# Patient Record
Sex: FEMALE | Race: WHITE | NOT HISPANIC OR LATINO | Employment: OTHER | URBAN - METROPOLITAN AREA
[De-identification: names, ages, dates, MRNs, and addresses within clinical notes are randomized per-mention and may not be internally consistent; named-entity substitution may affect disease eponyms.]

---

## 2018-12-19 ENCOUNTER — AMBULATORY - HEALTHEAST (OUTPATIENT)
Dept: LAB | Facility: CLINIC | Age: 63
End: 2018-12-19

## 2018-12-19 DIAGNOSIS — Z79.899 HIGH RISK MEDICATION USE: ICD-10-CM

## 2018-12-19 DIAGNOSIS — Z13.9 ENCOUNTER FOR SCREENING: ICD-10-CM

## 2018-12-19 LAB — ETHANOL SERPL-MCNC: <10 MG/DL

## 2018-12-24 LAB
MISCELLANEOUS TEST DEPT. - HE HISTORICAL: NORMAL
PERFORMING LAB: NORMAL
SPECIMEN STATUS: NORMAL
TEST NAME: NORMAL

## 2021-05-25 ENCOUNTER — RECORDS - HEALTHEAST (OUTPATIENT)
Dept: ADMINISTRATIVE | Facility: CLINIC | Age: 66
End: 2021-05-25

## 2021-05-26 ENCOUNTER — RECORDS - HEALTHEAST (OUTPATIENT)
Dept: ADMINISTRATIVE | Facility: CLINIC | Age: 66
End: 2021-05-26

## 2021-05-27 ENCOUNTER — RECORDS - HEALTHEAST (OUTPATIENT)
Dept: ADMINISTRATIVE | Facility: CLINIC | Age: 66
End: 2021-05-27

## 2021-05-29 ENCOUNTER — RECORDS - HEALTHEAST (OUTPATIENT)
Dept: ADMINISTRATIVE | Facility: CLINIC | Age: 66
End: 2021-05-29

## 2021-06-01 ENCOUNTER — RECORDS - HEALTHEAST (OUTPATIENT)
Dept: ADMINISTRATIVE | Facility: CLINIC | Age: 66
End: 2021-06-01

## 2021-06-16 PROBLEM — B37.9 CANDIDA INFECTION: Status: ACTIVE | Noted: 2018-10-19

## 2021-06-16 PROBLEM — F23 ACUTE PSYCHOSIS (H): Status: ACTIVE | Noted: 2018-10-14

## 2021-06-16 PROBLEM — F10.20 ALCOHOL USE DISORDER, SEVERE, DEPENDENCE (H): Status: ACTIVE | Noted: 2018-10-31

## 2021-07-21 ENCOUNTER — RECORDS - HEALTHEAST (OUTPATIENT)
Dept: ADMINISTRATIVE | Facility: CLINIC | Age: 66
End: 2021-07-21

## 2021-07-30 ENCOUNTER — OFFICE VISIT (OUTPATIENT)
Dept: FAMILY MEDICINE | Facility: CLINIC | Age: 66
End: 2021-07-30
Payer: COMMERCIAL

## 2021-07-30 ENCOUNTER — TRANSCRIBE ORDERS (OUTPATIENT)
Dept: OTHER | Age: 66
End: 2021-07-30

## 2021-07-30 VITALS
HEART RATE: 72 BPM | WEIGHT: 132.3 LBS | OXYGEN SATURATION: 95 % | HEIGHT: 67 IN | SYSTOLIC BLOOD PRESSURE: 104 MMHG | DIASTOLIC BLOOD PRESSURE: 68 MMHG | BODY MASS INDEX: 20.76 KG/M2

## 2021-07-30 DIAGNOSIS — J44.9 CHRONIC OBSTRUCTIVE PULMONARY DISEASE, UNSPECIFIED COPD TYPE (H): Primary | ICD-10-CM

## 2021-07-30 DIAGNOSIS — M54.50 CHRONIC LOW BACK PAIN: ICD-10-CM

## 2021-07-30 DIAGNOSIS — G89.29 OTHER CHRONIC PAIN: ICD-10-CM

## 2021-07-30 DIAGNOSIS — G89.29 CHRONIC LOW BACK PAIN: ICD-10-CM

## 2021-07-30 DIAGNOSIS — M25.511 PAIN IN RIGHT SHOULDER: Primary | ICD-10-CM

## 2021-07-30 PROCEDURE — 99203 OFFICE O/P NEW LOW 30 MIN: CPT | Performed by: FAMILY MEDICINE

## 2021-07-30 RX ORDER — POTASSIUM CHLORIDE 1500 MG/1
20 TABLET, EXTENDED RELEASE ORAL DAILY
COMMUNITY
Start: 2020-10-20 | End: 2023-03-01

## 2021-07-30 RX ORDER — BUDESONIDE AND FORMOTEROL FUMARATE DIHYDRATE 160; 4.5 UG/1; UG/1
2 AEROSOL RESPIRATORY (INHALATION) 2 TIMES DAILY
Qty: 10.2 G | Refills: 0 | Status: SHIPPED | OUTPATIENT
Start: 2021-07-30 | End: 2023-03-01

## 2021-07-30 RX ORDER — METHADONE HYDROCHLORIDE 5 MG/1
5 TABLET ORAL DAILY
COMMUNITY
Start: 2021-07-07 | End: 2023-03-01

## 2021-07-30 RX ORDER — METHYLPHENIDATE HYDROCHLORIDE 20 MG/1
40 TABLET ORAL 2 TIMES DAILY
COMMUNITY
End: 2023-03-01

## 2021-07-30 RX ORDER — CLONAZEPAM 0.5 MG/1
0.5 TABLET ORAL DAILY
COMMUNITY
End: 2023-03-01

## 2021-07-30 RX ORDER — CLONIDINE HYDROCHLORIDE 0.2 MG/1
0.2 TABLET ORAL 3 TIMES DAILY
COMMUNITY
Start: 2021-07-13 | End: 2023-03-05 | Stop reason: ALTCHOICE

## 2021-07-30 RX ORDER — NYSTATIN 100000 [USP'U]/G
POWDER TOPICAL 3 TIMES DAILY PRN
COMMUNITY
Start: 2021-03-27

## 2021-07-30 RX ORDER — ONDANSETRON 4 MG/1
4 TABLET, ORALLY DISINTEGRATING ORAL PRN
COMMUNITY
Start: 2020-08-14 | End: 2023-03-01

## 2021-07-30 ASSESSMENT — MIFFLIN-ST. JEOR: SCORE: 1159.8
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
## 2023-03-01 ENCOUNTER — APPOINTMENT (OUTPATIENT)
Dept: ULTRASOUND IMAGING | Facility: CLINIC | Age: 68
End: 2023-03-01
Attending: NURSE PRACTITIONER
Payer: COMMERCIAL

## 2023-03-01 ENCOUNTER — HOSPITAL ENCOUNTER (OUTPATIENT)
Facility: CLINIC | Age: 68
Setting detail: OBSERVATION
Discharge: HOME | End: 2023-03-02
Attending: HOSPITALIST | Admitting: HOSPITALIST
Payer: COMMERCIAL

## 2023-03-01 ENCOUNTER — APPOINTMENT (OUTPATIENT)
Dept: RADIOLOGY | Facility: CLINIC | Age: 68
End: 2023-03-01
Attending: NURSE PRACTITIONER
Payer: COMMERCIAL

## 2023-03-01 ENCOUNTER — APPOINTMENT (OUTPATIENT)
Dept: CT IMAGING | Facility: CLINIC | Age: 68
End: 2023-03-01
Attending: NURSE PRACTITIONER
Payer: COMMERCIAL

## 2023-03-01 DIAGNOSIS — R10.9 ABDOMINAL PAIN: ICD-10-CM

## 2023-03-01 DIAGNOSIS — M79.651 PAIN OF RIGHT THIGH: ICD-10-CM

## 2023-03-01 DIAGNOSIS — E83.42 HYPOMAGNESEMIA: Primary | ICD-10-CM

## 2023-03-01 DIAGNOSIS — E87.6 HYPOKALEMIA: ICD-10-CM

## 2023-03-01 DIAGNOSIS — K59.00 OBSTIPATION: ICD-10-CM

## 2023-03-01 LAB
ALBUMIN SERPL-MCNC: 3.7 G/DL (ref 3.5–5)
ALP SERPL-CCNC: 80 U/L (ref 45–120)
ALT SERPL W P-5'-P-CCNC: <9 U/L (ref 0–45)
ANION GAP SERPL CALCULATED.3IONS-SCNC: 14 MMOL/L (ref 5–18)
AST SERPL W P-5'-P-CCNC: 14 U/L (ref 0–40)
BASOPHILS # BLD AUTO: 0 10E3/UL (ref 0–0.2)
BASOPHILS NFR BLD AUTO: 1 %
BILIRUB SERPL-MCNC: 0.4 MG/DL (ref 0–1)
BUN SERPL-MCNC: 13 MG/DL (ref 8–22)
CALCIUM SERPL-MCNC: 8.7 MG/DL (ref 8.5–10.5)
CHLORIDE BLD-SCNC: 102 MMOL/L (ref 98–107)
CO2 SERPL-SCNC: 25 MMOL/L (ref 22–31)
CREAT SERPL-MCNC: 1.13 MG/DL (ref 0.6–1.1)
EOSINOPHIL # BLD AUTO: 0.1 10E3/UL (ref 0–0.7)
EOSINOPHIL NFR BLD AUTO: 3 %
ERYTHROCYTE [DISTWIDTH] IN BLOOD BY AUTOMATED COUNT: 12.6 % (ref 10–15)
GFR SERPL CREATININE-BSD FRML MDRD: 53 ML/MIN/1.73M2
GLUCOSE BLD-MCNC: 93 MG/DL (ref 70–125)
HCT VFR BLD AUTO: 32.3 % (ref 35–47)
HGB BLD-MCNC: 11 G/DL (ref 11.7–15.7)
HOLD SPECIMEN: NORMAL
IMM GRANULOCYTES # BLD: 0 10E3/UL
IMM GRANULOCYTES NFR BLD: 1 %
LACTATE SERPL-SCNC: 1.4 MMOL/L (ref 0.7–2)
LYMPHOCYTES # BLD AUTO: 0.9 10E3/UL (ref 0.8–5.3)
LYMPHOCYTES NFR BLD AUTO: 22 %
MAGNESIUM SERPL-MCNC: 1.7 MG/DL (ref 1.8–2.6)
MAGNESIUM SERPL-MCNC: 1.9 MG/DL (ref 1.8–2.6)
MCH RBC QN AUTO: 31.8 PG (ref 26.5–33)
MCHC RBC AUTO-ENTMCNC: 34.1 G/DL (ref 31.5–36.5)
MCV RBC AUTO: 93 FL (ref 78–100)
MONOCYTES # BLD AUTO: 0.3 10E3/UL (ref 0–1.3)
MONOCYTES NFR BLD AUTO: 6 %
NEUTROPHILS # BLD AUTO: 2.9 10E3/UL (ref 1.6–8.3)
NEUTROPHILS NFR BLD AUTO: 67 %
NRBC # BLD AUTO: 0 10E3/UL
NRBC BLD AUTO-RTO: 0 /100
PLATELET # BLD AUTO: 199 10E3/UL (ref 150–450)
POTASSIUM BLD-SCNC: 2.7 MMOL/L (ref 3.5–5)
PROT SERPL-MCNC: 7 G/DL (ref 6–8)
RBC # BLD AUTO: 3.46 10E6/UL (ref 3.8–5.2)
SODIUM SERPL-SCNC: 141 MMOL/L (ref 136–145)
WBC # BLD AUTO: 4.3 10E3/UL (ref 4–11)

## 2023-03-01 PROCEDURE — 36415 COLL VENOUS BLD VENIPUNCTURE: CPT | Performed by: EMERGENCY MEDICINE

## 2023-03-01 PROCEDURE — 250N000011 HC RX IP 250 OP 636: Performed by: HOSPITALIST

## 2023-03-01 PROCEDURE — 71046 X-RAY EXAM CHEST 2 VIEWS: CPT

## 2023-03-01 PROCEDURE — 83735 ASSAY OF MAGNESIUM: CPT | Performed by: NURSE PRACTITIONER

## 2023-03-01 PROCEDURE — 36415 COLL VENOUS BLD VENIPUNCTURE: CPT | Performed by: HOSPITALIST

## 2023-03-01 PROCEDURE — 83880 ASSAY OF NATRIURETIC PEPTIDE: CPT | Performed by: NURSE PRACTITIONER

## 2023-03-01 PROCEDURE — 83605 ASSAY OF LACTIC ACID: CPT | Performed by: NURSE PRACTITIONER

## 2023-03-01 PROCEDURE — C9113 INJ PANTOPRAZOLE SODIUM, VIA: HCPCS | Performed by: HOSPITALIST

## 2023-03-01 PROCEDURE — 80053 COMPREHEN METABOLIC PANEL: CPT | Performed by: NURSE PRACTITIONER

## 2023-03-01 PROCEDURE — 96372 THER/PROPH/DIAG INJ SC/IM: CPT | Mod: XS | Performed by: NURSE PRACTITIONER

## 2023-03-01 PROCEDURE — 83735 ASSAY OF MAGNESIUM: CPT | Performed by: HOSPITALIST

## 2023-03-01 PROCEDURE — 99285 EMERGENCY DEPT VISIT HI MDM: CPT | Mod: 25

## 2023-03-01 PROCEDURE — 99222 1ST HOSP IP/OBS MODERATE 55: CPT | Performed by: HOSPITALIST

## 2023-03-01 PROCEDURE — 258N000003 HC RX IP 258 OP 636: Performed by: HOSPITALIST

## 2023-03-01 PROCEDURE — 96365 THER/PROPH/DIAG IV INF INIT: CPT

## 2023-03-01 PROCEDURE — 250N000011 HC RX IP 250 OP 636: Performed by: NURSE PRACTITIONER

## 2023-03-01 PROCEDURE — 74177 CT ABD & PELVIS W/CONTRAST: CPT

## 2023-03-01 PROCEDURE — 96375 TX/PRO/DX INJ NEW DRUG ADDON: CPT

## 2023-03-01 PROCEDURE — 93971 EXTREMITY STUDY: CPT | Mod: RT

## 2023-03-01 PROCEDURE — 85025 COMPLETE CBC W/AUTO DIFF WBC: CPT | Performed by: NURSE PRACTITIONER

## 2023-03-01 PROCEDURE — G0378 HOSPITAL OBSERVATION PER HR: HCPCS

## 2023-03-01 PROCEDURE — 250N000013 HC RX MED GY IP 250 OP 250 PS 637: Performed by: NURSE PRACTITIONER

## 2023-03-01 PROCEDURE — 96366 THER/PROPH/DIAG IV INF ADDON: CPT

## 2023-03-01 RX ORDER — QUETIAPINE FUMARATE 300 MG/1
300 TABLET, FILM COATED ORAL AT BEDTIME
Status: DISCONTINUED | OUTPATIENT
Start: 2023-03-01 | End: 2023-03-02 | Stop reason: HOSPADM

## 2023-03-01 RX ORDER — QUETIAPINE FUMARATE 100 MG/1
100 TABLET, FILM COATED ORAL EVERY MORNING
Status: ON HOLD | COMMUNITY
End: 2023-04-06

## 2023-03-01 RX ORDER — PROCHLORPERAZINE MALEATE 5 MG
5 TABLET ORAL EVERY 6 HOURS PRN
Status: DISCONTINUED | OUTPATIENT
Start: 2023-03-01 | End: 2023-03-02 | Stop reason: HOSPADM

## 2023-03-01 RX ORDER — PROCHLORPERAZINE 25 MG
12.5 SUPPOSITORY, RECTAL RECTAL EVERY 12 HOURS PRN
Status: DISCONTINUED | OUTPATIENT
Start: 2023-03-01 | End: 2023-03-02 | Stop reason: HOSPADM

## 2023-03-01 RX ORDER — POTASSIUM CHLORIDE 1500 MG/1
40 TABLET, EXTENDED RELEASE ORAL ONCE
Status: COMPLETED | OUTPATIENT
Start: 2023-03-01 | End: 2023-03-01

## 2023-03-01 RX ORDER — AMLODIPINE BESYLATE 5 MG/1
5 TABLET ORAL DAILY
Status: ON HOLD | COMMUNITY
End: 2023-04-01

## 2023-03-01 RX ORDER — FLUOXETINE 40 MG/1
40 CAPSULE ORAL DAILY
Status: ON HOLD | COMMUNITY
End: 2023-04-06

## 2023-03-01 RX ORDER — POTASSIUM CHLORIDE 1500 MG/1
20 TABLET, EXTENDED RELEASE ORAL 2 TIMES DAILY
Qty: 20 TABLET | Refills: 0 | Status: SHIPPED | OUTPATIENT
Start: 2023-03-01 | End: 2023-03-31

## 2023-03-01 RX ORDER — QUETIAPINE FUMARATE 50 MG/1
100 TABLET, FILM COATED ORAL EVERY MORNING
Status: DISCONTINUED | OUTPATIENT
Start: 2023-03-02 | End: 2023-03-02 | Stop reason: HOSPADM

## 2023-03-01 RX ORDER — ACETAMINOPHEN 325 MG/1
650 TABLET ORAL EVERY 6 HOURS PRN
Status: DISCONTINUED | OUTPATIENT
Start: 2023-03-01 | End: 2023-03-02 | Stop reason: HOSPADM

## 2023-03-01 RX ORDER — BUPRENORPHINE AND NALOXONE 4; 1 MG/1; MG/1
1 FILM, SOLUBLE BUCCAL; SUBLINGUAL 2 TIMES DAILY
COMMUNITY

## 2023-03-01 RX ORDER — GABAPENTIN 300 MG/1
300 CAPSULE ORAL 3 TIMES DAILY
Status: ON HOLD | COMMUNITY
End: 2023-04-01

## 2023-03-01 RX ORDER — FLUTICASONE FUROATE AND VILANTEROL 200; 25 UG/1; UG/1
1 POWDER RESPIRATORY (INHALATION) DAILY
Status: DISCONTINUED | OUTPATIENT
Start: 2023-03-02 | End: 2023-03-01

## 2023-03-01 RX ORDER — BUPRENORPHINE AND NALOXONE 4; 1 MG/1; MG/1
1 FILM, SOLUBLE BUCCAL; SUBLINGUAL 2 TIMES DAILY
Status: DISCONTINUED | OUTPATIENT
Start: 2023-03-01 | End: 2023-03-02 | Stop reason: HOSPADM

## 2023-03-01 RX ORDER — ACETAMINOPHEN 650 MG/1
650 SUPPOSITORY RECTAL EVERY 6 HOURS PRN
Status: DISCONTINUED | OUTPATIENT
Start: 2023-03-01 | End: 2023-03-02 | Stop reason: HOSPADM

## 2023-03-01 RX ORDER — GABAPENTIN 300 MG/1
300 CAPSULE ORAL 3 TIMES DAILY
Status: DISCONTINUED | OUTPATIENT
Start: 2023-03-01 | End: 2023-03-02 | Stop reason: HOSPADM

## 2023-03-01 RX ORDER — AMLODIPINE BESYLATE 5 MG/1
5 TABLET ORAL DAILY
Status: DISCONTINUED | OUTPATIENT
Start: 2023-03-02 | End: 2023-03-02 | Stop reason: HOSPADM

## 2023-03-01 RX ORDER — CLONIDINE HYDROCHLORIDE 0.1 MG/1
0.2 TABLET ORAL 3 TIMES DAILY
Status: DISCONTINUED | OUTPATIENT
Start: 2023-03-01 | End: 2023-03-02 | Stop reason: HOSPADM

## 2023-03-01 RX ORDER — DOCUSATE SODIUM 100 MG/1
100 CAPSULE, LIQUID FILLED ORAL 2 TIMES DAILY
Status: DISCONTINUED | OUTPATIENT
Start: 2023-03-01 | End: 2023-03-02 | Stop reason: HOSPADM

## 2023-03-01 RX ORDER — DOCUSATE SODIUM 100 MG/1
100 CAPSULE, LIQUID FILLED ORAL 2 TIMES DAILY PRN
Status: DISCONTINUED | OUTPATIENT
Start: 2023-03-01 | End: 2023-03-02 | Stop reason: HOSPADM

## 2023-03-01 RX ORDER — CLONIDINE HYDROCHLORIDE 0.2 MG/1
0.2 TABLET ORAL PRN
COMMUNITY
End: 2023-03-05 | Stop reason: ALTCHOICE

## 2023-03-01 RX ORDER — FLUTICASONE PROPIONATE AND SALMETEROL 250; 50 UG/1; UG/1
1 POWDER RESPIRATORY (INHALATION) EVERY 12 HOURS
COMMUNITY
End: 2023-03-30

## 2023-03-01 RX ORDER — ALBUTEROL SULFATE 90 UG/1
2 AEROSOL, METERED RESPIRATORY (INHALATION) EVERY 6 HOURS PRN
Status: DISCONTINUED | OUTPATIENT
Start: 2023-03-01 | End: 2023-03-02 | Stop reason: HOSPADM

## 2023-03-01 RX ORDER — ONDANSETRON 4 MG/1
4 TABLET, ORALLY DISINTEGRATING ORAL EVERY 6 HOURS PRN
Status: DISCONTINUED | OUTPATIENT
Start: 2023-03-01 | End: 2023-03-02 | Stop reason: HOSPADM

## 2023-03-01 RX ORDER — ASPIRIN 81 MG
100 TABLET, DELAYED RELEASE (ENTERIC COATED) ORAL 2 TIMES DAILY PRN
Status: ON HOLD | COMMUNITY
End: 2023-04-15

## 2023-03-01 RX ORDER — MAGNESIUM OXIDE 400 MG/1
400 TABLET ORAL DAILY
Status: DISCONTINUED | OUTPATIENT
Start: 2023-03-02 | End: 2023-03-02 | Stop reason: HOSPADM

## 2023-03-01 RX ORDER — IOPAMIDOL 755 MG/ML
100 INJECTION, SOLUTION INTRAVASCULAR ONCE
Status: COMPLETED | OUTPATIENT
Start: 2023-03-01 | End: 2023-03-01

## 2023-03-01 RX ORDER — MAGNESIUM OXIDE 400 MG/1
400 TABLET ORAL DAILY
COMMUNITY
End: 2023-03-02

## 2023-03-01 RX ORDER — BISACODYL 10 MG
10 SUPPOSITORY, RECTAL RECTAL DAILY PRN
Status: DISCONTINUED | OUTPATIENT
Start: 2023-03-01 | End: 2023-03-02 | Stop reason: HOSPADM

## 2023-03-01 RX ORDER — POTASSIUM CHLORIDE 7.45 MG/ML
10 INJECTION INTRAVENOUS
Status: COMPLETED | OUTPATIENT
Start: 2023-03-01 | End: 2023-03-02

## 2023-03-01 RX ORDER — FUROSEMIDE 20 MG
20 TABLET ORAL DAILY
COMMUNITY
End: 2023-03-05 | Stop reason: ALTCHOICE

## 2023-03-01 RX ORDER — POTASSIUM CHLORIDE 1500 MG/1
20 TABLET, EXTENDED RELEASE ORAL DAILY
Status: DISCONTINUED | OUTPATIENT
Start: 2023-03-02 | End: 2023-03-02 | Stop reason: HOSPADM

## 2023-03-01 RX ORDER — FUROSEMIDE 20 MG
20 TABLET ORAL DAILY
Status: DISCONTINUED | OUTPATIENT
Start: 2023-03-02 | End: 2023-03-02 | Stop reason: HOSPADM

## 2023-03-01 RX ORDER — ONDANSETRON 2 MG/ML
4 INJECTION INTRAMUSCULAR; INTRAVENOUS EVERY 6 HOURS PRN
Status: DISCONTINUED | OUTPATIENT
Start: 2023-03-01 | End: 2023-03-02 | Stop reason: HOSPADM

## 2023-03-01 RX ORDER — SODIUM CHLORIDE 9 MG/ML
INJECTION, SOLUTION INTRAVENOUS CONTINUOUS
Status: DISCONTINUED | OUTPATIENT
Start: 2023-03-01 | End: 2023-03-02 | Stop reason: HOSPADM

## 2023-03-01 RX ORDER — BUDESONIDE AND FORMOTEROL FUMARATE DIHYDRATE 160; 4.5 UG/1; UG/1
2 AEROSOL RESPIRATORY (INHALATION)
Status: DISCONTINUED | OUTPATIENT
Start: 2023-03-02 | End: 2023-03-02 | Stop reason: HOSPADM

## 2023-03-01 RX ADMIN — PANTOPRAZOLE SODIUM 40 MG: 40 INJECTION, POWDER, FOR SOLUTION INTRAVENOUS at 22:22

## 2023-03-01 RX ADMIN — POLYETHYLENE GLYCOL 3350, SODIUM SULFATE ANHYDROUS, SODIUM BICARBONATE, SODIUM CHLORIDE, POTASSIUM CHLORIDE 4000 ML: 236; 22.74; 6.74; 5.86; 2.97 POWDER, FOR SOLUTION ORAL at 22:05

## 2023-03-01 RX ADMIN — METHYLNALTREXONE BROMIDE 12 MG: 12 INJECTION, SOLUTION SUBCUTANEOUS at 22:05

## 2023-03-01 RX ADMIN — IOPAMIDOL 75 ML: 755 INJECTION, SOLUTION INTRAVENOUS at 21:09

## 2023-03-01 RX ADMIN — POTASSIUM CHLORIDE 10 MEQ: 7.46 INJECTION, SOLUTION INTRAVENOUS at 22:05

## 2023-03-01 RX ADMIN — SODIUM CHLORIDE: 9 INJECTION, SOLUTION INTRAVENOUS at 22:05

## 2023-03-01 RX ADMIN — POTASSIUM CHLORIDE 10 MEQ: 7.46 INJECTION, SOLUTION INTRAVENOUS at 23:23

## 2023-03-01 RX ADMIN — POTASSIUM CHLORIDE 40 MEQ: 1500 TABLET, EXTENDED RELEASE ORAL at 21:25

## 2023-03-01 ASSESSMENT — ACTIVITIES OF DAILY LIVING (ADL)
ADLS_ACUITY_SCORE: 35
ADLS_ACUITY_SCORE: 35

## 2023-03-01 ASSESSMENT — ENCOUNTER SYMPTOMS
SHORTNESS OF BREATH: 1
CONSTIPATION: 1
FLANK PAIN: 0
ABDOMINAL PAIN: 1
COUGH: 1
EYE PAIN: 1

## 2023-03-02 VITALS
DIASTOLIC BLOOD PRESSURE: 71 MMHG | RESPIRATION RATE: 15 BRPM | OXYGEN SATURATION: 97 % | HEART RATE: 61 BPM | TEMPERATURE: 97.9 F | BODY MASS INDEX: 21.35 KG/M2 | SYSTOLIC BLOOD PRESSURE: 106 MMHG | HEIGHT: 66 IN

## 2023-03-02 LAB — BNP SERPL-MCNC: 31 PG/ML (ref 0–114)

## 2023-03-02 PROCEDURE — 250N000011 HC RX IP 250 OP 636: Performed by: NURSE PRACTITIONER

## 2023-03-02 PROCEDURE — 96366 THER/PROPH/DIAG IV INF ADDON: CPT

## 2023-03-02 PROCEDURE — G0378 HOSPITAL OBSERVATION PER HR: HCPCS

## 2023-03-02 RX ORDER — MAGNESIUM OXIDE 400 MG/1
400 TABLET ORAL DAILY
Qty: 30 TABLET | Refills: 0 | Status: ON HOLD | OUTPATIENT
Start: 2023-03-02 | End: 2023-04-06

## 2023-03-02 RX ADMIN — POTASSIUM CHLORIDE 10 MEQ: 7.46 INJECTION, SOLUTION INTRAVENOUS at 01:38

## 2023-03-02 RX ADMIN — POTASSIUM CHLORIDE 10 MEQ: 7.46 INJECTION, SOLUTION INTRAVENOUS at 00:30

## 2023-03-02 ASSESSMENT — ACTIVITIES OF DAILY LIVING (ADL)
ADLS_ACUITY_SCORE: 35
ADLS_ACUITY_SCORE: 35

## 2023-03-02 NOTE — PHARMACY-ADMISSION MEDICATION HISTORY
Pharmacy Note - Admission Medication History    Pertinent Provider Information: pt has both symbicort AND wixela and has been taking both;    Pt took additional 40 mg of lasix today.       ______________________________________________________________________    Prior To Admission (PTA) med list completed and updated in EMR.       PTA Med List   Medication Sig Note Last Dose     albuterol (PROAIR HFA;PROVENTIL HFA;VENTOLIN HFA) 90 mcg/actuation inhaler [ALBUTEROL (PROAIR HFA;PROVENTIL HFA;VENTOLIN HFA) 90 MCG/ACTUATION INHALER] Inhale 2 puffs every 6 (six) hours as needed for wheezing.  PRN at has with     amLODIPine (NORVASC) 5 MG tablet Take 5 mg by mouth daily  3/1/2023 at am     budesonide-formoterol (SYMBICORT) 160-4.5 MCG/ACT Inhaler Inhale 2 puffs into the lungs 2 times daily  3/1/2023 at x2.  has with     buprenorphine HCl-naloxone HCl (SUBOXONE) 4-1 MG per film Place 1 Film under the tongue 2 times daily  3/1/2023 at x2     cholecalciferol, vitamin D3, 1,000 unit tablet [CHOLECALCIFEROL, VITAMIN D3, 1,000 UNIT TABLET] Take 1 tablet (1,000 Units total) by mouth daily.  3/1/2023     cloNIDine (CATAPRES) 0.2 MG tablet Take 0.2 mg by mouth as needed  Unknown     cloNIDine (CATAPRES) 0.2 MG tablet Take 0.2 mg by mouth 3 times daily  3/1/2023 at 1200     docusate sodium (COLACE) 100 MG capsule [DOCUSATE SODIUM (COLACE) 100 MG CAPSULE] Take 1 capsule (100 mg total) by mouth 2 (two) times a day.  3/1/2023 at 2     docusate sodium (COLACE) 100 MG tablet Take 100 mg by mouth 2 times daily as needed for constipation  Past Week     FLUoxetine (PROZAC) 40 MG capsule Take 40 mg by mouth daily  3/1/2023     fluticasone-salmeterol (ADVAIR) 250-50 MCG/ACT inhaler Inhale 1 puff into the lungs every 12 hours 3/1/2023: Pt has BOTH symbicort and fluticasone/salmeterol with her and states she is using both 3/1/2023 at x2.  has with     furosemide (LASIX) 20 MG tablet Take 20 mg by mouth daily  3/1/2023 at took additional 40 mg  today     gabapentin (NEURONTIN) 300 MG capsule Take 300 mg by mouth 3 times daily 3/1/2023: Pt states she takes 900 mg TID.  Seems unlikely given recent fills for 300 mg #90 = 30 days supply  3/1/2023 at 1200     magnesium oxide (MAG-OX) 400 MG tablet Take 400 mg by mouth daily  3/1/2023     NYSTOP 726310 UNIT/GM powder 3 times daily as needed  PRN     omeprazole (PRILOSEC) 20 MG DR capsule Take 20 mg by mouth At Bedtime  2/28/2023     potassium chloride ER (KLOR-CON M) 20 MEQ CR tablet Take 20 mEq by mouth daily  2/28/2023     QUEtiapine (SEROQUEL) 100 MG tablet Take 100 mg by mouth every morning  3/1/2023     QUEtiapine (SEROQUEL) 100 MG tablet [QUETIAPINE (SEROQUEL) 100 MG TABLET] Take 3 tablets (300 mg total) by mouth at bedtime.  2/28/2023       Information source(s): Patient, Hospital records and [facility]  Method of interview communication: in-person    Summary of Changes to PTA Med List  New: lasix, magnesium, amlodipine  Discontinued: trazodone, methadone, spiriva, imitrex, zofran, oxybutynin, ritalin, clonazepam  Changed: updated gabapentin dosing; clonidine dosing, suboxone dosing    Patient was asked about OTC/herbal products specifically.  PTA med list reflects this.    In the past week, patient estimated taking medication this percent of the time:  greater than 90%.    Medication Affordability:  Not including over the counter (OTC) medications, was there a time in the past 12 months when you did not take your medications as prescribed because of cost?: Yes  Has this happened in the past 3 months?: Yes (seroquel and others;)    Allergies were reviewed, assessed, and updated with the patient.      Medications available for use during hospital stay: inhalers.     The information provided in this note is only as accurate as the sources available at the time of the update(s).    Thank you for the opportunity to participate in the care of this patient.    Marcella Cisneros [facility]  3/1/2023 10:45  PM

## 2023-03-02 NOTE — ED NOTES
"Requiring frequent reminders to continue drinking golytely. Has not consumed half of it yet. Pt states, \"I'm trying but I can't keep it down. I'm full.\" No emesis, no BM.     Finishing potassium. Remains on monitor in the meantime.  "

## 2023-03-02 NOTE — ED PROVIDER NOTES
"Emergency Department Midlevel Supervisory Note     I personally saw the patient and performed a substantive portion of the visit including all aspects of the medical decision making.    ED Course:  José Miguel Alas staffed patient with me. I agree with their assessment and plan of management, and I will see the patient.   I met with the patient to introduce myself, gather additional history, perform my initial exam, and discuss the plan.     Brief HPI:     Renetta Farooq is a 67 year old female who presents for evaluation of constipation.  She has had no bowel movement for 2 weeks.  Her abdomen is distended she appears uncomfortable    Brief Physical Exam: /71   Pulse 82   Temp 97.9  F (36.6  C) (Temporal)   Resp 18   Ht 1.676 m (5' 6\")   SpO2 97%   BMI 21.35 kg/m    Constitutional:  Alert, in no acute distress  EYES: Conjunctivae clear  HENT:  Atraumatic, normocephalic  Respiratory:  Respirations even, unlabored, in no acute respiratory distress  Cardiovascular:  Regular rate and rhythm, good peripheral perfusion  GI:  Abdomen distended and uncomfortable  Musculoskeletal:  No edema. No cyanosis. Range of motion major extremities intact.    Integument: Warm, Dry, No erythema, No rash.   Neurologic:  Alert & oriented, no focal deficits noted  Psych: Normal mood and affect     MDM:  Patient evaluated for constipation.  She has complicated past medical history.  Her vital signs are normal.  She appears uncomfortable.  She has some abdominal distention.  Laboratory investigation is unremarkable.  CT shows some evidence of obstipation inflammation.  Patient will be given GoLytely and admitted to the hospitalist       1. Abdominal pain    2. Hypokalemia    3. Pain of right thigh    4. Obstipation        Labs and Imaging:  Results for orders placed or performed during the hospital encounter of 03/01/23   CT Abdomen Pelvis w Contrast    Impression    IMPRESSION:   1.  Moderate stool burden throughout the proximal " colon, suggestive of constipation.  2.  Volvulus morphology of the central mesentery, without evidence of bowel obstruction.  3.  Distal esophageal wall thickening, suggestive of a distal esophagitis.  4.  Indeterminate 2 mm left lower lobe pulmonary nodule. Follow-up is recommended according to Fleischner criteria, as detailed below.    Fleischner Society Recommendations for Pulmonary Nodules    Nodule size less than 6 mm:     Single or multiple nodules:     Nodules < 6 mm do not require routine follow-up, but certain patients at high risk with suspicious nodule morphology, upper lobe location, or both may warrant 12-month follow-up.   US Lower Extremity Venous Duplex Right    Impression    IMPRESSION:  1.  No deep venous thrombosis in the right lower extremity.   Chest XR,  PA & LAT    Impression    IMPRESSION: Heart is normal in size. Lungs are clear.   Extra Green Top (Lithium Heparin) Tube   Result Value Ref Range    Hold Specimen JIC    Extra Purple Top Tube   Result Value Ref Range    Hold Specimen JIC    Extra Green Top (Lithium Heparin) ON ICE   Result Value Ref Range    Hold Specimen     Comprehensive metabolic panel   Result Value Ref Range    Sodium 141 136 - 145 mmol/L    Potassium 2.7 (LL) 3.5 - 5.0 mmol/L    Chloride 102 98 - 107 mmol/L    Carbon Dioxide (CO2) 25 22 - 31 mmol/L    Anion Gap 14 5 - 18 mmol/L    Urea Nitrogen 13 8 - 22 mg/dL    Creatinine 1.13 (H) 0.60 - 1.10 mg/dL    Calcium 8.7 8.5 - 10.5 mg/dL    Glucose 93 70 - 125 mg/dL    Alkaline Phosphatase 80 45 - 120 U/L    AST 14 0 - 40 U/L    ALT <9 0 - 45 U/L    Protein Total 7.0 6.0 - 8.0 g/dL    Albumin 3.7 3.5 - 5.0 g/dL    Bilirubin Total 0.4 0.0 - 1.0 mg/dL    GFR Estimate 53 (L) >60 mL/min/1.73m2   Lactic acid whole blood   Result Value Ref Range    Lactic Acid 1.4 0.7 - 2.0 mmol/L   CBC with platelets and differential   Result Value Ref Range    WBC Count 4.3 4.0 - 11.0 10e3/uL    RBC Count 3.46 (L) 3.80 - 5.20 10e6/uL    Hemoglobin  11.0 (L) 11.7 - 15.7 g/dL    Hematocrit 32.3 (L) 35.0 - 47.0 %    MCV 93 78 - 100 fL    MCH 31.8 26.5 - 33.0 pg    MCHC 34.1 31.5 - 36.5 g/dL    RDW 12.6 10.0 - 15.0 %    Platelet Count 199 150 - 450 10e3/uL    % Neutrophils 67 %    % Lymphocytes 22 %    % Monocytes 6 %    % Eosinophils 3 %    % Basophils 1 %    % Immature Granulocytes 1 %    NRBCs per 100 WBC 0 <1 /100    Absolute Neutrophils 2.9 1.6 - 8.3 10e3/uL    Absolute Lymphocytes 0.9 0.8 - 5.3 10e3/uL    Absolute Monocytes 0.3 0.0 - 1.3 10e3/uL    Absolute Eosinophils 0.1 0.0 - 0.7 10e3/uL    Absolute Basophils 0.0 0.0 - 0.2 10e3/uL    Absolute Immature Granulocytes 0.0 <=0.4 10e3/uL    Absolute NRBCs 0.0 10e3/uL   Result Value Ref Range    Magnesium 1.7 (L) 1.8 - 2.6 mg/dL     I have reviewed the relevant laboratory and radiology studies    Romero Low MD  [facility] EMERGENCY ROOM  [address] [address] [phone]     Romero Low MD  03/01/23 [id number]

## 2023-03-02 NOTE — ED PROVIDER NOTES
EMERGENCY DEPARTMENT ENCOUNTER      NAME: Renetta Farooq  AGE: 67 year old female  YOB: 1955  MRN: [id number]  EVALUATION DATE & TIME: No admission date for patient encounter.    PCP: Jamila Connelly    ED PROVIDER: ZEUS Pozo, CNP      Chief Complaint   Patient presents with     Constipation     Rectal Bleeding     Bloated         FINAL IMPRESSION:  1. Abdominal pain    2. Hypokalemia    3. Pain of right thigh    4. Obstipation          ED COURSE & MEDICAL DECISION MAKIN:20 PM I met with the patient, obtained history, performed an initial exam, and discussed options and plan for treatment here in the ED.  9:40 PM Updated the patient about labs and imaging results. We discussed plans for admission. Staffed with Dr Low.  9:45 PM Spoke with hospitalist Dr. Fischer who accepts the patient for admission.     Pertinent Labs & Imaging studies reviewed. (See chart for details)  67 year old female presents to the Emergency Department for evaluation of abdominal pain and constipation.  On Suboxone and Lasix.  Constipation likely precipitated by opioid use and dehydration.  She had been feeling bloated recently and increased her Lasix.  Did run out of her potassium replacement and potassium today 2.7.  Ordered p.o. and IV replacement.  Significant stool burden noted in the right colon.  Unlikely that enema would be helpful.  Ordered Relistor and GoLytely for treatment of severe constipation.  Will be kept observation for constipation management and potassium replacement.      Medical Decision Making    History:    Supplemental history from: Documented in chart, if applicable    External Record(s) reviewed: Documented in chart, if applicable. and Other: Inpatient and Outpatient computer records reviewed    Work Up:    Chart documentation includes differential considered and any EKGs or imaging independently interpreted by provider, where specified.    In additional to work up documented, I  considered the following work up: Documented in chart, if applicable.    External consultation:    Discussion of management with another provider: Documented in chart, if applicable and Hospitalist    Complicating factors:    Care impacted by chronic illness: N/A    Care affected by social determinants of health: Housing Insecurity    Disposition considerations: Admit.        At the conclusion of the encounter I discussed the results of all of the tests and the disposition. The questions were answered. The patient or family acknowledged understanding and was agreeable with the care plan.       0 minutes of critical care time     MEDICATIONS GIVEN IN THE EMERGENCY:  Medications   methylnaltrexone (RELISTOR) injection 12 mg (has no administration in time range)   polyethylene glycol (GoLYTELY) suspension 4,000 mL (has no administration in time range)   potassium chloride 10 mEq in 100 mL sterile water infusion (has no administration in time range)   potassium chloride ER (KLOR-CON M) CR tablet 40 mEq (40 mEq Oral $Given 3/1/23 2125)   iopamidol (ISOVUE-370) solution 100 mL (75 mLs Intravenous $Given 3/1/23 2109)       NEW PRESCRIPTIONS STARTED AT TODAY'S ER VISIT  New Prescriptions    No medications on file            =================================================================    Patient information was obtained from: Patient     Use of Intrepreter: N/A     Limitations to History: None     HPI    Renetta Farooq is a 67 year old female with a history of acute diastolic heart failure, bipolar disorder I, degenerative disc disease (cervical), cocaine use disorder, methamphetamine dependence, alcohol dependence, who presents via EMS for evaluation of constipation and rectal bleeding.     The patient was evicted from her previous apartment on 2/14 due to being behind on payments. She found an extended stay place in [place] where she currently reside since 2/21. The patient has been constipated since 2/14 where she  "endorses diffuse abdominal pain, greater on right side. The patient is bloated and notes that she has been \"doubling and even tripling\" intake of lasix due to feeling bloated. She is unable to pass gas. She denies history of constipation. The patient endorses rectal bleeding. She describe the bleeding as bright red with clots. She did a digital rectum exam on herself and reports she did not see any feces. Prior to constipation, she states having a bowel movement every morning. The patient reports difficulty urinating. She has to cough aggressively to get some urine out. About 3 days ago she notice right leg swelling. She explain having a vein pain on her right inner thigh that radiates up to her groin area which is a similar pain when she was pregnant. She reports history of blood clots. The patients appendix and gallbladder is still intact. She endorses bilateral ear ringing. She shortness of breath with movement. Denies any other complaints or concerns at the moment.     External Records Reviewed: I reviewed recent and relevant  Inpatient notes, Outpatient notes, Outpatient labs / studies, Care Everywhere, PDMP, External ED, Other    Review of Systems   Eyes: Positive for pain (with ringing).   Respiratory: Positive for cough and shortness of breath (with movement).    Cardiovascular: Positive for leg swelling (right).   Gastrointestinal: Positive for abdominal pain (diffuse, greater on right side) and constipation.        Positive for unable to pass gas   Genitourinary: Negative for flank pain.        Positive for difficulty urinating  Positive for rectal bleeding (bright red with clots)   Musculoskeletal:        Positive for right inner thing pain that radiates up to her groin area   All other systems reviewed and are negative.      PAST MEDICAL HISTORY:  Past Medical History:   Diagnosis Date     Acute psychosis (H) 10/14/2018     Alcohol dependence (H) 4/10/2015     Bipolar affective disorder, manic, severe, " with psychotic behavior (H) 10/15/2018     Candida infection 10/19/2018     Chronic hepatitis C (H) 4/10/2015     Chronic neck pain      Closed traumatic brain injury, without loss of consciousness, sequela (H)      Dental abscess      Episodic tension-type headache, not intractable      Methamphetamine dependence (H) 10/15/2018     Pancytopenia (H) 4/10/2015     Weakness of right arm 4/10/2015       PAST SURGICAL HISTORY:  Past Surgical History:   Procedure Laterality Date     ARTHROSCOPY SHOULDER ROTATOR CUFF REPAIR Bilateral      CERVICAL FUSION      twice     COSMETIC SURGERY       RHINOPLASTY       TONSILLECTOMY             CURRENT MEDICATIONS:    Current Facility-Administered Medications   Medication     methylnaltrexone (RELISTOR) injection 12 mg     polyethylene glycol (GoLYTELY) suspension 4,000 mL     potassium chloride 10 mEq in 100 mL sterile water infusion     Current Outpatient Medications   Medication     albuterol (PROAIR HFA;PROVENTIL HFA;VENTOLIN HFA) 90 mcg/actuation inhaler     budesonide-formoterol (SYMBICORT) 160-4.5 MCG/ACT Inhaler     buprenorphine-naloxone (SUBOXONE) 4-1 mg Film     buprenorphine-naloxone (SUBOXONE) 8-2 mg Film per sublingual film     cholecalciferol, vitamin D3, 1,000 unit tablet     clonazePAM (KLONOPIN) 0.5 MG tablet     cloNIDine (CATAPRES) 0.1 MG tablet     docusate sodium (COLACE) 100 MG capsule     FLUoxetine (PROZAC) 20 MG capsule     gabapentin (NEURONTIN) 100 MG capsule     methadone (DOLOPHINE) 5 MG tablet     methylphenidate (RITALIN) 20 MG tablet     NYSTOP 108157 UNIT/GM powder     omeprazole (PRILOSEC) 20 MG DR capsule     ondansetron (ZOFRAN-ODT) 4 MG ODT tab     oxybutynin (DITROPAN) 5 MG tablet     potassium chloride ER (KLOR-CON M) 20 MEQ CR tablet     QUEtiapine (SEROQUEL) 100 MG tablet     SUMAtriptan (IMITREX) 50 MG tablet     tiotropium (SPIRIVA) 18 mcg inhalation capsule     traZODone (DESYREL) 50 MG tablet         ALLERGIES:  No Known  "Allergies      VITALS:  Patient Vitals for the past 24 hrs:   BP Temp Temp src Pulse Resp SpO2 Height   03/01/23 1926 106/71 97.9  F (36.6  C) Temporal 82 18 97 % 1.676 m (5' 6\")       PHYSICAL EXAM    Constitutional:  alert, no distress  EYES: Conjunctivae clear  HENT:  Atraumatic, normocephalic  Respiratory:  No respiratory distress, normal breath sounds  Cardiovascular:  Normal rate, normal rhythm, no murmurs  GI:  Firm and distended. Right sided tenderness. no rebound, no guarding   Integument: Warm, Dry, No erythema, No rash.   Neurologic:  Alert & oriented x 3              LAB:  All pertinent labs reviewed and interpreted.  Labs Ordered and Resulted from Time of ED Arrival to Time of ED Departure   COMPREHENSIVE METABOLIC PANEL - Abnormal       Result Value    Sodium 141      Potassium 2.7 (*)     Chloride 102      Carbon Dioxide (CO2) 25      Anion Gap 14      Urea Nitrogen 13      Creatinine 1.13 (*)     Calcium 8.7      Glucose 93      Alkaline Phosphatase 80      AST 14      ALT <9      Protein Total 7.0      Albumin 3.7      Bilirubin Total 0.4      GFR Estimate 53 (*)    CBC WITH PLATELETS AND DIFFERENTIAL - Abnormal    WBC Count 4.3      RBC Count 3.46 (*)     Hemoglobin 11.0 (*)     Hematocrit 32.3 (*)     MCV 93      MCH 31.8      MCHC 34.1      RDW 12.6      Platelet Count 199      % Neutrophils 67      % Lymphocytes 22      % Monocytes 6      % Eosinophils 3      % Basophils 1      % Immature Granulocytes 1      NRBCs per 100 WBC 0      Absolute Neutrophils 2.9      Absolute Lymphocytes 0.9      Absolute Monocytes 0.3      Absolute Eosinophils 0.1      Absolute Basophils 0.0      Absolute Immature Granulocytes 0.0      Absolute NRBCs 0.0     MAGNESIUM - Abnormal    Magnesium 1.7 (*)    LACTIC ACID WHOLE BLOOD - Normal    Lactic Acid 1.4     B-TYPE NATRIURETIC PEPTIDE ( EAST ONLY)         RADIOLOGY:  Reviewed all pertinent imaging. Please see official radiology report.  CT Abdomen Pelvis w Contrast "   Final Result   IMPRESSION:    1.  Moderate stool burden throughout the proximal colon, suggestive of constipation.   2.  Volvulus morphology of the central mesentery, without evidence of bowel obstruction.   3.  Distal esophageal wall thickening, suggestive of a distal esophagitis.   4.  Indeterminate 2 mm left lower lobe pulmonary nodule. Follow-up is recommended according to Fleischner criteria, as detailed below.      Fleischner Society Recommendations for Pulmonary Nodules      Nodule size less than 6 mm:       Single or multiple nodules:       Nodules < 6 mm do not require routine follow-up, but certain patients at high risk with suspicious nodule morphology, upper lobe location, or both may warrant 12-month follow-up.      Chest XR,  PA & LAT   Final Result   IMPRESSION: Heart is normal in size. Lungs are clear.      US Lower Extremity Venous Duplex Right   Final Result   IMPRESSION:   1.  No deep venous thrombosis in the right lower extremity.                PROCEDURES:   None      I, Bernie Steiner, am serving as a scribe to document services personally performed by Vu Alas CNP. based on my observation and the provider's statements to me. IVu CNP attest that Bernie  is acting in a scribe capacity, has observed my performance of the services and has documented them in accordance with my direction.    ZEUS Pozo, CNP  Emergency Services  [facility] EMERGENCY ROOM  [address] [place] [id number] [phone]  Dept: [phone]           Vu Alas APRN CNP  03/01/23 8690

## 2023-03-02 NOTE — ED TRIAGE NOTES
"Here by [place] [employer] for constipation since 2/14. Patient reports being unable to pass gas, distention, pain on right side of abdomen that describes as sharp, and passing bright red clots from rectum.   Is taking lasix for CHF, per EMS, patient has been \"doubling and even tripling\" intake of lasix due to feeling bloated.   Patient is not on blood thinners.   Has been taking suboxone at home for pain with no relief.      Triage Assessment     Row Name 03/01/23 1927       Triage Assessment (Adult)    Airway WDL WDL       Respiratory WDL    Respiratory WDL WDL       Skin Circulation/Temperature WDL    Skin Circulation/Temperature WDL WDL       Cardiac WDL    Cardiac WDL WDL       Peripheral/Neurovascular WDL    Peripheral Neurovascular WDL WDL       Cognitive/Neuro/Behavioral WDL    Cognitive/Neuro/Behavioral WDL WDL              "

## 2023-03-02 NOTE — PROGRESS NOTES
Hospitalist follow up note:    After completing admission orders pt requested to be discharged to home as there are no beds here and she would be staying in the hallway. I think this is reasonable as she is tolerating oral intake and is not toxic appearing. She is to complete the order for golytely. She will also complete IV potassium replacement. She should resume her usual medications and follow up with primary in 2 days. She should resume lasix at prescribed dose and her supplemental potassium as well which was rx'd so that she has supply. Did not prescribe relistor as it is not clear yet that it will be effective. She is advised to return should her abdominal pain worsen or she is unable to tolerate oral intake.     Harrison Fischer, DO   Pager #: [phone]

## 2023-03-02 NOTE — H&P
[facility] MEDICINE ADMISSION HISTORY AND PHYSICAL     Brief Synopsis:     Renetta Farooq is a 67 year old female who presented with complaints of abdominal distention, unable to pass gas, right-sided abdominal pain, bright red blood per rectum.    Medical history is notable for opiate dependence with Suboxone chronically, chronic hepatitis C, methamphetamine abuse history, history of alcohol dependence, bipolar disorder.    Initial evaluation revealed normal vital signs, low potassium of 2.7, creatinine 1.13, normal white count, hemoglobin 11.0.  CT abdomen pelvis with moderate stool burden, volvulus morphology of the central mesentery without evidence of obstruction, distal esophageal wall thickening suggestive of esophagitis, tiny left lower lobe pulmonary nodule.  Lower extremity ultrasound negative for DVT.    Initial treatment included methylnaltrexone, GoLytely, potassium supplement.    Assessment and Plan:  Obstipation  Bright red blood per rectum  Distal esophagitis on CT  Likely opioid induced constipation  Imaging with volvulus morphology without evidence of obstruction, normal lactic acid  Getting methylnaltrexone in the emergency department  Also getting GoLytely in the ED  Monitor for response  Serial abdominal exams  Repeat lactic acid in the morning  Trend hemoglobin, white count  Start Protonix for esophagitis  Could rx methylnatrexone 12 every other day prn at discharge if effective  Will ask for GI consult given volvulus morphology, ?sigmoidoscopy    Chronic CHF  Essential hypertension  On lasix  Does have increased leg swelling, says wt is up 15lbs  Check BNP  Monitor fluid status  Recent normal ejection fraction, normal stress test    Chronic pain, on Suboxone  Also on gabapentin    Bipolar disorder  Takes Seroquel      Clinically Significant Risk Factors Present on Admission        # Hypokalemia: Lowest K = 2.7 mmol/L in last 2 days, will replace as needed          "  # Hypertension: home medication list includes antihypertensive(s)                  Disposition Plan      Expected Discharge Date: 03/02/2023               Chief Complaint  abdominal distention and pain     HISTORY   Renetta Farooq is a 67 year old female who presented with complaints of constipation.    Per ED provider:  Renetta Farooq is a 67 year old female with a history of acute diastolic heart failure, bipolar disorder I, degenerative disc disease (cervical), cocaine use disorder, methamphetamine dependence, alcohol dependence, who presents via EMS for evaluation of constipation and rectal bleeding.      The patient was evicted from her previous apartment on 2/14 due to being behind on payments. She found an extended stay place in [place] where she currently reside since 2/21. The patient has been constipated since 2/14 where she endorses diffuse abdominal pain, greater on right side. The patient is bloated and notes that she has been \"doubling and even tripling\" intake of lasix due to feeling bloated. She is unable to pass gas. She denies history of constipation. The patient endorses rectal bleeding. She describe the bleeding as bright red with clots. She did a digital rectum exam on herself and reports she did not see any feces. Prior to constipation, she states having a bowel movement every morning. The patient reports difficulty urinating. She has to cough aggressively to get some urine out. About 3 days ago she notice right leg swelling. She explain having a vein pain on her right inner thigh that radiates up to her groin area which is a similar pain when she was pregnant. She reports history of blood clots. The patients appendix and gallbladder is still intact. She endorses bilateral ear ringing. She shortness of breath with movement. Denies any other complaints or concerns at the moment.     She complains of abdominal pain, distention in the right side of her abdomen, bright red blood per rectum, " "nausea and vomiting.  No fever or chills.  She does feel short of breath.  Also describes increased lower extremity swelling.  She said that she was recently admitted at [facility] for heart failure.  She has been taking increased doses of Lasix for her leg swelling and shortness of breath.  Past Medical History     Past Medical History:  10/14/2018: Acute psychosis (H)  4/10/2015: Alcohol dependence (H)  10/15/2018: Bipolar affective disorder, manic, severe, with psychotic   behavior (H)  10/19/2018: Candida infection  4/10/2015: Chronic hepatitis C (H)  No date: Chronic neck pain  No date: Closed traumatic brain injury, without loss of consciousness,   sequela (H)  No date: Dental abscess  No date: Episodic tension-type headache, not intractable  10/15/2018: Methamphetamine dependence (H)  4/10/2015: Pancytopenia (H)  4/10/2015: Weakness of right arm     Surgical History     Past Surgical History:   Procedure Laterality Date     ARTHROSCOPY SHOULDER ROTATOR CUFF REPAIR Bilateral      CERVICAL FUSION      twice     COSMETIC SURGERY       RHINOPLASTY       TONSILLECTOMY       Family History      Family History   Problem Relation Age of Onset     Narcolepsy Mother      Acute myelogenous leukemia Mother      Cancer Father       Social History      Social History     Tobacco Use     Smoking status: Every Day     Packs/day: 1.00     Years: 42.00     Pack years: 42.00     Types: Cigarettes     Smokeless tobacco: Never   Substance Use Topics     Alcohol use: Yes     Comment: Alcoholic Drinks/day: \"A lot.\"  (Couldn't quantify except to repeat \"A lot\" of wine, liquor and beer)     Drug use: No      Allergies   No Known Allergies  Prior to Admission Medications      Prior to Admission Medications   Prescriptions Last Dose Informant Patient Reported? Taking?   FLUoxetine (PROZAC) 20 MG capsule   No No   Sig: [FLUOXETINE (PROZAC) 20 MG CAPSULE] Take 2 capsules (40 mg total) by mouth daily.   NYSTOP 328333 UNIT/GM powder  "  Yes No   Sig: APPLY UNDER BREAST 2-3 TIMES DAILY AS DIRECTED   QUEtiapine (SEROQUEL) 100 MG tablet   No No   Sig: [QUETIAPINE (SEROQUEL) 100 MG TABLET] Take 3 tablets (300 mg total) by mouth at bedtime.   SUMAtriptan (IMITREX) 50 MG tablet   No No   Sig: [SUMATRIPTAN (IMITREX) 50 MG TABLET] Take 1 tablet (50 mg total) by mouth every 2 (two) hours as needed for migraine No more than 2 tablets in 24 hrs..   albuterol (PROAIR HFA;PROVENTIL HFA;VENTOLIN HFA) 90 mcg/actuation inhaler   No No   Sig: [ALBUTEROL (PROAIR HFA;PROVENTIL HFA;VENTOLIN HFA) 90 MCG/ACTUATION INHALER] Inhale 2 puffs every 6 (six) hours as needed for wheezing.   budesonide-formoterol (SYMBICORT) 160-4.5 MCG/ACT Inhaler   No No   Sig: Inhale 2 puffs into the lungs 2 times daily   buprenorphine-naloxone (SUBOXONE) 4-1 mg Film   No No   Sig: [BUPRENORPHINE-NALOXONE (SUBOXONE) 4-1 MG FILM] Place 1 Film under the tongue daily Take 1 film mid-day.   Patient not taking: Reported on 2021   buprenorphine-naloxone (SUBOXONE) 8-2 mg Film per sublingual film   No No   Sig: [BUPRENORPHINE-NALOXONE (SUBOXONE) 8-2 MG FILM PER SUBLINGUAL FILM] Place 1 Film under the tongue 2 (two) times a day 1 film morning and evening..   cholecalciferol, vitamin D3, 1,000 unit tablet   No No   Sig: [CHOLECALCIFEROL, VITAMIN D3, 1,000 UNIT TABLET] Take 1 tablet (1,000 Units total) by mouth daily.   cloNIDine (CATAPRES) 0.1 MG tablet   Yes No   Si.3 mg 3 times daily as needed   clonazePAM (KLONOPIN) 0.5 MG tablet   Yes No   Sig: Take 0.5 mg by mouth daily   docusate sodium (COLACE) 100 MG capsule   No No   Sig: [DOCUSATE SODIUM (COLACE) 100 MG CAPSULE] Take 1 capsule (100 mg total) by mouth 2 (two) times a day.   gabapentin (NEURONTIN) 100 MG capsule   No No   Sig: [GABAPENTIN (NEURONTIN) 100 MG CAPSULE] Take 100 mg by mouth 3 (three) times a day.   Patient taking differently: Take 300 mg by mouth 3 times daily    methadone (DOLOPHINE) 5 MG tablet   Yes No   Sig: Take 5  mg by mouth daily   methylphenidate (RITALIN) 20 MG tablet   Yes No   Sig: Take 40 mg by mouth 2 times daily   omeprazole (PRILOSEC) 20 MG DR capsule   Yes No   Sig: Take 20 mg by mouth daily   ondansetron (ZOFRAN-ODT) 4 MG ODT tab   Yes No   Sig: Take 4 mg by mouth as needed   oxybutynin (DITROPAN) 5 MG tablet   No No   Sig: [OXYBUTYNIN (DITROPAN) 5 MG TABLET] Take 1 tablet (5 mg total) by mouth 3 (three) times a day.   potassium chloride ER (KLOR-CON M) 20 MEQ CR tablet   Yes No   Sig: daily   tiotropium (SPIRIVA) 18 mcg inhalation capsule   No No   Sig: [TIOTROPIUM (SPIRIVA) 18 MCG INHALATION CAPSULE] Place 1 capsule (2 puffs total) into inhaler and inhale daily.   traZODone (DESYREL) 50 MG tablet   No No   Sig: [TRAZODONE (DESYREL) 50 MG TABLET] Take 1 tablet (50 mg total) by mouth at bedtime as needed, may repeat once for sleep (melatonin augmentation or failure).   Patient not taking: Reported on 7/30/2021      Facility-Administered Medications: None      Review of Systems     A 12 point comprehensive review of systems was negative except as noted above in HPI.    PHYSICAL EXAMINATION     Vitals      Temp:  [97.9  F (36.6  C)] 97.9  F (36.6  C)  Pulse:  [82] 82  Resp:  [18] 18  BP: (106)/(71) 106/71  SpO2:  [97 %] 97 %    Examination   Physical Exam:    Gen: no acute distress, comfortable, alert, pleasant  ENT: no scleral icterus  Pulm: lungs are clear without wheezing, crackles, breathing comfortably at rest  CV: regular rate and rhythm, mild bilateral lower extremity pitting edema  GI: abdomen is soft, slightly distended, no guarding to palpation  MSK: no obvious deformities of the extremities  Derm: Not pale, no jaundice  Psych: appropriate affect      Pertinent Radiology     Radiology Results:   Recent Results (from the past 24 hour(s))   US Lower Extremity Venous Duplex Right    Narrative    EXAM: US LOWER EXTREMITY VENOUS DUPLEX RIGHT  LOCATION: [facility]  DATE/TIME: 3/1/2023  9:05 PM    INDICATION: Right leg pain.  COMPARISON: None.  TECHNIQUE: Venous Duplex ultrasound of the right lower extremity with and without compression, augmentation and duplex. Color flow and spectral Doppler with waveform analysis performed.    FINDINGS: Exam includes the common femoral, femoral, popliteal, and contralateral common femoral veins as well as segmentally visualized deep calf veins and greater saphenous vein.     RIGHT: No deep vein thrombosis. No superficial thrombophlebitis. No popliteal cyst.      Impression    IMPRESSION:  1.  No deep venous thrombosis in the right lower extremity.   Chest XR,  PA & LAT    Narrative    EXAM: XR CHEST 2 VIEWS  LOCATION: [facility]  DATE/TIME: 3/1/2023 9:07 PM    INDICATION: dyspnea  COMPARISON: 02/09/2023.      Impression    IMPRESSION: Heart is normal in size. Lungs are clear.   CT Abdomen Pelvis w Contrast    Narrative    EXAM: CT ABDOMEN PELVIS W CONTRAST  LOCATION: [facility]  DATE/TIME: 3/1/2023 9:16 PM    INDICATION: Abdominal pain and constipation.  COMPARISON: CT chest abdomen pelvis 01/22/2023; CT abdomen/pelvis 12/16/2022.  TECHNIQUE: CT scan of the abdomen and pelvis was performed following injection of IV contrast. Multiplanar reformats were obtained. Dose reduction techniques were used.  CONTRAST: 75 mL Isovue 370.    FINDINGS:    LOWER CHEST: Indeterminate 2 mm nodule left lower lobe, axial image 31. Mild centrilobular emphysema. Upper limits of normal heart size. Wall thickening of the distal esophagus, suggestive of a distal esophagitis. Atherosclerosis of the visualized   thoracic aorta.    HEPATOBILIARY: Liver enhances normally and is normal in size.    Gallbladder is normal.    No intrahepatic or intrahepatic biliary ductal dilatation.    PANCREAS: Diffuse parenchymal atrophy, similar to prior.    SPLEEN: Enhances normally. Normal size.    ADRENAL GLANDS: Normal.    KIDNEYS: Both kidneys  enhance symmetrically, without hydronephrosis.    No nephroureterolithiasis. Low-attenuation subcentimeter renal lesion(s). These are compatible with small benign cysts and no specific imaging evaluation or follow-up is recommended.    Urinary bladder is unremarkable.    PELVIC ORGANS: No suspicious abnormality.    BOWEL: Moderate stool burden throughout the proximal colon, suggestive of constipation. Volvulus morphology of the central mesentery, without proximally dilated bowel to suggest that this represents a mechanical obstruction. This is most likely a   transient phenomenon.    No intraperitoneal free fluid or free air.    LYMPH NODES: No suspicious abdominal or pelvic lymphadenopathy.    VASCULATURE: No abdominal aortic aneurysm. Moderate atheromatous disease.    MUSCULOSKELETAL: No suspicious abnormality. Sacral Tarlov cysts.    OTHER: No additionally significant abnormalities.      Impression    IMPRESSION:   1.  Moderate stool burden throughout the proximal colon, suggestive of constipation.  2.  Volvulus morphology of the central mesentery, without evidence of bowel obstruction.  3.  Distal esophageal wall thickening, suggestive of a distal esophagitis.  4.  Indeterminate 2 mm left lower lobe pulmonary nodule. Follow-up is recommended according to Fleischner criteria, as detailed below.    Fleischner Society Recommendations for Pulmonary Nodules    Nodule size less than 6 mm:     Single or multiple nodules:     Nodules < 6 mm do not require routine follow-up, but certain patients at high risk with suspicious nodule morphology, upper lobe location, or both may warrant 12-month follow-up.     Harrison Fischer,   [facility]   Phone: #[phone]

## 2023-03-02 NOTE — ED NOTES
Wants to go home. Dr. Fischer updated. Orders for discharge. Will discharge after IV potassium is complete.

## 2023-03-05 ENCOUNTER — APPOINTMENT (OUTPATIENT)
Dept: RADIOLOGY | Facility: CLINIC | Age: 68
End: 2023-03-05
Attending: EMERGENCY MEDICINE
Payer: COMMERCIAL

## 2023-03-05 ENCOUNTER — HOSPITAL ENCOUNTER (EMERGENCY)
Facility: CLINIC | Age: 68
Discharge: HOME | End: 2023-03-05
Attending: EMERGENCY MEDICINE | Admitting: EMERGENCY MEDICINE
Payer: COMMERCIAL

## 2023-03-05 VITALS
HEIGHT: 65 IN | BODY MASS INDEX: 22.49 KG/M2 | HEART RATE: 55 BPM | WEIGHT: 135 LBS | DIASTOLIC BLOOD PRESSURE: 58 MMHG | TEMPERATURE: 97.5 F | SYSTOLIC BLOOD PRESSURE: 93 MMHG | OXYGEN SATURATION: 94 % | RESPIRATION RATE: 36 BRPM

## 2023-03-05 DIAGNOSIS — F19.90 MISUSE OF MEDICATION: ICD-10-CM

## 2023-03-05 DIAGNOSIS — I50.32 CHRONIC HEART FAILURE WITH PRESERVED EJECTION FRACTION (HFPEF) (H): ICD-10-CM

## 2023-03-05 LAB
ANION GAP SERPL CALCULATED.3IONS-SCNC: 10 MMOL/L (ref 5–18)
BASOPHILS # BLD AUTO: 0 10E3/UL (ref 0–0.2)
BASOPHILS NFR BLD AUTO: 1 %
BNP SERPL-MCNC: 213 PG/ML (ref 0–114)
BUN SERPL-MCNC: 8 MG/DL (ref 8–22)
CALCIUM SERPL-MCNC: 9.3 MG/DL (ref 8.5–10.5)
CHLORIDE BLD-SCNC: 106 MMOL/L (ref 98–107)
CO2 SERPL-SCNC: 25 MMOL/L (ref 22–31)
CREAT SERPL-MCNC: 0.88 MG/DL (ref 0.6–1.1)
EOSINOPHIL # BLD AUTO: 0.1 10E3/UL (ref 0–0.7)
EOSINOPHIL NFR BLD AUTO: 3 %
ERYTHROCYTE [DISTWIDTH] IN BLOOD BY AUTOMATED COUNT: 12.8 % (ref 10–15)
GFR SERPL CREATININE-BSD FRML MDRD: 71 ML/MIN/1.73M2
GLUCOSE BLD-MCNC: 134 MG/DL (ref 70–125)
HCT VFR BLD AUTO: 35.6 % (ref 35–47)
HGB BLD-MCNC: 12.3 G/DL (ref 11.7–15.7)
HOLD SPECIMEN: NORMAL
IMM GRANULOCYTES # BLD: 0 10E3/UL
IMM GRANULOCYTES NFR BLD: 1 %
LYMPHOCYTES # BLD AUTO: 1 10E3/UL (ref 0.8–5.3)
LYMPHOCYTES NFR BLD AUTO: 25 %
MAGNESIUM SERPL-MCNC: 2 MG/DL (ref 1.8–2.6)
MCH RBC QN AUTO: 32 PG (ref 26.5–33)
MCHC RBC AUTO-ENTMCNC: 34.6 G/DL (ref 31.5–36.5)
MCV RBC AUTO: 93 FL (ref 78–100)
MONOCYTES # BLD AUTO: 0.2 10E3/UL (ref 0–1.3)
MONOCYTES NFR BLD AUTO: 6 %
NEUTROPHILS # BLD AUTO: 2.6 10E3/UL (ref 1.6–8.3)
NEUTROPHILS NFR BLD AUTO: 64 %
NRBC # BLD AUTO: 0 10E3/UL
NRBC BLD AUTO-RTO: 0 /100
PLATELET # BLD AUTO: 195 10E3/UL (ref 150–450)
POTASSIUM BLD-SCNC: 3.6 MMOL/L (ref 3.5–5)
RBC # BLD AUTO: 3.84 10E6/UL (ref 3.8–5.2)
SODIUM SERPL-SCNC: 141 MMOL/L (ref 136–145)
TROPONIN I SERPL-MCNC: 0.01 NG/ML (ref 0–0.29)
WBC # BLD AUTO: 3.9 10E3/UL (ref 4–11)

## 2023-03-05 PROCEDURE — 36415 COLL VENOUS BLD VENIPUNCTURE: CPT | Performed by: EMERGENCY MEDICINE

## 2023-03-05 PROCEDURE — 93005 ELECTROCARDIOGRAM TRACING: CPT | Performed by: EMERGENCY MEDICINE

## 2023-03-05 PROCEDURE — 99285 EMERGENCY DEPT VISIT HI MDM: CPT | Mod: 25

## 2023-03-05 PROCEDURE — 83880 ASSAY OF NATRIURETIC PEPTIDE: CPT | Performed by: EMERGENCY MEDICINE

## 2023-03-05 PROCEDURE — 83735 ASSAY OF MAGNESIUM: CPT | Performed by: EMERGENCY MEDICINE

## 2023-03-05 PROCEDURE — 82374 ASSAY BLOOD CARBON DIOXIDE: CPT | Performed by: EMERGENCY MEDICINE

## 2023-03-05 PROCEDURE — 84484 ASSAY OF TROPONIN QUANT: CPT | Performed by: EMERGENCY MEDICINE

## 2023-03-05 PROCEDURE — 71046 X-RAY EXAM CHEST 2 VIEWS: CPT

## 2023-03-05 PROCEDURE — 85025 COMPLETE CBC W/AUTO DIFF WBC: CPT | Performed by: EMERGENCY MEDICINE

## 2023-03-05 RX ORDER — CLONIDINE HYDROCHLORIDE 0.2 MG/1
0.2 TABLET ORAL 3 TIMES DAILY
Qty: 30 TABLET | Refills: 0 | Status: SHIPPED | OUTPATIENT
Start: 2023-03-05 | End: 2023-03-30 | Stop reason: DRUGHIGH

## 2023-03-05 RX ORDER — FUROSEMIDE 20 MG
40 TABLET ORAL 2 TIMES DAILY
Qty: 56 TABLET | Refills: 0 | Status: SHIPPED | OUTPATIENT
Start: 2023-03-05 | End: 2023-03-31

## 2023-03-05 ASSESSMENT — ENCOUNTER SYMPTOMS
SHORTNESS OF BREATH: 1
PALPITATIONS: 1
APPETITE CHANGE: 0
DIARRHEA: 1
COUGH: 0
FEVER: 1

## 2023-03-05 ASSESSMENT — ACTIVITIES OF DAILY LIVING (ADL): ADLS_ACUITY_SCORE: 35

## 2023-03-05 NOTE — ED NOTES
Patient now responding to question, states that in the past in two hours she was not able to breath

## 2023-03-05 NOTE — ED TRIAGE NOTES
Patient presents to ED for evaluation for shortness of breath and altered mental status. Patient was in the wheel chair and slumped over, not responding to verbal communication. Son reports that she was responding on the drive over. Patient was recently seen for shortness of breath and was found to have access fluid

## 2023-03-06 LAB
ATRIAL RATE - MUSE: 78 BPM
DIASTOLIC BLOOD PRESSURE - MUSE: NORMAL MMHG
INTERPRETATION ECG - MUSE: NORMAL
P AXIS - MUSE: 52 DEGREES
PR INTERVAL - MUSE: 160 MS
QRS DURATION - MUSE: 88 MS
QT - MUSE: 436 MS
QTC - MUSE: 497 MS
R AXIS - MUSE: 65 DEGREES
SYSTOLIC BLOOD PRESSURE - MUSE: NORMAL MMHG
T AXIS - MUSE: 76 DEGREES
VENTRICULAR RATE- MUSE: 78 BPM

## 2023-03-06 NOTE — DISCHARGE INSTRUCTIONS
You cannot over take your clonidine.  If you have an episode of panic you need to do your breathing exercises that you were instructed on by the psychiatrist the last time you are in the hospital.    I did set up a new primary care referral for you.  You should receive a phone call within the next 48 hours to get scheduled for a new primary care clinic appointment.  If you do not hear from our  you can call yourself to which [facility] you would like to be seen at and simply set up an appointment with them.    You were given a short course of the clonidine.  We otherwise do not do refills through the emergency department.  Therefore, make sure that you make the new appointments and if you are not able to get one soon enough then go see your old primary care physician in the meantime.

## 2023-03-06 NOTE — ED PROVIDER NOTES
Emergency Department Encounter     Evaluation Date & Time:   3/5/2023  5:37 PM    CHIEF COMPLAINT:  Shortness of Breath and Altered Mental Status      Triage Note:Patient presents to ED for evaluation for shortness of breath and altered mental status. Patient was in the wheel chair and slumped over, not responding to verbal communication. Son reports that she was responding on the drive over. Patient was recently seen for shortness of breath and was found to have access fluid           FINAL IMPRESSION:    ICD-10-CM    1. Chronic heart failure with preserved ejection fraction (HFpEF) (H)  I50.32 Primary Care Referral      2. Misuse of medication  F19.90           Impression and Plan     ED COURSE & MEDICAL DECISION MAKIN:00 PM I met with the patient, obtained history, performed an initial exam, and discussed options and plan for diagnostics and treatment here in the ED.     ED Course as of 23 1228   Sun Mar 05, 2023   0076 Renetta is here for feelings of tachycardia and shortness of breath when she is out of her clonidine.  This is a chronic condition for her and not new.  She describes overusing the clonidine and that she previously had pain physician who prescribed both the clonidine and the Suboxone as well as her Lasix but they have told her that she needs to see somebody else which she has not yet set up.  So, she used her last dose of the clonidine and had been stretching out the Lasix as well.  She noted last night her legs were quite swollen but they are back down this morning after her son who is her PCA elevated them overnight.  She is staying at the extended care facility here in [place] for additional assistance.  She would like to get set up with a new primary care physician out here as well.  She feels this is her normal state when she runs out of those medications.    Her chart was otherwise reviewed, though, because she does state that she gets frequent UTIs, she was here recently and  for some reason needed to receive GoLytely according to the patient has been having loose stools because of that.  She clinically appears a little bit intravascularly dehydrated.  With slight crackles on her lower lungs we will do a chest x-ray to look at her fluid status but otherwise she does not have a fever.  There was some question of a fever yesterday but her son states that their thermometer is actually not working.  Now she is not sob.  She has gavi on oxygen previously at home and when she is sleeping her oxygen saturations are in the very upper 80s and then go back to normal when she wakes up.s   1818 Her chart with us as well as with [facility] was reviewed including her most recent hospital admission and cardiology notes.  She has a normal ejection fraction was admitted in early February for possible pulmonary edema but it sounds as if the findings on chest x-ray were mild.  They did note that she has significant problems with polypharmacy and often notes that she has run out of her medications in particular her clonidine when she recently had fills and should have had quite an adequate amount left.  She has admitted to overusing the clonidine.  Today's history would fit with that pattern.  She does admit to overusing it.  I will have pharmacy check on when her last fill was, and this currently her heart rate is adequate not elevated and her blood pressure is normal as well.  Certainly will do chest x-ray to look for signs of fluid overload along with BNP but her extremities are not significantly edematous.  She has no fever or complaint of new cough or cold to suggest that as a cause for her shortness of breath and at rest now she is not short of breath.  No current signs of clonidine withdrawal but she does states she took her last dose before coming in.  She does want a new primary care physician here in [place] now that she lives here.  We will make primary care referral.   1925 She has no  "infectious complaints to suggest that the finding on x-ray is infectious and has no cough with this.  More than likely this is a localized area of atelectasis or edema.  Otherwise there is no other pulmonary edema on her examination room to need emergent dose of IV Lasix today.  As mentioned she is oxygenating normally.  I am having pharmacy check on her last fill of clonidine to see whether or not we can safely represcribe a short course.       At the conclusion of the encounter I discussed the results of all the tests and the disposition. The questions were answered. The patient or family acknowledged understanding and was agreeable with the care plan.          0 minutes of critical care time        MEDICATIONS GIVEN IN THE EMERGENCY DEPARTMENT:  Medications - No data to display    NEW PRESCRIPTIONS STARTED AT TODAY'S ED VISIT:  Discharge Medication List as of 3/5/2023  7:47 PM          HPI     The history is provided by the patient and a caregiver. No  was used.        Renetta Farooq is a 67 year old female with a pertinent history of acute diastolic heart failure, multiple drug use dependencies, and TBI, who presents to this ED via walk-in with her son/PCA for evaluation of shortness of breath and altered mental status.    Patient reports she can't breathe and her mouth is super dry. She endorses she has been eating and drinking. Patient states she is out of her lasix and clonidine because she's been \"taking them too fast\" and won't get it refilled for another week. She reports she is prescribed 0.2 mg of clonidine 3x per day, and is taking them as needed, as her heart rate goes up to 180 bpm, which leads to difficulty breathing. She notes she is also prescribed 40 mg of lasix per day. Patient reports her legs are swollen and elevating them last night helped. She states she had a fever of 103.2 yesterday with multiple episodes of diarrhea. No cough. She notes she has not seen her primary " "doctor in a year. Son denies the patient has been using oxygen at home since July of 2022. She endorses a history of bladder infections. No other current complaints.    REVIEW OF SYSTEMS:  Review of Systems   Constitutional: Positive for fever. Negative for appetite change.   Respiratory: Positive for shortness of breath. Negative for cough.    Cardiovascular: Positive for palpitations and leg swelling.   Gastrointestinal: Positive for diarrhea.     remainder of systems are all otherwise negative.        Medical History     Past Medical History:   Diagnosis Date     Acute psychosis (H) 10/14/2018     Alcohol dependence (H) 4/10/2015     Bipolar affective disorder, manic, severe, with psychotic behavior (H) 10/15/2018     Candida infection 10/19/2018     Chronic hepatitis C (H) 4/10/2015     Chronic neck pain      Closed traumatic brain injury, without loss of consciousness, sequela (H)      Dental abscess      Episodic tension-type headache, not intractable      Methamphetamine dependence (H) 10/15/2018     Pancytopenia (H) 4/10/2015     Weakness of right arm 4/10/2015       Past Surgical History:   Procedure Laterality Date     ARTHROSCOPY SHOULDER ROTATOR CUFF REPAIR Bilateral      CERVICAL FUSION      twice     COSMETIC SURGERY       RHINOPLASTY       TONSILLECTOMY         Family History   Problem Relation Age of Onset     Narcolepsy Mother      Acute myelogenous leukemia Mother      Cancer Father        Social History     Tobacco Use     Smoking status: Every Day     Packs/day: 1.00     Years: 42.00     Pack years: 42.00     Types: Cigarettes     Smokeless tobacco: Never   Substance Use Topics     Alcohol use: Yes     Comment: Alcoholic Drinks/day: \"A lot.\"  (Couldn't quantify except to repeat \"A lot\" of wine, liquor and beer)     Drug use: No       cloNIDine (CATAPRES) 0.2 MG tablet  furosemide (LASIX) 20 MG tablet  albuterol (PROAIR HFA;PROVENTIL HFA;VENTOLIN HFA) 90 mcg/actuation inhaler  amLODIPine (NORVASC) " "5 MG tablet  buprenorphine HCl-naloxone HCl (SUBOXONE) 4-1 MG per film  cholecalciferol, vitamin D3, 1,000 unit tablet  docusate sodium (COLACE) 100 MG capsule  docusate sodium (COLACE) 100 MG tablet  FLUoxetine (PROZAC) 40 MG capsule  fluticasone-salmeterol (ADVAIR) 250-50 MCG/ACT inhaler  gabapentin (NEURONTIN) 300 MG capsule  magnesium oxide (MAG-OX) 400 MG tablet  NYSTOP 389821 UNIT/GM powder  omeprazole (PRILOSEC) 20 MG DR capsule  potassium chloride ER (KLOR-CON M) 20 MEQ CR tablet  QUEtiapine (SEROQUEL) 100 MG tablet  QUEtiapine (SEROQUEL) 100 MG tablet        Physical Exam     First Vitals:  Patient Vitals for the past 24 hrs:   BP Temp Temp src Pulse Resp SpO2 Height Weight   03/05/23 1945 93/58 -- -- 55 -- 94 % -- --   03/05/23 1916 96/59 -- -- 60 -- 94 % -- --   03/05/23 1830 -- -- -- 75 (!) 36 98 % -- --   03/05/23 1815 105/64 -- -- 74 16 93 % -- --   03/05/23 1800 105/63 -- -- 67 12 92 % -- --   03/05/23 1745 109/64 -- -- 77 12 99 % -- --   03/05/23 1744 109/64 97.5  F (36.4  C) Axillary 79 20 100 % 1.651 m (5' 5\") 61.2 kg (135 lb)       PHYSICAL EXAM:   Constitutional:   Laying in hospital bed.   HENT:  Normocephalic, posterior pharynx wnl, mucous membranes dry and pink.   Eyes:  PERRL, EOMI, Conjunctiva normal, No discharge, no scleral icterus.  Respiratory:  Crackles at bases, worse on left than the right.  Cardiovascular:  RRR nl s1s2 0 murmurs, rubs, or gallops.  Peripheral pulses dp, pt, and radial are wnl.  Trace peripheral edema.  GI:  Bowel sounds normal, Soft, No tenderness, No flank tenderness, nondistended.  :No CVA tenderness.   Musculoskeletal:  Moves all extremities.  No erythematous or swollen major joints,   Integument:  Normal.  Lymphatic:  No cervical lymphadenopathy  Neurologic:  Alert & oriented x 3, Normal motor function, Normal sensory function, No focal deficits noted. Normal speech.  Psychiatric:  Affect normal, Judgment normal, Mood normal.     Results     LAB AND " RADIOLOGY:  All pertinent labs reviewed and interpreted  Results for orders placed or performed during the hospital encounter of 03/05/23   Chest XR,  PA & LAT     Status: None    Narrative    EXAM: XR CHEST 2 VIEWS  LOCATION: [facility]  DATE/TIME: 3/5/2023 6:51 PM    INDICATION: SOB.  COMPARISON: 3/1/2023.      Impression    IMPRESSION: Lungs are hyperexpanded, compatible with COPD. No acute airspace opacities. There is a minimal amount of patchy opacity involving the left lateral costophrenic sulcus which could reflect a small area of infectious process. Right lung is   clear. No pleural effusion or pneumothorax. Spinal fusion hardware of the cervical spine is partially seen.   Lyman Draw     Status: None    Narrative    The following orders were created for panel order Lyman Draw.  Procedure                               Abnormality         Status                     ---------                               -----------         ------                     Extra Blood Culture Bottle[[id number]]                       Final result               Extra Blue Top Tube[[id number]]                              Final result               Extra Red Top Tube[[id number]]                               Final result               Extra Green Top (Lithium...[[id number]]                      Final result               Extra Purple Top Tube[[id number]]                            Final result               Extra Heparinized Syringe[[id number]]                        Final result               Extra Green Top (Lithium...[[id number]]                      Final result                 Please view results for these tests on the individual orders.   Extra Blood Culture Bottle     Status: None   Result Value Ref Range    Hold Specimen JIC    Extra Blue Top Tube     Status: None   Result Value Ref Range    Hold Specimen JIC    Extra Red Top Tube     Status: None   Result Value Ref Range    Hold Specimen JIC    Extra  Green Top (Lithium Heparin) Tube     Status: None   Result Value Ref Range    Hold Specimen JIC    Extra Purple Top Tube     Status: None   Result Value Ref Range    Hold Specimen JIC    Extra Heparinized Syringe     Status: None   Result Value Ref Range    Hold Specimen JIC    Extra Green Top (Lithium Heparin) ON ICE     Status: None   Result Value Ref Range    Hold Specimen JIC    Basic metabolic panel     Status: Abnormal   Result Value Ref Range    Sodium 141 136 - 145 mmol/L    Potassium 3.6 3.5 - 5.0 mmol/L    Chloride 106 98 - 107 mmol/L    Carbon Dioxide (CO2) 25 22 - 31 mmol/L    Anion Gap 10 5 - 18 mmol/L    Urea Nitrogen 8 8 - 22 mg/dL    Creatinine 0.88 0.60 - 1.10 mg/dL    Calcium 9.3 8.5 - 10.5 mg/dL    Glucose 134 (H) 70 - 125 mg/dL    GFR Estimate 71 >60 mL/min/1.73m2   Magnesium     Status: Normal   Result Value Ref Range    Magnesium 2.0 1.8 - 2.6 mg/dL   Troponin I     Status: Normal   Result Value Ref Range    Troponin I 0.01 0.00 - 0.29 ng/mL   B-Type Natriuretic Peptide ([facility] Only)     Status: Abnormal   Result Value Ref Range     (H) 0 - 114 pg/mL   CBC with platelets and differential     Status: Abnormal   Result Value Ref Range    WBC Count 3.9 (L) 4.0 - 11.0 10e3/uL    RBC Count 3.84 3.80 - 5.20 10e6/uL    Hemoglobin 12.3 11.7 - 15.7 g/dL    Hematocrit 35.6 35.0 - 47.0 %    MCV 93 78 - 100 fL    MCH 32.0 26.5 - 33.0 pg    MCHC 34.6 31.5 - 36.5 g/dL    RDW 12.8 10.0 - 15.0 %    Platelet Count 195 150 - 450 10e3/uL    % Neutrophils 64 %    % Lymphocytes 25 %    % Monocytes 6 %    % Eosinophils 3 %    % Basophils 1 %    % Immature Granulocytes 1 %    NRBCs per 100 WBC 0 <1 /100    Absolute Neutrophils 2.6 1.6 - 8.3 10e3/uL    Absolute Lymphocytes 1.0 0.8 - 5.3 10e3/uL    Absolute Monocytes 0.2 0.0 - 1.3 10e3/uL    Absolute Eosinophils 0.1 0.0 - 0.7 10e3/uL    Absolute Basophils 0.0 0.0 - 0.2 10e3/uL    Absolute Immature Granulocytes 0.0 <=0.4 10e3/uL    Absolute NRBCs 0.0 10e3/uL    CBC with platelets differential     Status: Abnormal    Narrative    The following orders were created for panel order CBC with platelets differential.  Procedure                               Abnormality         Status                     ---------                               -----------         ------                     CBC with platelets and d...[[id number]]  Abnormal            Final result                 Please view results for these tests on the individual orders.         ECG:    Performed at: nsr rate of 78, poor r wave progression in anterior leads, nonspecific st finding in v5.  No significant changes copared to 10/14/18.  Pr 160ms, qrs 88ms, qtc 497ms, prt axes 52 65 76      I have independently reviewed and interpreted the EKS(s) documented above    PROCEDURES:  Procedures:       [employer] System Documentation     Medical Decision Making    History:    Supplemental history from: Documented in chart, if applicable and Family Member/Significant Other    External Record(s) reviewed: Documented in chart, if applicable.    Work Up:    Chart documentation includes differential considered and any EKGs or imaging independently interpreted by provider, where specified.    In additional to work up documented, I considered the following work up: Documented in chart, if applicable.    External consultation:    Discussion of management with another provider: n/a    Complicating factors:    Care impacted by chronic illness: Chronic Pain, Mental Health and Other: chemical dependence and medication misuse, hx/o drug abuse and alcoholism    Care affected by social determinants of health: Alcohol Abuse and/or Recreational Drug Use    Disposition considerations: Discharge. I recommended the patient continue their current prescription strength medication(s): and she should not over use, and does need f/u with pmd, made referral to new pmd per pt request.. See documentation for any additional details.           The  creation of this record is based on the scribe s observations of the work being performed by Jihan Perry and the provider s statements to them. This document has been checked and approved by MD Morena Blanco MD  Emergency Medicine  [facility] EMERGENCY ROOM       Morena Guerrero MD  03/05/23 0649

## 2023-03-30 ENCOUNTER — HOSPITAL ENCOUNTER (INPATIENT)
Facility: CLINIC | Age: 68
LOS: 2 days | Discharge: HOME | DRG: 190 | End: 2023-04-01
Attending: EMERGENCY MEDICINE | Admitting: FAMILY MEDICINE
Payer: COMMERCIAL

## 2023-03-30 ENCOUNTER — APPOINTMENT (OUTPATIENT)
Dept: CT IMAGING | Facility: CLINIC | Age: 68
DRG: 190 | End: 2023-03-30
Attending: EMERGENCY MEDICINE
Payer: COMMERCIAL

## 2023-03-30 ENCOUNTER — APPOINTMENT (OUTPATIENT)
Dept: RADIOLOGY | Facility: CLINIC | Age: 68
DRG: 190 | End: 2023-03-30
Attending: EMERGENCY MEDICINE
Payer: COMMERCIAL

## 2023-03-30 DIAGNOSIS — E83.42 HYPOMAGNESEMIA: ICD-10-CM

## 2023-03-30 DIAGNOSIS — J20.9 ACUTE BRONCHITIS, UNSPECIFIED ORGANISM: Primary | ICD-10-CM

## 2023-03-30 DIAGNOSIS — J44.1 COPD EXACERBATION (H): ICD-10-CM

## 2023-03-30 DIAGNOSIS — F41.1 GENERALIZED ANXIETY DISORDER: ICD-10-CM

## 2023-03-30 DIAGNOSIS — F31.2 BIPOLAR AFFECTIVE DISORDER, MANIC, SEVERE, WITH PSYCHOTIC BEHAVIOR (H): ICD-10-CM

## 2023-03-30 LAB
ALBUMIN SERPL-MCNC: 4.3 G/DL (ref 3.5–5)
ALBUMIN UR-MCNC: NEGATIVE MG/DL
ALP SERPL-CCNC: 81 U/L (ref 45–120)
ALT SERPL W P-5'-P-CCNC: 10 U/L (ref 0–45)
AMMONIA PLAS-SCNC: 18 UMOL/L (ref 11–35)
AMPHETAMINES UR QL SCN: NORMAL
ANION GAP SERPL CALCULATED.3IONS-SCNC: 15 MMOL/L (ref 5–18)
APPEARANCE UR: CLEAR
AST SERPL W P-5'-P-CCNC: 17 U/L (ref 0–40)
ATRIAL RATE - MUSE: 77 BPM
BARBITURATES UR QL: NORMAL
BASE EXCESS BLDV CALC-SCNC: -2 MMOL/L
BASOPHILS # BLD AUTO: 0 10E3/UL (ref 0–0.2)
BASOPHILS NFR BLD AUTO: 0 %
BENZODIAZ UR QL: NORMAL
BILIRUB SERPL-MCNC: 0.6 MG/DL (ref 0–1)
BILIRUB UR QL STRIP: NEGATIVE
BNP SERPL-MCNC: 14 PG/ML (ref 0–114)
BUN SERPL-MCNC: 13 MG/DL (ref 8–22)
C REACTIVE PROTEIN LHE: <0.1 MG/DL (ref 0–?)
CALCIUM SERPL-MCNC: 9.5 MG/DL (ref 8.5–10.5)
CANNABINOIDS UR QL SCN: NORMAL
CHLORIDE BLD-SCNC: 91 MMOL/L (ref 98–107)
CK SERPL-CCNC: 124 U/L (ref 30–190)
CO2 SERPL-SCNC: 21 MMOL/L (ref 22–31)
COCAINE UR QL: NORMAL
COLOR UR AUTO: COLORLESS
CREAT SERPL-MCNC: 1.01 MG/DL (ref 0.6–1.1)
CREAT UR-MCNC: 15 MG/DL
DIASTOLIC BLOOD PRESSURE - MUSE: NORMAL MMHG
EOSINOPHIL # BLD AUTO: 0.1 10E3/UL (ref 0–0.7)
EOSINOPHIL NFR BLD AUTO: 1 %
ERYTHROCYTE [DISTWIDTH] IN BLOOD BY AUTOMATED COUNT: 11.9 % (ref 10–15)
ETHANOL SERPL-MCNC: <10 MG/DL
FLUAV RNA SPEC QL NAA+PROBE: NEGATIVE
FLUBV RNA RESP QL NAA+PROBE: NEGATIVE
GFR SERPL CREATININE-BSD FRML MDRD: 60 ML/MIN/1.73M2
GLUCOSE BLD-MCNC: 117 MG/DL (ref 70–125)
GLUCOSE UR STRIP-MCNC: NEGATIVE MG/DL
HCO3 BLDV-SCNC: 22 MMOL/L (ref 24–30)
HCT VFR BLD AUTO: 37.4 % (ref 35–47)
HGB BLD-MCNC: 13.1 G/DL (ref 11.7–15.7)
HGB UR QL STRIP: NEGATIVE
IMM GRANULOCYTES # BLD: 0.1 10E3/UL
IMM GRANULOCYTES NFR BLD: 1 %
INTERPRETATION ECG - MUSE: NORMAL
KETONES UR STRIP-MCNC: NEGATIVE MG/DL
LACTATE SERPL-SCNC: 2.7 MMOL/L (ref 0.7–2)
LACTATE SERPL-SCNC: 4.3 MMOL/L (ref 0.7–2)
LEUKOCYTE ESTERASE UR QL STRIP: NEGATIVE
LYMPHOCYTES # BLD AUTO: 1.1 10E3/UL (ref 0.8–5.3)
LYMPHOCYTES NFR BLD AUTO: 10 %
MAGNESIUM SERPL-MCNC: 1.5 MG/DL (ref 1.8–2.6)
MCH RBC QN AUTO: 31.6 PG (ref 26.5–33)
MCHC RBC AUTO-ENTMCNC: 35 G/DL (ref 31.5–36.5)
MCV RBC AUTO: 90 FL (ref 78–100)
MONOCYTES # BLD AUTO: 0.5 10E3/UL (ref 0–1.3)
MONOCYTES NFR BLD AUTO: 5 %
NEUTROPHILS # BLD AUTO: 8.7 10E3/UL (ref 1.6–8.3)
NEUTROPHILS NFR BLD AUTO: 83 %
NITRATE UR QL: NEGATIVE
NRBC # BLD AUTO: 0 10E3/UL
NRBC BLD AUTO-RTO: 0 /100
OPIATES UR QL SCN: NORMAL
OXYCODONE UR QL: NORMAL
OXYHGB MFR BLDV: 50.8 % (ref 70–75)
P AXIS - MUSE: 76 DEGREES
PCO2 BLDV: 35 MM HG (ref 35–50)
PCP UR QL SCN: NORMAL
PH BLDV: 7.42 [PH] (ref 7.35–7.45)
PH UR STRIP: 6.5 [PH] (ref 5–7)
PLATELET # BLD AUTO: 175 10E3/UL (ref 150–450)
PO2 BLDV: 27 MM HG (ref 25–47)
POTASSIUM BLD-SCNC: 3.3 MMOL/L (ref 3.5–5)
PR INTERVAL - MUSE: 150 MS
PROCALCITONIN SERPL-MCNC: <0.02 NG/ML (ref 0–0.49)
PROT SERPL-MCNC: 7.6 G/DL (ref 6–8)
QRS DURATION - MUSE: 92 MS
QT - MUSE: 420 MS
QTC - MUSE: 475 MS
R AXIS - MUSE: 46 DEGREES
RBC # BLD AUTO: 4.14 10E6/UL (ref 3.8–5.2)
RBC URINE: <1 /HPF
RSV RNA SPEC NAA+PROBE: NEGATIVE
SAO2 % BLDV: 52.5 % (ref 70–75)
SARS-COV-2 RNA RESP QL NAA+PROBE: NEGATIVE
SODIUM SERPL-SCNC: 127 MMOL/L (ref 136–145)
SP GR UR STRIP: 1.02 (ref 1–1.03)
SQUAMOUS EPITHELIAL: <1 /HPF
SYSTOLIC BLOOD PRESSURE - MUSE: NORMAL MMHG
T AXIS - MUSE: 72 DEGREES
TROPONIN I SERPL-MCNC: <0.01 NG/ML (ref 0–0.29)
UROBILINOGEN UR STRIP-MCNC: <2 MG/DL
VENTRICULAR RATE- MUSE: 77 BPM
WBC # BLD AUTO: 10.5 10E3/UL (ref 4–11)
WBC URINE: <1 /HPF

## 2023-03-30 PROCEDURE — 250N000013 HC RX MED GY IP 250 OP 250 PS 637: Performed by: INTERNAL MEDICINE

## 2023-03-30 PROCEDURE — 74177 CT ABD & PELVIS W/CONTRAST: CPT

## 2023-03-30 PROCEDURE — 99285 EMERGENCY DEPT VISIT HI MDM: CPT | Mod: 25,CS

## 2023-03-30 PROCEDURE — 82550 ASSAY OF CK (CPK): CPT | Performed by: EMERGENCY MEDICINE

## 2023-03-30 PROCEDURE — 258N000003 HC RX IP 258 OP 636: Performed by: FAMILY MEDICINE

## 2023-03-30 PROCEDURE — C9803 HOPD COVID-19 SPEC COLLECT: HCPCS

## 2023-03-30 PROCEDURE — 82805 BLOOD GASES W/O2 SATURATION: CPT | Performed by: EMERGENCY MEDICINE

## 2023-03-30 PROCEDURE — 80307 DRUG TEST PRSMV CHEM ANLYZR: CPT | Performed by: EMERGENCY MEDICINE

## 2023-03-30 PROCEDURE — 250N000009 HC RX 250: Performed by: FAMILY MEDICINE

## 2023-03-30 PROCEDURE — 250N000013 HC RX MED GY IP 250 OP 250 PS 637: Performed by: EMERGENCY MEDICINE

## 2023-03-30 PROCEDURE — 87637 SARSCOV2&INF A&B&RSV AMP PRB: CPT | Performed by: EMERGENCY MEDICINE

## 2023-03-30 PROCEDURE — 999N000157 HC STATISTIC RCP TIME EA 10 MIN

## 2023-03-30 PROCEDURE — 82077 ASSAY SPEC XCP UR&BREATH IA: CPT | Performed by: EMERGENCY MEDICINE

## 2023-03-30 PROCEDURE — 82140 ASSAY OF AMMONIA: CPT | Performed by: EMERGENCY MEDICINE

## 2023-03-30 PROCEDURE — 250N000013 HC RX MED GY IP 250 OP 250 PS 637: Performed by: FAMILY MEDICINE

## 2023-03-30 PROCEDURE — 258N000003 HC RX IP 258 OP 636: Performed by: EMERGENCY MEDICINE

## 2023-03-30 PROCEDURE — 94640 AIRWAY INHALATION TREATMENT: CPT | Mod: 76

## 2023-03-30 PROCEDURE — 84520 ASSAY OF UREA NITROGEN: CPT | Performed by: EMERGENCY MEDICINE

## 2023-03-30 PROCEDURE — 96365 THER/PROPH/DIAG IV INF INIT: CPT

## 2023-03-30 PROCEDURE — 71275 CT ANGIOGRAPHY CHEST: CPT

## 2023-03-30 PROCEDURE — 87040 BLOOD CULTURE FOR BACTERIA: CPT | Performed by: EMERGENCY MEDICINE

## 2023-03-30 PROCEDURE — 99223 1ST HOSP IP/OBS HIGH 75: CPT | Performed by: FAMILY MEDICINE

## 2023-03-30 PROCEDURE — 96361 HYDRATE IV INFUSION ADD-ON: CPT

## 2023-03-30 PROCEDURE — 250N000011 HC RX IP 250 OP 636: Performed by: FAMILY MEDICINE

## 2023-03-30 PROCEDURE — 83880 ASSAY OF NATRIURETIC PEPTIDE: CPT | Performed by: EMERGENCY MEDICINE

## 2023-03-30 PROCEDURE — 120N000001 HC R&B MED SURG/OB

## 2023-03-30 PROCEDURE — 250N000009 HC RX 250: Performed by: EMERGENCY MEDICINE

## 2023-03-30 PROCEDURE — 83735 ASSAY OF MAGNESIUM: CPT | Performed by: EMERGENCY MEDICINE

## 2023-03-30 PROCEDURE — 84145 PROCALCITONIN (PCT): CPT | Performed by: EMERGENCY MEDICINE

## 2023-03-30 PROCEDURE — 85025 COMPLETE CBC W/AUTO DIFF WBC: CPT | Performed by: EMERGENCY MEDICINE

## 2023-03-30 PROCEDURE — 250N000011 HC RX IP 250 OP 636: Performed by: EMERGENCY MEDICINE

## 2023-03-30 PROCEDURE — 83605 ASSAY OF LACTIC ACID: CPT | Performed by: EMERGENCY MEDICINE

## 2023-03-30 PROCEDURE — 36415 COLL VENOUS BLD VENIPUNCTURE: CPT | Performed by: EMERGENCY MEDICINE

## 2023-03-30 PROCEDURE — 96375 TX/PRO/DX INJ NEW DRUG ADDON: CPT

## 2023-03-30 PROCEDURE — 94640 AIRWAY INHALATION TREATMENT: CPT

## 2023-03-30 PROCEDURE — 71046 X-RAY EXAM CHEST 2 VIEWS: CPT

## 2023-03-30 PROCEDURE — 84484 ASSAY OF TROPONIN QUANT: CPT | Performed by: EMERGENCY MEDICINE

## 2023-03-30 PROCEDURE — 86140 C-REACTIVE PROTEIN: CPT | Performed by: EMERGENCY MEDICINE

## 2023-03-30 PROCEDURE — 93005 ELECTROCARDIOGRAM TRACING: CPT | Performed by: EMERGENCY MEDICINE

## 2023-03-30 PROCEDURE — 81001 URINALYSIS AUTO W/SCOPE: CPT | Performed by: EMERGENCY MEDICINE

## 2023-03-30 RX ORDER — LIDOCAINE 40 MG/G
CREAM TOPICAL
Status: DISCONTINUED | OUTPATIENT
Start: 2023-03-30 | End: 2023-04-01 | Stop reason: HOSPADM

## 2023-03-30 RX ORDER — CLONIDINE HYDROCHLORIDE 0.1 MG/1
0.1 TABLET ORAL 3 TIMES DAILY
Status: DISCONTINUED | OUTPATIENT
Start: 2023-03-31 | End: 2023-04-01 | Stop reason: HOSPADM

## 2023-03-30 RX ORDER — ONDANSETRON 2 MG/ML
4 INJECTION INTRAMUSCULAR; INTRAVENOUS EVERY 6 HOURS PRN
Status: DISCONTINUED | OUTPATIENT
Start: 2023-03-30 | End: 2023-04-01 | Stop reason: HOSPADM

## 2023-03-30 RX ORDER — DIPHENHYDRAMINE HCL 50 MG
50 CAPSULE ORAL EVERY 6 HOURS PRN
Status: ON HOLD | COMMUNITY
End: 2023-04-01

## 2023-03-30 RX ORDER — DOCUSATE SODIUM 100 MG/1
100 CAPSULE, LIQUID FILLED ORAL 2 TIMES DAILY PRN
Status: DISCONTINUED | OUTPATIENT
Start: 2023-03-30 | End: 2023-04-01 | Stop reason: HOSPADM

## 2023-03-30 RX ORDER — MAGNESIUM OXIDE 400 MG/1
400 TABLET ORAL DAILY
Status: DISCONTINUED | OUTPATIENT
Start: 2023-03-31 | End: 2023-04-01 | Stop reason: HOSPADM

## 2023-03-30 RX ORDER — SODIUM CHLORIDE 9 MG/ML
INJECTION, SOLUTION INTRAVENOUS CONTINUOUS
Status: DISCONTINUED | OUTPATIENT
Start: 2023-03-30 | End: 2023-03-31

## 2023-03-30 RX ORDER — IPRATROPIUM BROMIDE AND ALBUTEROL SULFATE 2.5; .5 MG/3ML; MG/3ML
3 SOLUTION RESPIRATORY (INHALATION) ONCE
Status: COMPLETED | OUTPATIENT
Start: 2023-03-30 | End: 2023-03-30

## 2023-03-30 RX ORDER — NICOTINE 21 MG/24HR
1 PATCH, TRANSDERMAL 24 HOURS TRANSDERMAL DAILY
Status: DISCONTINUED | OUTPATIENT
Start: 2023-03-30 | End: 2023-04-01 | Stop reason: HOSPADM

## 2023-03-30 RX ORDER — LEVOFLOXACIN 250 MG/1
750 TABLET, FILM COATED ORAL EVERY 24 HOURS
Status: DISCONTINUED | OUTPATIENT
Start: 2023-03-30 | End: 2023-04-01 | Stop reason: HOSPADM

## 2023-03-30 RX ORDER — DOXYCYCLINE 100 MG/10ML
100 INJECTION, POWDER, LYOPHILIZED, FOR SOLUTION INTRAVENOUS ONCE
Status: COMPLETED | OUTPATIENT
Start: 2023-03-30 | End: 2023-03-30

## 2023-03-30 RX ORDER — IPRATROPIUM BROMIDE AND ALBUTEROL SULFATE 2.5; .5 MG/3ML; MG/3ML
3 SOLUTION RESPIRATORY (INHALATION)
Status: DISCONTINUED | OUTPATIENT
Start: 2023-03-30 | End: 2023-03-31

## 2023-03-30 RX ORDER — BUPRENORPHINE AND NALOXONE 4; 1 MG/1; MG/1
1 FILM, SOLUBLE BUCCAL; SUBLINGUAL 2 TIMES DAILY
Status: DISCONTINUED | OUTPATIENT
Start: 2023-03-30 | End: 2023-04-01 | Stop reason: HOSPADM

## 2023-03-30 RX ORDER — MAGNESIUM SULFATE 4 G/50ML
4 INJECTION INTRAVENOUS ONCE
Status: COMPLETED | OUTPATIENT
Start: 2023-03-30 | End: 2023-03-30

## 2023-03-30 RX ORDER — BUPRENORPHINE AND NALOXONE 8; 2 MG/1; MG/1
1 FILM, SOLUBLE BUCCAL; SUBLINGUAL ONCE
Status: COMPLETED | OUTPATIENT
Start: 2023-03-30 | End: 2023-03-30

## 2023-03-30 RX ORDER — POTASSIUM CHLORIDE 1500 MG/1
40 TABLET, EXTENDED RELEASE ORAL ONCE
Status: COMPLETED | OUTPATIENT
Start: 2023-03-30 | End: 2023-03-30

## 2023-03-30 RX ORDER — LORAZEPAM 2 MG/ML
0.5 INJECTION INTRAMUSCULAR EVERY 6 HOURS PRN
Status: DISCONTINUED | OUTPATIENT
Start: 2023-03-30 | End: 2023-04-01 | Stop reason: HOSPADM

## 2023-03-30 RX ORDER — IPRATROPIUM BROMIDE AND ALBUTEROL SULFATE 2.5; .5 MG/3ML; MG/3ML
3 SOLUTION RESPIRATORY (INHALATION) ONCE
Status: DISCONTINUED | OUTPATIENT
Start: 2023-03-30 | End: 2023-03-30

## 2023-03-30 RX ORDER — POTASSIUM CHLORIDE 1500 MG/1
20 TABLET, EXTENDED RELEASE ORAL 2 TIMES DAILY
Status: DISCONTINUED | OUTPATIENT
Start: 2023-03-30 | End: 2023-04-01 | Stop reason: HOSPADM

## 2023-03-30 RX ORDER — METHYLPREDNISOLONE SODIUM SUCCINATE 125 MG/2ML
125 INJECTION, POWDER, LYOPHILIZED, FOR SOLUTION INTRAMUSCULAR; INTRAVENOUS ONCE
Status: COMPLETED | OUTPATIENT
Start: 2023-03-30 | End: 2023-03-30

## 2023-03-30 RX ORDER — PANTOPRAZOLE SODIUM 40 MG/1
40 TABLET, DELAYED RELEASE ORAL AT BEDTIME
Status: DISCONTINUED | OUTPATIENT
Start: 2023-03-30 | End: 2023-04-01 | Stop reason: HOSPADM

## 2023-03-30 RX ORDER — ACETAMINOPHEN 650 MG/1
650 SUPPOSITORY RECTAL EVERY 6 HOURS PRN
Status: DISCONTINUED | OUTPATIENT
Start: 2023-03-30 | End: 2023-04-01 | Stop reason: HOSPADM

## 2023-03-30 RX ORDER — ONDANSETRON 4 MG/1
4 TABLET, ORALLY DISINTEGRATING ORAL EVERY 6 HOURS PRN
Status: DISCONTINUED | OUTPATIENT
Start: 2023-03-30 | End: 2023-04-01 | Stop reason: HOSPADM

## 2023-03-30 RX ORDER — METHYLPREDNISOLONE SODIUM SUCCINATE 40 MG/ML
40 INJECTION, POWDER, LYOPHILIZED, FOR SOLUTION INTRAMUSCULAR; INTRAVENOUS EVERY 6 HOURS
Status: COMPLETED | OUTPATIENT
Start: 2023-03-30 | End: 2023-03-31

## 2023-03-30 RX ORDER — LORAZEPAM 2 MG/ML
1 INJECTION INTRAMUSCULAR ONCE
Status: COMPLETED | OUTPATIENT
Start: 2023-03-30 | End: 2023-03-30

## 2023-03-30 RX ORDER — HEPARIN SODIUM 5000 [USP'U]/.5ML
5000 INJECTION, SOLUTION INTRAVENOUS; SUBCUTANEOUS EVERY 12 HOURS
Status: DISCONTINUED | OUTPATIENT
Start: 2023-03-30 | End: 2023-04-01 | Stop reason: HOSPADM

## 2023-03-30 RX ORDER — MAGNESIUM SULFATE HEPTAHYDRATE 40 MG/ML
2 INJECTION, SOLUTION INTRAVENOUS ONCE
Status: COMPLETED | OUTPATIENT
Start: 2023-03-30 | End: 2023-03-30

## 2023-03-30 RX ORDER — BUDESONIDE AND FORMOTEROL FUMARATE DIHYDRATE 160; 4.5 UG/1; UG/1
2 AEROSOL RESPIRATORY (INHALATION) 2 TIMES DAILY
COMMUNITY
End: 2023-04-27

## 2023-03-30 RX ORDER — IOPAMIDOL 755 MG/ML
75 INJECTION, SOLUTION INTRAVASCULAR ONCE
Status: COMPLETED | OUTPATIENT
Start: 2023-03-30 | End: 2023-03-30

## 2023-03-30 RX ORDER — LORAZEPAM 2 MG/ML
0.5 INJECTION INTRAMUSCULAR ONCE
Status: DISCONTINUED | OUTPATIENT
Start: 2023-03-30 | End: 2023-03-30

## 2023-03-30 RX ORDER — CLONIDINE HYDROCHLORIDE 0.1 MG/1
0.1 TABLET ORAL 3 TIMES DAILY
Status: ON HOLD | COMMUNITY
End: 2023-04-01

## 2023-03-30 RX ORDER — ACETAMINOPHEN 325 MG/1
650 TABLET ORAL EVERY 6 HOURS PRN
Status: DISCONTINUED | OUTPATIENT
Start: 2023-03-30 | End: 2023-04-01 | Stop reason: HOSPADM

## 2023-03-30 RX ORDER — PREDNISONE 20 MG/1
40 TABLET ORAL DAILY
Status: DISCONTINUED | OUTPATIENT
Start: 2023-03-31 | End: 2023-04-01 | Stop reason: HOSPADM

## 2023-03-30 RX ORDER — DIPHENHYDRAMINE HCL 50 MG
50 CAPSULE ORAL EVERY 6 HOURS PRN
Status: DISCONTINUED | OUTPATIENT
Start: 2023-03-30 | End: 2023-04-01 | Stop reason: HOSPADM

## 2023-03-30 RX ADMIN — LEVOFLOXACIN 750 MG: 750 TABLET, FILM COATED ORAL at 20:31

## 2023-03-30 RX ADMIN — SODIUM CHLORIDE 1000 ML: 9 INJECTION, SOLUTION INTRAVENOUS at 11:32

## 2023-03-30 RX ADMIN — BUPRENORPHINE AND NALOXONE 1 FILM: 4; 1 FILM, SOLUBLE BUCCAL; SUBLINGUAL at 23:38

## 2023-03-30 RX ADMIN — METHYLPREDNISOLONE SODIUM SUCCINATE 40 MG: 40 INJECTION, POWDER, FOR SOLUTION INTRAMUSCULAR; INTRAVENOUS at 23:36

## 2023-03-30 RX ADMIN — IPRATROPIUM BROMIDE AND ALBUTEROL SULFATE 3 ML: 2.5; .5 SOLUTION RESPIRATORY (INHALATION) at 17:08

## 2023-03-30 RX ADMIN — POTASSIUM CHLORIDE 40 MEQ: 1500 TABLET, EXTENDED RELEASE ORAL at 17:10

## 2023-03-30 RX ADMIN — POTASSIUM CHLORIDE 20 MEQ: 1500 TABLET, EXTENDED RELEASE ORAL at 23:34

## 2023-03-30 RX ADMIN — IOPAMIDOL 75 ML: 755 INJECTION, SOLUTION INTRAVENOUS at 15:18

## 2023-03-30 RX ADMIN — SODIUM CHLORIDE 1000 ML: 9 INJECTION, SOLUTION INTRAVENOUS at 17:12

## 2023-03-30 RX ADMIN — ACETAMINOPHEN 650 MG: 325 TABLET ORAL at 23:34

## 2023-03-30 RX ADMIN — MAGNESIUM SULFATE HEPTAHYDRATE 2 G: 40 INJECTION, SOLUTION INTRAVENOUS at 16:51

## 2023-03-30 RX ADMIN — IPRATROPIUM BROMIDE AND ALBUTEROL SULFATE 3 ML: 2.5; .5 SOLUTION RESPIRATORY (INHALATION) at 13:21

## 2023-03-30 RX ADMIN — SODIUM CHLORIDE: 9 INJECTION, SOLUTION INTRAVENOUS at 16:51

## 2023-03-30 RX ADMIN — NICOTINE 1 PATCH: 14 PATCH, EXTENDED RELEASE TRANSDERMAL at 20:30

## 2023-03-30 RX ADMIN — HEPARIN SODIUM 5000 UNITS: 5000 INJECTION, SOLUTION INTRAVENOUS; SUBCUTANEOUS at 20:31

## 2023-03-30 RX ADMIN — IPRATROPIUM BROMIDE AND ALBUTEROL SULFATE 3 ML: 2.5; .5 SOLUTION RESPIRATORY (INHALATION) at 19:34

## 2023-03-30 RX ADMIN — MAGNESIUM SULFATE HEPTAHYDRATE 4 G: 4 INJECTION, SOLUTION INTRAVENOUS at 20:25

## 2023-03-30 RX ADMIN — DOXYCYCLINE 100 MG: 100 INJECTION, POWDER, LYOPHILIZED, FOR SOLUTION INTRAVENOUS at 17:50

## 2023-03-30 RX ADMIN — SODIUM CHLORIDE: 9 INJECTION, SOLUTION INTRAVENOUS at 20:25

## 2023-03-30 RX ADMIN — LORAZEPAM 1 MG: 2 INJECTION INTRAMUSCULAR; INTRAVENOUS at 17:10

## 2023-03-30 RX ADMIN — PANTOPRAZOLE SODIUM 40 MG: 40 TABLET, DELAYED RELEASE ORAL at 23:34

## 2023-03-30 RX ADMIN — METHYLPREDNISOLONE SODIUM SUCCINATE 125 MG: 125 INJECTION, POWDER, FOR SOLUTION INTRAMUSCULAR; INTRAVENOUS at 10:42

## 2023-03-30 RX ADMIN — BUPRENORPHINE AND NALOXONE 1 FILM: 8; 2 FILM, SOLUBLE BUCCAL; SUBLINGUAL at 10:43

## 2023-03-30 RX ADMIN — LORAZEPAM 0.5 MG: 2 INJECTION INTRAMUSCULAR; INTRAVENOUS at 22:04

## 2023-03-30 ASSESSMENT — ENCOUNTER SYMPTOMS
ABDOMINAL PAIN: 1
COUGH: 1
SHORTNESS OF BREATH: 1

## 2023-03-30 ASSESSMENT — ACTIVITIES OF DAILY LIVING (ADL)
ADLS_ACUITY_SCORE: 35
ADLS_ACUITY_SCORE: 35
ADLS_ACUITY_SCORE: 33
ADLS_ACUITY_SCORE: 35

## 2023-03-30 NOTE — H&P
[facility] MEDICINE ADMISSION HISTORY AND PHYSICAL     Assessment & Plan       Renetta Farooq is a 67 year old  female with history of hypertension, anxiety, depression, bipolar disorder, opiate dependence with Suboxone chronically, chronic hepatitis C, methamphetamine abuse, history of alcohol dependence, tobacco use, came with short of breath, chest tightness, wheezing secondary to COPD exacerbation.    COPD exacerbation  Ongoing tobacco use  Chest CT-worsening bronchial wall thickening with associated endobronchial retained secretion compatible with bronchitis, no PE  Levaquin 750 mg daily for 5-day  DuoNeb 4 times a day  Status post IV methylprednisone 125 mg once, resume prednisone 40 mg daily for 5-day  Symbicort 2 puff twice a day at home    Essential hypertension  Norvasc 5 mg daily  Clonidine 0.1 mg 3 times a day    Anxiety and depression  Seroquel 100 mg in a.m. and 300 mg at bedtime  Fluoxetine 40 mg daily    Hypokalemia: Lowest K = 3.3 mmol/L in last 2 days, will replace as needed  Hyponatremia: Lowest Na = 127 mmol/L in last 2 days, will monitor as appropriate    Hypomagnesemia: Lowest Mg = 1.5 mg/dL in last 2 days, will replace as needed hold     Social issues  Currently homeless living in an extended stay hotel   evaluate    Code full  DVT prophylaxis  Subcu heparin 5000 unit twice a day  Inpatient admission  Needs to stay in the hospital at least for 2 days for further evaluation and treatment       Chief Complaint  short of breath, chest tightness, wheezing     HISTORY     Renetta Farooq is a 67 year old  female with history of hypertension, anxiety, depression, bipolar disorder, opiate dependence with Suboxone chronically, chronic hepatitis C, methamphetamine abuse, history of alcohol dependence, tobacco use, came with short of breath, chest tightness, wheezing.  She is smoking 3 to 4 cigarettes/day.  She has productive cough with yellow-green phlegm.   "Afebrile.  She has dyspnea with minimal exertion.  Denies headache, heartburn, abdominal pain, diarrhea, constipation or urinary symptoms.  History is provided by the patient.  Spoke with ED physician    Past Medical History     Past Medical History:  10/14/2018: Acute psychosis (H)  4/10/2015: Alcohol dependence (H)  10/15/2018: Bipolar affective disorder, manic, severe, with psychotic   behavior (H)  10/19/2018: Candida infection  4/10/2015: Chronic hepatitis C (H)  No date: Chronic neck pain  No date: Closed traumatic brain injury, without loss of consciousness,   sequela (H)  No date: Dental abscess  No date: Episodic tension-type headache, not intractable  10/15/2018: Methamphetamine dependence (H)  4/10/2015: Pancytopenia (H)  4/10/2015: Weakness of right arm     Surgical History     Past Surgical History:   Procedure Laterality Date     ARTHROSCOPY SHOULDER ROTATOR CUFF REPAIR Bilateral      CERVICAL FUSION      twice     COSMETIC SURGERY       RHINOPLASTY       TONSILLECTOMY       Family History    Reviewed, and   Family History   Problem Relation Age of Onset     Narcolepsy Mother      Acute myelogenous leukemia Mother      Cancer Father      Social History      Social History     Tobacco Use     Smoking status: Every Day     Packs/day: 1.00     Years: 42.00     Pack years: 42.00     Types: Cigarettes     Smokeless tobacco: Never   Substance Use Topics     Alcohol use: Yes     Comment: Alcoholic Drinks/day: \"A lot.\"  (Couldn't quantify except to repeat \"A lot\" of wine, liquor and beer)     Drug use: No     Allergies   No Known Allergies  Prior to Admission Medications      Prior to Admission Medications   Prescriptions Last Dose Informant Patient Reported? Taking?   FLUoxetine (PROZAC) 40 MG capsule   Yes No   Sig: Take 40 mg by mouth daily   NYSTOP 964262 UNIT/GM powder   Yes No   Sig: 3 times daily as needed   QUEtiapine (SEROQUEL) 100 MG tablet   No No   Sig: [QUETIAPINE (SEROQUEL) 100 MG TABLET] Take 3 " tablets (300 mg total) by mouth at bedtime.   QUEtiapine (SEROQUEL) 100 MG tablet   Yes No   Sig: Take 100 mg by mouth every morning   albuterol (PROAIR HFA;PROVENTIL HFA;VENTOLIN HFA) 90 mcg/actuation inhaler   No No   Sig: [ALBUTEROL (PROAIR HFA;PROVENTIL HFA;VENTOLIN HFA) 90 MCG/ACTUATION INHALER] Inhale 2 puffs every 6 (six) hours as needed for wheezing.   amLODIPine (NORVASC) 5 MG tablet   Yes No   Sig: Take 5 mg by mouth daily   buprenorphine HCl-naloxone HCl (SUBOXONE) 4-1 MG per film   Yes No   Sig: Place 1 Film under the tongue 2 times daily   cholecalciferol, vitamin D3, 1,000 unit tablet   No No   Sig: [CHOLECALCIFEROL, VITAMIN D3, 1,000 UNIT TABLET] Take 1 tablet (1,000 Units total) by mouth daily.   cloNIDine (CATAPRES) 0.2 MG tablet   No No   Sig: Take 1 tablet (0.2 mg) by mouth 3 times daily for 10 days   docusate sodium (COLACE) 100 MG capsule   No No   Sig: [DOCUSATE SODIUM (COLACE) 100 MG CAPSULE] Take 1 capsule (100 mg total) by mouth 2 (two) times a day.   docusate sodium (COLACE) 100 MG tablet   Yes No   Sig: Take 100 mg by mouth 2 times daily as needed for constipation   fluticasone-salmeterol (ADVAIR) 250-50 MCG/ACT inhaler   Yes No   Sig: Inhale 1 puff into the lungs every 12 hours   furosemide (LASIX) 20 MG tablet   No No   Sig: Take 2 tablets (40 mg) by mouth 2 times daily for 14 days   gabapentin (NEURONTIN) 300 MG capsule   Yes No   Sig: Take 300 mg by mouth 3 times daily   magnesium oxide (MAG-OX) 400 MG tablet   No No   Sig: Take 1 tablet (400 mg) by mouth daily   omeprazole (PRILOSEC) 20 MG DR capsule   Yes No   Sig: Take 20 mg by mouth At Bedtime   potassium chloride ER (KLOR-CON M) 20 MEQ CR tablet   No No   Sig: Take 1 tablet (20 mEq) by mouth 2 times daily      Facility-Administered Medications: None      Review of Systems     A 12 point comprehensive review of systems was negative except as noted above in HPI.    PHYSICAL EXAMINATION       Vitals      Temp:  [98.1  F (36.7  C)]  98.1  F (36.7  C)  Pulse:  [68-89] 83  Resp:  [20] 20  BP: (102-117)/(55-74) 117/74  SpO2:  [97 %-99 %] 98 %    Examination     GENERAL:  Alert, anxious, appears stated age   HEAD:  Normocephalic, without obvious abnormality, atraumatic   EYES:  PERRL, conjunctiva  clear,  EOM's intact   NOSE: Nares normal,  mucosa normal, no drainage   THROAT: Lips, mucosa,   gums normal, mouth moist   NECK: Supple, symmetrical, trachea midline   BACK:   Symmetric, no curvature, ROM normal   LUNGS:   Scattered expiratory wheezing bilaterally, no rales, rhonchi, symmetric chest rise on inhalation, respirations unlabored   CHEST WALL:  No tenderness or deformity   HEART:  Regular rate and rhythm, S1 and S2 normal, no murmur    ABDOMEN:   Soft, non-tender, bowel sounds active , no masses, no organomegaly, no rebound or guarding   EXTREMITIES: No   edema    SKIN: No rashes   NEURO: Alert, oriented x 3, moves all four extremities freely, non-focal   PSYCH: Cooperative, anxious easily     Pertinent Lab     Most Recent 3 CBC's:Recent Labs   Lab Test 03/30/23  1037 03/05/23  1748 03/01/23  1933   WBC 10.5 3.9* 4.3   HGB 13.1 12.3 11.0*   MCV 90 93 93    195 199     Most Recent 3 BMP's:Recent Labs   Lab Test 03/30/23  1037 03/05/23  1748 03/01/23 1933   * 141 141   POTASSIUM 3.3* 3.6 2.7*   CHLORIDE 91* 106 102   CO2 21* 25 25   BUN 13 8 13   CR 1.01 0.88 1.13*   ANIONGAP 15 10 14   KARRIE 9.5 9.3 8.7    134* 93     Most Recent 2 LFT's:Recent Labs   Lab Test 03/30/23  1037 03/01/23  1933   AST 17 14   ALT 10 <9   ALKPHOS 81 80   BILITOTAL 0.6 0.4         Pertinent Radiology     Radiology Results:   Chest ct  1.  No evidence of pulmonary embolus.  2.  Worsening bronchial wall thickening, with associated endobronchial retained secretions compatible with bronchitis.  3.  Additional chronic findings as above.    Abdomen ct  1.  Cholelithiasis without cholecystitis.  2.  No evidence of bowel obstruction or pneumatosis.    EKG  Results:  Sinus rhythm     Total time spent 70 min by me on the date of service doing chart review, history, exam, documentation & further activities per the note.       Laila Grayson MD  [facility] Medicine  [facility]   Phone: #[phone]

## 2023-03-30 NOTE — ED NOTES
Report received and pt moved to 8 and care assumed by RN. Pt appears anxious on arrival to room. VSS, asking when she can have ativan. MD notified. Call light in reach.

## 2023-03-30 NOTE — ED TRIAGE NOTES
Pt BIB EMS for SOB. Pt has hx of CHF and has been having SOB for the last week. The pt is bringing up multiple complaints and asking for her suboxone in triage. Pt says that she has pneumonia but has not been in to see a doctor since she last was in the Hospital. Oxygen is at 98% on room air.     Triage Assessment     Row Name 03/30/23 1013       Triage Assessment (Adult)    Airway WDL WDL       Respiratory WDL    Respiratory WDL X;rhythm/pattern    Rhythm/Pattern, Respiratory shortness of breath;periodic breathing;dyspnea on exertion       Skin Circulation/Temperature WDL    Skin Circulation/Temperature WDL WDL       Cardiac WDL    Cardiac WDL WDL       Peripheral/Neurovascular WDL    Peripheral Neurovascular WDL WDL       Cognitive/Neuro/Behavioral WDL    Cognitive/Neuro/Behavioral WDL WDL

## 2023-03-30 NOTE — ED PROVIDER NOTES
EMERGENCY DEPARTMENT ENCOUNTER      NAME: Renetta Farooq  AGE: 67 year old female  YOB: 1955  MRN: [id number]  EVALUATION DATE & TIME: No admission date for patient encounter.    PCP: Jamila Connelly    ED PROVIDER: Rosita Samson MD      Chief Complaint   Patient presents with     Shortness of Breath     Withdrawal         FINAL IMPRESSION:  1. Acute bronchitis, unspecified organism    2. COPD exacerbation (H)    3. Bipolar affective disorder, manic, severe, with psychotic behavior (H)    4. Generalized anxiety disorder    5. Hypomagnesemia          ED COURSE & MEDICAL DECISION MAKING:    Pertinent Labs & Imaging studies reviewed. (See chart for details)    10:18 AM I introduced myself to the patient, obtained patient history, performed a physical exam, and discussed plan for ED workup including potential diagnostic laboratory/imaging studies and interventions.    67 year old female with a pertinent history of methamphetamine dependence, pancytopenia, acute psychosis, closed traumatic brain injury, COPD, opioid abuse on suboxone, clonidine abuse, homelessness who presents to this ED for evaluation of shortness of breath.  Patient appears significantly anxious with multiple complaints.  She is supposed to be on Suboxone but has not taken it for 2 days due to vomiting.  Also has prior history of clonidine abuse and is overdosed on this in the past.  States she is currently just taking it however per record review from recent hospitalization this was not continued.  It does appear that her primary care doctor may have reordered it.  On exam at this time she does not have any obvious sign of clonidine overdose.  Her vital signs are within normal limits and she is afebrile.  She has some mild expiratory wheezing bilaterally but no significant respiratory distress.  She is satting normally on room air.  Unfortunately due to significant ED boarding and capacity issues she was in the waiting room for an  extended period of time while treatment was initiated.  Did have concern for possible Suboxone/opioid withdrawal with her multiple complaints and elevated COWS score.  Thus did give her her prescribed Suboxone.  Also had concern for possible COPD exacerbation versus pneumonia versus pulmonary edema versus PE etc.  She was given 125 mg of IV Solu-Medrol and DuoNeb was ordered.  Unfortunately the DuoNeb was significantly delayed as she needed to be in her room to have this performed.  However I did check on her multiple times in the waiting room and she was in no acute distress.  She was able to ambulate to the bathroom.  When I would walk up to her to discuss how she was feeling she would then become more dramatic about her symptoms however watching her from afar she had no sign of respiratory distress.  EKG was obtained which does not reveal any evidence of acute ischemia.  Troponin was within normal limits.  Felt that acute coronary syndrome was less likely.  BNP is within normal to 14 making pulmonary edema unlikely.  Did have concern for potential substance abuse as well.  Did appear to improve with the Suboxone.  Initial lactic acid is 2.7 however do question potentially some dehydration versus albuterol use.  Did give IV fluids with plan for repeat.  White blood cell count is normal at 10.5.  Hemoglobin 13.1.  CMP revealed a potassium of 3.3.  40 mill equivalents of oral potassium was given.  Sodium slightly low at 127 as well.  Creatinine within normal limits.  Chest x-ray was obtained and largely unrevealing.  Ammonia within normal limits.  Influenza RSV and COVID-19 PCR is negative.  CK within normal limits.  Did obtain CRP and procalcitonin which are both normal.  Urine drug screen ordered as well as urinalysis.  Magnesium was also low and replacement with 2 g IV magnesium sulfate was ordered.  Family was able to get the patient to a room for monitoring was also given a milligram of IV Ativan as she was  significantly anxious.  She was able to be given the DuoNeb.    With the unrevealing chest x-ray did order a CT chest as well as abdomen and pelvis for further evaluation with the elevated lactic acid.  CT of the abdomen is unrevealing.  CT of the chest Did not reveal any evidence of PE.  Worsening bronchial wall thickening with associated endobronchial retained secretions compatible with bronchitis.  Did order second DuoNeb as well.  Initially had not given antibiotics due to reassuring procalcitonin CRP and white blood cell count.  However on repeat lactic acid is now elevated at 4 however do question this may have been albuterol related.  However will give IV doxycycline as it does appear that she at the very least has a COPD exacerbation.  Do feel she warrants admission for further management.  She was improved with interventions here.  No acute respiratory distress on my multiple reevaluations.  Spoke with the hospitalist who excepted the patient for admission.  She was admitted in stable condition.         At the conclusion of the encounter I discussed the results of all of the tests and the disposition. The questions were answered. The patient or family acknowledged understanding and was agreeable with the care plan.         Medical Decision Making    History:    Supplemental history from: Documented in chart, if applicable    External Record(s) reviewed: Documented in chart, if applicable.    Work Up:    Chart documentation includes differential considered and any EKGs or imaging independently interpreted by provider.    In additional to work up documented, I considered the following work up: Documented in chart, if applicable.    External consultation:    Discussion of management with another provider: Documented in chart, if applicable    Complicating factors:    Care impacted by chronic illness: Chronic Lung Disease, Chronic Pain and Mental Health    Care affected by social determinants of health: Alcohol  Abuse and/or Recreational Drug Use    Disposition considerations: Admit.      MEDICATIONS GIVEN IN THE EMERGENCY:  Medications   buprenorphine HCl-naloxone HCl (SUBOXONE) 8-2 MG per film 1 Film (1 Film Sublingual $Given 3/30/23 1043)   ipratropium - albuterol 0.5 mg/2.5 mg/3 mL (DUONEB) neb solution 3 mL (3 mLs Nebulization $Given 3/30/23 1321)   methylPREDNISolone sodium succinate (solu-MEDROL) injection 125 mg (125 mg Intravenous $Given 3/30/23 1042)   0.9% sodium chloride BOLUS (0 mLs Intravenous Stopped 3/30/23 1651)   magnesium sulfate 2 g in 50 mL sterile water intermittent infusion (0 g Intravenous Stopped 3/30/23 1756)   iopamidol (ISOVUE-370) solution 75 mL (75 mLs Intravenous $Given 3/30/23 1518)   ipratropium - albuterol 0.5 mg/2.5 mg/3 mL (DUONEB) neb solution 3 mL (3 mLs Nebulization $Given 3/30/23 1708)   potassium chloride ER (KLOR-CON M) CR tablet 40 mEq (40 mEq Oral $Given 3/30/23 1710)   LORazepam (ATIVAN) injection 1 mg (1 mg Intravenous $Given 3/30/23 1710)   doxycycline (VIBRAMYCIN) 100 mg vial to attach to  mL bag (0 mg Intravenous Stopped 3/30/23 1900)   0.9% sodium chloride BOLUS (0 mLs Intravenous Stopped 3/30/23 1757)   magnesium sulfate 4 g in 50 mL sterile water intermittent infusion (0 g Intravenous Stopped 3/30/23 2230)   methylPREDNISolone sodium succinate (solu-MEDROL) injection 40 mg (40 mg Intravenous $Given 3/31/23 0456)   magnesium sulfate 4 g in 50 mL sterile water intermittent infusion (0 g Intravenous Stopped 3/31/23 1334)       NEW PRESCRIPTIONS STARTED AT TODAY'S ER VISIT  Discharge Medication List as of 4/1/2023  2:27 PM      START taking these medications    Details   nicotine (NICODERM CQ) 14 MG/24HR 24 hr patch Place 1 patch onto the skin daily, Disp-14 patch, R-1, E-Prescribe      ipratropium - albuterol 0.5 mg/2.5 mg/3 mL (DUONEB) 0.5-2.5 (3) MG/3ML neb solution Take 1 vial (3 mLs) by nebulization 3 times daily For 1 week then every 6 hours as needed, Disp-120 mL,  R-1, E-PrescribePlease provide vials      predniSONE (DELTASONE) 20 MG tablet Take 2 tablets (40 mg) by mouth daily for 4 days, Disp-8 tablet, R-0, E-Prescribe                =================================================================    [facility]    Patient information was obtained from: Patient    Use of : N/A          Renetta Farooq is a 67 year old female with a pertinent history of methamphetamine dependence, pancytopenia, acute psychosis, closed traumatic brain injury, COPD, opioid abuse on suboxone, clonidine abuse, homelessness who presents to this ED for evaluation of shortness of breath.    Patient states she can't breathe. She reports she has had pneumonia for 7 days, 5 days without eating or drinking, and 4 days of a fever up to 102F. She states her electrolytes are low. Last on oxygen a couple of weeks ago when she was in the hospital. Patient reports congestion with coughing non-stop, and brown/green phlegm. She endorses tobacco use. Patient endorses she has been taking clonidine 0.2 mg 3x per day, endorsing the hospital prescribed 2 months of it. She reports she has not taken her suboxone for 2 days due to vomiting. She reports abdominal pain and leg pain. Patient has been taking her inhalers at home. No other current complaints.      REVIEW OF SYSTEMS   Review of Systems   HENT: Positive for congestion.    Respiratory: Positive for cough (productive) and shortness of breath.    Gastrointestinal: Positive for abdominal pain.   Musculoskeletal:        Positive for leg pain.      Pertinent positives and negatives are documented in the HPI. All other systems reviewed and are negative.      PAST MEDICAL HISTORY:  Past Medical History:   Diagnosis Date     Acute psychosis (H) 10/14/2018     Alcohol dependence (H) 4/10/2015     Bipolar affective disorder, manic, severe, with psychotic behavior (H) 10/15/2018     Candida infection 10/19/2018     Chronic hepatitis C (H) 4/10/2015     Chronic neck  pain      Closed traumatic brain injury, without loss of consciousness, sequela (H)      Dental abscess      Episodic tension-type headache, not intractable      Methamphetamine dependence (H) 10/15/2018     Pancytopenia (H) 4/10/2015     Weakness of right arm 4/10/2015       PAST SURGICAL HISTORY:  Past Surgical History:   Procedure Laterality Date     ARTHROSCOPY SHOULDER ROTATOR CUFF REPAIR Bilateral      CERVICAL FUSION      twice     COSMETIC SURGERY       RHINOPLASTY       TONSILLECTOMY             CURRENT MEDICATIONS:    albuterol (PROAIR HFA;PROVENTIL HFA;VENTOLIN HFA) 90 mcg/actuation inhaler  budesonide-formoterol (SYMBICORT) 160-4.5 MCG/ACT Inhaler  buprenorphine HCl-naloxone HCl (SUBOXONE) 4-1 MG per film  docusate sodium (COLACE) 100 MG capsule  nicotine (NICODERM CQ) 14 MG/24HR 24 hr patch  NYSTOP 357215 UNIT/GM powder  omeprazole (PRILOSEC) 20 MG DR capsule  calcium carbonate (TUMS) 500 MG chewable tablet  cloNIDine (CATAPRES) 0.1 MG tablet  cyclobenzaprine (FLEXERIL) 5 MG tablet  diclofenac (VOLTAREN) 1 % topical gel  FLUoxetine (PROZAC) 40 MG capsule  gabapentin (NEURONTIN) 300 MG capsule  hydrocortisone (CORTEF) 10 MG tablet  hydrOXYzine (ATARAX) 25 MG tablet  ibuprofen (ADVIL/MOTRIN) 200 MG tablet  ipratropium - albuterol 0.5 mg/2.5 mg/3 mL (DUONEB) 0.5-2.5 (3) MG/3ML neb solution  magnesium oxide (MAG-OX) 400 MG tablet  potassium chloride ER (KLOR-CON M) 20 MEQ CR tablet  QUEtiapine (SEROQUEL) 100 MG tablet  QUEtiapine (SEROQUEL) 300 MG tablet  sulfamethoxazole-trimethoprim (BACTRIM DS) 800-160 MG tablet  Vitamin D3 (CHOLECALCIFEROL) 25 mcg (1000 units) tablet        ALLERGIES:  Allergies   Allergen Reactions     Amoxicillin Diarrhea     Droperidol Angioedema       FAMILY HISTORY:  Family History   Problem Relation Age of Onset     Narcolepsy Mother      Acute myelogenous leukemia Mother      Cancer Father        SOCIAL HISTORY:   Social History     Socioeconomic History     Marital status:  "     Spouse name: None     Number of children: None     Years of education: None     Highest education level: None   Tobacco Use     Smoking status: Every Day     Packs/day: 1.00     Years: 42.00     Pack years: 42.00     Types: Cigarettes     Smokeless tobacco: Never   Substance and Sexual Activity     Alcohol use: Yes     Comment: Alcoholic Drinks/day: \"A lot.\"  (Couldn't quantify except to repeat \"A lot\" of wine, liquor and beer)     Drug use: No   Social History Narrative    Lives alone in independent housing with psychiatric case assistance       VITALS:  /62 (BP Location: Left arm)   Pulse 88   Temp 97.8  F (36.6  C) (Oral)   Resp 18   Ht 1.651 m (5' 5\")   Wt 59.3 kg (130 lb 12.8 oz)   SpO2 96%   BMI 21.77 kg/m      PHYSICAL EXAM    Physical Exam  Constitutional: anxious, appears uncomfortable  HENT: Normocephalic, Atraumatic, Bilateral external ears normal, Oropharynx normal, mucous membranes moist, Nose normal. Neck- Normal range of motion, No tenderness, Supple, No stridor.   Eyes: PERRL, EOMI, Conjunctiva normal, No discharge.   Respiratory: Normal breath sounds, No respiratory distress, Mild expiratory wheezing bilaterally, Speaks in full sentences easily.   Cardiovascular: Normal heart rate, Regular rhythm, No murmurs, No rubs, No gallops. 2+ radial pulses bilaterally  GI: Bowel sounds normal, Soft, No tenderness, No masses, No rebound or guarding.   Musculoskeletal: 2+ DP pulses. No notable lower extremity edema.. Good range of motion in all major joints. No tenderness to palpation or major deformities noted. No tenderness of the CTLS spine.   Integument: Warm, Dry, No erythema, No rash. No petechiae.   Neurologic: Alert & oriented x 3, 5/5 strength in all 4 extremities bilaterally. Sensation intact to light touch in all 4 extremities and the face bilaterally. No focal deficits noted. Normal gait.    Psychiatric: Anxious     LAB:  All pertinent labs reviewed and " interpreted.  Results for orders placed or performed during the hospital encounter of 03/30/23   Chest XR,  PA & LAT    Impression    IMPRESSION: Heart size is normal. No CHF, lobar consolidation, or pleural effusions. Minimal basilar scarring. Mediastinum and bony structures are stable in appearance.   CT Chest Pulmonary Embolism w Contrast    Impression    IMPRESSION:  1.  No evidence of pulmonary embolus.  2.  Worsening bronchial wall thickening, with associated endobronchial retained secretions compatible with bronchitis.  3.  Additional chronic findings as above.   CT Abdomen Pelvis w Contrast    Impression    IMPRESSION:   1.  Cholelithiasis without cholecystitis.  2.  No evidence of bowel obstruction or pneumatosis.   Comprehensive metabolic panel   Result Value Ref Range    Sodium 127 (L) 136 - 145 mmol/L    Potassium 3.3 (L) 3.5 - 5.0 mmol/L    Chloride 91 (L) 98 - 107 mmol/L    Carbon Dioxide (CO2) 21 (L) 22 - 31 mmol/L    Anion Gap 15 5 - 18 mmol/L    Urea Nitrogen 13 8 - 22 mg/dL    Creatinine 1.01 0.60 - 1.10 mg/dL    Calcium 9.5 8.5 - 10.5 mg/dL    Glucose 117 70 - 125 mg/dL    Alkaline Phosphatase 81 45 - 120 U/L    AST 17 0 - 40 U/L    ALT 10 0 - 45 U/L    Protein Total 7.6 6.0 - 8.0 g/dL    Albumin 4.3 3.5 - 5.0 g/dL    Bilirubin Total 0.6 0.0 - 1.0 mg/dL    GFR Estimate 60 (L) >60 mL/min/1.73m2   Result Value Ref Range    Troponin I <0.01 0.00 - 0.29 ng/mL   B-Type Natriuretic Peptide ([facility] Only)   Result Value Ref Range    BNP 14 0 - 114 pg/mL   UA with Microscopic reflex to Culture    Specimen: Urine, Clean Catch   Result Value Ref Range    Color Urine Colorless Colorless, Straw, Light Yellow, Yellow    Appearance Urine Clear Clear    Glucose Urine Negative Negative mg/dL    Bilirubin Urine Negative Negative    Ketones Urine Negative Negative mg/dL    Specific Gravity Urine 1.020 1.001 - 1.030    Blood Urine Negative Negative    pH Urine 6.5 5.0 - 7.0    Protein Albumin Urine Negative Negative  mg/dL    Urobilinogen Urine <2.0 <2.0 mg/dL    Nitrite Urine Negative Negative    Leukocyte Esterase Urine Negative Negative    RBC Urine <1 <=2 /HPF    WBC Urine <1 <=5 /HPF    Squamous Epithelials Urine <1 <=1 /HPF   Symptomatic Influenza A/B, RSV, & SARS-CoV2 PCR (COVID-19) Nasopharyngeal    Specimen: Nasopharyngeal; Swab   Result Value Ref Range    Influenza A PCR Negative Negative    Influenza B PCR Negative Negative    RSV PCR Negative Negative    SARS CoV2 PCR Negative Negative   Result Value Ref Range    Magnesium 1.5 (L) 1.8 - 2.6 mg/dL   Result Value Ref Range    Ammonia 18 11 - 35 umol/L   Ethyl Alcohol Level   Result Value Ref Range    Alcohol, Blood <10 None detected mg/dL   CRP inflammation   Result Value Ref Range    CRP <0.1 0.0 - <0.8 mg/dL   Result Value Ref Range    Procalcitonin <0.02 0.00 - 0.49 ng/mL   Lactic acid whole blood   Result Value Ref Range    Lactic Acid 2.7 (H) 0.7 - 2.0 mmol/L   CBC with platelets and differential   Result Value Ref Range    WBC Count 10.5 4.0 - 11.0 10e3/uL    RBC Count 4.14 3.80 - 5.20 10e6/uL    Hemoglobin 13.1 11.7 - 15.7 g/dL    Hematocrit 37.4 35.0 - 47.0 %    MCV 90 78 - 100 fL    MCH 31.6 26.5 - 33.0 pg    MCHC 35.0 31.5 - 36.5 g/dL    RDW 11.9 10.0 - 15.0 %    Platelet Count 175 150 - 450 10e3/uL    % Neutrophils 83 %    % Lymphocytes 10 %    % Monocytes 5 %    % Eosinophils 1 %    % Basophils 0 %    % Immature Granulocytes 1 %    NRBCs per 100 WBC 0 <1 /100    Absolute Neutrophils 8.7 (H) 1.6 - 8.3 10e3/uL    Absolute Lymphocytes 1.1 0.8 - 5.3 10e3/uL    Absolute Monocytes 0.5 0.0 - 1.3 10e3/uL    Absolute Eosinophils 0.1 0.0 - 0.7 10e3/uL    Absolute Basophils 0.0 0.0 - 0.2 10e3/uL    Absolute Immature Granulocytes 0.1 <=0.4 10e3/uL    Absolute NRBCs 0.0 10e3/uL   Lactic acid whole blood   Result Value Ref Range    Lactic Acid 4.3 (HH) 0.7 - 2.0 mmol/L   Drugs of Abuse 1 Panel, Urine ([facility] Only)   Result Value Ref Range    Amphetamines Urine Screen  Negative Screen Negative    Benzodiazepines Urine Screen Negative Screen Negative    Opiates Urine Screen Negative Screen Negative    PCP Urine Screen Negative Screen Negative    Cannabinoids Urine Screen Negative Screen Negative    Barbiturates Urine Screen Negative Screen Negative    Cocaine Urine Screen Negative Screen Negative    Oxycodone Urine Screen Negative Screen Negative    Creatinine Urine mg/dL 15 mg/dL   Result Value Ref Range     30 - 190 U/L   Blood gas venous   Result Value Ref Range    pH Venous 7.42 7.35 - 7.45    pCO2 Venous 35 35 - 50 mm Hg    pO2 Venous 27 25 - 47 mm Hg    Bicarbonate Venous 22 (L) 24 - 30 mmol/L    Base Excess/Deficit (+/-) -2.0   mmol/L    Oxyhemoglobin Venous 50.8 (L) 70.0 - 75.0 %    O2 Sat, Venous 52.5 (L) 70.0 - 75.0 %   CBC with platelets   Result Value Ref Range    WBC Count 7.2 4.0 - 11.0 10e3/uL    RBC Count 3.56 (L) 3.80 - 5.20 10e6/uL    Hemoglobin 11.4 (L) 11.7 - 15.7 g/dL    Hematocrit 32.9 (L) 35.0 - 47.0 %    MCV 92 78 - 100 fL    MCH 32.0 26.5 - 33.0 pg    MCHC 34.7 31.5 - 36.5 g/dL    RDW 11.9 10.0 - 15.0 %    Platelet Count 129 (L) 150 - 450 10e3/uL   Basic metabolic panel   Result Value Ref Range    Sodium 132 (L) 136 - 145 mmol/L    Potassium 4.0 3.5 - 5.0 mmol/L    Chloride 98 98 - 107 mmol/L    Carbon Dioxide (CO2) 23 22 - 31 mmol/L    Anion Gap 11 5 - 18 mmol/L    Urea Nitrogen 11 8 - 22 mg/dL    Creatinine 0.80 0.60 - 1.10 mg/dL    Calcium 9.2 8.5 - 10.5 mg/dL    Glucose 133 (H) 70 - 125 mg/dL    GFR Estimate 80 >60 mL/min/1.73m2   Result Value Ref Range    Magnesium 2.7 (H) 1.8 - 2.6 mg/dL   Result Value Ref Range    Magnesium 2.3 1.8 - 2.6 mg/dL   ECG 12-LEAD WITH MUSE ([facility])   Result Value Ref Range    Systolic Blood Pressure  mmHg    Diastolic Blood Pressure  mmHg    Ventricular Rate 77 BPM    Atrial Rate 77 BPM    NC Interval 150 ms    QRS Duration 92 ms     ms    QTc 475 ms    P Axis 76 degrees    R AXIS 46 degrees    T Axis 72 degrees     Interpretation ECG       Sinus rhythm  Cannot rule out Anterior infarct (cited on or before 05-MAR-2023)  Abnormal ECG  When compared with ECG of 05-MAR-2023 17:44,  No significant change was found  Confirmed by SEE ED PROVIDER NOTE FOR, ECG INTERPRETATION ([id number]),  BUCKY BALL ([id number]) on 3/30/2023 10:31:29 AM     Blood Culture Arm, Left    Specimen: Arm, Left; Blood   Result Value Ref Range    Culture No Growth    Blood Culture Arm, Right    Specimen: Arm, Right; Blood   Result Value Ref Range    Culture No Growth        RADIOLOGY:  Reviewed all pertinent imaging. Please see official radiology report.  CT Abdomen Pelvis w Contrast   Final Result   IMPRESSION:    1.  Cholelithiasis without cholecystitis.   2.  No evidence of bowel obstruction or pneumatosis.      CT Chest Pulmonary Embolism w Contrast   Final Result   IMPRESSION:   1.  No evidence of pulmonary embolus.   2.  Worsening bronchial wall thickening, with associated endobronchial retained secretions compatible with bronchitis.   3.  Additional chronic findings as above.      Chest XR,  PA & LAT   Final Result   IMPRESSION: Heart size is normal. No CHF, lobar consolidation, or pleural effusions. Minimal basilar scarring. Mediastinum and bony structures are stable in appearance.          EKG:         EKG Interpretation:      Interpreted by Rosita Samson MD  Time reviewed: 10:26 am   Symptoms at time of EKG: shortness of breath   Rhythm: normal sinus   Rate: normal  Axis: NORMAL  Ectopy: none  Conduction: normal  ST Segments/ T Waves: No ST-T wave changes  Q Waves: none  Comparison to prior: Unchanged from 3/5/23    Clinical Impression: no evidence of acute ischemia        I have independently reviewed and interpreted the EKG(s) documented above.    PROCEDURES:   None      [employer] System Documentation:   CMS Diagnoses:      Mental Health Risk Assessment      PSS-3    Date and Time Over the past 2 weeks have you felt down,  depressed, or hopeless? Over the past 2 weeks have you had thoughts of killing yourself? Have you ever attempted to kill yourself? When did this last happen? User   03/30/23 1017 yes yes no -- AB      C-SSRS ([place])    Date and Time Q1 Wished to be Dead (Past Month) Q2 Suicidal Thoughts (Past Month) Q3 Suicidal Thought Method Q4 Suicidal Intent without Specific Plan Q5 Suicide Intent with Specific Plan Q6 Suicide Behavior (Lifetime) Within the Past 3 Months? RETIRED: Level of Risk per Screen Screening Not Complete User   03/30/23 1017 no no no no no no -- -- -- AB                Item Assessment   Suicidal Ideation None   Plan none   Intent None   Suicidal or self-harm behaviors not suicidal currenlty   Risk Factors not suicidal currently   Protective Factors not suicidal currently           I, Jihan Perry, am serving as a scribe to document services personally performed by Rosita Samson MD based on my observation and the provider's statements to me. I, Rosita Samson MD, attest that Jihan Perry is acting in a scribe capacity, has observed my performance of the services and has documented them in accordance with my direction.    Rosita Samson MD  [facility] EMERGENCY ROOM  [address] [facility] [address] [facility] [address] [phone]     Rosita Samson MD  04/17/23 1854

## 2023-03-30 NOTE — LETTER
My Post-hospital Smoking Cessation Relapse Prevention Plan     Name: Renetta Farooq  Potential Quit date: 3/30/2023   Date: 3/31/2023   PCP/Family Doctor: Jamila Connelly   Tobacco Treatment Specialist: Michelle  Tobacco Treatment Specialist Phone Number: [phone]    Thank you for sitting with me and talking about your smoking history, thoughts and feelings about quitting. I hope you found our talk meaningful. As trained tobacco treatment specialists, we're here to give on-going support and recommendations.     Below are the things we recommend based on our talk:     > 14 mg NICOTINE PATCH: After leaving the hospital, use this every day for 4 to 6 weeks. Depending on your cravings and urges, lower the dose to 7 mg for 4 to 6 weeks until you fully quit. If your cravings get worse, you may want to go back up to the previous dose for another 1 to 2 weeks. Be sure to speak with your family doctor about patch refills. Refer to  Quitting for Good with Treatment and Support  for more details and titration recommendation.      > Nicotine Inhaler: Do not smoke cigarettes while using this inhaler. Take 3 to 4 puffs a minute for 20 minutes.  Or see what works for you. Try puffing as much as you want for 5 or 20 minutes at a time. You may use 4 to 16 cartridges per day. Begin with using every 1-2 hours then reduce interval about every 3 weeks. For example, start every 1-2 hours then in 3 weeks down to every 2-3 hours for 3 weeks then every 4-5 hours then done.Don't use it longer than 6 months. Be sure to speak with your family doctor about Nicotine Inhaler refills. If you need more counseling or support, feel free to contact your Tobacco Treatment Specialist. Refer to  Quitting for Good with Treatment and Support  for more details and titration recommendation.     Make sure you stay in contact with your primary provider for any changes you might need in your daily medications. Use the workbook we gave you to help  understand why you smoke, to come up with 5 reasons to quit, and to imagine your future without smoking. I strongly encourage you to find your own ways to distract yourself to get past cravings.     Please know that slipping up here and there doesn't mean you failed. Rather, it's a chance to reflect, change your habits, and try new ways to deal with strong triggers.  If you have any questions or concerns, please don't hesitate to call us at [phone]. We are here Monday through Friday, 08:00A to 04:30P. Please leave a message if we don't answer.

## 2023-03-31 LAB
ANION GAP SERPL CALCULATED.3IONS-SCNC: 11 MMOL/L (ref 5–18)
BUN SERPL-MCNC: 11 MG/DL (ref 8–22)
CALCIUM SERPL-MCNC: 9.2 MG/DL (ref 8.5–10.5)
CHLORIDE BLD-SCNC: 98 MMOL/L (ref 98–107)
CO2 SERPL-SCNC: 23 MMOL/L (ref 22–31)
CREAT SERPL-MCNC: 0.8 MG/DL (ref 0.6–1.1)
ERYTHROCYTE [DISTWIDTH] IN BLOOD BY AUTOMATED COUNT: 11.9 % (ref 10–15)
GFR SERPL CREATININE-BSD FRML MDRD: 80 ML/MIN/1.73M2
GLUCOSE BLD-MCNC: 133 MG/DL (ref 70–125)
HCT VFR BLD AUTO: 32.9 % (ref 35–47)
HGB BLD-MCNC: 11.4 G/DL (ref 11.7–15.7)
MAGNESIUM SERPL-MCNC: 2.3 MG/DL (ref 1.8–2.6)
MAGNESIUM SERPL-MCNC: 2.7 MG/DL (ref 1.8–2.6)
MCH RBC QN AUTO: 32 PG (ref 26.5–33)
MCHC RBC AUTO-ENTMCNC: 34.7 G/DL (ref 31.5–36.5)
MCV RBC AUTO: 92 FL (ref 78–100)
PLATELET # BLD AUTO: 129 10E3/UL (ref 150–450)
POTASSIUM BLD-SCNC: 4 MMOL/L (ref 3.5–5)
RBC # BLD AUTO: 3.56 10E6/UL (ref 3.8–5.2)
SODIUM SERPL-SCNC: 132 MMOL/L (ref 136–145)
WBC # BLD AUTO: 7.2 10E3/UL (ref 4–11)

## 2023-03-31 PROCEDURE — 999N000032 HC STATISTIC CHRONIC DISEASE SPECIALIST RT CONSULT

## 2023-03-31 PROCEDURE — 94640 AIRWAY INHALATION TREATMENT: CPT

## 2023-03-31 PROCEDURE — 250N000013 HC RX MED GY IP 250 OP 250 PS 637: Performed by: HOSPITALIST

## 2023-03-31 PROCEDURE — 94640 AIRWAY INHALATION TREATMENT: CPT | Mod: 76

## 2023-03-31 PROCEDURE — 258N000003 HC RX IP 258 OP 636: Performed by: FAMILY MEDICINE

## 2023-03-31 PROCEDURE — 999N000157 HC STATISTIC RCP TIME EA 10 MIN

## 2023-03-31 PROCEDURE — 120N000001 HC R&B MED SURG/OB

## 2023-03-31 PROCEDURE — 85027 COMPLETE CBC AUTOMATED: CPT | Performed by: FAMILY MEDICINE

## 2023-03-31 PROCEDURE — 80048 BASIC METABOLIC PNL TOTAL CA: CPT | Performed by: FAMILY MEDICINE

## 2023-03-31 PROCEDURE — 250N000012 HC RX MED GY IP 250 OP 636 PS 637: Performed by: FAMILY MEDICINE

## 2023-03-31 PROCEDURE — 94799 UNLISTED PULMONARY SVC/PX: CPT

## 2023-03-31 PROCEDURE — 250N000013 HC RX MED GY IP 250 OP 250 PS 637: Performed by: FAMILY MEDICINE

## 2023-03-31 PROCEDURE — 250N000013 HC RX MED GY IP 250 OP 250 PS 637: Performed by: INTERNAL MEDICINE

## 2023-03-31 PROCEDURE — 250N000009 HC RX 250: Performed by: FAMILY MEDICINE

## 2023-03-31 PROCEDURE — 250N000011 HC RX IP 250 OP 636: Performed by: FAMILY MEDICINE

## 2023-03-31 PROCEDURE — 83735 ASSAY OF MAGNESIUM: CPT | Performed by: FAMILY MEDICINE

## 2023-03-31 PROCEDURE — G0463 HOSPITAL OUTPT CLINIC VISIT: HCPCS

## 2023-03-31 PROCEDURE — 999N000033 HC STATISTIC CHRONIC PULMONARY DISEASE SPECIALIST

## 2023-03-31 PROCEDURE — 99233 SBSQ HOSP IP/OBS HIGH 50: CPT | Performed by: FAMILY MEDICINE

## 2023-03-31 PROCEDURE — 36415 COLL VENOUS BLD VENIPUNCTURE: CPT | Performed by: FAMILY MEDICINE

## 2023-03-31 PROCEDURE — 99407 BEHAV CHNG SMOKING > 10 MIN: CPT

## 2023-03-31 RX ORDER — QUETIAPINE FUMARATE 25 MG/1
100 TABLET, FILM COATED ORAL EVERY MORNING
Status: DISCONTINUED | OUTPATIENT
Start: 2023-04-01 | End: 2023-04-01 | Stop reason: HOSPADM

## 2023-03-31 RX ORDER — MAGNESIUM OXIDE 400 MG/1
400 TABLET ORAL DAILY
Qty: 20 TABLET | Refills: 0 | Status: SHIPPED | OUTPATIENT
Start: 2023-03-31 | End: 2023-04-01

## 2023-03-31 RX ORDER — IBUPROFEN 400 MG/1
400 TABLET, FILM COATED ORAL EVERY 6 HOURS PRN
Status: DISCONTINUED | OUTPATIENT
Start: 2023-03-31 | End: 2023-04-01 | Stop reason: HOSPADM

## 2023-03-31 RX ORDER — MAGNESIUM SULFATE 4 G/50ML
4 INJECTION INTRAVENOUS ONCE
Status: COMPLETED | OUTPATIENT
Start: 2023-03-31 | End: 2023-03-31

## 2023-03-31 RX ORDER — QUETIAPINE FUMARATE 300 MG/1
300 TABLET, FILM COATED ORAL AT BEDTIME
Status: DISCONTINUED | OUTPATIENT
Start: 2023-03-31 | End: 2023-04-01 | Stop reason: HOSPADM

## 2023-03-31 RX ORDER — GABAPENTIN 300 MG/1
300 CAPSULE ORAL 3 TIMES DAILY
Status: DISCONTINUED | OUTPATIENT
Start: 2023-03-31 | End: 2023-04-01 | Stop reason: HOSPADM

## 2023-03-31 RX ORDER — NICOTINE 21 MG/24HR
1 PATCH, TRANSDERMAL 24 HOURS TRANSDERMAL DAILY
Qty: 14 PATCH | Refills: 1 | Status: SHIPPED | OUTPATIENT
Start: 2023-04-01

## 2023-03-31 RX ORDER — PREDNISONE 20 MG/1
40 TABLET ORAL DAILY
Qty: 8 TABLET | Refills: 0 | Status: ON HOLD | OUTPATIENT
Start: 2023-03-31 | End: 2023-04-06

## 2023-03-31 RX ORDER — IPRATROPIUM BROMIDE AND ALBUTEROL SULFATE 2.5; .5 MG/3ML; MG/3ML
3 SOLUTION RESPIRATORY (INHALATION) 3 TIMES DAILY
Status: DISCONTINUED | OUTPATIENT
Start: 2023-03-31 | End: 2023-04-01

## 2023-03-31 RX ORDER — LEVOFLOXACIN 750 MG/1
750 TABLET, FILM COATED ORAL EVERY 24 HOURS
Qty: 4 TABLET | Refills: 0 | Status: SHIPPED | OUTPATIENT
Start: 2023-03-31 | End: 2023-04-01

## 2023-03-31 RX ADMIN — QUETIAPINE FUMARATE 300 MG: 300 TABLET ORAL at 01:16

## 2023-03-31 RX ADMIN — Medication 400 MG: at 09:54

## 2023-03-31 RX ADMIN — BUPRENORPHINE AND NALOXONE 1 FILM: 4; 1 FILM, SOLUBLE BUCCAL; SUBLINGUAL at 20:19

## 2023-03-31 RX ADMIN — BUPRENORPHINE AND NALOXONE 1 FILM: 4; 1 FILM, SOLUBLE BUCCAL; SUBLINGUAL at 11:02

## 2023-03-31 RX ADMIN — MAGNESIUM SULFATE HEPTAHYDRATE 4 G: 4 INJECTION, SOLUTION INTRAVENOUS at 11:15

## 2023-03-31 RX ADMIN — QUETIAPINE FUMARATE 300 MG: 300 TABLET ORAL at 22:38

## 2023-03-31 RX ADMIN — LEVOFLOXACIN 750 MG: 750 TABLET, FILM COATED ORAL at 20:17

## 2023-03-31 RX ADMIN — POTASSIUM CHLORIDE 20 MEQ: 1500 TABLET, EXTENDED RELEASE ORAL at 20:17

## 2023-03-31 RX ADMIN — IPRATROPIUM BROMIDE AND ALBUTEROL SULFATE 3 ML: 2.5; .5 SOLUTION RESPIRATORY (INHALATION) at 07:57

## 2023-03-31 RX ADMIN — NICOTINE 1 PATCH: 14 PATCH, EXTENDED RELEASE TRANSDERMAL at 09:54

## 2023-03-31 RX ADMIN — LORAZEPAM 0.5 MG: 2 INJECTION INTRAMUSCULAR; INTRAVENOUS at 17:33

## 2023-03-31 RX ADMIN — IBUPROFEN 400 MG: 400 TABLET, FILM COATED ORAL at 18:18

## 2023-03-31 RX ADMIN — IPRATROPIUM BROMIDE AND ALBUTEROL SULFATE 3 ML: 2.5; .5 SOLUTION RESPIRATORY (INHALATION) at 15:11

## 2023-03-31 RX ADMIN — PANTOPRAZOLE SODIUM 40 MG: 40 TABLET, DELAYED RELEASE ORAL at 22:38

## 2023-03-31 RX ADMIN — IPRATROPIUM BROMIDE AND ALBUTEROL SULFATE 3 ML: 2.5; .5 SOLUTION RESPIRATORY (INHALATION) at 01:04

## 2023-03-31 RX ADMIN — PREDNISONE 40 MG: 20 TABLET ORAL at 11:15

## 2023-03-31 RX ADMIN — LORAZEPAM 0.5 MG: 2 INJECTION INTRAMUSCULAR; INTRAVENOUS at 09:53

## 2023-03-31 RX ADMIN — ACETAMINOPHEN 650 MG: 325 TABLET ORAL at 17:33

## 2023-03-31 RX ADMIN — IPRATROPIUM BROMIDE AND ALBUTEROL SULFATE 3 ML: 2.5; .5 SOLUTION RESPIRATORY (INHALATION) at 21:32

## 2023-03-31 RX ADMIN — CLONIDINE HYDROCHLORIDE 0.1 MG: 0.1 TABLET ORAL at 09:53

## 2023-03-31 RX ADMIN — SODIUM CHLORIDE: 9 INJECTION, SOLUTION INTRAVENOUS at 10:04

## 2023-03-31 RX ADMIN — IBUPROFEN 400 MG: 400 TABLET, FILM COATED ORAL at 01:16

## 2023-03-31 RX ADMIN — LORAZEPAM 0.5 MG: 2 INJECTION INTRAMUSCULAR; INTRAVENOUS at 23:57

## 2023-03-31 RX ADMIN — POTASSIUM CHLORIDE 20 MEQ: 1500 TABLET, EXTENDED RELEASE ORAL at 09:53

## 2023-03-31 RX ADMIN — METHYLPREDNISOLONE SODIUM SUCCINATE 40 MG: 40 INJECTION, POWDER, FOR SOLUTION INTRAMUSCULAR; INTRAVENOUS at 04:56

## 2023-03-31 ASSESSMENT — ACTIVITIES OF DAILY LIVING (ADL)
ADLS_ACUITY_SCORE: 35

## 2023-03-31 NOTE — CONSULTS
Tobacco Treatment Consult  3/31/2023, 5:46 PM    Patient Admitted for: COPD exacerbation (H) [J44.1] on 3/30/2023    History of Tobacco Use:   Tobacco Product(s):cigarette  Amount used: 1 ppd now down to 1/4 ppd  Number of quit attempts in past: 1  Longest period of without use: 1 year  Pharmacotherapy tried in the past: gum and Bupropion (Wellbutrin)  Pharmacotherapy tried that has been beneficial: patch  Pharmacotherapy tried that has not been beneficial or was not tolerated: Bupropion (Wellbutrin)  Fagerstrom Score: 7 Level of dependence 4-7 moderate  Medical co-morbidities impacting tobacco use: anxiety, depression and bipolar disorder    Assessment:   Importance of quitting to patient: 8  Current confidence in ability to quit: 8  Readiness to quit/planning to quit: ready  Reasons for wanting to quit: mostly her breathing  Emotional Triggers:anger, sadness, boredom and nervous or stressed  Physical and behavioral triggers: talking on the phone, drinking coffee, eating a meal and finishing a task  Nicotine withdrawal symptoms experiencing as an inpatient: nervousness, difficulty concentrating and anxiety  Current major life stressors: housing insecurity  Barriers to quitting: small support system  -Renetta is very interested in quitting, she states she does not want to have her breathing feel that bad again. She has quit in the past and started again when she was an in patient for her mental health.    Education done during visit:  -Discussed triggers and response to them  -Discussed benefits to quitting tobacco use  -Educated on physical benefits to health of tobacco cessation  -Identified health alternatives  -Education on use of pharmacotherapy products and discussed options to determine what may work best for patient.  -Discussed barriers to quitting and how patient feels they may overcome them  -Discussed relapse prevention strategies once back in an environment where smoking is allowed.  -Educated on benefits  of having a support system and remembering their reasons for quitting  -Initiated self-identifying as a nonsmoker   -Issued tobacco cessation materials and resource information.  -Discussed talking to her mental health worker about resources  -Talked about the importance of keeping in close contact with her primary provider for any medication that may need adjusting     Recommendations:  Smoking Cessation  -In the hospital recommend the patient have the following pharmacotherapy: Patch 14 mg  -Upon discharge recommend the patient have the following pharmacotherapy: Patch 14 mg and Inhaler puff for 20 minutes every 1-2 hours  -Taper NRT recommended after 4 weeks of successful cessation.  Patch down to 7 mg for 4 weeks. For inhaler after 3 weeks down to 20 minutes every 2-3 hours then down to every 2-3 hours  for 3 weeks, down to every 3-4 hours for 3 weeks then every 4-5 hours for 3 weeks.   -Continued cessation therapy by this service via phone in conjunction with the COPD call back program  -Continued encouragement from health care providers to quit and stay tobacco free.    Renetta will participate in follow-up calls post-discharge in conjunction with the COPD call back program. Will continue to be available to patient throughout hospital stay.    Total 38 minutes spent in smoking cessation, and 30 minutes spent in chart review,care coordination, and documentation.    Michelle Espinoza RT, Chronic Pulmonary Disease Specialist, Certified Tobacco Treatment Specialist  Phone [phone]

## 2023-03-31 NOTE — DISCHARGE INSTRUCTIONS
COPD Education Reminders:  *Take Action Plan out and start checking it daily.   * Call your Doctor when in the Yellow Zone  *Take Action Plan along with you to your follow up appointments with your Primary Care Provider and Pulmonologist. Discuss your Action Plan with them.  *Avoid all irritants such as dust, smoke, harsh smells, cold air, or humidity  *Try to avoid contact with individuals who are sick with symptoms such as cough, fever, sneezing, ect.  *Feel free to call COPD Educators if you have any questions about managing your COPD at [phone].  ---------------------------    Smoking Cessation Reminders  -Talk with your mental health worker about resources for patches. Along with gum if you don't like the inhaler.

## 2023-03-31 NOTE — PROGRESS NOTES
Report given on room air.     Patient frequently seeks staff. Patient is being taught boundaries.      Pt has fluids discontinued by Dr. Grayson this afternoon. Holding cardiac meds related to vitals.     Pt would like flu shot prior to discharge.     Naps intermittent throughout day, pain is rated 8-9/10. Falls asleep shortly after.     Report given [id number]

## 2023-03-31 NOTE — CONSULTS
COPD Education Consult  3/31/2023, 3:31 PM  Reason for Consult: COPD education  Patient Admitted for: COPD exacerbation (H) [J44.1] on 3/30/2023     History of Present Illness: Renetta is a 67 year old with a history of hypertension, CHF, ETOH abuse, depression, anxiety, bipolar disorder, h/o methamphetimine abuse, h/o opioid abuse, chronic pain, smoking, emphysema on CT scan, LLL lung nodule and presumed COPD. She has never seen pulmonary in the past nor had lung testing, she is on room air at baseline. Renetta presented to the ED after being shortness of breath for 1 week with chest tightness and wheezing. Of note her oxygen saturation was in the high 90's. She is being treated with steroids, antibiotics, OPEP therapy, supplemental oxygen and bronchodilators.    Last PFT:  Date: none    Home Respiratory Medications:  Bronchodilators:   -albuterol inhaler PRN  Combination Therapy:   -Symbicort 2 puffs BID    Imaging:  XR CHEST 2 VIEWS  DATE/TIME: 3/30/2023 12:45 PM                                                             IMPRESSION: Heart size is normal. No CHF, lobar consolidation, or pleural effusions. Minimal basilar scarring. Mediastinum and bony structures are stable in appearance.    CT CHEST PULMONARY EMBOLISM W CONTRAST  DATE/TIME: 3/30/2023 3:18 PM  LUNGS AND PLEURA: Moderate changes of centrilobular emphysema. Worsening bilateral bronchial wall thickening, greatest in both lower lobes. Scattered bronchial filling defects compatible with retained secretions noted, primarily in the left lower lobe.   No pneumothorax. No pleural effusions. 2 mm left lower lobe pulmonary nodule, image 189 series 6, unchanged.                                                      IMPRESSION:  1.  No evidence of pulmonary embolus.  2.  Worsening bronchial wall thickening, with associated endobronchial retained secretions compatible with bronchitis.  3.  Additional chronic findings as above.    Assessment: Patient currently is  "sitting in bed on room air comfortably, able to speak in full sentences with very little difficulty, cough is good and productive. /65   Pulse 94   Temp 98.6  F (37  C) (Oral)   Resp (!) 55   Ht 1.651 m (5' 5\")   Wt 59 kg (130 lb)   SpO2 96%   BMI 21.63 kg/m      Education:  -COPD Action Plan purpose and use discussed, instructed to talk with their primary and pulmonary about plan  -Information on COPD and tools to help cope  -Breathing techniques including pursed-lip breathing  -Disease process and irritants; discussed, questions answered  -Issued patient a COPD information folder  -Discussed anxiety and it's relationship with the dyspnea cycle  -Need for lung function testing and pulmonary follow up along with benefits of both    Recommendations:  -Continue current inpatient therapy, per [facility] protocols  -Follow up appointment with pulmonology along with a PFT.  -Consult(s) to tobacco treatment for smoking cessation.  -Medications patient is currently taking at home appear to be adequate, recommend no changes until pulmonary appointment.  -Home oxygen evaluation prior to discharge  -OT/PT evaluation prior to discharge    Renetta will participate in our call back program. Will continue to follow patient throughout hospital stay along with educating patient and family.    Total time spend with patient 15 minutes and 60 minutes spent in chart review, care coordination, and documentation.    Michelle Espinoza RT, Chronic Pulmonary Disease Specialist  Phone [phone]    "

## 2023-03-31 NOTE — PLAN OF CARE
A&Ox4, seeks out staff frequently. Very anxious at times, ativan given.   Scheduled seroquel given.   RA, no wheezing.   K protocol, SR. Softer blood pressures, still remain WNL.   Up independently to bathroom   Tylenol, ibuprofen given for thigh pain.   Problem: Pain Acute  Goal: Optimal Pain Control and Function  Outcome: Progressing   Goal Outcome Evaluation:

## 2023-03-31 NOTE — PROGRESS NOTES
[facility] MEDICINE PROGRESS NOTE      Identification/Summary: Renetta Farooq is a 67 year old female with a past medical history of  hypertension, anxiety, depression, bipolar disorder, opiate dependence with Suboxone chronically, chronic hepatitis C, methamphetamine abuse, history of alcohol dependence, tobacco use, came with short of breath, chest tightness, wheezing secondary to COPD exacerbation and acute bronchitis.  Requiring 1 L of oxygen with activity.  Symptoms started improving with bronchodilator, systemic steroid and antibiotic.    Assessment and Plan:  COPD exacerbation  Ongoing tobacco use  Chest CT-worsening bronchial wall thickening with associated endobronchial retained secretion compatible with bronchitis, no PE  Levaquin 750 mg daily for 5-day  DuoNeb 4 times a day  Status post IV methylprednisone--> p.o. prednisone 40 mg daily for 5 days  Symbicort 2 puff twice a day at home  Absolute smoking cessation  Follow-up with pulmonology as outpatient  Outpatient pulmonary function test    Acute hypoxic respiratory failure, requiring 1 L of oxygen  Wean off as tolerated    Hypotension on hypertension  Clonidine 0.1 mg 3 times a day, discontinued  Norvasc 5 mg daily, on hold    Anxiety and depression  Seroquel 100 mg in a.m. and 300 mg at bedtime  Fluoxetine 40 mg daily    Hypokalemia: Lowest K = 3.3-->4, replaced. Lasix is discontinued  Hyponatremia: Lowest Na = 127-->132. Status post IV normal saline.  Lasix is discontinued  Hypomagnesemia: Lowest Mg = 1.5-->2.3, replaced      Social issues  Currently homeless living in an extended stay hotel   evaluate     Code full  DVT prophylaxis  Subcu heparin 5000 unit twice a day    Barrier to discharge  Awaiting for more clinical improvement and wean of oxygen.  Anticipated discharge in 1 day    Laila Grayson MD  Hospitalist  [facility]  [facility]  Phone: #[phone]  Securely  message with the Vocera Web Console (learn more here)  Text page via [email or web address] Paging/Directory     Interval History/Subjective:  Resting comfortably.  Breathing is much improved.  Requiring 1 L of oxygen with activity.  Still has cough with productive cough.  Afebrile.  Getting anxious easily.  No other concern.    Physical Exam/Objective:  Temp:  [98  F (36.7  C)-98.1  F (36.7  C)] 98  F (36.7  C)  Pulse:  [68-95] 95  Resp:  [11-43] 26  BP: ()/(46-74) 128/68  SpO2:  [84 %-99 %] 95 %  Body mass index is 21.63 kg/m .    GENERAL:  Alert, anxious easily, in no acute distress, appears stated age   HEAD:  Normocephalic, without obvious abnormality, atraumatic   EYES:  PERRL, conjunctiva  clear,   EOM's intact   NOSE: Nares normal,  mucosa normal, no drainage   THROAT: Lips, mucosa, and tongue normal; teeth and gums normal, mouth moist   NECK: Supple, symmetrical, trachea midline   BACK:   Symmetric, no curvature, ROM normal   LUNGS:   Clear to auscultation bilaterally, no rales, rhonchi, or wheezing, symmetric chest rise on inhalation, respirations unlabored   CHEST WALL:  No tenderness or deformity   HEART:  Regular rate and rhythm, S1 and S2 normal, no murmur     ABDOMEN:   Soft, non-tender, bowel sounds active all four quadrants, no masses, no organomegaly, no rebound or guarding   EXTREMITIES: No   edema    SKIN: No rashes   NEURO: Alert, oriented x3, moves all four extremities freely   PSYCH: Cooperative, behavior is appropriate, anxious easily      Data reviewed today: I personally reviewed all new medications, labs, imaging/diagnostics reports over the past 24 hours. Pertinent findings include:    Imaging:   Chest ct  1.  No evidence of pulmonary embolus.  2.  Worsening bronchial wall thickening, with associated endobronchial retained secretions compatible with bronchitis.  3.  Additional chronic findings as above.     Abdomen ct  1.  Cholelithiasis without cholecystitis.  2.  No evidence of bowel obstruction  or pneumatosis.    Labs:  Most Recent 3 CBC's:Recent Labs   Lab Test 03/31/23  0633 03/30/23  1037 03/05/23  1748   WBC 7.2 10.5 3.9*   HGB 11.4* 13.1 12.3   MCV 92 90 93   * 175 195     Most Recent 3 BMP's:Recent Labs   Lab Test 03/31/23  0633 03/30/23  1037 03/05/23  1748   * 127* 141   POTASSIUM 4.0 3.3* 3.6   CHLORIDE 98 91* 106   CO2 23 21* 25   BUN 11 13 8   CR 0.80 1.01 0.88   ANIONGAP 11 15 10   KARRIE 9.2 9.5 9.3   * 117 134*       Medications:   Personally Reviewed.  Medications       buprenorphine HCl-naloxone HCl  1 Film Sublingual BID     cloNIDine  0.1 mg Oral TID     heparin ANTICOAGULANT  5,000 Units Subcutaneous Q12H     ipratropium - albuterol 0.5 mg/2.5 mg/3 mL  3 mL Nebulization Q6H     levofloxacin  750 mg Oral Q24H     magnesium oxide  400 mg Oral Daily     magnesium sulfate  4 g Intravenous Once     nicotine  1 patch Transdermal Daily     nicotine   Transdermal Q8H     pantoprazole  40 mg Oral At Bedtime     potassium chloride ER  20 mEq Oral BID     predniSONE  40 mg Oral Daily     QUEtiapine  300 mg Oral At Bedtime     sodium chloride (PF)  3 mL Intracatheter Q8H     Total time spent 50 min by me on the date of service doing chart review, history, exam, documentation & further activities per the note.

## 2023-03-31 NOTE — PHARMACY-ADMISSION MEDICATION HISTORY
"Pharmacy Note - Admission Medication History    Pertinent Provider Information:  Patient states dose of clonidine is 0.2 mg TID and that provider is Ruthann.    Two recent Rxs from Ruthann Zabala PA-C were for clonidine 0.1 mg tabs on 3/21/23 #30 for 30 days and on 3/12/23 #270 for 90 day supply with directions of 1 tablet TID.  Per records, patient has history of overuse of clonidine.  I documented 0.1 mg TID as this is likely the correct dosing from provider.    Patient ran out of seroquel and gabapentin.  Patient's reported gabapentin dose is more than Rx appears to be for.  She took suboxone in the ED, but states she hadn't had it for 4-5 days due to vomiting and then later in the conversation states due to \"pharmacy problems\".       ______________________________________________________________________    Prior To Admission (PTA) med list completed and updated in EMR.       PTA Med List   Medication Sig Note Last Dose     albuterol (PROAIR HFA;PROVENTIL HFA;VENTOLIN HFA) 90 mcg/actuation inhaler [ALBUTEROL (PROAIR HFA;PROVENTIL HFA;VENTOLIN HFA) 90 MCG/ACTUATION INHALER] Inhale 2 puffs every 6 (six) hours as needed for wheezing.  Past Week at has with     amLODIPine (NORVASC) 5 MG tablet Take 5 mg by mouth daily  3/29/2023     budesonide-formoterol (SYMBICORT) 160-4.5 MCG/ACT Inhaler Inhale 2 puffs into the lungs 2 times daily  Past Week     buprenorphine HCl-naloxone HCl (SUBOXONE) 4-1 MG per film Place 1 Film under the tongue 2 times daily  3/30/2023 at 1043     cholecalciferol, vitamin D3, 1,000 unit tablet [CHOLECALCIFEROL, VITAMIN D3, 1,000 UNIT TABLET] Take 1 tablet (1,000 Units total) by mouth daily.  Past Week     cloNIDine (CATAPRES) 0.1 MG tablet Take 0.1 mg by mouth 3 times daily 3/30/2023: Filled #270 for 90 day supply and #30 for 30 day supply in March 2023.  Patient states dose is 0.2 mg TID.   3/30/2023 at am x1     diphenhydrAMINE (BENADRYL) 50 MG capsule Take 50 mg by mouth every 6 hours as needed " for sleep  Past Week     docusate sodium (COLACE) 100 MG capsule [DOCUSATE SODIUM (COLACE) 100 MG CAPSULE] Take 1 capsule (100 mg total) by mouth 2 (two) times a day.  3/30/2023 at am     docusate sodium (COLACE) 100 MG tablet Take 100 mg by mouth 2 times daily as needed for constipation  PRN     furosemide (LASIX) 20 MG tablet Take 2 tablets (40 mg) by mouth 2 times daily for 14 days  3/30/2023 at am     gabapentin (NEURONTIN) 300 MG capsule Take 300 mg by mouth 3 times daily  ~2 weeks ago     magnesium oxide (MAG-OX) 400 MG tablet Take 1 tablet (400 mg) by mouth daily  3/30/2023     NYSTOP 599616 UNIT/GM powder Apply topically 3 times daily as needed  PRN     omeprazole (PRILOSEC) 20 MG DR capsule Take 20 mg by mouth At Bedtime  3/29/2023     potassium chloride ER (KLOR-CON M) 20 MEQ CR tablet Take 1 tablet (20 mEq) by mouth 2 times daily  3/30/2023 at am     QUEtiapine (SEROQUEL) 100 MG tablet Take 100 mg by mouth every morning  5 days     QUEtiapine (SEROQUEL) 100 MG tablet [QUETIAPINE (SEROQUEL) 100 MG TABLET] Take 3 tablets (300 mg total) by mouth at bedtime.  5 days       Information source(s): Patient, Hospital records and [facility]  Method of interview communication: in-person    Summary of Changes to PTA Med List  New: benadryl  Discontinued: none  Changed: clonidine dosing    Patient was asked about OTC/herbal products specifically.  PTA med list reflects this.    In the past week, patient estimated taking medication this percent of the time:  greater than 90%.    Medication Affordability:       Allergies were reviewed, assessed, and updated with the patient.      Medications available for use during hospital stay: albuterol.     The information provided in this note is only as accurate as the sources available at the time of the update(s).    Thank you for the opportunity to participate in the care of this patient.    Marcella Cisneros [facility]  3/30/2023 7:03 PM

## 2023-04-01 VITALS
DIASTOLIC BLOOD PRESSURE: 62 MMHG | TEMPERATURE: 97.8 F | HEART RATE: 88 BPM | RESPIRATION RATE: 18 BRPM | WEIGHT: 130.8 LBS | HEIGHT: 65 IN | OXYGEN SATURATION: 96 % | BODY MASS INDEX: 21.79 KG/M2 | SYSTOLIC BLOOD PRESSURE: 113 MMHG

## 2023-04-01 PROCEDURE — 250N000013 HC RX MED GY IP 250 OP 250 PS 637: Performed by: FAMILY MEDICINE

## 2023-04-01 PROCEDURE — 250N000011 HC RX IP 250 OP 636: Performed by: FAMILY MEDICINE

## 2023-04-01 PROCEDURE — 94640 AIRWAY INHALATION TREATMENT: CPT

## 2023-04-01 PROCEDURE — 250N000009 HC RX 250: Performed by: FAMILY MEDICINE

## 2023-04-01 PROCEDURE — 999N000157 HC STATISTIC RCP TIME EA 10 MIN

## 2023-04-01 PROCEDURE — 99239 HOSP IP/OBS DSCHRG MGMT >30: CPT | Performed by: FAMILY MEDICINE

## 2023-04-01 PROCEDURE — 250N000013 HC RX MED GY IP 250 OP 250 PS 637: Performed by: HOSPITALIST

## 2023-04-01 PROCEDURE — 250N000013 HC RX MED GY IP 250 OP 250 PS 637: Performed by: INTERNAL MEDICINE

## 2023-04-01 PROCEDURE — 250N000012 HC RX MED GY IP 250 OP 636 PS 637: Performed by: FAMILY MEDICINE

## 2023-04-01 RX ORDER — IPRATROPIUM BROMIDE AND ALBUTEROL SULFATE 2.5; .5 MG/3ML; MG/3ML
3 SOLUTION RESPIRATORY (INHALATION) 3 TIMES DAILY
Qty: 120 ML | Refills: 1 | Status: ON HOLD | OUTPATIENT
Start: 2023-04-01 | End: 2023-04-06

## 2023-04-01 RX ORDER — LEVOFLOXACIN 750 MG/1
750 TABLET, FILM COATED ORAL EVERY 24 HOURS
Qty: 3 TABLET | Refills: 0 | Status: ON HOLD | OUTPATIENT
Start: 2023-04-01 | End: 2023-04-06

## 2023-04-01 RX ORDER — GABAPENTIN 300 MG/1
300 CAPSULE ORAL 3 TIMES DAILY
Status: ON HOLD | COMMUNITY
Start: 2023-04-01 | End: 2023-04-15

## 2023-04-01 RX ORDER — IPRATROPIUM BROMIDE AND ALBUTEROL SULFATE 2.5; .5 MG/3ML; MG/3ML
3 SOLUTION RESPIRATORY (INHALATION) 2 TIMES DAILY
Status: DISCONTINUED | OUTPATIENT
Start: 2023-04-01 | End: 2023-04-01 | Stop reason: HOSPADM

## 2023-04-01 RX ADMIN — QUETIAPINE FUMARATE 100 MG: 25 TABLET ORAL at 07:58

## 2023-04-01 RX ADMIN — IBUPROFEN 400 MG: 400 TABLET, FILM COATED ORAL at 08:14

## 2023-04-01 RX ADMIN — Medication 400 MG: at 07:59

## 2023-04-01 RX ADMIN — POTASSIUM CHLORIDE 20 MEQ: 1500 TABLET, EXTENDED RELEASE ORAL at 07:59

## 2023-04-01 RX ADMIN — IPRATROPIUM BROMIDE AND ALBUTEROL SULFATE 3 ML: 2.5; .5 SOLUTION RESPIRATORY (INHALATION) at 08:10

## 2023-04-01 RX ADMIN — PREDNISONE 40 MG: 20 TABLET ORAL at 08:00

## 2023-04-01 RX ADMIN — LORAZEPAM 0.5 MG: 2 INJECTION INTRAMUSCULAR; INTRAVENOUS at 07:56

## 2023-04-01 RX ADMIN — BUPRENORPHINE AND NALOXONE 1 FILM: 4; 1 FILM, SOLUBLE BUCCAL; SUBLINGUAL at 07:59

## 2023-04-01 RX ADMIN — LORAZEPAM 0.5 MG: 2 INJECTION INTRAMUSCULAR; INTRAVENOUS at 14:05

## 2023-04-01 RX ADMIN — NICOTINE 1 PATCH: 14 PATCH, EXTENDED RELEASE TRANSDERMAL at 08:25

## 2023-04-01 ASSESSMENT — ACTIVITIES OF DAILY LIVING (ADL)
DEPENDENT_IADLS:: TRANSPORTATION
ADLS_ACUITY_SCORE: 35

## 2023-04-01 NOTE — PROGRESS NOTES
Patient has been assessed for Home Oxygen needs.     Pulse oximetry (SpO2) and Oxygen flow readings:    SpO2 = 94% on room air at rest while awake.    SpO2 = 89% on room air during activity/with exercise.

## 2023-04-01 NOTE — PLAN OF CARE
Goal Outcome Evaluation:      Plan of Care Reviewed With: patient    Overall Patient Progress: improvingOverall Patient Progress: improving     Patient alert and oriented. VSS on RA. Place on 2L of NC at HS(baseline Home O2 per pt).NSR on tele. Denies SOB/chest pain/nausea. Up ad noel. Multimodal pain regimen utilized to manage chronic pain. Voiding without issues. Provided with much emotional support.

## 2023-04-01 NOTE — PLAN OF CARE
"PRIMARY DIAGNOSIS: \"GENERIC\" NURSING  OUTPATIENT/OBSERVATION GOALS TO BE MET BEFORE DISCHARGE:  ADLs back to baseline: Yes    Activity and level of assistance: Ambulating independently.    Pain status: Improved-controlled with oral pain medications.    Return to near baseline physical activity: Yes     Discharge Planner Nurse   Safe discharge environment identified: No.  Barriers to discharge: Yes       Entered by: Cheryl Renner RN 04/01/2023 10:41 AM     Please review provider order for any additional goals.   Nurse to notify provider when observation goals have been met and patient is ready for discharge.    Patient is A/Ox4, VSS on RA. Patient walks independently in the room and across the chaudhary to the bathroom. Voiding adequately. IV saline locked, patent. No new skin issues noted. Patient rating pain 1/10 during shift, cold therapy, pain medications, rest, and repositioning used to manage pain. Home O2 assessment completed, patient does not require O2 at home. MD notified pending discharge.    "

## 2023-04-01 NOTE — CONSULTS
Care Management Initial Consult    General Information  Assessment completed with: Patient,    Type of CM/SW Visit: Initial Assessment    Primary Care Provider verified and updated as needed: Yes   Readmission within the last 30 days:        Reason for Consult: discharge planning  Advance Care Planning:            Communication Assessment  Patient's communication style: spoken language (English or Bilingual)             Cognitive  Cognitive/Neuro/Behavioral: WDL                      Living Environment:   People in home: alone     Current living Arrangements: hotel/motel, homeless      Able to return to prior arrangements: yes       Family/Social Support:  Care provided by: self, other (see comments) (PCA)  Provides care for: no one     PCA, Children          Description of Support System: Supportive, Involved         Current Resources:   Patient receiving home care services: No     Community Resources: [facility], [facility] Work[facility]  Equipment currently used at home: cane, straight, walker, rolling  Supplies currently used at home: None    Lifestyle & Psychosocial Needs:  Social Determinants of Health     Tobacco Use: High Risk     Smoking Tobacco Use: Every Day     Smokeless Tobacco Use: Never     Passive Exposure: Not on file   Alcohol Use: Not on file   Financial Resource Strain: Not on file   Food Insecurity: Not on file   Transportation Needs: Not on file   Physical Activity: Not on file   Stress: Not on file   Social Connections: Not on file   Intimate Partner Violence: Not on file   Depression: Not at risk     PHQ-2 Score: 0   Housing Stability: Not on file       Functional Status:  Prior to admission patient needed assistance:   Dependent ADLs:: Ambulation-cane, Ambulation-walker, Grooming  Dependent IADLs:: Transportation       Additional Information:  2:48 PM  Assessed.  Staying in a hotel in [place].  Pt uses a walker/cane as needed.  Receives 3 hrs PCA services (laundry/hair) per week.  Has Sebastian Canales CM assisting  with housing search.  Son will transport at discharge.        BRYANT Vasquez

## 2023-04-01 NOTE — PLAN OF CARE
Problem: COPD (Chronic Obstructive Pulmonary Disease) Comorbidity  Goal: Maintenance of COPD Symptom Control  Intervention: Maintain COPD-Symptom Control  Recent Flowsheet Documentation  Taken 4/1/2023 0000 by Alessandra Mckee RN  Medication Review/Management: medications reviewed    Goal Outcome Evaluation:  Patient is alert and oriented X 4. Denied pain. On 2L via NC at night. Patient reported that she uses oxygen at home at night as needed. Was up to the bathroom independently. Voided. IV saline locked. Discharge plan pending medical clearance.

## 2023-04-01 NOTE — PLAN OF CARE
Patient ready for discharge. Discharge instructions discussed and questions answered. All belongings sent with patient.

## 2023-04-01 NOTE — DISCHARGE SUMMARY
[facility] MEDICINE  DISCHARGE SUMMARY     Primary Care Physician: Jamila Connelly  Admission Date: 3/30/2023   Discharge Provider: Laila Grayson MD Discharge Date: 4/1/2023   Diet:   Regular diet   Code Status: Full Code   Activity: DCACTIVITY: Activity as tolerated        Condition at Discharge: Stable     REASON FOR PRESENTATION(See Admission Note for Details)   Short of breath and productive cough    PRINCIPAL & ACTIVE DISCHARGE DIAGNOSES     COPD exacerbation  Acute bronchitis  Ongoing tobacco use  Essential hypertension  Anxiety and depression  Hypokalemia, replaced  Hyponatremia, replaced  Hypomagnesemia, replaced  Social issues, currently homeless living in an extended stay hotel    PENDING LABS     Unresulted Labs Ordered in the Past 30 Days of this Admission     Date and Time Order Name Status Description    3/30/2023  5:08 PM Blood Culture Arm, Right Preliminary     3/30/2023  5:08 PM Blood Culture Arm, Left Preliminary           PROCEDURES ( this hospitalization only)      None    RECOMMENDATIONS TO OUTPATIENT PROVIDER FOR F/U VISIT     Follow-up Appointments     Follow-up and recommended labs and tests       Follow-up with primary MD in 1 week  CBC and BMP in 1 week  Follow-up with psychiatry in 2 weeks  Follow-up with pulmonology in 2-4 -week  Absolute smoking cessation             DISPOSITION     Home    SUMMARY OF HOSPITAL COURSE:      Ms.Mary JEANNE Farooq is a 67 year old  female with history of hypertension, anxiety, depression, bipolar disorder, opiate dependence with Suboxone chronically, chronic hepatitis C, methamphetamine abuse, history of alcohol dependence, tobacco use, came with short of breath, chest tightness, wheezing secondary to COPD exacerbation.  CT scan revealed worsening bronchial wall thickening with associated endobronchial retained secretion compatible with bronchitis.  Treated with DuoNeb, Levaquin, IV systemic steroid.  Discharging home  with Levaquin, oral prednisone to complete 5-day course and will resume steroid inhaler and bronchodilator.  DuoNeb prescribed. Absolute smoking cessation is advised.  Will follow-up with pulmonology in 2 weeks. Found to have hyponatremia, hypokalemia, hypomagnesemia secondary to diuresis.  No acute pulmonary congestion.  Lasix is discontinued.  Electrolytes are replaced.     Has significant anxiety.  On Seroquel 100 mg in a.m. and 300 mg at bedtime, fluoxetine 40 mg daily.  Follow-up with psychiatry as outpatient.     Blood pressure is in lower side.  Norvasc, clonidine, Lasix are discontinued.  Gabapentin dose decreased.  Will follow-up with primary care physician closely.      Discharge Medications with Med changes:     Current Discharge Medication List      START taking these medications    Details   ipratropium - albuterol 0.5 mg/2.5 mg/3 mL (DUONEB) 0.5-2.5 (3) MG/3ML neb solution Take 1 vial (3 mLs) by nebulization 3 times daily For 1 week then every 6 hours as needed  Qty: 120 mL, Refills: 1    Comments: Please provide vials  Associated Diagnoses: COPD exacerbation (H)      levofloxacin (LEVAQUIN) 750 MG tablet Take 1 tablet (750 mg) by mouth every 24 hours  Qty: 3 tablet, Refills: 0    Associated Diagnoses: Acute bronchitis, unspecified organism      !! magnesium oxide (MAG-OX) 400 MG tablet Take 1 tablet (400 mg) by mouth daily  Qty: 20 tablet, Refills: 0    Associated Diagnoses: Hypomagnesemia      nicotine (NICODERM CQ) 14 MG/24HR 24 hr patch Place 1 patch onto the skin daily  Qty: 14 patch, Refills: 1    Associated Diagnoses: Acute bronchitis, unspecified organism      predniSONE (DELTASONE) 20 MG tablet Take 2 tablets (40 mg) by mouth daily for 4 days  Qty: 8 tablet, Refills: 0    Associated Diagnoses: COPD exacerbation (H)       !! - Potential duplicate medications found. Please discuss with provider.      CONTINUE these medications which have CHANGED    Details   gabapentin (NEURONTIN) 300 MG capsule  Take 1 capsule (300 mg) by mouth At Bedtime         CONTINUE these medications which have NOT CHANGED    Details   albuterol (PROAIR HFA;PROVENTIL HFA;VENTOLIN HFA) 90 mcg/actuation inhaler [ALBUTEROL (PROAIR HFA;PROVENTIL HFA;VENTOLIN HFA) 90 MCG/ACTUATION INHALER] Inhale 2 puffs every 6 (six) hours as needed for wheezing.  Qty: 1 each, Refills: 0    Associated Diagnoses: Mucopurulent chronic bronchitis (H)      budesonide-formoterol (SYMBICORT) 160-4.5 MCG/ACT Inhaler Inhale 2 puffs into the lungs 2 times daily      buprenorphine HCl-naloxone HCl (SUBOXONE) 4-1 MG per film Place 1 Film under the tongue 2 times daily      cholecalciferol, vitamin D3, 1,000 unit tablet [CHOLECALCIFEROL, VITAMIN D3, 1,000 UNIT TABLET] Take 1 tablet (1,000 Units total) by mouth daily.  Qty: 30 tablet, Refills: 0    Associated Diagnoses: Bipolar affective disorder, manic, severe, with psychotic behavior (H)      docusate sodium (COLACE) 100 MG capsule [DOCUSATE SODIUM (COLACE) 100 MG CAPSULE] Take 1 capsule (100 mg total) by mouth 2 (two) times a day.  Qty: 60 capsule, Refills: 0    Associated Diagnoses: Opioid use disorder, severe, in sustained remission, on maintenance therapy (H)      docusate sodium (COLACE) 100 MG tablet Take 100 mg by mouth 2 times daily as needed for constipation      !! magnesium oxide (MAG-OX) 400 MG tablet Take 1 tablet (400 mg) by mouth daily  Qty: 30 tablet, Refills: 0    Associated Diagnoses: Hypomagnesemia      NYSTOP 163581 UNIT/GM powder Apply topically 3 times daily as needed      omeprazole (PRILOSEC) 20 MG DR capsule Take 20 mg by mouth At Bedtime      !! QUEtiapine (SEROQUEL) 100 MG tablet Take 100 mg by mouth every morning      !! QUEtiapine (SEROQUEL) 100 MG tablet [QUETIAPINE (SEROQUEL) 100 MG TABLET] Take 3 tablets (300 mg total) by mouth at bedtime.  Qty: 90 tablet, Refills: 0    Associated Diagnoses: Acute psychosis (H); Bipolar affective disorder, manic, severe, with psychotic behavior (H)       FLUoxetine (PROZAC) 40 MG capsule Take 40 mg by mouth daily       !! - Potential duplicate medications found. Please discuss with provider.      STOP taking these medications       amLODIPine (NORVASC) 5 MG tablet Comments:   Reason for Stopping:         cloNIDine (CATAPRES) 0.1 MG tablet Comments:   Reason for Stopping:         diphenhydrAMINE (BENADRYL) 50 MG capsule Comments:   Reason for Stopping:         furosemide (LASIX) 20 MG tablet Comments:   Reason for Stopping:         potassium chloride ER (KLOR-CON M) 20 MEQ CR tablet Comments:   Reason for Stopping:                     Rationale for medication changes:      Norvasc, clonidine, Lasix, Benadryl discontinued  Gabapentin dose decreased        Consults   None    Immunizations given this encounter     Most Recent Immunizations   Administered Date(s) Administered     Flu, Unspecified 09/23/2016     Hepatitis A (ADULT 19+) 01/10/2020     Influenza (H1N1) 06/03/2010     Influenza (High Dose) 3 valent vaccine 01/10/2020     Influenza (IIV3) PF 12/20/2011     Influenza Vaccine >6 months (Alfuria,Fluzone) 09/23/2016     Pneumo Conj 13-V (2010&after) 12/20/2011           Anticoagulation Information      None      SIGNIFICANT IMAGING FINDINGS     Results for orders placed or performed during the hospital encounter of 03/30/23   Chest XR,  PA & LAT    Impression    IMPRESSION: Heart size is normal. No CHF, lobar consolidation, or pleural effusions. Minimal basilar scarring. Mediastinum and bony structures are stable in appearance.   CT Chest Pulmonary Embolism w Contrast    Impression    IMPRESSION:  1.  No evidence of pulmonary embolus.  2.  Worsening bronchial wall thickening, with associated endobronchial retained secretions compatible with bronchitis.  3.  Additional chronic findings as above.   CT Abdomen Pelvis w Contrast    Impression    IMPRESSION:   1.  Cholelithiasis without cholecystitis.  2.  No evidence of bowel obstruction or pneumatosis.        SIGNIFICANT LABORATORY FINDINGS     Creatinine 0.80  Sodium 127-->132   Magnesium 1.5-->2.7  Discharge Orders        Adult Mental Health  Referral      Adult Pulmonary Medicine Referral      Reason for your hospital stay    sob     Follow-up and recommended labs and tests     Follow-up with primary MD in 1 week  CBC and BMP in 1 week  Follow-up with psychiatry in 2 weeks  Follow-up with pulmonology in 2-4 -week  Absolute smoking cessation     Activity    Your activity upon discharge: activity as tolerated     Compressor with Reusable Nebulizer     Diet    Follow this diet upon discharge: regular diet       Examination   Physical Exam   Temp:  [97.5  F (36.4  C)-98.7  F (37.1  C)] 97.8  F (36.6  C)  Pulse:  [75-94] 88  Resp:  [18-20] 18  BP: ()/(53-65) 113/62  SpO2:  [89 %-97 %] 96 %  Wt Readings from Last 1 Encounters:   04/01/23 59.3 kg (130 lb 12.8 oz)     GENERAL:  Alert, anxious easily, in no acute distress, appears stated age   HEAD:  Normocephalic, without obvious abnormality, atraumatic   EYES:  PERRL, conjunctiva  clear,   EOM's intact   NOSE: Nares normal,  mucosa normal, no drainage   THROAT: Lips, mucosa, and tongue normal; teeth and gums normal, mouth moist   NECK: Supple, symmetrical, trachea midline   BACK:   Symmetric, no curvature, ROM normal   LUNGS:   Clear to auscultation bilaterally, no rales, rhonchi, or wheezing, symmetric chest rise on inhalation, respirations unlabored   CHEST WALL:  No tenderness or deformity   HEART:  Regular rate and rhythm, S1 and S2 normal, no murmur     ABDOMEN:   Soft, non-tender, bowel sounds active all four quadrants, no masses, no organomegaly, no rebound or guarding   EXTREMITIES: No   edema    SKIN: No rashes   NEURO: Alert, oriented x3, moves all four extremities freely   PSYCH: Cooperative, behavior is appropriate, anxious easily            Please see EMR for more detailed significant labs, imaging, consultant notes etc.    Laila HUNTER,  MD, personally saw the patient today and spent greater than 30 minutes discharging this patient.    Laila Grayson MD  [facility]    CC:Jamila Connelly

## 2023-04-01 NOTE — PROGRESS NOTES
Pt on RA, Neb treatment given, pt tolerated well.       03/31/23 6857   Tech Time   $Tech Time (10 minute increments) 2   Nebulizer Assessment & Treatment   $RT Use ONLY Delivery Method Nebulizer - Additional   Nebulizer Device Mouthpiece   Pretreatment Heart Rate (beats/min) 68   Pretreatment Resp Rate (breaths/min) 18   Pretreatment O2 sats - (TCU only) 94   Pretreat Breath Sounds - Bilat - All Lobes clear   Breath Sounds Post-Respiratory Treatment   Posttreatment Heart Rate (beats/min) 72   Posttreatment Resp Rate (breaths/min) 20   Post treatment O2 Sats - (TCU only) 100   Breath Sounds Posttreatment All Fields All Fields   Breath Sounds Posttreatment All Fields no change     Will continue to monitor and assess.    Julianne Llanes, RT

## 2023-04-03 ENCOUNTER — APPOINTMENT (OUTPATIENT)
Dept: RADIOLOGY | Facility: CLINIC | Age: 68
End: 2023-04-03
Attending: EMERGENCY MEDICINE
Payer: COMMERCIAL

## 2023-04-03 ENCOUNTER — HOSPITAL ENCOUNTER (OUTPATIENT)
Facility: CLINIC | Age: 68
Setting detail: OBSERVATION
Discharge: HOME | End: 2023-04-06
Attending: EMERGENCY MEDICINE | Admitting: EMERGENCY MEDICINE
Payer: COMMERCIAL

## 2023-04-03 DIAGNOSIS — E87.6 HYPOKALEMIA: ICD-10-CM

## 2023-04-03 DIAGNOSIS — E83.42 HYPOMAGNESEMIA: ICD-10-CM

## 2023-04-03 DIAGNOSIS — F31.2 BIPOLAR AFFECTIVE DISORDER, MANIC, SEVERE, WITH PSYCHOTIC BEHAVIOR (H): ICD-10-CM

## 2023-04-03 DIAGNOSIS — R26.89 IMPAIRMENT OF BALANCE: Primary | ICD-10-CM

## 2023-04-03 DIAGNOSIS — R06.00 DYSPNEA, UNSPECIFIED TYPE: ICD-10-CM

## 2023-04-03 DIAGNOSIS — J44.1 COPD EXACERBATION (H): ICD-10-CM

## 2023-04-03 DIAGNOSIS — E83.51 HYPOCALCEMIA: ICD-10-CM

## 2023-04-03 DIAGNOSIS — F23 ACUTE PSYCHOSIS (H): ICD-10-CM

## 2023-04-03 LAB
ALBUMIN UR-MCNC: NEGATIVE MG/DL
ANION GAP SERPL CALCULATED.3IONS-SCNC: 13 MMOL/L (ref 5–18)
APPEARANCE UR: CLEAR
BILIRUB UR QL STRIP: NEGATIVE
BUN SERPL-MCNC: 17 MG/DL (ref 8–22)
CALCIUM SERPL-MCNC: 9.6 MG/DL (ref 8.5–10.5)
CHLORIDE BLD-SCNC: 98 MMOL/L (ref 98–107)
CO2 SERPL-SCNC: 22 MMOL/L (ref 22–31)
COLOR UR AUTO: COLORLESS
CREAT SERPL-MCNC: 1.02 MG/DL (ref 0.6–1.1)
ERYTHROCYTE [DISTWIDTH] IN BLOOD BY AUTOMATED COUNT: 11.9 % (ref 10–15)
GFR SERPL CREATININE-BSD FRML MDRD: 60 ML/MIN/1.73M2
GLUCOSE BLD-MCNC: 88 MG/DL (ref 70–125)
GLUCOSE UR STRIP-MCNC: NEGATIVE MG/DL
HCT VFR BLD AUTO: 37.4 % (ref 35–47)
HGB BLD-MCNC: 13.4 G/DL (ref 11.7–15.7)
HGB UR QL STRIP: NEGATIVE
KETONES UR STRIP-MCNC: NEGATIVE MG/DL
LEUKOCYTE ESTERASE UR QL STRIP: NEGATIVE
MAGNESIUM SERPL-MCNC: 1.6 MG/DL (ref 1.8–2.6)
MAGNESIUM SERPL-MCNC: 1.9 MG/DL (ref 1.8–2.6)
MCH RBC QN AUTO: 32 PG (ref 26.5–33)
MCHC RBC AUTO-ENTMCNC: 35.8 G/DL (ref 31.5–36.5)
MCV RBC AUTO: 89 FL (ref 78–100)
NITRATE UR QL: NEGATIVE
PH UR STRIP: 6.5 [PH] (ref 5–7)
PLATELET # BLD AUTO: 208 10E3/UL (ref 150–450)
POTASSIUM BLD-SCNC: 3 MMOL/L (ref 3.5–5)
POTASSIUM BLD-SCNC: 3 MMOL/L (ref 3.5–5)
POTASSIUM BLD-SCNC: 3.6 MMOL/L (ref 3.5–5)
RBC # BLD AUTO: 4.19 10E6/UL (ref 3.8–5.2)
RBC URINE: 1 /HPF
SODIUM SERPL-SCNC: 133 MMOL/L (ref 136–145)
SP GR UR STRIP: 1 (ref 1–1.03)
UROBILINOGEN UR STRIP-MCNC: <2 MG/DL
WBC # BLD AUTO: 12.1 10E3/UL (ref 4–11)
WBC URINE: <1 /HPF

## 2023-04-03 PROCEDURE — 83735 ASSAY OF MAGNESIUM: CPT | Performed by: EMERGENCY MEDICINE

## 2023-04-03 PROCEDURE — 93005 ELECTROCARDIOGRAM TRACING: CPT | Performed by: EMERGENCY MEDICINE

## 2023-04-03 PROCEDURE — 94640 AIRWAY INHALATION TREATMENT: CPT

## 2023-04-03 PROCEDURE — 258N000003 HC RX IP 258 OP 636: Performed by: EMERGENCY MEDICINE

## 2023-04-03 PROCEDURE — 36415 COLL VENOUS BLD VENIPUNCTURE: CPT | Performed by: INTERNAL MEDICINE

## 2023-04-03 PROCEDURE — 250N000011 HC RX IP 250 OP 636: Performed by: INTERNAL MEDICINE

## 2023-04-03 PROCEDURE — 85027 COMPLETE CBC AUTOMATED: CPT | Performed by: EMERGENCY MEDICINE

## 2023-04-03 PROCEDURE — 999N000157 HC STATISTIC RCP TIME EA 10 MIN

## 2023-04-03 PROCEDURE — 96361 HYDRATE IV INFUSION ADD-ON: CPT

## 2023-04-03 PROCEDURE — G0378 HOSPITAL OBSERVATION PER HR: HCPCS

## 2023-04-03 PROCEDURE — 250N000009 HC RX 250

## 2023-04-03 PROCEDURE — 96375 TX/PRO/DX INJ NEW DRUG ADDON: CPT

## 2023-04-03 PROCEDURE — 84132 ASSAY OF SERUM POTASSIUM: CPT | Performed by: EMERGENCY MEDICINE

## 2023-04-03 PROCEDURE — 81001 URINALYSIS AUTO W/SCOPE: CPT | Performed by: INTERNAL MEDICINE

## 2023-04-03 PROCEDURE — 36415 COLL VENOUS BLD VENIPUNCTURE: CPT | Performed by: EMERGENCY MEDICINE

## 2023-04-03 PROCEDURE — 83735 ASSAY OF MAGNESIUM: CPT | Mod: 91 | Performed by: INTERNAL MEDICINE

## 2023-04-03 PROCEDURE — 99223 1ST HOSP IP/OBS HIGH 75: CPT | Performed by: INTERNAL MEDICINE

## 2023-04-03 PROCEDURE — 250N000009 HC RX 250: Performed by: INTERNAL MEDICINE

## 2023-04-03 PROCEDURE — 96374 THER/PROPH/DIAG INJ IV PUSH: CPT

## 2023-04-03 PROCEDURE — 250N000009 HC RX 250: Performed by: EMERGENCY MEDICINE

## 2023-04-03 PROCEDURE — 99285 EMERGENCY DEPT VISIT HI MDM: CPT | Mod: 25

## 2023-04-03 PROCEDURE — 250N000013 HC RX MED GY IP 250 OP 250 PS 637: Performed by: INTERNAL MEDICINE

## 2023-04-03 PROCEDURE — 71046 X-RAY EXAM CHEST 2 VIEWS: CPT

## 2023-04-03 PROCEDURE — 94640 AIRWAY INHALATION TREATMENT: CPT | Mod: 76

## 2023-04-03 PROCEDURE — 96376 TX/PRO/DX INJ SAME DRUG ADON: CPT

## 2023-04-03 PROCEDURE — 250N000011 HC RX IP 250 OP 636: Performed by: EMERGENCY MEDICINE

## 2023-04-03 PROCEDURE — 84132 ASSAY OF SERUM POTASSIUM: CPT | Performed by: INTERNAL MEDICINE

## 2023-04-03 PROCEDURE — 82374 ASSAY BLOOD CARBON DIOXIDE: CPT | Performed by: EMERGENCY MEDICINE

## 2023-04-03 PROCEDURE — 96365 THER/PROPH/DIAG IV INF INIT: CPT

## 2023-04-03 PROCEDURE — 96368 THER/DIAG CONCURRENT INF: CPT

## 2023-04-03 PROCEDURE — 96366 THER/PROPH/DIAG IV INF ADDON: CPT

## 2023-04-03 PROCEDURE — 250N000013 HC RX MED GY IP 250 OP 250 PS 637: Performed by: EMERGENCY MEDICINE

## 2023-04-03 RX ORDER — HYDROXYZINE HYDROCHLORIDE 25 MG/1
25 TABLET, FILM COATED ORAL ONCE
Status: COMPLETED | OUTPATIENT
Start: 2023-04-03 | End: 2023-04-03

## 2023-04-03 RX ORDER — LEVALBUTEROL INHALATION SOLUTION 1.25 MG/3ML
1.25 SOLUTION RESPIRATORY (INHALATION)
Status: DISCONTINUED | OUTPATIENT
Start: 2023-04-03 | End: 2023-04-03

## 2023-04-03 RX ORDER — POTASSIUM CHLORIDE 1500 MG/1
40 TABLET, EXTENDED RELEASE ORAL ONCE
Status: COMPLETED | OUTPATIENT
Start: 2023-04-03 | End: 2023-04-03

## 2023-04-03 RX ORDER — DROPERIDOL 2.5 MG/ML
1.25 INJECTION, SOLUTION INTRAMUSCULAR; INTRAVENOUS ONCE
Status: COMPLETED | OUTPATIENT
Start: 2023-04-03 | End: 2023-04-03

## 2023-04-03 RX ORDER — IPRATROPIUM BROMIDE AND ALBUTEROL SULFATE 2.5; .5 MG/3ML; MG/3ML
3 SOLUTION RESPIRATORY (INHALATION)
Status: DISCONTINUED | OUTPATIENT
Start: 2023-04-03 | End: 2023-04-03

## 2023-04-03 RX ORDER — ALBUTEROL SULFATE 5 MG/ML
2.5 SOLUTION RESPIRATORY (INHALATION) ONCE
Status: COMPLETED | OUTPATIENT
Start: 2023-04-03 | End: 2023-04-03

## 2023-04-03 RX ORDER — AMOXICILLIN 250 MG
1 CAPSULE ORAL 2 TIMES DAILY
Status: DISCONTINUED | OUTPATIENT
Start: 2023-04-03 | End: 2023-04-06 | Stop reason: HOSPADM

## 2023-04-03 RX ORDER — POTASSIUM CHLORIDE 1500 MG/1
20 TABLET, EXTENDED RELEASE ORAL ONCE
Status: COMPLETED | OUTPATIENT
Start: 2023-04-03 | End: 2023-04-03

## 2023-04-03 RX ORDER — HYDROXYZINE HYDROCHLORIDE 25 MG/1
50 TABLET, FILM COATED ORAL EVERY 6 HOURS PRN
Status: DISCONTINUED | OUTPATIENT
Start: 2023-04-03 | End: 2023-04-06 | Stop reason: HOSPADM

## 2023-04-03 RX ORDER — MAGNESIUM SULFATE HEPTAHYDRATE 40 MG/ML
2 INJECTION, SOLUTION INTRAVENOUS ONCE
Status: COMPLETED | OUTPATIENT
Start: 2023-04-03 | End: 2023-04-03

## 2023-04-03 RX ORDER — BUPRENORPHINE AND NALOXONE 4; 1 MG/1; MG/1
1 FILM, SOLUBLE BUCCAL; SUBLINGUAL 2 TIMES DAILY
Status: DISCONTINUED | OUTPATIENT
Start: 2023-04-03 | End: 2023-04-03

## 2023-04-03 RX ORDER — METHYLPREDNISOLONE SODIUM SUCCINATE 125 MG/2ML
125 INJECTION, POWDER, LYOPHILIZED, FOR SOLUTION INTRAMUSCULAR; INTRAVENOUS ONCE
Status: COMPLETED | OUTPATIENT
Start: 2023-04-03 | End: 2023-04-03

## 2023-04-03 RX ORDER — PANTOPRAZOLE SODIUM 40 MG/1
40 TABLET, DELAYED RELEASE ORAL AT BEDTIME
Status: DISCONTINUED | OUTPATIENT
Start: 2023-04-03 | End: 2023-04-06 | Stop reason: HOSPADM

## 2023-04-03 RX ORDER — GABAPENTIN 300 MG/1
300 CAPSULE ORAL AT BEDTIME
Status: DISCONTINUED | OUTPATIENT
Start: 2023-04-03 | End: 2023-04-06 | Stop reason: HOSPADM

## 2023-04-03 RX ORDER — DIPHENHYDRAMINE HYDROCHLORIDE 50 MG/ML
25 INJECTION INTRAMUSCULAR; INTRAVENOUS ONCE
Status: COMPLETED | OUTPATIENT
Start: 2023-04-03 | End: 2023-04-03

## 2023-04-03 RX ORDER — LEVOFLOXACIN 5 MG/ML
500 INJECTION, SOLUTION INTRAVENOUS EVERY 24 HOURS
Status: DISCONTINUED | OUTPATIENT
Start: 2023-04-03 | End: 2023-04-05

## 2023-04-03 RX ORDER — MAGNESIUM OXIDE 400 MG/1
400 TABLET ORAL DAILY
Status: DISCONTINUED | OUTPATIENT
Start: 2023-04-03 | End: 2023-04-06 | Stop reason: HOSPADM

## 2023-04-03 RX ORDER — ONDANSETRON 4 MG/1
4 TABLET, ORALLY DISINTEGRATING ORAL EVERY 6 HOURS PRN
Status: DISCONTINUED | OUTPATIENT
Start: 2023-04-03 | End: 2023-04-06 | Stop reason: HOSPADM

## 2023-04-03 RX ORDER — LORAZEPAM 2 MG/ML
0.5 INJECTION INTRAMUSCULAR EVERY 6 HOURS PRN
Status: DISCONTINUED | OUTPATIENT
Start: 2023-04-03 | End: 2023-04-06 | Stop reason: HOSPADM

## 2023-04-03 RX ORDER — QUETIAPINE FUMARATE 100 MG/1
100 TABLET, FILM COATED ORAL EVERY MORNING
Status: DISCONTINUED | OUTPATIENT
Start: 2023-04-04 | End: 2023-04-06 | Stop reason: HOSPADM

## 2023-04-03 RX ORDER — METHYLPREDNISOLONE SODIUM SUCCINATE 40 MG/ML
40 INJECTION, POWDER, LYOPHILIZED, FOR SOLUTION INTRAMUSCULAR; INTRAVENOUS EVERY 8 HOURS
Status: DISCONTINUED | OUTPATIENT
Start: 2023-04-03 | End: 2023-04-04

## 2023-04-03 RX ORDER — HYDROXYZINE HYDROCHLORIDE 25 MG/1
25 TABLET, FILM COATED ORAL EVERY 6 HOURS PRN
Status: DISCONTINUED | OUTPATIENT
Start: 2023-04-03 | End: 2023-04-06 | Stop reason: HOSPADM

## 2023-04-03 RX ORDER — ACETAMINOPHEN 325 MG/1
650 TABLET ORAL EVERY 6 HOURS PRN
Status: DISCONTINUED | OUTPATIENT
Start: 2023-04-03 | End: 2023-04-06 | Stop reason: HOSPADM

## 2023-04-03 RX ORDER — DIPHENHYDRAMINE HYDROCHLORIDE 50 MG/ML
50 INJECTION INTRAMUSCULAR; INTRAVENOUS
Status: DISCONTINUED | OUTPATIENT
Start: 2023-04-03 | End: 2023-04-06 | Stop reason: HOSPADM

## 2023-04-03 RX ORDER — ACETAMINOPHEN 650 MG/1
650 SUPPOSITORY RECTAL EVERY 6 HOURS PRN
Status: DISCONTINUED | OUTPATIENT
Start: 2023-04-03 | End: 2023-04-06 | Stop reason: HOSPADM

## 2023-04-03 RX ORDER — BUPRENORPHINE AND NALOXONE 4; 1 MG/1; MG/1
1 FILM, SOLUBLE BUCCAL; SUBLINGUAL 2 TIMES DAILY
Status: DISCONTINUED | OUTPATIENT
Start: 2023-04-03 | End: 2023-04-06 | Stop reason: HOSPADM

## 2023-04-03 RX ORDER — BUDESONIDE AND FORMOTEROL FUMARATE DIHYDRATE 160; 4.5 UG/1; UG/1
2 AEROSOL RESPIRATORY (INHALATION) 2 TIMES DAILY
Status: DISCONTINUED | OUTPATIENT
Start: 2023-04-03 | End: 2023-04-06 | Stop reason: HOSPADM

## 2023-04-03 RX ORDER — QUETIAPINE FUMARATE 100 MG/1
300 TABLET, FILM COATED ORAL AT BEDTIME
Status: DISCONTINUED | OUTPATIENT
Start: 2023-04-03 | End: 2023-04-06 | Stop reason: HOSPADM

## 2023-04-03 RX ORDER — AMOXICILLIN 250 MG
2 CAPSULE ORAL 2 TIMES DAILY
Status: DISCONTINUED | OUTPATIENT
Start: 2023-04-03 | End: 2023-04-06 | Stop reason: HOSPADM

## 2023-04-03 RX ORDER — MEPERIDINE HYDROCHLORIDE 50 MG/ML
25 INJECTION INTRAMUSCULAR; INTRAVENOUS; SUBCUTANEOUS EVERY 30 MIN PRN
Status: DISCONTINUED | OUTPATIENT
Start: 2023-04-03 | End: 2023-04-06 | Stop reason: HOSPADM

## 2023-04-03 RX ORDER — ONDANSETRON 2 MG/ML
4 INJECTION INTRAMUSCULAR; INTRAVENOUS EVERY 6 HOURS PRN
Status: DISCONTINUED | OUTPATIENT
Start: 2023-04-03 | End: 2023-04-06 | Stop reason: HOSPADM

## 2023-04-03 RX ORDER — LEVALBUTEROL INHALATION SOLUTION 1.25 MG/3ML
1.25 SOLUTION RESPIRATORY (INHALATION) 4 TIMES DAILY
Status: DISCONTINUED | OUTPATIENT
Start: 2023-04-04 | End: 2023-04-05

## 2023-04-03 RX ORDER — NICOTINE 21 MG/24HR
1 PATCH, TRANSDERMAL 24 HOURS TRANSDERMAL DAILY
Status: DISCONTINUED | OUTPATIENT
Start: 2023-04-03 | End: 2023-04-06 | Stop reason: HOSPADM

## 2023-04-03 RX ORDER — ALBUTEROL SULFATE 0.83 MG/ML
2.5 SOLUTION RESPIRATORY (INHALATION)
Status: DISCONTINUED | OUTPATIENT
Start: 2023-04-03 | End: 2023-04-06 | Stop reason: HOSPADM

## 2023-04-03 RX ADMIN — Medication 1 MG: at 20:28

## 2023-04-03 RX ADMIN — POTASSIUM CHLORIDE 40 MEQ: 1500 TABLET, EXTENDED RELEASE ORAL at 09:57

## 2023-04-03 RX ADMIN — MAGNESIUM SULFATE HEPTAHYDRATE 2 G: 40 INJECTION, SOLUTION INTRAVENOUS at 12:11

## 2023-04-03 RX ADMIN — ACETAMINOPHEN 650 MG: 325 TABLET ORAL at 16:02

## 2023-04-03 RX ADMIN — POTASSIUM CHLORIDE 40 MEQ: 1500 TABLET, EXTENDED RELEASE ORAL at 17:08

## 2023-04-03 RX ADMIN — ALBUTEROL SULFATE 2.5 MG: 2.5 SOLUTION RESPIRATORY (INHALATION) at 08:12

## 2023-04-03 RX ADMIN — PANTOPRAZOLE SODIUM 40 MG: 40 TABLET, DELAYED RELEASE ORAL at 20:28

## 2023-04-03 RX ADMIN — SODIUM CHLORIDE 1000 ML: 9 INJECTION, SOLUTION INTRAVENOUS at 08:10

## 2023-04-03 RX ADMIN — HYDROXYZINE HYDROCHLORIDE 25 MG: 25 TABLET ORAL at 10:31

## 2023-04-03 RX ADMIN — BUPRENORPHINE AND NALOXONE 1 FILM: 4; 1 FILM, SOLUBLE BUCCAL; SUBLINGUAL at 16:10

## 2023-04-03 RX ADMIN — DIPHENHYDRAMINE HYDROCHLORIDE 25 MG: 50 INJECTION, SOLUTION INTRAMUSCULAR; INTRAVENOUS at 13:42

## 2023-04-03 RX ADMIN — METHYLPREDNISOLONE SODIUM SUCCINATE 125 MG: 125 INJECTION, POWDER, FOR SOLUTION INTRAMUSCULAR; INTRAVENOUS at 13:45

## 2023-04-03 RX ADMIN — LEVALBUTEROL HYDROCHLORIDE 1.25 MG: 1.25 SOLUTION RESPIRATORY (INHALATION) at 20:40

## 2023-04-03 RX ADMIN — ALBUTEROL SULFATE 2.5 MG: 2.5 SOLUTION RESPIRATORY (INHALATION) at 15:15

## 2023-04-03 RX ADMIN — EPINEPHRINE 1 MG: 1 INJECTION INTRAMUSCULAR; INTRAVENOUS; SUBCUTANEOUS at 13:44

## 2023-04-03 RX ADMIN — FLUOXETINE 40 MG: 20 CAPSULE ORAL at 15:58

## 2023-04-03 RX ADMIN — HYDROXYZINE HYDROCHLORIDE 50 MG: 25 TABLET ORAL at 18:45

## 2023-04-03 RX ADMIN — LORAZEPAM 0.5 MG: 2 INJECTION INTRAMUSCULAR; INTRAVENOUS at 15:49

## 2023-04-03 RX ADMIN — Medication 400 MG: at 15:58

## 2023-04-03 RX ADMIN — MAGNESIUM SULFATE HEPTAHYDRATE 2 G: 40 INJECTION, SOLUTION INTRAVENOUS at 17:09

## 2023-04-03 RX ADMIN — POTASSIUM CHLORIDE 20 MEQ: 1500 TABLET, EXTENDED RELEASE ORAL at 18:45

## 2023-04-03 RX ADMIN — LEVOFLOXACIN 500 MG: 500 INJECTION, SOLUTION INTRAVENOUS at 15:53

## 2023-04-03 RX ADMIN — BUPRENORPHINE AND NALOXONE 1 FILM: 4; 1 FILM, SOLUBLE BUCCAL; SUBLINGUAL at 22:24

## 2023-04-03 RX ADMIN — QUETIAPINE FUMARATE 300 MG: 300 TABLET ORAL at 20:29

## 2023-04-03 RX ADMIN — DROPERIDOL 1.25 MG: 2.5 INJECTION, SOLUTION INTRAMUSCULAR; INTRAVENOUS at 13:19

## 2023-04-03 RX ADMIN — GABAPENTIN 300 MG: 300 CAPSULE ORAL at 21:46

## 2023-04-03 RX ADMIN — IPRATROPIUM BROMIDE 0.5 MG: 0.5 SOLUTION RESPIRATORY (INHALATION) at 20:40

## 2023-04-03 RX ADMIN — NICOTINE 1 PATCH: 14 PATCH, EXTENDED RELEASE TRANSDERMAL at 15:58

## 2023-04-03 RX ADMIN — SODIUM CHLORIDE 500 ML: 9 INJECTION, SOLUTION INTRAVENOUS at 12:12

## 2023-04-03 RX ADMIN — SENNOSIDES AND DOCUSATE SODIUM 1 TABLET: 50; 8.6 TABLET ORAL at 20:29

## 2023-04-03 RX ADMIN — METHYLPREDNISOLONE SODIUM SUCCINATE 40 MG: 40 INJECTION, POWDER, FOR SOLUTION INTRAMUSCULAR; INTRAVENOUS at 21:45

## 2023-04-03 ASSESSMENT — ACTIVITIES OF DAILY LIVING (ADL)
DEPENDENT_IADLS:: TRANSPORTATION
ADLS_ACUITY_SCORE: 35
ADLS_ACUITY_SCORE: 35
ADLS_ACUITY_SCORE: 37
ADLS_ACUITY_SCORE: 35
ADLS_ACUITY_SCORE: 37
ADLS_ACUITY_SCORE: 36

## 2023-04-03 NOTE — ED PROVIDER NOTES
EMERGENCY DEPARTMENT ENCOUnter      NAME: Renetta Farooq  AGE: 67 year old female  YOB: 1955  MRN: [id number]  EVALUATION DATE & TIME: 4/3/2023  7:43 AM    PCP: Jamila Connelly    ED PROVIDER: Vu Macias DO      Chief Complaint   Patient presents with     Shortness of Breath         FINAL IMPRESSION:  1. Hypokalemia    2. Hypomagnesemia    3. Dyspnea, unspecified type          ED COURSE & MEDICAL DECISION MAKIN:42 AM  I met with patient for initial interview and encounter in Room 02. PPE worn includes N95 mask and exam gloves.   12:18 PM Spoke to Hospitalist Dr. Corona who accepts the patient for admission.       The patient presented to the emergency department today complaining of shortness of breath.  When EMS arrived on scene the patient was wheezy.  A nebulizer treatment was provided.  At the time of arrival here she was very anxious but had clear breath sounds.  She was satting well on room air.  Laboratory tests were performed where she was found to be both hypokalemic and hypomagnesemic.  Despite having normal O2 saturations, the patient continued to state that she was dyspneic.  She was also still very anxious and repeatedly demanding Ativan.  She was given 1 dose of droperidol.  Fairly shortly after this, she began complaining of tongue swelling.  She did appear to have some mild tongue swelling but no signs of airway compromise.  Benadryl, steroids, and epi were given.  She was monitored further in the emergency department and plan is to watch her overnight for further monitoring.  She is comfortable with this plan.        Medical Decision Making    History:    Supplemental history from: Documented in chart, if applicable and EMS    External Record(s) reviewed: Documented in chart, if applicable. and Other: Inpatient and Outpatient computer records reviewed. Chart Review [facility] admission 3/30/23-23    Work Up:    Chart documentation includes differential considered  and any EKGs or imaging independently interpreted by provider.    In additional to work up documented, I considered the following work up: Documented in chart, if applicable.    External consultation:    Discussion of management with another provider: Documented in chart, if applicable and Hospitalist    Complicating factors:    Care impacted by chronic illness: Hypertension    Care affected by social determinants of health: N/A    Disposition considerations: Admit.          At the conclusion of the encounter I discussed the results of all of the tests and the disposition. The questions were answered. The patient or family acknowledged understanding and was agreeable with the care plan.       =================================================================    HPI        Renetta Farooq is a 67 year old female with a pertinent history of HTN, anxiety, depression, bipolar disorder, opiate dependence with Suboxone chronically, chronic hepatitis C, methamphetamine abuse, history of alcohol dependence, tobacco use, came with short of breath, chest tightness, wheezing secondary to COPD exacerbation, who presents to this ED via EMS for evaluation of shortness of breath.     Per Chart Review: The patient was admitted to [facility] on 3/30/23-4/1/23 for shortness of breath and productive cough. CT scan revealed worsening bronchial wall thickening with associated endobronchial retained secretion compatible with bronchitis.  Treated with DuoNeb, Levaquin, IV systemic steroid.  Discharging home with Levaquin, oral prednisone to complete 5-day course and will resume steroid inhaler and bronchodilator.    Per EMS, the patient's son went to get the prescribe nebulizer for the patient but she does not have the nebulizer machine therefore developed significant shortness of breath at home. She was given duoneb en route with oxygen saturation in the 97-98%. She complains of right leg cramping/pain. The patient associated  productive cough and wheezing to symptoms.     Denies any other complaints or concerns at the moment.       REVIEW OF SYSTEMS     Constitutional:  Denies fever or chills  HENT:  Denies sore throat   Respiratory:  Positive for cough and shortness of breath   Cardiovascular:  Denies chest pain or palpitations  GI:  Denies abdominal pain, nausea, or vomiting  Musculoskeletal:  Positive for right lower extremity pain/cramping.   Skin:  Denies rash   Neurologic:  Denies headache, focal weakness or sensory changes    All other systems reviewed and are negative      PAST MEDICAL HISTORY:  Past Medical History:   Diagnosis Date     Acute psychosis (H) 10/14/2018     Alcohol dependence (H) 4/10/2015     Bipolar affective disorder, manic, severe, with psychotic behavior (H) 10/15/2018     Candida infection 10/19/2018     Chronic hepatitis C (H) 4/10/2015     Chronic neck pain      Closed traumatic brain injury, without loss of consciousness, sequela (H)      Dental abscess      Episodic tension-type headache, not intractable      Methamphetamine dependence (H) 10/15/2018     Pancytopenia (H) 4/10/2015     Weakness of right arm 4/10/2015       PAST SURGICAL HISTORY:  Past Surgical History:   Procedure Laterality Date     ARTHROSCOPY SHOULDER ROTATOR CUFF REPAIR Bilateral      CERVICAL FUSION      twice     COSMETIC SURGERY       RHINOPLASTY       TONSILLECTOMY             CURRENT MEDICATIONS:    No current outpatient medications on file.      ALLERGIES:  Allergies   Allergen Reactions     Amoxicillin Diarrhea     Droperidol Angioedema       FAMILY HISTORY:  Family History   Problem Relation Age of Onset     Narcolepsy Mother      Acute myelogenous leukemia Mother      Cancer Father        SOCIAL HISTORY:   Social History     Socioeconomic History     Marital status:    Tobacco Use     Smoking status: Every Day     Packs/day: 1.00     Years: 54.00     Pack years: 54.00     Types: Cigarettes     Start date: 1968      "Smokeless tobacco: Never     Tobacco comments:     Seen IP by CTTS on 3/31/2023   Substance and Sexual Activity     Alcohol use: Yes     Comment: Alcoholic Drinks/day: \"A lot.\"  (Couldn't quantify except to repeat \"A lot\" of wine, liquor and beer)     Drug use: No   Social History Narrative    Lives alone in independent housing with psychiatric case assistance       VITALS:  Patient Vitals for the past 24 hrs:   BP Temp Temp src Pulse Resp SpO2 Height Weight   04/04/23 0750 93/56 97.5  F (36.4  C) Oral 66 16 92 % -- --   04/04/23 0731 -- -- -- -- -- 92 % -- --   04/04/23 0521 -- -- -- -- -- -- -- 61.3 kg (135 lb 3.2 oz)   04/04/23 0422 93/55 97.4  F (36.3  C) Axillary 63 14 92 % -- --   04/04/23 0101 -- -- -- -- -- -- -- 60.2 kg (132 lb 11.5 oz)   04/04/23 0027 92/55 97.3  F (36.3  C) Axillary 62 14 92 % -- --   04/03/23 2224 -- -- -- -- 18 92 % -- --   04/03/23 2117 97/56 97.7  F (36.5  C) Oral 91 (!) 32 93 % 1.651 m (5' 5\") 59 kg (130 lb 1.1 oz)   04/03/23 2040 -- -- -- 96 28 94 % -- --   04/03/23 2030 109/69 -- -- 108 22 93 % -- --   04/03/23 1921 -- -- -- 92 26 97 % -- --   04/03/23 1423 -- -- -- 97 18 95 % -- --   04/03/23 1418 -- -- -- 97 20 97 % -- --   04/03/23 1400 -- -- -- 95 19 96 % -- --   04/03/23 1333 131/73 -- -- 82 25 99 % -- --   04/03/23 1155 -- -- -- 89 18 96 % -- --   04/03/23 1030 -- -- -- 100 -- 96 % -- --   04/03/23 0930 102/57 -- -- 71 11 94 % -- --   04/03/23 0812 -- -- -- 85 -- 100 % -- --       PHYSICAL EXAM    Constitutional:  Well developed, Well nourished, very anxious  HENT:  Normocephalic, Atraumatic, Oropharynx moist, Nose normal.   Eyes:  EOMI, Conjunctiva normal, No discharge.   Respiratory:  Normal breath sounds, No respiratory distress, hyperventilating, No wheezing, No chest tenderness.   Cardiovascular:  Normal heart rate, Normal rhythm, No murmurs  GI:  Soft, No tenderness, No guarding, No CVA tenderness.   Musculoskeletal:  No tenderness to palpation or major deformities " noted.   Extremities: No lower extremity edema.  Neurologic:  Alert & oriented x 3, No focal deficits noted.        LAB:  All pertinent labs reviewed and interpreted.  Results for orders placed or performed during the hospital encounter of 04/03/23              CBC with platelets   Result Value Ref Range    WBC Count 12.1 (H) 4.0 - 11.0 10e3/uL    RBC Count 4.19 3.80 - 5.20 10e6/uL    Hemoglobin 13.4 11.7 - 15.7 g/dL    Hematocrit 37.4 35.0 - 47.0 %    MCV 89 78 - 100 fL    MCH 32.0 26.5 - 33.0 pg    MCHC 35.8 31.5 - 36.5 g/dL    RDW 11.9 10.0 - 15.0 %    Platelet Count 208 150 - 450 10e3/uL   Basic metabolic panel   Result Value Ref Range    Sodium 133 (L) 136 - 145 mmol/L    Potassium 3.0 (L) 3.5 - 5.0 mmol/L    Chloride 98 98 - 107 mmol/L    Carbon Dioxide (CO2) 22 22 - 31 mmol/L    Anion Gap 13 5 - 18 mmol/L    Urea Nitrogen 17 8 - 22 mg/dL    Creatinine 1.02 0.60 - 1.10 mg/dL    Calcium 9.6 8.5 - 10.5 mg/dL    Glucose 88 70 - 125 mg/dL    GFR Estimate 60 (L) >60 mL/min/1.73m2   Result Value Ref Range    Magnesium 1.6 (L) 1.8 - 2.6 mg/dL   Result Value Ref Range    Potassium 3.0 (L) 3.5 - 5.0 mmol/L   Result Value Ref Range    Magnesium 1.9 1.8 - 2.6 mg/dL   UA with Microscopic reflex to Culture    Specimen: Urine, Clean Catch   Result Value Ref Range    Color Urine Colorless Colorless, Straw, Light Yellow, Yellow    Appearance Urine Clear Clear    Glucose Urine Negative Negative mg/dL    Bilirubin Urine Negative Negative    Ketones Urine Negative Negative mg/dL    Specific Gravity Urine 1.005 1.001 - 1.030    Blood Urine Negative Negative    pH Urine 6.5 5.0 - 7.0    Protein Albumin Urine Negative Negative mg/dL    Urobilinogen Urine <2.0 <2.0 mg/dL    Nitrite Urine Negative Negative    Leukocyte Esterase Urine Negative Negative    RBC Urine 1 <=2 /HPF    WBC Urine <1 <=5 /HPF         RADIOLOGY:  I have independently reviewed and interpreted the above imaging, pending the final radiology read.  XR Chest 2 Views    Final Result   IMPRESSION:    1. Mild hyperaeration.   2. No other evidence of acute cardiopulmonary disease is identified on this study. No significant change since the prior study dated 03/30/2023.   3. Questionable loosening of the pedicle screws at T2 (portion of the posterior fusion hardware for the lower cervical and upper thoracic spine). Recommend clinical correlation.          EKG:    Normal sinus rhythm at 67 bpm.  Normal axis.  Nonspecific ST changes.  No signs of acute ischemia.  QRS 98 ms, QTc 481 ms.    I have independently reviewed and interpreted this EKG          IBernie, am serving as a scribe to document services personally performed by Dr. Macias based on my observation and the provider's statements to me. I, Vu Macias, DO attest that Bernie Steiner is acting in a scribe capacity, has observed my performance of the services and has documented them in accordance with my direction.    Vu Macias DO  Emergency Medicine  [facility] EMERGENCY ROOM  [address] [id number] [phone]  Dept: [phone]       Vu Macias MD  04/04/23 0814

## 2023-04-03 NOTE — ED NOTES
Bed: [facility]-02  Expected date: 4/3/23  Expected time: 7:41 AM  Means of arrival: Ambulance  Comments:

## 2023-04-03 NOTE — ED NOTES
AIDET performed, white board updated for rounding. Patient updated on plan of care. Patient's pain assessed. Call light within reach, bed in low position, side rails up. Visitor at bedside: none. Care assumed by writer at this time.

## 2023-04-03 NOTE — PROGRESS NOTES
[facility] ACUTE PAIN SERVICE CONSULTATION     Date of Admission:  4/3/2023  Date of Consult (When I saw the patient): 04/03/23  Physician requesting consult: Dr. Corona   Reason for consult: chronic opioid use disorder  Primary Care Physician: Jamila Connelly     Assessment/Plan:     Renetta Farooq is a 67 year old female who was admitted on 4/3/2023.  Pain team was asked to see the patient for chronic opioid use disorder. Admitted for SOB. History of hypertension, COPD, PTSD, bipolar disorder, opioid dependence/substance abuse on Suboxone, methamphetamine abuse, alcohol dependence, tobacco abuse, cognitive impairment, chronic right arm and leg weakness, recently discharged from outside hospital on 3/12/2023 with complaints of bradycardia, hypotension secondary to clonidine abuse..  Describes pain as 2/10 and shaky. The patient describes leg pain that to me seems like she is shelbie muscles from anxiety, acutely. I do think this will resolve as we treat her anxiety and get her her daily dose of Suboxone which she hasn't had this AM. The patient denies current nausea, chest pain. Is feeling short of breath from anxiety. Diet is regular. The patient does  smoke and does have  chemical dependency history.   Was admitted at  3/30/23 - 4/1/23 for SOB. She was discharged on current dosing of gabapentin (300mg at bedtime) different than home dosing of 300mg TID  .     Opioid Induced Respiratory Depression Risk Assessment:low ?age    Patient in extreme anxiety in ED. Says she has leg pains, but upon further questioning it is more like 'jumping legs' from anxiety. Started when anxiety started ramping up a few hours ago. I do think her whole body is tensing up due to anxiety and causing pain. I would not at this time medicate for these 'leg pains'. No other pain. Takes suboxone regularly, going well as outpatient. No complaints about it, doesn't notice side effects, she reports ' it does the job'.      Would  not introduce opioids (other than home Suboxone)  here given history, unless acute pain process happens that would normally require opioids     VERY anxious to get her suboxone now (has not taken since last al). Will reorder for now, and then at bedtime.   Will give ordered lorazepam now for extreme anxiety in ED. Will add vistaril/atarax as well for adjunctive for anxiety.   PLAN:   1) Pain is consistent with anxiety induced leg spasms. .  Labs and imaging indicated: I have personally reviewed pertinent notes, labs, tests, and radiologic imaging in patient's chart. Treatment plan includes multimodal pain approach, Hospital Medicine Service for medical management, . Patient educated regarding multimodal pain approach, medications as listed below, reviewed discharge medications, advised keeping track of doses, educated on tapering off as pain improves, watch for constipation and stool softeners, no driving or alcohol with opioids, store medications in a safe place, advised to take no more than the prescribed dose as increased doses may cause respiratory depression or death, . Patient is understanding of the plan. All questions and concerns addressed to patient's satisfaction.   2)Multimodal Medication Therapy  Topical: none  NSAID'S: CrCl 49ml/min. none  Steroids:  Solu medrol q8h   Muscle Relaxants: none  Adjuvants: gabapentin 300mg at bedtime (home med) (was TID prior to last admission). I have not been able to gather from her if she can tell a difference pain wise with TID vs at bedtime, or any changes in side effects. She is currently too focused on anxiety and getting her Suboxone, to discuss this, I told her we can rediscuss tomorrow.   Antidepressants/anxiolytics: prozac 40mg daily, lorazepam 0.5mg q6hprn  Opioids: Suboxone 2-1mg BID  Nothing else indicated  IV Pain medication: none   3)Non-medication interventions:   Acupuncture consult - patient currently not able to hold long enough discussion to offer  this  Integrative consult - same, will offer tomorrow.   4)Constipation Prophylaxis: Scheduled and prn: SennaS BID  5) Care Teams:    -Opioid prescriber has been Jemima Serrano  -[place]  pulled from system on 4/3. This indicates   3/3 Suboxone 4-1mg 28 for 14 d  2/13 gabapentin 300mg TID  2/3 same suboxone  1/30 same suboxone dosing    Increase in suboxone 1/10/23 (previously 2-0.5mg BID)  Discharge Recommendations - We recommend prescribing the following at the time of discharge: home Suboxone.  Do not recommend opioids for ongoing management, if warranted would limit to < 3-5 day supply at discharge, continue adjuvants/topicals if effective for acute pain.Follow up primary care.    It has been determined patient is at high risk of overdose of opioids as you have a history of substance abuse disorder. Opioids are not recommended.      History of Present Illness (HPI):       Renetta Farooq is a 67 year old female who presented for sob.  Past medical history as above. The pain is reported to be acute 'spasming'  Per [place]  review, the patient does  have an opioid tolerance (via suboxone)  Opioid induced side effects noted and include: sedation: no, nausea: no, and constipation: no,and disease of addiction: yes, significant addiction history .      Reviewed medical record, labs, imaging, ED note, and care everywhere.     Past pain treatments have included: multiple BZD, multiple illicit use of opioids      Home pain medications/psych medications/anticoagulation medications include: Fluoxetine 40 mg daily  Gabapentin 300 mg at bedtime  Quetiapine 3 mg at bedtime 100 mg in the morning  Ativan 1 mg every 6 hours as needed for anxiety    Last UDS: 3/30 unremarkable. Discussed with hosptialist, likely don't need one now     Medical History   PAST MEDICAL HISTORY:   Past Medical History:   Diagnosis Date     Acute psychosis (H) 10/14/2018     Alcohol dependence (H) 4/10/2015     Bipolar affective disorder, manic, severe,  "with psychotic behavior (H) 10/15/2018     Candida infection 10/19/2018     Chronic hepatitis C (H) 4/10/2015     Chronic neck pain      Closed traumatic brain injury, without loss of consciousness, sequela (H)      Dental abscess      Episodic tension-type headache, not intractable      Methamphetamine dependence (H) 10/15/2018     Pancytopenia (H) 4/10/2015     Weakness of right arm 4/10/2015       PAST SURGICAL HISTORY:   Past Surgical History:   Procedure Laterality Date     ARTHROSCOPY SHOULDER ROTATOR CUFF REPAIR Bilateral      CERVICAL FUSION      twice     COSMETIC SURGERY       RHINOPLASTY       TONSILLECTOMY         FAMILY HISTORY:   Family History   Problem Relation Age of Onset     Narcolepsy Mother      Acute myelogenous leukemia Mother      Cancer Father        SOCIAL HISTORY:   Social History     Tobacco Use     Smoking status: Every Day     Packs/day: 1.00     Years: 54.00     Pack years: 54.00     Types: Cigarettes     Start date: 1968     Smokeless tobacco: Never     Tobacco comments:     Seen IP by CTTS on 3/31/2023   Vaping Use     Vaping status: Not on file   Substance Use Topics     Alcohol use: Yes     Comment: Alcoholic Drinks/day: \"A lot.\"  (Couldn't quantify except to repeat \"A lot\" of wine, liquor and beer)        HEALTH & LIFESTYLE PRACTICES  Tobacco:  reports that she has been smoking cigarettes. She started smoking about 55 years ago. She has a 54.00 pack-year smoking history. She has never used smokeless tobacco.  Alcohol:  reports current alcohol use.  Illicit drugs:  reports no history of drug use.    Allergies  Allergies   Allergen Reactions     Amoxicillin Diarrhea       Problem List  Patient Active Problem List    Diagnosis Date Noted     Hypokalemia 04/03/2023     Priority: Medium     Hypomagnesemia 04/03/2023     Priority: Medium     Dyspnea, unspecified type 04/03/2023     Priority: Medium     COPD exacerbation (H) 03/30/2023     Priority: Medium     Closed traumatic brain " injury, without loss of consciousness, sequela (H)      Priority: Medium     Dental abscess      Priority: Medium     Episodic tension-type headache, not intractable      Priority: Medium     Alcohol use disorder, severe, dependence (H) 10/31/2018     Priority: Medium     Opioid use disorder, severe, in sustained remission, on maintenance therapy (H)      Priority: Medium     Chronic neck pain      Priority: Medium     Candida infection 10/19/2018     Priority: Medium     Bipolar affective disorder, manic, severe, with psychotic behavior (H) 10/15/2018     Priority: Medium     Methamphetamine dependence (H) 10/15/2018     Priority: Medium     Acute psychosis (H) 10/14/2018     Priority: Medium     Alcohol withdrawal (H) 04/11/2015     Priority: Medium     Pancytopenia (H) 04/10/2015     Priority: Medium     Weakness of right arm 04/10/2015     Priority: Medium     Chronic hepatitis C (H) 04/10/2015     Priority: Medium     Alcohol dependence (H) 04/10/2015     Priority: Medium       Raquel Albarado PharmD  Acute Care Pain Management Program  [facility] ([place], [place], [place])  Monday-Friday 8a-4p   Page via online paging system

## 2023-04-03 NOTE — ED TRIAGE NOTES
Patient presents to the ED via EMS with complaints of shortness of breath, leg pain, and  anxiety.     Triage Assessment     Row Name 04/03/23 0749       Triage Assessment (Adult)    Airway WDL X    Additional Documentation Breath Sounds (Group)       Respiratory WDL    Respiratory WDL WDL       Breath Sounds    Breath Sounds All Fields    All Lung Fields Breath Sounds Anterior:;Posterior:;coarse       Skin Circulation/Temperature WDL    Skin Circulation/Temperature WDL WDL       Cardiac WDL    Cardiac WDL WDL       Peripheral/Neurovascular WDL    Peripheral Neurovascular WDL WDL       Cognitive/Neuro/Behavioral WDL    Cognitive/Neuro/Behavioral WDL WDL

## 2023-04-03 NOTE — PHARMACY-ADMISSION MEDICATION HISTORY
Pharmacy Intern Admission Medication History    Admission medication history is complete. The information provided in this note is only as accurate as the sources available at the time of the update.    Medication reconciliation/reorder completed by provider prior to medication history? Yes    Information Source(s): Patient via in-person    Pertinent Information:      Allergies reviewed with patient and updates made in EHR: yes    Medication History Completed By: Tae Glasgow 4/3/2023 3:36 PM    Prior to Admission Medications   Prescriptions Last Dose Informant Patient Reported? Taking?   FLUoxetine (PROZAC) 40 MG capsule 4/2/2023 at am  Yes Yes   Sig: Take 40 mg by mouth daily   NYSTOP [id number] UNIT/GM powder 4/2/2023 at prn  Yes Yes   Sig: Apply topically 3 times daily as needed   QUEtiapine (SEROQUEL) 100 MG tablet 4/2/2023 at pm  No Yes   Sig: [QUETIAPINE (SEROQUEL) 100 MG TABLET] Take 3 tablets (300 mg total) by mouth at bedtime.   QUEtiapine (SEROQUEL) 100 MG tablet 4/2/2023 at am  Yes Yes   Sig: Take 100 mg by mouth every morning   albuterol (PROAIR HFA;PROVENTIL HFA;VENTOLIN HFA) 90 mcg/actuation inhaler Unknown at prn  No Yes   Sig: [ALBUTEROL (PROAIR HFA;PROVENTIL HFA;VENTOLIN HFA) 90 MCG/ACTUATION INHALER] Inhale 2 puffs every 6 (six) hours as needed for wheezing.   budesonide-formoterol (SYMBICORT) 160-4.5 MCG/ACT Inhaler 4/2/2023 at pm  Yes Yes   Sig: Inhale 2 puffs into the lungs 2 times daily   buprenorphine HCl-naloxone HCl (SUBOXONE) 4-1 MG per film 4/2/2023 at pm  Yes Yes   Sig: Place 1 Film under the tongue 2 times daily   cholecalciferol, vitamin D3, 1,000 unit tablet 4/2/2023 at am  No Yes   Sig: [CHOLECALCIFEROL, VITAMIN D3, 1,000 UNIT TABLET] Take 1 tablet (1,000 Units total) by mouth daily.   docusate sodium (COLACE) 100 MG capsule 4/2/2023 at pm  No Yes   Sig: [DOCUSATE SODIUM (COLACE) 100 MG CAPSULE] Take 1 capsule (100 mg total) by mouth 2 (two) times a day.   docusate sodium (COLACE) 100 MG  tablet 4/2/2023 at prn  Yes Yes   Sig: Take 100 mg by mouth 2 times daily as needed for constipation   gabapentin (NEURONTIN) 300 MG capsule 4/2/2023 at pm  Yes Yes   Sig: Take 1 capsule (300 mg) by mouth At Bedtime   ipratropium - albuterol 0.5 mg/2.5 mg/3 mL (DUONEB) 0.5-2.5 (3) MG/3ML neb solution 4/2/2023 at pm  No Yes   Sig: Take 1 vial (3 mLs) by nebulization 3 times daily For 1 week then every 6 hours as needed   levofloxacin (LEVAQUIN) 750 MG tablet 4/2/2023 at am  No Yes   Sig: Take 1 tablet (750 mg) by mouth every 24 hours   magnesium oxide (MAG-OX) 400 MG tablet 4/2/2023 at am  No Yes   Sig: Take 1 tablet (400 mg) by mouth daily   nicotine (NICODERM CQ) 14 MG/24HR 24 hr patch 4/2/2023 at am  No Yes   Sig: Place 1 patch onto the skin daily   omeprazole (PRILOSEC) 20 MG DR capsule 4/2/2023 at pm  Yes Yes   Sig: Take 20 mg by mouth At Bedtime   predniSONE (DELTASONE) 20 MG tablet 4/2/2023 at am  No Yes   Sig: Take 2 tablets (40 mg) by mouth daily for 4 days      Facility-Administered Medications: None

## 2023-04-03 NOTE — PROVIDER NOTIFICATION
Text page to Dr. Corona: Patient frequently trying to get to the toilet complaining that her bladder hurts. PVR earlier was 900 ml and she was straight cathed. do you want to check a UA?  Justin Fernandez RN  4/3/2023  4:17 PM

## 2023-04-03 NOTE — ED NOTES
Pt seen in the ED. Neb x2 was given. BS clear diminished with rhonchi second neb. Pt seem very anxious. Pt remains on room air in no respiratory distress at this time.    Jenny Steele, RT

## 2023-04-03 NOTE — H&P
[facility]    History and Physical - Hospitalist Service       Date of Admission:  4/3/2023    Assessment & Plan      Renetta Farooq is a 67 year old female with history of hypertension, COPD, PTSD, bipolar disorder, opioid dependence/substance abuse on Suboxone, methamphetamine abuse, alcohol dependence, tobacco abuse, cognitive impairment, chronic right arm and leg weakness, recently discharged from outside hospital on 3/12/2023 with complaints of bradycardia, hypotension secondary to clonidine abuse.  She currently resides at an extended living hotel.  Admitted on 4/3/2023 for complaint of shortness of breath.  Patient was provided droperidol in the emergency room and had an apparent drug reaction with tongue and oral mucosal angioedema.  Resolved after methylprednisolone administration.    Mild chronic obstructive pulmonary disease exacerbation  Bronchitis  DuoNeb every 4 hours  Order methylprednisolone 40 mg IV every 8 hours  Order levofloxacin 500 mg IV every 24 hours  Continuous pulse oximetry  Supplemental oxygen to maintain oxygen saturation greater than 90%.  R CAT assessment.  Pulmonology consultation.    Drug reaction  Angioedema  Ordered frequent vital signs per hypersensitivity reaction management protocol.  Diphenhydramine 50 mg IV once as needed for hypersensitivity reaction.  Order famotidine 20 mg IV once as needed.  Epinephrine 0.3 mg IM every 5 minutes as needed for hypersensitivity reaction.  Order normal saline 500 cc fluid bolus as needed.  Albuterol as needed as above.    Hypokalemia  Hypomagnesemia  Mild hyponatremia likely secondary to dehydration.  Patient received normal saline infusion in ED.  Repeat basic metabolic profile  Magnesium potassium replacement protocols.    Chronic low back pain  Status post fusion C3-4, L4-5 and 6 and cervical spine.  History of  Opioid use disorder  X-ray reveals possible loosening of pedicle screws at T2.  Appears in  2022  appears stable.   Currently on Suboxone we will resume.  Acute pain service consultation.    Social issues.   Patient currently resides at extended stay hotel.  Care management/social work consultation.    Cognitive impairment  PTSD  Bipolar 2 with chronic passive SI.  Anxiety not otherwise specified  PTA medication: Fluoxetine 40 mg daily  Gabapentin 300 mg at bedtime  Quetiapine 3 mg at bedtime 100 mg in the morning  Ativan 1 mg every 6 hours as needed for anxiety    Severe protein calorie malnutrition  Continue dietary supplements.    Hypertension  Patient is currently normotensive    Tobacco abuse  Nicotine 14 mg transdermal daily     Diet: NPO for Medical/Clinical Reasons Except for: Ice Chips  DVT Prophylaxis: Pneumatic Compression Devices  Burroughs Catheter: Not present  Lines: None     Cardiac Monitoring: None  Code Status: Full Code    Clinically Significant Risk Factors Present on Admission        # Hypokalemia: Lowest K = 3 mmol/L in last 2 days, will replace as needed     # Hypomagnesemia: Lowest Mg = 1.6 mg/dL in last 2 days, will replace as needed                    Disposition Plan      Expected Discharge Date: 04/04/2023                  Ray Corona DO  Hospitalist Service  [facility]  Securely message with [employer] (more info)  Text page via [employer] Paging/Directory     ______________________________________________________________________    Chief Complaint   Shortness of breath, productive cough.    History is obtained from the patient    History of Present Illness   Renetta Farooq is a 67 year old female with history of hypertension, COPD, PTSD, bipolar disorder, opioid dependence/substance abuse on Suboxone, methamphetamine abuse, alcohol dependence, tobacco abuse, cognitive impairment, chronic right arm and leg weakness, recently discharged from outside hospital on 3/12/2023 with complaints of bradycardia, hypotension secondary to clonidine abuse.  She currently  resides at an extended living hotel.  EMS was contacted by patient's son.  Patient was prescribed nebulized beta agonists after her last hospitalization.  Unfortunately patient did not have a nebulizer machine and developed significant shortness of breath.  Patient also with symptoms of productive cough and wheezing.  Patient was given DuoNeb on route.  Oxygen saturation was normal between 97 and 98%.  Patient reported she had worsening shortness of breath which prompted her evaluation in the emergency department.  She reports cough productive of clear sputum.  She has increased anxiety and ran out of Ativan.  She is asking for clonidine in the emergency room.    Upon evaluation in the emergency department patient's vital signs were normal.  She was satting between 94 and 100% on room air.  Laboratory findings remarkable for mild hyponatremia with sodium of 133.  Potassium was low at 3.0 magnesium was low at 1.6.  CBC was remarkable for white blood cell count 12.1 otherwise unremarkable.  Chest x-ray showed mild hyperaeration.  There was no evidence for acute cardiopulmonary disease.  Incidental finding of possible loosening of pedicle screws at T2.  Patient was complaining of anxiety and was provided droperidol in the emergency room.  Patient developed angioedema of the tongue and mucous membranes which required IV methylprednisolone.  Patient swelling has improved.  Patient was admitted for possible upper respiratory infection versus bronchitis with mild COPD exacerbation      Past Medical History    Past Medical History:   Diagnosis Date     Acute psychosis (H) 10/14/2018     Alcohol dependence (H) 4/10/2015     Bipolar affective disorder, manic, severe, with psychotic behavior (H) 10/15/2018     Candida infection 10/19/2018     Chronic hepatitis C (H) 4/10/2015     Chronic neck pain      Closed traumatic brain injury, without loss of consciousness, sequela (H)      Dental abscess      Episodic tension-type  headache, not intractable      Methamphetamine dependence (H) 10/15/2018     Pancytopenia (H) 4/10/2015     Weakness of right arm 4/10/2015       Past Surgical History   Past Surgical History:   Procedure Laterality Date     ARTHROSCOPY SHOULDER ROTATOR CUFF REPAIR Bilateral      CERVICAL FUSION      twice     COSMETIC SURGERY       RHINOPLASTY       TONSILLECTOMY         Prior to Admission Medications   Prior to Admission Medications   Prescriptions Last Dose Informant Patient Reported? Taking?   FLUoxetine (PROZAC) 40 MG capsule   Yes No   Sig: Take 40 mg by mouth daily   NYSTOP [id number] UNIT/GM powder   Yes No   Sig: Apply topically 3 times daily as needed   QUEtiapine (SEROQUEL) 100 MG tablet   No No   Sig: [QUETIAPINE (SEROQUEL) 100 MG TABLET] Take 3 tablets (300 mg total) by mouth at bedtime.   QUEtiapine (SEROQUEL) 100 MG tablet   Yes No   Sig: Take 100 mg by mouth every morning   albuterol (PROAIR HFA;PROVENTIL HFA;VENTOLIN HFA) 90 mcg/actuation inhaler   No No   Sig: [ALBUTEROL (PROAIR HFA;PROVENTIL HFA;VENTOLIN HFA) 90 MCG/ACTUATION INHALER] Inhale 2 puffs every 6 (six) hours as needed for wheezing.   budesonide-formoterol (SYMBICORT) 160-4.5 MCG/ACT Inhaler   Yes No   Sig: Inhale 2 puffs into the lungs 2 times daily   buprenorphine HCl-naloxone HCl (SUBOXONE) 4-1 MG per film   Yes No   Sig: Place 1 Film under the tongue 2 times daily   cholecalciferol, vitamin D3, 1,000 unit tablet   No No   Sig: [CHOLECALCIFEROL, VITAMIN D3, 1,000 UNIT TABLET] Take 1 tablet (1,000 Units total) by mouth daily.   docusate sodium (COLACE) 100 MG capsule   No No   Sig: [DOCUSATE SODIUM (COLACE) 100 MG CAPSULE] Take 1 capsule (100 mg total) by mouth 2 (two) times a day.   docusate sodium (COLACE) 100 MG tablet   Yes No   Sig: Take 100 mg by mouth 2 times daily as needed for constipation   gabapentin (NEURONTIN) 300 MG capsule   Yes No   Sig: Take 1 capsule (300 mg) by mouth At Bedtime   ipratropium - albuterol 0.5 mg/2.5 mg/3  mL (DUONEB) 0.5-2.5 (3) MG/3ML neb solution   No No   Sig: Take 1 vial (3 mLs) by nebulization 3 times daily For 1 week then every 6 hours as needed   levofloxacin (LEVAQUIN) 750 MG tablet   No No   Sig: Take 1 tablet (750 mg) by mouth every 24 hours   magnesium oxide (MAG-OX) 400 MG tablet   No No   Sig: Take 1 tablet (400 mg) by mouth daily   nicotine (NICODERM CQ) 14 MG/24HR 24 hr patch   No No   Sig: Place 1 patch onto the skin daily   omeprazole (PRILOSEC) 20 MG DR capsule   Yes No   Sig: Take 20 mg by mouth At Bedtime   predniSONE (DELTASONE) 20 MG tablet   No No   Sig: Take 2 tablets (40 mg) by mouth daily for 4 days      Facility-Administered Medications: None           Physical Exam   Vital Signs: Temp: 98  F (36.7  C) Temp src: Axillary BP: 131/73 Pulse: 97   Resp: 18 SpO2: 95 % O2 Device: None (Room air)    Weight: 130 lbs 0 oz        Medical Decision Making             Data

## 2023-04-04 ENCOUNTER — APPOINTMENT (OUTPATIENT)
Dept: OCCUPATIONAL THERAPY | Facility: CLINIC | Age: 68
End: 2023-04-04
Attending: INTERNAL MEDICINE
Payer: COMMERCIAL

## 2023-04-04 ENCOUNTER — APPOINTMENT (OUTPATIENT)
Dept: PHYSICAL THERAPY | Facility: CLINIC | Age: 68
End: 2023-04-04
Attending: INTERNAL MEDICINE
Payer: COMMERCIAL

## 2023-04-04 LAB
ANION GAP SERPL CALCULATED.3IONS-SCNC: 9 MMOL/L (ref 5–18)
ATRIAL RATE - MUSE: 67 BPM
BACTERIA BLD CULT: NO GROWTH
BACTERIA BLD CULT: NO GROWTH
BUN SERPL-MCNC: 17 MG/DL (ref 8–22)
CALCIUM SERPL-MCNC: 9 MG/DL (ref 8.5–10.5)
CHLORIDE BLD-SCNC: 98 MMOL/L (ref 98–107)
CO2 SERPL-SCNC: 26 MMOL/L (ref 22–31)
CREAT SERPL-MCNC: 0.8 MG/DL (ref 0.6–1.1)
DIASTOLIC BLOOD PRESSURE - MUSE: 57 MMHG
ERYTHROCYTE [DISTWIDTH] IN BLOOD BY AUTOMATED COUNT: 12 % (ref 10–15)
GFR SERPL CREATININE-BSD FRML MDRD: 80 ML/MIN/1.73M2
GLUCOSE BLD-MCNC: 135 MG/DL (ref 70–125)
HCT VFR BLD AUTO: 33.2 % (ref 35–47)
HGB BLD-MCNC: 11.6 G/DL (ref 11.7–15.7)
INTERPRETATION ECG - MUSE: NORMAL
MAGNESIUM SERPL-MCNC: 2.2 MG/DL (ref 1.8–2.6)
MCH RBC QN AUTO: 31.4 PG (ref 26.5–33)
MCHC RBC AUTO-ENTMCNC: 34.9 G/DL (ref 31.5–36.5)
MCV RBC AUTO: 90 FL (ref 78–100)
P AXIS - MUSE: 51 DEGREES
PLATELET # BLD AUTO: 153 10E3/UL (ref 150–450)
POTASSIUM BLD-SCNC: 4 MMOL/L (ref 3.5–5)
PR INTERVAL - MUSE: 164 MS
QRS DURATION - MUSE: 98 MS
QT - MUSE: 456 MS
QTC - MUSE: 481 MS
R AXIS - MUSE: 24 DEGREES
RBC # BLD AUTO: 3.7 10E6/UL (ref 3.8–5.2)
SODIUM SERPL-SCNC: 133 MMOL/L (ref 136–145)
SYSTOLIC BLOOD PRESSURE - MUSE: 98 MMHG
T AXIS - MUSE: 50 DEGREES
VENTRICULAR RATE- MUSE: 67 BPM
WBC # BLD AUTO: 4.5 10E3/UL (ref 4–11)

## 2023-04-04 PROCEDURE — 250N000012 HC RX MED GY IP 250 OP 636 PS 637: Performed by: INTERNAL MEDICINE

## 2023-04-04 PROCEDURE — 94640 AIRWAY INHALATION TREATMENT: CPT | Mod: 76

## 2023-04-04 PROCEDURE — 999N000157 HC STATISTIC RCP TIME EA 10 MIN: Mod: 76

## 2023-04-04 PROCEDURE — 99232 SBSQ HOSP IP/OBS MODERATE 35: CPT | Performed by: INTERNAL MEDICINE

## 2023-04-04 PROCEDURE — 250N000013 HC RX MED GY IP 250 OP 250 PS 637: Performed by: INTERNAL MEDICINE

## 2023-04-04 PROCEDURE — 250N000009 HC RX 250: Performed by: INTERNAL MEDICINE

## 2023-04-04 PROCEDURE — 83735 ASSAY OF MAGNESIUM: CPT | Performed by: INTERNAL MEDICINE

## 2023-04-04 PROCEDURE — 97161 PT EVAL LOW COMPLEX 20 MIN: CPT | Mod: GP

## 2023-04-04 PROCEDURE — 250N000011 HC RX IP 250 OP 636: Performed by: INTERNAL MEDICINE

## 2023-04-04 PROCEDURE — 272N000202 HC AEROBIKA WITH MANOMETER

## 2023-04-04 PROCEDURE — 94640 AIRWAY INHALATION TREATMENT: CPT

## 2023-04-04 PROCEDURE — 96376 TX/PRO/DX INJ SAME DRUG ADON: CPT

## 2023-04-04 PROCEDURE — G0378 HOSPITAL OBSERVATION PER HR: HCPCS

## 2023-04-04 PROCEDURE — 85027 COMPLETE CBC AUTOMATED: CPT | Performed by: INTERNAL MEDICINE

## 2023-04-04 PROCEDURE — 97165 OT EVAL LOW COMPLEX 30 MIN: CPT | Mod: GO

## 2023-04-04 PROCEDURE — 94799 UNLISTED PULMONARY SVC/PX: CPT

## 2023-04-04 PROCEDURE — 99205 OFFICE O/P NEW HI 60 MIN: CPT | Performed by: NURSE PRACTITIONER

## 2023-04-04 PROCEDURE — 80048 BASIC METABOLIC PNL TOTAL CA: CPT | Performed by: INTERNAL MEDICINE

## 2023-04-04 PROCEDURE — 97116 GAIT TRAINING THERAPY: CPT | Mod: GP

## 2023-04-04 PROCEDURE — 99207 PR CDG-CUT & PASTE-POTENTIAL IMPACT ON LEVEL: CPT | Performed by: INTERNAL MEDICINE

## 2023-04-04 PROCEDURE — 36415 COLL VENOUS BLD VENIPUNCTURE: CPT | Performed by: INTERNAL MEDICINE

## 2023-04-04 RX ORDER — POTASSIUM CHLORIDE 1500 MG/1
20 TABLET, EXTENDED RELEASE ORAL ONCE
Status: COMPLETED | OUTPATIENT
Start: 2023-04-04 | End: 2023-04-04

## 2023-04-04 RX ORDER — TAMSULOSIN HYDROCHLORIDE 0.4 MG/1
0.4 CAPSULE ORAL DAILY
Status: DISCONTINUED | OUTPATIENT
Start: 2023-04-04 | End: 2023-04-06 | Stop reason: HOSPADM

## 2023-04-04 RX ORDER — POTASSIUM CHLORIDE 7.45 MG/ML
10 INJECTION INTRAVENOUS
Status: DISCONTINUED | OUTPATIENT
Start: 2023-04-04 | End: 2023-04-04

## 2023-04-04 RX ORDER — PREDNISONE 20 MG/1
40 TABLET ORAL DAILY
Status: DISCONTINUED | OUTPATIENT
Start: 2023-04-04 | End: 2023-04-06 | Stop reason: HOSPADM

## 2023-04-04 RX ADMIN — SENNOSIDES AND DOCUSATE SODIUM 1 TABLET: 50; 8.6 TABLET ORAL at 09:04

## 2023-04-04 RX ADMIN — LORAZEPAM 0.5 MG: 2 INJECTION INTRAMUSCULAR; INTRAVENOUS at 11:47

## 2023-04-04 RX ADMIN — TAMSULOSIN HYDROCHLORIDE 0.4 MG: 0.4 CAPSULE ORAL at 10:08

## 2023-04-04 RX ADMIN — HYDROXYZINE HYDROCHLORIDE 25 MG: 25 TABLET ORAL at 05:27

## 2023-04-04 RX ADMIN — GABAPENTIN 300 MG: 300 CAPSULE ORAL at 20:27

## 2023-04-04 RX ADMIN — METHYLPREDNISOLONE SODIUM SUCCINATE 40 MG: 40 INJECTION, POWDER, FOR SOLUTION INTRAMUSCULAR; INTRAVENOUS at 05:22

## 2023-04-04 RX ADMIN — BUPRENORPHINE AND NALOXONE 1 FILM: 4; 1 FILM, SOLUBLE BUCCAL; SUBLINGUAL at 20:27

## 2023-04-04 RX ADMIN — BUPRENORPHINE AND NALOXONE 1 FILM: 4; 1 FILM, SOLUBLE BUCCAL; SUBLINGUAL at 09:05

## 2023-04-04 RX ADMIN — LEVOFLOXACIN 500 MG: 500 INJECTION, SOLUTION INTRAVENOUS at 14:36

## 2023-04-04 RX ADMIN — LEVALBUTEROL HYDROCHLORIDE 1.25 MG: 1.25 SOLUTION RESPIRATORY (INHALATION) at 07:31

## 2023-04-04 RX ADMIN — PREDNISONE 40 MG: 20 TABLET ORAL at 11:47

## 2023-04-04 RX ADMIN — LEVALBUTEROL HYDROCHLORIDE 1.25 MG: 1.25 SOLUTION RESPIRATORY (INHALATION) at 20:03

## 2023-04-04 RX ADMIN — HYDROXYZINE HYDROCHLORIDE 25 MG: 25 TABLET ORAL at 13:16

## 2023-04-04 RX ADMIN — IPRATROPIUM BROMIDE 0.5 MG: 0.5 SOLUTION RESPIRATORY (INHALATION) at 07:31

## 2023-04-04 RX ADMIN — LEVALBUTEROL HYDROCHLORIDE 1.25 MG: 1.25 SOLUTION RESPIRATORY (INHALATION) at 11:58

## 2023-04-04 RX ADMIN — LEVALBUTEROL HYDROCHLORIDE 1.25 MG: 1.25 SOLUTION RESPIRATORY (INHALATION) at 16:02

## 2023-04-04 RX ADMIN — FLUOXETINE 40 MG: 20 CAPSULE ORAL at 09:04

## 2023-04-04 RX ADMIN — SENNOSIDES AND DOCUSATE SODIUM 2 TABLET: 50; 8.6 TABLET ORAL at 20:27

## 2023-04-04 RX ADMIN — IPRATROPIUM BROMIDE 0.5 MG: 0.5 SOLUTION RESPIRATORY (INHALATION) at 20:03

## 2023-04-04 RX ADMIN — QUETIAPINE FUMARATE 300 MG: 300 TABLET ORAL at 20:27

## 2023-04-04 RX ADMIN — NICOTINE 1 PATCH: 14 PATCH, EXTENDED RELEASE TRANSDERMAL at 09:06

## 2023-04-04 RX ADMIN — LORAZEPAM 0.5 MG: 2 INJECTION INTRAMUSCULAR; INTRAVENOUS at 05:48

## 2023-04-04 RX ADMIN — Medication 400 MG: at 09:04

## 2023-04-04 RX ADMIN — LORAZEPAM 0.5 MG: 2 INJECTION INTRAMUSCULAR; INTRAVENOUS at 17:53

## 2023-04-04 RX ADMIN — POTASSIUM CHLORIDE 20 MEQ: 1500 TABLET, EXTENDED RELEASE ORAL at 02:04

## 2023-04-04 RX ADMIN — IPRATROPIUM BROMIDE 0.5 MG: 0.5 SOLUTION RESPIRATORY (INHALATION) at 11:59

## 2023-04-04 RX ADMIN — QUETIAPINE 100 MG: 100 TABLET ORAL at 09:04

## 2023-04-04 RX ADMIN — IPRATROPIUM BROMIDE 0.5 MG: 0.5 SOLUTION RESPIRATORY (INHALATION) at 16:02

## 2023-04-04 ASSESSMENT — ACTIVITIES OF DAILY LIVING (ADL)
ADLS_ACUITY_SCORE: 36
ADLS_ACUITY_SCORE: 35
ADLS_ACUITY_SCORE: 36
ADLS_ACUITY_SCORE: 35
ADLS_ACUITY_SCORE: 35
ADLS_ACUITY_SCORE: 36
ADLS_ACUITY_SCORE: 35

## 2023-04-04 NOTE — PROGRESS NOTES
"PRIMARY DIAGNOSIS: \"GENERIC\" NURSING  OUTPATIENT/OBSERVATION GOALS TO BE MET BEFORE DISCHARGE:  1. ADLs back to baseline: No    2. Activity and level of assistance: Up with standby assistance.    3. Pain status: Improved-controlled with oral pain medications.    4. Return to near baseline physical activity: No     Discharge Planner Nurse   Safe discharge environment identified: No  Barriers to discharge: Yes       Entered by: Maria Elena Hayes RN 04/04/2023 9:59 AM     Please review provider order for any additional goals.   Nurse to notify provider when observation goals have been met and patient is ready for discharge.    A&Ox4, slightly lethargic but more alert today. Bladder scan at 0915 for 295mL, patient attempted to void but unable. Regular diet, SBA, alarms on for safety. Discharge pending and placement pending.  "

## 2023-04-04 NOTE — PROGRESS NOTES
04/04/23 1100   Appointment Info   Signing Clinician's Name / Credentials (PT) Lorraine Vivas, PT, DPT   Quick Adds   Quick Adds Certification   Living Environment   People in Home child(jimmy), adult  (Son)   Current Living Arrangements other (see comments)  (extended stay hotel)   Home Accessibility no concerns   Self-Care   Equipment Currently Used at Home none   Fall history within last six months no   Activity/Exercise/Self-Care Comment Pt reports having son assist with all ambulation at this time due to broken AFO and falls in past. Pt reports needing to go to [facility]   General Information   Onset of Illness/Injury or Date of Surgery 04/03/23   Referring Physician Dr. Ray Corona   Patient/Family Therapy Goals Statement (PT) Move safely   Pertinent History of Current Problem (include personal factors and/or comorbidities that impact the POC) Dyspnea, Hx of C3-4 and L4-5 fusions, drop foot on right   Weight-Bearing Status - LLE full weight-bearing   Weight-Bearing Status - RLE full weight-bearing   Bed Mobility   Bed Mobility supine-sit   Supine-Sit [place] (Bed Mobility) supervision;verbal cues   Transfers   Transfers sit-stand transfer   Maintains Weight-bearing Status (Transfers) able to maintain   Sit-Stand Transfer   Sit-Stand [place] (Transfers) supervision;verbal cues   Assistive Device (Sit-Stand Transfers)   (None)   Gait/Stairs (Locomotion)   [place] Level (Gait) minimum assist (75% patient effort);verbal cues   Assistive Device (Gait) walker, front-wheeled   Distance in Feet 10   Distance in Feet (Gait) 150, 200   Pattern (Gait) step-through   Deviations/Abnormal Patterns (Gait) gait speed decreased;festinating/shuffling   Maintains Weight-bearing Status (Gait) able to maintain   Clinical Impression   Criteria for Skilled Therapeutic Intervention Yes, treatment indicated   PT Diagnosis (PT) Impaired functional mobility   Influenced by the following impairments weakness, impaired  balance   Functional limitations due to impairments transfers, gait   Clinical Presentation (PT Evaluation Complexity) Stable/Uncomplicated   Clinical Presentation Rationale Pt presents as medically diagnosed   Clinical Decision Making (Complexity) low complexity   Planned Therapy Interventions (PT) gait training;home exercise program;balance training;patient/family education;transfer training;strengthening   Anticipated Equipment Needs at Discharge (PT) walker, rolling;gait belt;other (see comments)  (AFO)   Risk & Benefits of therapy have been explained evaluation/treatment results reviewed;care plan/treatment goals reviewed;patient   PT Total Evaluation Time   PT Eval, Low Complexity Minutes (84513) 10   Therapy Certification   Start of care date 04/04/23   Certification date from 04/04/23   Certification date to 04/25/23   Medical Diagnosis Dyspnea   Physical Therapy Goals   PT Frequency Daily   PT Predicted Duration/Target Date for Goal Attainment 04/11/23   PT Goals Transfers;Gait   PT: Transfers Modified independent;Sit to/from stand;Assistive device   PT: Gait Modified independent;Rolling walker;150 feet   Interventions   Interventions Quick Adds Gait Training;Therapeutic Activity   Therapeutic Activity   Symptoms Noted During/After Treatment None   Treatment Detail/Skilled Intervention Supine to sit with HOB elevated, SBA, verbal cueing for safety. Sit<>Stand with no AD x 1 rep, SBA, verbal cueing for safety. Sit<>Stand with FWW, CGA, verbal cueing for hand placement and safety. Sit to supine with HOB elevated, mod I   Gait Training   Gait Training Minutes (21204) 10   Symptoms Noted During/After Treatment (Gait Training) fatigue   Treatment Detail/Skilled Intervention verbal cueing for safety and LE advancement. patient reports having an AFO for right foot, but broke about 1 year ago and has not been able to replace. Patient reports using son's arm to ambulate at home for safety, also does not have a FWW.   during ambulation and 02 92% on RA   [place]son Level (Gait Training) contact guard   Physical Assistance Level (Gait Training) verbal cues   Weight Bearing (Gait Training) full weight-bearing   Assistive Device (Gait Training) rolling walker   Pattern Analysis (Gait Training) swing-through gait   Gait Analysis Deviations decreased evita;decreased step length   Impairments (Gait Analysis/Training) balance impaired   PT Discharge Planning   PT Plan Gait, balance, LE exercises   PT Discharge Recommendation (DC Rec) (S)  Transitional Care Facility   PT Rationale for DC Rec Patient needs assist of 1 for all mobility and FWW, high fall risk. Has assist from son at this time at home. TCU for balance and mobility training   PT Brief overview of current status Amb 200ft with FWW, CGA   Total Session Time   Timed Code Treatment Minutes 10   Total Session Time (sum of timed and untimed services) 20   [facility]  OUTPATIENT PHYSICAL THERAPY EVALUATION  PLAN OF TREATMENT FOR OUTPATIENT REHABILITATION  (COMPLETE FOR INITIAL CLAIMS ONLY)  Patient's Last Name, First Name, M.I.  YOB: 1955  CapriRenetta  JEANNE                        Provider's Name  [facility] Medical Record No.  [id number]                             Onset Date:  04/03/23   Start of Care Date:  04/04/23   Type:     _X_PT   ___OT   ___SLP Medical Diagnosis:  Dyspnea              PT Diagnosis:  Impaired functional mobility Visits from SOC:  1     See note for plan of treatment, functional goals and certification details    I CERTIFY THE NEED FOR THESE SERVICES FURNISHED UNDER        THIS PLAN OF TREATMENT AND WHILE UNDER MY CARE     (Physician co-signature of this document indicates review and certification of the therapy plan).

## 2023-04-04 NOTE — PROVIDER NOTIFICATION
04/03/23 2040   RCAT Assessment   Reason for Assessment COPD   Pulmonary Status 3  (Chronic)   Surgical Status 0   Chest X-ray 1  (Mild hyperinflation)   Respiratory Pattern 2   Mental Status 0   Breath Sounds 4  (Coarse)   Cough Effectiveness 1   Level of Activity 0   O2 Required for SpO2>=92% 0   Acuity Level (points) 11   Acuity Level  3   Re-eval Interval Guideline Every 3 days   Re-evaluation Date 04/06/23     Patient currently ordered on Q6 Atrovent/Xopenex. Per RCAT protocol based on score, nebs will be changed to QID with flutter valve for bronchopulmonary hygiene.     Doris Miranda, RT on 4/3/2023 at 9:59 PM

## 2023-04-04 NOTE — PROGRESS NOTES
RESPIRATORY CARE NOTE     Patient Name: Renetta Farooq  Today's Date: 4/4/2023       Pt continues to receive Xopenex and Atrovent nebs QID with flutter. BS are clear and diminished. Pt is on RAof o, SpO2 is 92-94 %. Post treatment there is no change. Pt  perceives improvement at times.  RT encouraged deep breathing and coughing techniques .  RT will continue to monitor and assess.   Linda Reddy, RT

## 2023-04-04 NOTE — PROGRESS NOTES
Care Management Follow Up    Length of Stay (days): 0    Expected Discharge Date: 04/05/2023     Concerns to be Addressed: no discharge needs identified, denies needs/concerns at this time (Pt's only requests are for medications. Informed the bedside RN would be notified.)     Patient plan of care discussed at interdisciplinary rounds: Yes    Anticipated Discharge Disposition: TCU     Additional Information:  CM met with the Pt after PT saw and requested TCU. Pt says they are open to going to any TCU that would best met the pt's needs. Referrals sent as requested. Pt requests and states understanding about cost for transport out of pocket it needed.     [facility] spoke with [facility] and learned that they open beds and will review the Pt as needed for placement.       DORA Cuevas

## 2023-04-04 NOTE — PROGRESS NOTES
[facility]    Medicine Progress Note - Hospitalist Service    Date of Admission:  4/3/2023    Assessment & Plan      Renetta Farooq is a 67 year old female with history of hypertension, COPD, PTSD, bipolar disorder, opioid dependence/substance abuse on Suboxone, methamphetamine abuse, alcohol dependence, tobacco abuse, cognitive impairment, chronic right arm and leg weakness, recently discharged from outside hospital on 3/12/2023 with complaints of bradycardia, hypotension secondary to clonidine abuse.  She currently resides at an extended living hotel.  Admitted on 4/3/2023 for complaint of shortness of breath.  Patient was provided droperidol in the emergency room and had an apparent drug reaction with tongue and oral mucosal angioedema.  Resolved after methylprednisolone administration.  Overnight patient with significant urinary retention requiring straight catheterization.     Mild chronic obstructive pulmonary disease exacerbation  Bronchitis  DuoNeb every 4 hours  Discontinue methyprednisolone.   Start prednisone 40 mg daily.  Continue levofloxacin 500 mg IV every 24 hours  Continuous pulse oximetry  Not requiring oxygen at this time.   Home oxygen assessment.   Pulmonology consultation.    Drug reaction  Angioedema  Resolved.   Diphenhydramine 50 mg IV once as needed  Famotidine 20 mg IV once as needed.  Epinephrine 0.3 mg IM every 5 minutes as needed for hypersensitivity reaction.  Albuterol as needed as above.  Resume regular diet.     Urinary retention  Continue bladder protocol.   UA negative.   Start flomax 0.4 mg daily.  Encourage ambulation.  Consider Burroughs catheter placement and outpatient Urology voiding trial.     Hypokalemia  Hypomagnesemia  Mild hyponatremia likely secondary to dehydration.  BMP in process.  Magnesium potassium replacement protocols.    Chronic low back pain  Status post fusion C3-4, L4-5 and 6 and cervical spine.  Opioid use disorder  X-ray reveals  possible loosening of pedicle screws at T2.  Appears in  2022 appears stable.   Currently on Suboxone we will resume.  Acute pain service consultation.    Social issues.   Patient currently resides at extended stay hot.  Care management/social work consultation for assistance with placement.     Cognitive impairment  PTSD  Bipolar 2 with chronic passive SI.  Anxiety not otherwise specified  PTA medication: Fluoxetine 40 mg daily  Gabapentin 300 mg at bedtime  Quetiapine 3 mg at bedtime 100 mg in the morning  Ativan 0.5 mg every 6 hours as needed for anxiety    Hypertension  Patient is currently normotensive    Tobacco abuse  Nicotine 14 mg transdermal daily       Diet: NPO for Medical/Clinical Reasons Except for: Ice Chips    DVT Prophylaxis: Pneumatic Compression Devices  Burroughs Catheter: Not present  Lines: None     Cardiac Monitoring: None  Code Status: Full Code      Clinically Significant Risk Factors Present on Admission        # Hypokalemia: Lowest K = 3 mmol/L in last 2 days, will replace as needed     # Hypomagnesemia: Lowest Mg = 1.6 mg/dL in last 2 days, will replace as needed                    Disposition Plan      Expected Discharge Date: 04/04/2023      Destination: home with family (Extended-stay hotel in [place].)            Ray Corona DO  Hospitalist Service  [facility]  Securely message with [employer] (more info)  Text page via [employer] Paging/Directory   ______________________________________________________________________    Interval History   Patient with urine retention requiring straight catheterization for 800mL. She reports mild shortness of breath.  Denies chest pain, nausea, or vomiting.  No stridor, no dysphagia.     Physical Exam   Vital Signs: Temp: 97.4  F (36.3  C) Temp src: Axillary BP: 93/55 Pulse: 63   Resp: 14 SpO2: 92 % O2 Device: None (Room air)    Weight: 135 lbs 3.2 oz    General Appearance: No apparent distress  Respiratory: Regular rate and  rhythm no murmur rub or gallop.  Cardiovascular: Clear to auscultation bilaterally no wheezes rales or rhonchi  GI: Soft nontender to palpation normoactive bowel sounds all 4 quadrants  Skin: No rashes wounds or abrasions on exposed skin.  Other:  awake alert oriented x3.    Medical Decision Making   38 MINUTES SPENT BY ME on the date of service doing chart review, history, exam, documentation & further activities per the note.      Data     I have personally reviewed the following data over the past 24 hrs:    12.1 (H)  \   13.4   / 208     133 (L) 98 17 /  88   3.6 22 1.02 \       Imaging results reviewed over the past 24 hrs:   Recent Results (from the past 24 hour(s))   XR Chest 2 Views    Narrative    EXAM: XR CHEST 2 VIEWS  LOCATION: [facility]  DATE/TIME: 4/3/2023 9:13 AM    INDICATION: Dyspnea.  COMPARISON: Chest x-rays dated 03/30/2023 and CT chest dated 03/30/2023.    FINDINGS: Lungs are persistently hyperaerated but are otherwise grossly clear without infiltrate, nodule, mass, effusion, pneumothorax, or acute osseous fracture. Heart size is grossly within normal limits. Cervical spine fusion hardware is partially   imaged and there is some lucency in the region of the pedicle screws at approximately T2 indicating possible loosening.      Impression    IMPRESSION:   1. Mild hyperaeration.  2. No other evidence of acute cardiopulmonary disease is identified on this study. No significant change since the prior study dated 03/30/2023.  3. Questionable loosening of the pedicle screws at T2 (portion of the posterior fusion hardware for the lower cervical and upper thoracic spine). Recommend clinical correlation.

## 2023-04-04 NOTE — PROGRESS NOTES
Occupational Therapy Discharge Summary    Reason for therapy discharge:  Pt seen for OT eval only.  She has no skilled OT needs.

## 2023-04-04 NOTE — ED NOTES
Patient has called out many times for some meds to help her breathing and anxiety. Writer notified the RN. Patient has been up to the bathroom independently twice since 1900.

## 2023-04-04 NOTE — PROGRESS NOTES
04/04/23 1430   Appointment Info   Signing Clinician's Name / Credentials (OT) Kajal Lopez, OTR/L   Living Environment   People in Home child(jimmy), adult   Living Environment Comments Pt and her son are currently homeless, living in an extended stay motel.  Pt has plans to go to an assisted living apt.  Is working with her  on that per pts report.  Pt has a PCa for 3 hours a week to do laundry and hair   Self-Care   Usual Activity Tolerance good   Current Activity Tolerance good   Activity/Exercise/Self-Care Comment pt was ind with adls prior to hospitalization   Instrumental Activities of Daily Living (IADL)   IADL Comments son does all.  Pt said they don'thave much money so go to the food shelf.  they don't have a car.   General Information   Onset of Illness/Injury or Date of Surgery 04/03/23   Referring Physician Lucila Corona   Patient/Family Therapy Goal Statement (OT) I want to go to an assisted living.  I've almost got my section 8 housing back.   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 thinking and her right arm and leg, [place], chronic Hep c, opioid dependent, PTSD   Existing Precautions/Restrictions fall   Cognitive Status Examination   Cognitive Status Comments pt is at baseline with her cognition   Strength Comprehensive (MMT)   General Manual Muscle Testing (MMT) Assessment no strength deficits identified   Muscle Tone Assessment   Muscle Tone Quick Adds No deficits were identified   Coordination   Coordination Comments pt able to complete her adls, brush her long hair, etc   Bed Mobility   Bed Mobility No deficits identified   Transfers   Transfers No deficits identified   Balance   Balance Comments pt walked to bathroom and around her room without a device independently   Activities of Daily Living   BADL Assessment/Intervention lower body  dressing;grooming;toileting   Lower Body Dressing Assessment/Training   [place] Level (Lower Body Dressing) modified independence   Grooming Assessment/Training   [place] Level (Grooming) modified independence   Toileting   [place] Level (Toileting) modified independence   Clinical Impression   Criteria for Skilled Therapeutic Interventions Met (OT) Evaluation only;Current level of function same as previous level of function   OT Total Evaluation Time   OT Eval, Low Complexity Minutes (69356) 15   OT Discharge Planning   OT Discharge Recommendation (DC Rec) (S)  home with assist  (as before)   OT Rationale for DC Rec pt has no skilled OT needs   OT Brief overview of current status Pt is ind with adls and mobility in bed/bathrooms.  Cognition is at baseline   Total Session Time   Total Session Time (sum of timed and untimed services) 15

## 2023-04-04 NOTE — CONSULTS
Care Management Initial Consult    General Information  Assessment completed with: Patient, VM-chart review,    Type of CM/SW Visit: Initial Assessment  Primary Care Provider verified and updated as needed: Yes   Readmission within the last 30 days: previous discharge plan unsuccessful   Return Category: Exacerbation of disease  Reason for Consult: discharge planning, health care directive, transportation  Advance Care Planning: Advance Care Planning Reviewed: no concerns identified        Communication Assessment  Patient's communication style: spoken language (English or Bilingual)        Cognitive  Cognitive/Neuro/Behavioral: WDL                      Living Environment:   People in home: child(jimmy), adult (Staying with son.)     Current living Arrangements: hotel/motel      Able to return to prior arrangements: yes     Family/Social Support:  Care provided by: self, child(jimmy) (Son/PCA care as needed.)  Provides care for: no one, unable/limited ability to care for self  Marital Status:   Children, Other (specify) (Friends)          Description of Support System: Involved, Supportive (As needed/able)    Support Assessment: Adequate family and caregiver support    Current Resources:   Patient receiving home care services: No  Community Resources: PCA, County Worker (Per documentation from 4/1/23)  Equipment currently used at home:    Supplies currently used at home: None    Employment/Financial:  Employment Status: retired     Financial Concerns: No concerns identified   Referral to Financial Worker: No     Lifestyle & Psychosocial Needs:  Social Determinants of Health     Tobacco Use: High Risk (3/31/2023)    Patient History      Smoking Tobacco Use: Every Day      Smokeless Tobacco Use: Never      Passive Exposure: Not on file   Alcohol Use: Not on file   Financial Resource Strain: Not on file   Food Insecurity: Not on file   Transportation Needs: Not on file   Physical Activity: Not on file   Stress: Not  "on file   Social Connections: Not on file   Intimate Partner Violence: Not on file   Depression: Not at risk (7/30/2021)    PHQ-2      PHQ-2 Score: 0   Housing Stability: Not on file     Functional Status:  Prior to admission patient needed assistance:   Dependent ADLs:: Ambulation-cane, Ambulation-walker, Grooming (Per previous documentation)  Dependent IADLs:: Transportation (Not fully determined. Son assists as needed.)  Assesssment of Functional Status: At functional baseline    Mental Health Status:  Mental Health Status: No Current Concerns       Chemical Dependency Status:  Chemical Dependency Status: No Current Concerns           Values/Beliefs:  Spiritual, Cultural Beliefs, Taoism Practices, Values that affect care: no (None identified)             Additional Information:  Writer met with pt and reviewed the chart to provide SHANNON info, reintroduce Care Management(CM), and obtain an assessment. Pt is currently staying at an extended-stay hotel with her son. Pt was not willing to answer many questions and continuously requested clonidine. Per documentation, pt has 3hrs/day of PCA care through an undisclosed provider. Pt stated no concerns with eventual discharge other than about receiving medications. Pt was directed to discuss medications with her RNs and Hospitalists.     4/3 per , J-\"67 year old female with history of hypertension, COPD, PTSD, bipolar disorder, opioid dependence/substance abuse on Suboxone, methamphetamine abuse, alcohol dependence, tobacco abuse, cognitive impairment, chronic right arm and leg weakness, recently discharged from outside hospital on 3/12/2023 with complaints of bradycardia, hypotension secondary to clonidine abuse.  She currently resides at an extended living hotel.  EMS was contacted by patient's son.  Patient was prescribed nebulized beta agonists after her last hospitalization.  Unfortunately patient did not have a nebulizer machine and developed " "significant shortness of breath.  Patient also with symptoms of productive cough and wheezing.  Patient was given DuoNeb on route.  Oxygen saturation was normal between 97 and 98%.  Patient reported she had worsening shortness of breath which prompted her evaluation in the emergency department.  She reports cough productive of clear sputum.  She has increased anxiety and ran out of Ativan.  She is asking for clonidine in the emergency room.     Upon evaluation in the emergency department patient's vital signs were normal.  She was satting between 94 and 100% on room air.  Laboratory findings remarkable for mild hyponatremia with sodium of 133.  Potassium was low at 3.0 magnesium was low at 1.6.  CBC was remarkable for white blood cell count 12.1 otherwise unremarkable.  Chest x-ray showed mild hyperaeration.  There was no evidence for acute cardiopulmonary disease.  Incidental finding of possible loosening of pedicle screws at T2.  Patient was complaining of anxiety and was provided droperidol in the emergency room.  Patient developed angioedema of the tongue and mucous membranes which required IV methylprednisolone.  Patient swelling has improved. Patient was admitted for possible upper respiratory infection versus bronchitis with mild COPD exacerbation.\"    PAIN, RT, and PULM Consults pending. Anticipate improvement and return to hot via son at time of discharge. CM to follow for changes in current discharge disposition and assist with service coordination or other needs as indicated.    Osmany Aguilar RN        "

## 2023-04-04 NOTE — CONSULTS
[facility] ACUTE PAIN SERVICE CONSULTATION     Date of Admission:  4/3/2023  Date of Consult (When I saw the patient): 04/04/23  Physician requesting consult: Dr. Corona   Reason for consult: chronic opioid use disorder  Primary Care Physician: Jamila Connelly     Assessment/Plan:     Renetta Farooq is a 67 year old female who was admitted on 4/3/2023.  Pain team was asked to see the patient for chronic opioid use disorder. Admitted for SOB. History of hypertension, COPD, PTSD, bipolar disorder, opioid dependence/substance abuse on Suboxone, methamphetamine abuse, alcohol dependence, tobacco abuse, cognitive impairment, chronic right arm and leg weakness. She was recently discharged from outside hospital on 3/12/2023 with complaints of bradycardia, hypotension secondary to clonidine abuse.  Describes pain as 3/10 and aching, tight, cramps in BLEs. The patient does  smoke and does have  chemical dependency history.     At time of visit, patient requests suboxone be changed to methadone. She requests clonidine be resumed and she would like an increase in lorazepam.  Do not recommend clonidine given recent abuse. Do not recommend methadone, will keep on suboxone. She will follow up with prescriber. Would use caution with concurrent use of benzodiazepines and suboxone.     PLAN:   1) Pain is consistent with chronic pain. Labs and imaging indicated: I have personally reviewed pertinent notes, labs, tests, and radiologic imaging in patient's chart. Treatment plan includes multimodal pain approach, Hospital Medicine Service for medical management. Patient educated regarding medications as listed below, cuation with use of benzodiazepines and suboxone. Patient is understanding of the plan. All questions and concerns addressed to patient's satisfaction.   2)Multimodal Medication Therapy  Adjuvants: Gabapentin 300 mg at bedtime, magnesium oxide   Antidepressants/anxiolytics: Prozac, Seroquel, prn hydroxyzine , prn  lorazepam   Opioids:  suboxone bid   IV Pain medication: none   3)Non-medication interventions: rest, ice vs heat, pt, ot   4)Constipation Prophylaxis: Scheduled and prn    -Opioid prescriber has been Jemima Serrano  -[place]  pulled from system on 4/3. This indicates   3/28/23 suboxone 4-1 mg film #14 for 7 days   3/3 Suboxone 4-1mg 28 for 14 d  2/13 gabapentin 300 mg #90 for 30 d  Increase in suboxone 1/10/23 (previously 2-0.5mg BID)  Discharge Recommendations - We recommend prescribing the following at the time of discharge: home Suboxone.     History of Present Illness (HPI):      Renetta Farooq is a 67 year old female who presented for shortness of breath.  Past medical history as above. The pain is reported to be chronic, located in the BLEs, and it does not radiate.  Current pain is rated at 3/10 and goal is 0/10.      Per [place]  review, the patient does have an opioid tolerance. Opioid induced side effects noted and include: disease of addiction.      Reviewed medical record, labs, imaging, ED note, and care everywhere.     Medical History   PAST MEDICAL HISTORY:   Past Medical History:   Diagnosis Date     Acute psychosis (H) 10/14/2018     Alcohol dependence (H) 4/10/2015     Bipolar affective disorder, manic, severe, with psychotic behavior (H) 10/15/2018     Candida infection 10/19/2018     Chronic hepatitis C (H) 4/10/2015     Chronic neck pain      Closed traumatic brain injury, without loss of consciousness, sequela (H)      Dental abscess      Episodic tension-type headache, not intractable      Methamphetamine dependence (H) 10/15/2018     Pancytopenia (H) 4/10/2015     Weakness of right arm 4/10/2015       PAST SURGICAL HISTORY:   Past Surgical History:   Procedure Laterality Date     ARTHROSCOPY SHOULDER ROTATOR CUFF REPAIR Bilateral      CERVICAL FUSION      twice     COSMETIC SURGERY       RHINOPLASTY       TONSILLECTOMY         FAMILY HISTORY:   Family History   Problem Relation Age of Onset      "Narcolepsy Mother      Acute myelogenous leukemia Mother      Cancer Father        SOCIAL HISTORY:   Social History     Tobacco Use     Smoking status: Every Day     Packs/day: 1.00     Years: 54.00     Pack years: 54.00     Types: Cigarettes     Start date: 1969     Smokeless tobacco: Never     Tobacco comments:     Seen IP by CTTS on 3/31/2023   Vaping Use     Vaping status: Not on file   Substance Use Topics     Alcohol use: Yes     Comment: Alcoholic Drinks/day: \"A lot.\"  (Couldn't quantify except to repeat \"A lot\" of wine, liquor and beer)        HEALTH & LIFESTYLE PRACTICES  Tobacco:  reports that she has been smoking cigarettes. She started smoking about 55 years ago. She has a 54.00 pack-year smoking history. She has never used smokeless tobacco.  Alcohol:  reports current alcohol use.  Illicit drugs:  reports no history of drug use.    Allergies  Allergies   Allergen Reactions     Amoxicillin Diarrhea     Droperidol Angioedema       Problem List  Patient Active Problem List    Diagnosis Date Noted     Hypokalemia 04/03/2023     Priority: Medium     Hypomagnesemia 04/03/2023     Priority: Medium     Dyspnea, unspecified type 04/03/2023     Priority: Medium     COPD exacerbation (H) 03/30/2023     Priority: Medium     Closed traumatic brain injury, without loss of consciousness, sequela (H)      Priority: Medium     Dental abscess      Priority: Medium     Episodic tension-type headache, not intractable      Priority: Medium     Alcohol use disorder, severe, dependence (H) 10/31/2018     Priority: Medium     Opioid use disorder, severe, in sustained remission, on maintenance therapy (H)      Priority: Medium     Chronic neck pain      Priority: Medium     Candida infection 10/19/2018     Priority: Medium     Bipolar affective disorder, manic, severe, with psychotic behavior (H) 10/15/2018     Priority: Medium     Methamphetamine dependence (H) 10/15/2018     Priority: Medium     Acute psychosis (H) 10/14/2018 "     Priority: Medium     Alcohol withdrawal (H) 04/11/2015     Priority: Medium     Pancytopenia (H) 04/10/2015     Priority: Medium     Weakness of right arm 04/10/2015     Priority: Medium     Chronic hepatitis C (H) 04/10/2015     Priority: Medium     Alcohol dependence (H) 04/10/2015     Priority: Medium       Prior to Admission Medications   Medications Prior to Admission   Medication Sig Dispense Refill Last Dose     albuterol (PROAIR HFA;PROVENTIL HFA;VENTOLIN HFA) 90 mcg/actuation inhaler [ALBUTEROL (PROAIR HFA;PROVENTIL HFA;VENTOLIN HFA) 90 MCG/ACTUATION INHALER] Inhale 2 puffs every 6 (six) hours as needed for wheezing. 1 each 0 Unknown at prn     budesonide-formoterol (SYMBICORT) 160-4.5 MCG/ACT Inhaler Inhale 2 puffs into the lungs 2 times daily   4/2/2023 at pm     buprenorphine HCl-naloxone HCl (SUBOXONE) 4-1 MG per film Place 1 Film under the tongue 2 times daily   4/2/2023 at pm     cholecalciferol, vitamin D3, 1,000 unit tablet [CHOLECALCIFEROL, VITAMIN D3, 1,000 UNIT TABLET] Take 1 tablet (1,000 Units total) by mouth daily. 30 tablet 0 4/2/2023 at am     docusate sodium (COLACE) 100 MG capsule [DOCUSATE SODIUM (COLACE) 100 MG CAPSULE] Take 1 capsule (100 mg total) by mouth 2 (two) times a day. 60 capsule 0 4/2/2023 at pm     docusate sodium (COLACE) 100 MG tablet Take 100 mg by mouth 2 times daily as needed for constipation   4/2/2023 at prn     FLUoxetine (PROZAC) 40 MG capsule Take 40 mg by mouth daily   4/2/2023 at am     gabapentin (NEURONTIN) 300 MG capsule Take 1 capsule (300 mg) by mouth At Bedtime   4/2/2023 at pm     ipratropium - albuterol 0.5 mg/2.5 mg/3 mL (DUONEB) 0.5-2.5 (3) MG/3ML neb solution Take 1 vial (3 mLs) by nebulization 3 times daily For 1 week then every 6 hours as needed 120 mL 1 4/2/2023 at pm     levofloxacin (LEVAQUIN) 750 MG tablet Take 1 tablet (750 mg) by mouth every 24 hours 3 tablet 0 4/2/2023 at am     magnesium oxide (MAG-OX) 400 MG tablet Take 1 tablet (400 mg)  "by mouth daily 30 tablet 0 4/2/2023 at am     nicotine (NICODERM CQ) 14 MG/24HR 24 hr patch Place 1 patch onto the skin daily 14 patch 1 4/2/2023 at am     NYSTOP 628996 UNIT/GM powder Apply topically 3 times daily as needed   4/2/2023 at prn     omeprazole (PRILOSEC) 20 MG DR capsule Take 20 mg by mouth At Bedtime   4/2/2023 at pm     predniSONE (DELTASONE) 20 MG tablet Take 2 tablets (40 mg) by mouth daily for 4 days 8 tablet 0 4/2/2023 at am     QUEtiapine (SEROQUEL) 100 MG tablet Take 100 mg by mouth every morning   4/2/2023 at am     QUEtiapine (SEROQUEL) 100 MG tablet [QUETIAPINE (SEROQUEL) 100 MG TABLET] Take 3 tablets (300 mg total) by mouth at bedtime. 90 tablet 0 4/2/2023 at pm       Review of Systems  Complete ROS reviewed, unless noted in HPI, all other systems reviewed (with patient) and all others found to be negative.      Objective:     Physical Exam:  BP 93/56 (BP Location: Left arm)   Pulse 66   Temp 97.5  F (36.4  C) (Oral)   Resp 16   Ht 1.651 m (5' 5\")   Wt 61.3 kg (135 lb 3.2 oz)   SpO2 94%   BMI 22.50 kg/m    Weight:   Vitals:    04/03/23 2117 04/04/23 0101 04/04/23 0521   Weight: 59 kg (130 lb 1.1 oz) 60.2 kg (132 lb 11.5 oz) 61.3 kg (135 lb 3.2 oz)      Body mass index is 22.5 kg/m .    General Appearance:  Alert, cooperative, no distress   Head:  Normocephalic, without obvious abnormality, atraumatic   Eyes:  PERRL, conjunctiva/corneas clear, EOM's intact   ENT/Throat: Lips, mucosa, and tongue normal; teeth and gums normal   Lymph/Neck: Supple, symmetrical, trachea midline   Lungs:   Clear but diminished to auscultation bilaterally, respirations unlabored   Cardiovascular/Heart:  Regular rate and rhythm, S1, S2 normal,no murmur, rub or gallop.    Abdomen:   Soft, non-tender, bowel sounds active all four quadrants,  no masses, no organomegaly   Musculoskeletal: Extremities normal, atraumatic   Skin: Skin color, texture, turgor normal   Neurologic: Alert and oriented X 3, Moves all 4 " extremities     Psych: Affect is anxious     Imaging: Reviewed I have personally reviewed pertinent labs, tests, and radiologic imaging in patient's chart.  XR Chest 2 Views    Result Date: 4/3/2023  EXAM: XR CHEST 2 VIEWS LOCATION: [facility] DATE/TIME: 4/3/2023 9:13 AM INDICATION: Dyspnea. COMPARISON: Chest x-rays dated 03/30/2023 and CT chest dated 03/30/2023. FINDINGS: Lungs are persistently hyperaerated but are otherwise grossly clear without infiltrate, nodule, mass, effusion, pneumothorax, or acute osseous fracture. Heart size is grossly within normal limits. Cervical spine fusion hardware is partially imaged and there is some lucency in the region of the pedicle screws at approximately T2 indicating possible loosening.     IMPRESSION: 1. Mild hyperaeration. 2. No other evidence of acute cardiopulmonary disease is identified on this study. No significant change since the prior study dated 03/30/2023. 3. Questionable loosening of the pedicle screws at T2 (portion of the posterior fusion hardware for the lower cervical and upper thoracic spine). Recommend clinical correlation.    CT Abdomen Pelvis w Contrast    Result Date: 3/30/2023  EXAM: CT ABDOMEN PELVIS W CONTRAST LOCATION: [facility] DATE/TIME: 3/30/2023 3:18 PM INDICATION: abdominal pain, elevated lactic acid, eval for pathology COMPARISON: 03/01/2023 TECHNIQUE: CT scan of the abdomen and pelvis was performed following injection of IV contrast. Multiplanar reformats were obtained. Dose reduction techniques were used. CONTRAST: Isovue 370 75mL FINDINGS: LOWER CHEST: Bibasilar bronchial wall thickening and filling defects compatible with bronchitis. HEPATOBILIARY: Cholelithiasis without CT evidence of cholecystitis. Liver is mildly enlarged extending into the pelvis, unchanged. PANCREAS: Small calcification in the pancreatic head which may be related to prior pancreatitis. No evidence of acute  pancreatitis. No ductal dilatation. SPLEEN: Normal size ADRENAL GLANDS: Unremarkable KIDNEYS/BLADDER: Small probable renal cysts. No hydronephrosis. Bladder is normal in contour. BOWEL: No bowel obstruction. No pneumatosis. LYMPH NODES: No significant retroperitoneal adenopathy VASCULATURE: Moderate atherosclerotic calcification. Patent portal vein. PELVIC ORGANS: No free fluid MUSCULOSKELETAL: No acute bony abnormalities. Lumbar scoliosis, apex to the left. Multilevel spondylosis.     IMPRESSION: 1.  Cholelithiasis without cholecystitis. 2.  No evidence of bowel obstruction or pneumatosis.    CT Chest Pulmonary Embolism w Contrast    Result Date: 3/30/2023  EXAM: CT CHEST PULMONARY EMBOLISM W CONTRAST LOCATION: [facility] DATE/TIME: 3/30/2023 3:18 PM INDICATION: cough, shortness of breath, productive cough, xray unrevealing, eval for COMPARISON: Chest x-ray dated 03/30/2023, 01/22/2023 chest CT TECHNIQUE: CT chest pulmonary angiogram during arterial phase injection of IV contrast. Multiplanar reformats and MIP reconstructions were performed. Dose reduction techniques were used. CONTRAST: Isovue 370 75mL FINDINGS: ANGIOGRAM CHEST: Pulmonary arteries are normal caliber and negative for pulmonary emboli. Thoracic aorta is negative for dissection. No CT evidence of right heart strain. LUNGS AND PLEURA: Moderate changes of centrilobular emphysema. Worsening bilateral bronchial wall thickening, greatest in both lower lobes. Scattered bronchial filling defects compatible with retained secretions noted, primarily in the left lower lobe. No pneumothorax. No pleural effusions. 2 mm left lower lobe pulmonary nodule, image 189 series 6, unchanged. MEDIASTINUM/AXILLAE: No significant mediastinal or hilar adenopathy. Small hiatal hernia. CORONARY ARTERY CALCIFICATION: None. UPPER ABDOMEN: No acute abnormalities. MUSCULOSKELETAL: Old left rib fractures. Postoperative changes in the cervical spine.      IMPRESSION: 1.  No evidence of pulmonary embolus. 2.  Worsening bronchial wall thickening, with associated endobronchial retained secretions compatible with bronchitis. 3.  Additional chronic findings as above.    Chest XR,  PA & LAT    Result Date: 3/30/2023  EXAM: XR CHEST 2 VIEWS LOCATION: [facility] DATE/TIME: 3/30/2023 12:45 PM INDICATION: shortness of breath, cough, eval for pneumonia COMPARISON: 01/22/2023     IMPRESSION: Heart size is normal. No CHF, lobar consolidation, or pleural effusions. Minimal basilar scarring. Mediastinum and bony structures are stable in appearance.    Chest XR,  PA & LAT    Result Date: 3/5/2023  EXAM: XR CHEST 2 VIEWS LOCATION: [facility] DATE/TIME: 3/5/2023 6:51 PM INDICATION: SOB. COMPARISON: 3/1/2023.     IMPRESSION: Lungs are hyperexpanded, compatible with COPD. No acute airspace opacities. There is a minimal amount of patchy opacity involving the left lateral costophrenic sulcus which could reflect a small area of infectious process. Right lung is clear. No pleural effusion or pneumothorax. Spinal fusion hardware of the cervical spine is partially seen.      Labs: Reviewed I have personally reviewed pertinent labs, tests, and radiologic imaging in patient's chart.  Recent Results (from the past 24 hour(s))   Potassium    Collection Time: 04/03/23  3:43 PM   Result Value Ref Range    Potassium 3.0 (L) 3.5 - 5.0 mmol/L   Magnesium    Collection Time: 04/03/23  3:43 PM   Result Value Ref Range    Magnesium 1.9 1.8 - 2.6 mg/dL   UA with Microscopic reflex to Culture    Collection Time: 04/03/23  5:09 PM    Specimen: Urine, Clean Catch   Result Value Ref Range    Color Urine Colorless Colorless, Straw, Light Yellow, Yellow    Appearance Urine Clear Clear    Glucose Urine Negative Negative mg/dL    Bilirubin Urine Negative Negative    Ketones Urine Negative Negative mg/dL    Specific Gravity Urine 1.005 1.001 - 1.030    Blood  Urine Negative Negative    pH Urine 6.5 5.0 - 7.0    Protein Albumin Urine Negative Negative mg/dL    Urobilinogen Urine <2.0 <2.0 mg/dL    Nitrite Urine Negative Negative    Leukocyte Esterase Urine Negative Negative    RBC Urine 1 <=2 /HPF    WBC Urine <1 <=5 /HPF   Potassium    Collection Time: 04/03/23 10:59 PM   Result Value Ref Range    Potassium 3.6 3.5 - 5.0 mmol/L   Basic metabolic panel    Collection Time: 04/04/23  9:07 AM   Result Value Ref Range    Sodium 133 (L) 136 - 145 mmol/L    Potassium 4.0 3.5 - 5.0 mmol/L    Chloride 98 98 - 107 mmol/L    Carbon Dioxide (CO2) 26 22 - 31 mmol/L    Anion Gap 9 5 - 18 mmol/L    Urea Nitrogen 17 8 - 22 mg/dL    Creatinine 0.80 0.60 - 1.10 mg/dL    Calcium 9.0 8.5 - 10.5 mg/dL    Glucose 135 (H) 70 - 125 mg/dL    GFR Estimate 80 >60 mL/min/1.73m2   CBC with platelets    Collection Time: 04/04/23  9:07 AM   Result Value Ref Range    WBC Count 4.5 4.0 - 11.0 10e3/uL    RBC Count 3.70 (L) 3.80 - 5.20 10e6/uL    Hemoglobin 11.6 (L) 11.7 - 15.7 g/dL    Hematocrit 33.2 (L) 35.0 - 47.0 %    MCV 90 78 - 100 fL    MCH 31.4 26.5 - 33.0 pg    MCHC 34.9 31.5 - 36.5 g/dL    RDW 12.0 10.0 - 15.0 %    Platelet Count 153 150 - 450 10e3/uL   Magnesium    Collection Time: 04/04/23  9:07 AM   Result Value Ref Range    Magnesium 2.2 1.8 - 2.6 mg/dL       Total time spent 65 minutes with greater than 50% in consultation, education and coordination of care.     Also discussed with RN, pharmacist.     Please see A&P for additional details of medical decision making.    Elements of Medical Decision Making as described above. High risk therapy including opioids, high risk drug therapy including oral and/or parenteral controlled substances    Thank you for this consultation.    Argentina SCHULZ, EDWINAP-C  Acute Care Pain Management Program  [facility] ([facility], [place], [place])  Monday-Friday 8a-4p   Page via online paging system or call [phone]

## 2023-04-04 NOTE — PROGRESS NOTES
PRIMARY DIAGNOSIS: Dyspnea  OUTPATIENT/OBSERVATION GOALS TO BE MET BEFORE DISCHARGE:  1. Stable vital signs Yes Soft BP  2. Tolerating diet:NPO except meds & ic chips  3. Pain controlled with oral pain medications:  Denies pain  4. Positive bowel sounds:  Yes  5. Voiding without difficulty:  No Difficulty voiding . Pt tried going to the bathroom 3x. Bladder scanned, 705ml, Straight cath, drained 825 ml  6. Able to ambulate:  Yes, but wobbly  7. Provider specific discharge goals met:  No    Discharge Planner Nurse   Safe discharge environment identified: No  Barriers to discharge: Yes       Entered by: Elodia Ward RN 04/04/2023 6:36 AM

## 2023-04-04 NOTE — PROGRESS NOTES
"PRIMARY DIAGNOSIS: \"GENERIC\" NURSING  OUTPATIENT/OBSERVATION GOALS TO BE MET BEFORE DISCHARGE:  1. ADLs back to baseline: No    2. Activity and level of assistance: Up with standby assistance.    3. Pain status: Pain free.    4. Return to near baseline physical activity: No     Discharge Planner Nurse   Safe discharge environment identified: No  Barriers to discharge: Yes       Entered by: Maria Elena Hayes RN 04/04/2023 2:02 PM     Please review provider order for any additional goals.   Nurse to notify provider when observation goals have been met and patient is ready for discharge.    A&Ox4, much more alert and oriented. Patient voided 200mL and PVR was 67mL. Recheck scan at 1700. Patient had anxiety an was given PRN IV Ativan at 1148 and Atarax at 1315. K and Mg protocol, rechecks tomorrow. Assist of 1 with GB, regular diet, and alarms on for safety. Pending discharge and placement.  "

## 2023-04-05 ENCOUNTER — APPOINTMENT (OUTPATIENT)
Dept: PHYSICAL THERAPY | Facility: CLINIC | Age: 68
End: 2023-04-05
Payer: COMMERCIAL

## 2023-04-05 LAB
ALBUMIN UR-MCNC: NEGATIVE MG/DL
ANION GAP SERPL CALCULATED.3IONS-SCNC: 10 MMOL/L (ref 5–18)
APPEARANCE UR: CLEAR
BILIRUB UR QL STRIP: NEGATIVE
BUN SERPL-MCNC: 19 MG/DL (ref 8–22)
CALCIUM SERPL-MCNC: 8.3 MG/DL (ref 8.5–10.5)
CHLORIDE BLD-SCNC: 95 MMOL/L (ref 98–107)
CO2 SERPL-SCNC: 25 MMOL/L (ref 22–31)
COLOR UR AUTO: NORMAL
CREAT SERPL-MCNC: 0.8 MG/DL (ref 0.6–1.1)
ERYTHROCYTE [DISTWIDTH] IN BLOOD BY AUTOMATED COUNT: 12.2 % (ref 10–15)
GFR SERPL CREATININE-BSD FRML MDRD: 80 ML/MIN/1.73M2
GLUCOSE BLD-MCNC: 103 MG/DL (ref 70–125)
GLUCOSE UR STRIP-MCNC: NEGATIVE MG/DL
HCT VFR BLD AUTO: 34.4 % (ref 35–47)
HGB BLD-MCNC: 11.8 G/DL (ref 11.7–15.7)
HGB UR QL STRIP: NEGATIVE
HOLD SPECIMEN: NORMAL
KETONES UR STRIP-MCNC: NEGATIVE MG/DL
LEUKOCYTE ESTERASE UR QL STRIP: NEGATIVE
MAGNESIUM SERPL-MCNC: 1.8 MG/DL (ref 1.8–2.6)
MCH RBC QN AUTO: 31.6 PG (ref 26.5–33)
MCHC RBC AUTO-ENTMCNC: 34.3 G/DL (ref 31.5–36.5)
MCV RBC AUTO: 92 FL (ref 78–100)
NITRATE UR QL: NEGATIVE
OSMOLALITY SERPL: 281 MMOL/KG (ref 280–301)
OSMOLALITY UR: 224 MMOL/KG (ref 100–1200)
PH UR STRIP: 6.5 [PH] (ref 5–7)
PLATELET # BLD AUTO: 160 10E3/UL (ref 150–450)
POTASSIUM BLD-SCNC: 3.6 MMOL/L (ref 3.5–5)
RBC # BLD AUTO: 3.73 10E6/UL (ref 3.8–5.2)
SODIUM SERPL-SCNC: 130 MMOL/L (ref 136–145)
SODIUM UR-SCNC: <20 MMOL/L
SP GR UR STRIP: 1.01 (ref 1–1.03)
TSH SERPL DL<=0.005 MIU/L-ACNC: 0.33 UIU/ML (ref 0.3–5)
UROBILINOGEN UR STRIP-MCNC: <2 MG/DL
WBC # BLD AUTO: 5.9 10E3/UL (ref 4–11)

## 2023-04-05 PROCEDURE — 97116 GAIT TRAINING THERAPY: CPT | Mod: GP

## 2023-04-05 PROCEDURE — 83735 ASSAY OF MAGNESIUM: CPT | Performed by: INTERNAL MEDICINE

## 2023-04-05 PROCEDURE — 99214 OFFICE O/P EST MOD 30 MIN: CPT | Performed by: NURSE PRACTITIONER

## 2023-04-05 PROCEDURE — 99232 SBSQ HOSP IP/OBS MODERATE 35: CPT | Mod: GC | Performed by: INTERNAL MEDICINE

## 2023-04-05 PROCEDURE — 81003 URINALYSIS AUTO W/O SCOPE: CPT | Performed by: INTERNAL MEDICINE

## 2023-04-05 PROCEDURE — 94640 AIRWAY INHALATION TREATMENT: CPT

## 2023-04-05 PROCEDURE — 84443 ASSAY THYROID STIM HORMONE: CPT | Performed by: INTERNAL MEDICINE

## 2023-04-05 PROCEDURE — 97112 NEUROMUSCULAR REEDUCATION: CPT | Mod: GP

## 2023-04-05 PROCEDURE — 94799 UNLISTED PULMONARY SVC/PX: CPT

## 2023-04-05 PROCEDURE — 250N000012 HC RX MED GY IP 250 OP 636 PS 637: Performed by: INTERNAL MEDICINE

## 2023-04-05 PROCEDURE — 96376 TX/PRO/DX INJ SAME DRUG ADON: CPT

## 2023-04-05 PROCEDURE — G0378 HOSPITAL OBSERVATION PER HR: HCPCS

## 2023-04-05 PROCEDURE — 999N000157 HC STATISTIC RCP TIME EA 10 MIN: Mod: 76

## 2023-04-05 PROCEDURE — 94640 AIRWAY INHALATION TREATMENT: CPT | Mod: 76

## 2023-04-05 PROCEDURE — 83935 ASSAY OF URINE OSMOLALITY: CPT | Performed by: INTERNAL MEDICINE

## 2023-04-05 PROCEDURE — 83930 ASSAY OF BLOOD OSMOLALITY: CPT | Performed by: INTERNAL MEDICINE

## 2023-04-05 PROCEDURE — 84300 ASSAY OF URINE SODIUM: CPT | Performed by: INTERNAL MEDICINE

## 2023-04-05 PROCEDURE — 250N000011 HC RX IP 250 OP 636: Performed by: INTERNAL MEDICINE

## 2023-04-05 PROCEDURE — 36415 COLL VENOUS BLD VENIPUNCTURE: CPT | Performed by: INTERNAL MEDICINE

## 2023-04-05 PROCEDURE — 85014 HEMATOCRIT: CPT

## 2023-04-05 PROCEDURE — 250N000009 HC RX 250: Performed by: INTERNAL MEDICINE

## 2023-04-05 PROCEDURE — 250N000013 HC RX MED GY IP 250 OP 250 PS 637: Performed by: NURSE PRACTITIONER

## 2023-04-05 PROCEDURE — 36415 COLL VENOUS BLD VENIPUNCTURE: CPT

## 2023-04-05 PROCEDURE — 250N000013 HC RX MED GY IP 250 OP 250 PS 637

## 2023-04-05 PROCEDURE — 250N000009 HC RX 250: Performed by: NURSE PRACTITIONER

## 2023-04-05 PROCEDURE — 82310 ASSAY OF CALCIUM: CPT

## 2023-04-05 PROCEDURE — 250N000013 HC RX MED GY IP 250 OP 250 PS 637: Performed by: INTERNAL MEDICINE

## 2023-04-05 RX ORDER — POTASSIUM CHLORIDE 1500 MG/1
20 TABLET, EXTENDED RELEASE ORAL ONCE
Status: COMPLETED | OUTPATIENT
Start: 2023-04-05 | End: 2023-04-05

## 2023-04-05 RX ORDER — LEVOFLOXACIN 500 MG/1
500 TABLET, FILM COATED ORAL DAILY
Status: DISCONTINUED | OUTPATIENT
Start: 2023-04-05 | End: 2023-04-06 | Stop reason: HOSPADM

## 2023-04-05 RX ORDER — DOCUSATE SODIUM 100 MG/1
100 CAPSULE, LIQUID FILLED ORAL 2 TIMES DAILY
Status: DISCONTINUED | OUTPATIENT
Start: 2023-04-05 | End: 2023-04-06 | Stop reason: HOSPADM

## 2023-04-05 RX ORDER — MAGNESIUM OXIDE 400 MG/1
400 TABLET ORAL EVERY 4 HOURS
Status: COMPLETED | OUTPATIENT
Start: 2023-04-05 | End: 2023-04-05

## 2023-04-05 RX ORDER — LIDOCAINE 50 MG/G
OINTMENT TOPICAL 4 TIMES DAILY
Status: DISCONTINUED | OUTPATIENT
Start: 2023-04-05 | End: 2023-04-06 | Stop reason: HOSPADM

## 2023-04-05 RX ORDER — LEVALBUTEROL INHALATION SOLUTION 1.25 MG/3ML
1.25 SOLUTION RESPIRATORY (INHALATION)
Status: DISCONTINUED | OUTPATIENT
Start: 2023-04-05 | End: 2023-04-06

## 2023-04-05 RX ORDER — LEVALBUTEROL INHALATION SOLUTION 1.25 MG/3ML
1.25 SOLUTION RESPIRATORY (INHALATION) 4 TIMES DAILY
Status: DISCONTINUED | OUTPATIENT
Start: 2023-04-05 | End: 2023-04-05

## 2023-04-05 RX ORDER — DOCUSATE SODIUM 100 MG/1
100 CAPSULE, LIQUID FILLED ORAL 2 TIMES DAILY PRN
Status: DISCONTINUED | OUTPATIENT
Start: 2023-04-05 | End: 2023-04-06 | Stop reason: HOSPADM

## 2023-04-05 RX ADMIN — LORAZEPAM 0.5 MG: 2 INJECTION INTRAMUSCULAR; INTRAVENOUS at 00:46

## 2023-04-05 RX ADMIN — DICLOFENAC SODIUM 2 G: 10 GEL TOPICAL at 12:15

## 2023-04-05 RX ADMIN — LEVALBUTEROL HYDROCHLORIDE 1.25 MG: 1.25 SOLUTION RESPIRATORY (INHALATION) at 20:15

## 2023-04-05 RX ADMIN — POTASSIUM CHLORIDE 20 MEQ: 1500 TABLET, EXTENDED RELEASE ORAL at 11:43

## 2023-04-05 RX ADMIN — QUETIAPINE FUMARATE 300 MG: 300 TABLET ORAL at 20:29

## 2023-04-05 RX ADMIN — Medication 400 MG: at 11:43

## 2023-04-05 RX ADMIN — LEVALBUTEROL HYDROCHLORIDE 1.25 MG: 1.25 SOLUTION RESPIRATORY (INHALATION) at 11:27

## 2023-04-05 RX ADMIN — GABAPENTIN 300 MG: 300 CAPSULE ORAL at 20:30

## 2023-04-05 RX ADMIN — HYDROXYZINE HYDROCHLORIDE 25 MG: 25 TABLET ORAL at 05:36

## 2023-04-05 RX ADMIN — LEVOFLOXACIN 500 MG: 500 TABLET, FILM COATED ORAL at 14:46

## 2023-04-05 RX ADMIN — NICOTINE 1 PATCH: 14 PATCH, EXTENDED RELEASE TRANSDERMAL at 08:13

## 2023-04-05 RX ADMIN — LIDOCAINE: 50 OINTMENT TOPICAL at 16:27

## 2023-04-05 RX ADMIN — DOCUSATE SODIUM 100 MG: 100 CAPSULE, LIQUID FILLED ORAL at 20:30

## 2023-04-05 RX ADMIN — FLUOXETINE 40 MG: 20 CAPSULE ORAL at 08:13

## 2023-04-05 RX ADMIN — QUETIAPINE 100 MG: 100 TABLET ORAL at 08:13

## 2023-04-05 RX ADMIN — LIDOCAINE: 50 OINTMENT TOPICAL at 12:15

## 2023-04-05 RX ADMIN — Medication 400 MG: at 14:46

## 2023-04-05 RX ADMIN — BUPRENORPHINE AND NALOXONE 1 FILM: 4; 1 FILM, SOLUBLE BUCCAL; SUBLINGUAL at 08:15

## 2023-04-05 RX ADMIN — Medication 400 MG: at 08:13

## 2023-04-05 RX ADMIN — LIDOCAINE: 50 OINTMENT TOPICAL at 13:26

## 2023-04-05 RX ADMIN — IPRATROPIUM BROMIDE 0.5 MG: 0.5 SOLUTION RESPIRATORY (INHALATION) at 20:15

## 2023-04-05 RX ADMIN — PREDNISONE 40 MG: 20 TABLET ORAL at 08:13

## 2023-04-05 RX ADMIN — LEVALBUTEROL HYDROCHLORIDE 1.25 MG: 1.25 SOLUTION RESPIRATORY (INHALATION) at 15:50

## 2023-04-05 RX ADMIN — IPRATROPIUM BROMIDE 0.5 MG: 0.5 SOLUTION RESPIRATORY (INHALATION) at 08:19

## 2023-04-05 RX ADMIN — IPRATROPIUM BROMIDE 0.5 MG: 0.5 SOLUTION RESPIRATORY (INHALATION) at 11:28

## 2023-04-05 RX ADMIN — HYDROXYZINE HYDROCHLORIDE 25 MG: 25 TABLET ORAL at 11:50

## 2023-04-05 RX ADMIN — DICLOFENAC SODIUM 2 G: 10 GEL TOPICAL at 18:00

## 2023-04-05 RX ADMIN — SENNOSIDES AND DOCUSATE SODIUM 1 TABLET: 50; 8.6 TABLET ORAL at 08:13

## 2023-04-05 RX ADMIN — LEVALBUTEROL HYDROCHLORIDE 1.25 MG: 1.25 SOLUTION RESPIRATORY (INHALATION) at 08:19

## 2023-04-05 RX ADMIN — LORAZEPAM 0.5 MG: 2 INJECTION INTRAMUSCULAR; INTRAVENOUS at 08:11

## 2023-04-05 RX ADMIN — BUPRENORPHINE AND NALOXONE 1 FILM: 4; 1 FILM, SOLUBLE BUCCAL; SUBLINGUAL at 20:30

## 2023-04-05 RX ADMIN — DOCUSATE SODIUM 100 MG: 100 CAPSULE, LIQUID FILLED ORAL at 11:50

## 2023-04-05 RX ADMIN — SENNOSIDES AND DOCUSATE SODIUM 1 TABLET: 50; 8.6 TABLET ORAL at 20:30

## 2023-04-05 RX ADMIN — TAMSULOSIN HYDROCHLORIDE 0.4 MG: 0.4 CAPSULE ORAL at 08:13

## 2023-04-05 RX ADMIN — LORAZEPAM 0.5 MG: 2 INJECTION INTRAMUSCULAR; INTRAVENOUS at 16:27

## 2023-04-05 RX ADMIN — PANTOPRAZOLE SODIUM 40 MG: 40 TABLET, DELAYED RELEASE ORAL at 20:29

## 2023-04-05 RX ADMIN — IPRATROPIUM BROMIDE 0.5 MG: 0.5 SOLUTION RESPIRATORY (INHALATION) at 15:50

## 2023-04-05 RX ADMIN — DICLOFENAC SODIUM 2 G: 10 GEL TOPICAL at 14:47

## 2023-04-05 RX ADMIN — HYDROXYZINE HYDROCHLORIDE 25 MG: 25 TABLET ORAL at 21:45

## 2023-04-05 ASSESSMENT — ACTIVITIES OF DAILY LIVING (ADL)
ADLS_ACUITY_SCORE: 35

## 2023-04-05 NOTE — PROGRESS NOTES
[facility] ACUTE PAIN SERVICE    Daily PAIN Progress Note    Assessment/Plan:  Renetta Farooq is a 67 year old female who was admitted on 4/3/2023.  Pain team was asked to see the patient for chronic opioid use disorder. Admitted for SOB. History of hypertension, COPD, PTSD, bipolar disorder, opioid dependence/substance abuse on Suboxone, methamphetamine abuse, alcohol dependence, tobacco abuse, cognitive impairment, chronic right arm and leg weakness. She was recently discharged from outside hospital on 3/12/2023 with complaints of bradycardia, hypotension secondary to clonidine abuse. The patient does  smoke and does have  chemical dependency history.       PLAN:   1) Pain is consistent with chronic pain. Labs and imaging indicated: I have personally reviewed pertinent notes, labs, tests, and radiologic imaging in patient's chart. Treatment plan includes multimodal pain approach, Hospital Medicine Service for medical management. Patient educated regarding medications as listed below, cuation with use of benzodiazepines and suboxone. Patient is understanding of the plan. All questions and concerns addressed to patient's satisfaction.   2)Multimodal Medication Therapy  Topicals: add lidocaine ointment alternating with Voltaren   NSAIDs: none oral, on daily prednisone   Adjuvants: Gabapentin 300 mg at bedtime, magnesium oxide   Antidepressants/anxiolytics: Prozac, Seroquel, prn hydroxyzine , prn lorazepam   Opioids:  suboxone bid   IV Pain medication: none   3)Non-medication interventions: rest, ice vs heat, pt, ot   4)Constipation Prophylaxis: Scheduled and prn     -Opioid prescriber has been Jemima DUNN PMP pulled from system on 4/3. This indicates   3/28/23 suboxone 4-1 mg film #14 for 7 days   3/3 Suboxone 4-1mg 28 for 14 d  2/13 gabapentin 300 mg #90 for 30 d  Increase in suboxone 1/10/23 (previously 2-0.5mg BID)  Discharge Recommendations - We recommend prescribing the following at the time of  "discharge: home Suboxone, home Gabapentin. Topicals over the counter if effective. Follow up Primary Care Provider and Suboxone provider.    Pain team will sign off. Discussed with Hospital Medicine Service     Subjective:  She reports ongoing BLE cramping and tightness. She reports chronic back and neck pain. Denies nausea, vomiting, constipation, diarrhea, chest pain, shortness of breath. Currently resting in bed and watching TV.      Principal Problem:    Dyspnea, unspecified type  Active Problems:    Hypokalemia    Hypomagnesemia      Objective:  Vital signs in last 24 hours:  /83 (BP Location: Left arm, Cuff Size: Adult Regular)   Pulse 94   Temp 97.5  F (36.4  C) (Oral)   Resp 18   Ht 1.651 m (5' 5\")   Wt 62.4 kg (137 lb 8 oz)   SpO2 91%   BMI 22.88 kg/m    Weight:   Vitals:    04/03/23 0747 04/03/23 2117 04/04/23 0101 04/04/23 0521   Weight: 59 kg (130 lb) 59 kg (130 lb 1.1 oz) 60.2 kg (132 lb 11.5 oz) 61.3 kg (135 lb 3.2 oz)    04/05/23 0343   Weight: 62.4 kg (137 lb 8 oz)      Weight change: 3.402 kg (7 lb 8 oz)  Body mass index is 22.88 kg/m .    Intake/Output last 3 shifts:  I/O last 3 completed shifts:  In: 1200 [P.O.:1200]  Out: 1750 [Urine:1750]  Intake/Output this shift:  I/O this shift:  In: -   Out: 325 [Urine:325]    Review of Systems:   As per subjective, all others negative.    Physical Exam:  General Appearance:  Alert, cooperative, no distress   Head:  Normocephalic, without obvious abnormality, atraumatic   Eyes:  PERRL, conjunctiva/corneas clear, EOM's intact   Nose: Nares normal, septum midline   Throat: Lips, mucosa, and tongue normal; teeth and gums normal   Neck: Supple, symmetrical, trachea midline   Back:   Symmetric, no curvature, ROM normal   Lungs:   Clear to auscultation bilaterally, respirations unlabored   Heart:  Regular rate and rhythm, S1, S2 normal    Abdomen:   Soft, non-tender, bowel sounds active all four quadrants   Extremities: Extremities normal, atraumatic "   Skin: Skin warm, dry    Neurologic: Alert and oriented X 3, Moves all 4 extremities     Imaging: Reviewed I have personally reviewed pertinent labs, tests, and radiologic imaging in patient's chart.      Labs: Reviewed I have personally reviewed pertinent labs, tests, and radiologic imaging in patient's chart.      Total time spent 35 minutes with greater than 50% in consultation, education and coordination of care.     Also discussed with MD.     Please see A&P for additional details of medical decision making.    Elements of Medical Decision Making as described above. High risk therapy including opioids, high risk drug therapy including oral and/or parenteral controlled substances.       Argentina SCHULZ FNP-C  Acute Care Pain Management Program  [facility] ([facility], [place], [place])  Monday-Friday 8a-4p   Page via online paging system or [email or web address]

## 2023-04-05 NOTE — PROGRESS NOTES
"PRIMARY DIAGNOSIS: \"GENERIC\" NURSING  OUTPATIENT/OBSERVATION GOALS TO BE MET BEFORE DISCHARGE:  1. ADLs back to baseline: Yes    2. Activity and level of assistance: Up with standby assistance.    3. Pain status: Improved-controlled with oral pain medications.    4. Return to near baseline physical activity: Yes     Discharge Planner Nurse   Safe discharge environment identified: No  Barriers to discharge: Yes       Entered by: Allison Simeon RN 04/05/2023 11:27 AM     Please review provider order for any additional goals.   Nurse to notify provider when observation goals have been met and patient is ready for discharge.  "

## 2023-04-05 NOTE — PROGRESS NOTES
Care Management Follow Up    Length of Stay (days): 0    Expected Discharge Date: 04/06/2023     Concerns to be Addressed: no discharge needs identified, denies needs/concerns at this time (Pt's only requests are for medications. Informed the bedside RN would be notified.)     Patient plan of care discussed at interdisciplinary rounds: Yes    Anticipated Discharge Disposition: Home     Anticipated Discharge Services: None  Anticipated Discharge DME: None    Patient/family educated on Medicare website which has current facility and service quality ratings: no  Education Provided on the Discharge Plan:    Patient/Family in Agreement with the Plan: unable to assess    Referrals Placed by CM/SW:  (Not currently indicated.)  Private pay costs discussed: Not applicable    Additional Information:  11:57 AM  SW reviewed TCU referrals.  CCRC team is reaching out, see below:    Destination    Service Provider Request Status Selected Services Address Phone Fax Patient Preferred   [facility] ([facility])  Pending - Request Sent N/A [address] [id number] [phone] --   Internal Comment last updated by Eusebia Esquivel LICSW 4/5/2023 1156    Tentative beds tomorrow 4/6            [facility] ()  Pending - Request Sent N/A [address] [address] [phone] --   Internal Comment last updated by Hermila Ham 4/5/2023 1011    04/05/23 1009: I emailed Ruba in admissions. [facility] ([facility])  Declined   Bed Not Available, History of violence and/or drug or alcohol abuse N/A [address] [id number] [phone] --   [facility] ([facility])  Declined   Cannot meet patient's needs N/A [address] [facility] [id number] [phone] --   Internal Comment last updated by Hermila Ham 4/5/2023 1140    04/05/23 1130: I called and spoke to Nigel in  admissions. She said declined, due to her medication, they do not have a doctor that can prescribe her medication. [facility] ([facility])  Declined   Cannot meet patient's psychosocial needs, Smoker N/A [address] [facility] [phone] --   Internal Comment last updated by Hermila Ham 4/5/2023 0931    04/05/23 0926: I emailed Hue in admissions. She will respond via Epic. BRYANT Fleming

## 2023-04-05 NOTE — PROGRESS NOTES
"PRIMARY DIAGNOSIS: \"GENERIC\" NURSING  OUTPATIENT/OBSERVATION GOALS TO BE MET BEFORE DISCHARGE:  1. ADLs back to baseline: Yes    2. Activity and level of assistance: Up with standby assistance.    3. Pain status: Improved-controlled with oral pain medications.    4. Return to near baseline physical activity: No     Discharge Planner Nurse   Safe discharge environment identified: No  Barriers to discharge: Yes   Please review provider order for any additional goals.   Nurse to notify provider when observation goals have been met and patient is ready for discharge.  "

## 2023-04-05 NOTE — PROGRESS NOTES
[facility]    Medicine Progress Note - Hospitalist Service  Physician Attestation   I, Ray Corona DO, was present with Dr. Annette Holman, resident physician, who participated in the service and in the documentation of the note.  I have verified the history and personally performed the physical exam and medical decision making.  I agree with the assessment and plan of care as documented in the note.      Additional findings: Hyponatremia  We will order TSH, with reflex T4, urinalysis, urine sodium, urine osmolality, serum osmolality levels.    I have personally reviewed the following data over the past 24 hrs:    5.9  \   11.8   / 160     130 (L) 95 (L) 19 /  103   3.6 25 0.80 \         Ray Corona DO  Date of Service (when I saw the patient): 04/05/23    Date of Admission:  4/3/2023    Assessment & Plan   Renetta Farooq is a 67 year old female with history of hypertension, COPD, PTSD, bipolar disorder, opioid dependence/substance abuse on Suboxone, methamphetamine abuse, alcohol dependence, tobacco abuse, cognitive impairment, chronic right arm and leg weakness, recently discharged from outside hospital on 3/12/2023 with complaints of bradycardia, hypotension secondary to clonidine abuse. Admitted on 4/3/2023 for complaint of shortness of breath, being treated for mild COPD exacerbation with bronchitis. Episode of angioedema in ED with droperidol, now resolved. Currently hemodynamically stable on room air. Pending placement to TCU.     Mild chronic obstructive pulmonary disease exacerbation  Bronchitis  CXR on admission with mild hyperaeration. Recent CT chest with bronchitis. Not requiring oxygen at this time.   -DuoNeb every 4 hours  -Continue prednisone 40 mg daily to complete 5 day course (4/4-4/8)  -Switch from IV to PO levofloxacin 500 mg every 24 hours (4/3-)  -Continuous pulse oximetry    Urinary retention  UA negative. Improving from patient perspective  -Continue  bladder protocol, bladder scan this morning  -Continue flomax 0.4 mg daily.  -Encourage ambulation.    Hypokalemia  Hypomagnesemia  Mild hyponatremia likely secondary to dehydration.  -BMP in process.  -Magnesium potassium replacement protocols    Drug reaction, resolved  Angioedema, resolved   Occurred in ED this admission after droperidol administration.   - Diphenhydramine 50 mg IV once as needed  - Famotidine 20 mg IV once as needed.  - Epinephrine 0.3 mg IM every 5 minutes as needed for hypersensitivity reaction.  - Albuterol as needed as above.  - continue regular diet      Chronic low back pain  Status post fusion C3-4, L4-5 and 6 and cervical spine.  Opioid use disorder  X-ray reveals possible loosening of pedicle screws at T2.  Appears stable in  2022  -Pain service consulted, appreciate recs (signed off 4/5)   - continue PTA suboxone, Prozac, seroquel    - PRN hydroxyzine, PRN lorazepam   - add lidocaine ointment, voltaren gel   - recommend PTA meds at discharge       Social issues.   Patient [facility].  Care management/social work consultation for assistance with placement, planning for TCU placement at discharge as recommended by PT.     Cognitive impairment  PTSD  Bipolar 2 with chronic passive SI.  Anxiety not otherwise specified  PTA medication: Fluoxetine 40 mg daily  Gabapentin 300 mg at bedtime  Quetiapine 3 mg at bedtime 100 mg in the morning  Ativan 0.5 mg every 6 hours as needed for anxiety  Hydroxyzine 25-50 mg every 6 hours as needed for anxiety     Hypertension  Patient is currently normotensive     Tobacco abuse  -Nicotine 14 mg transdermal daily       Diet: Regular Diet Adult    DVT Prophylaxis: Pneumatic Compression Devices  Burroughs Catheter: Not present  Lines: None     Cardiac Monitoring: None  Code Status: Full Code      Clinically Significant Risk Factors Present on Admission        # Hypokalemia: Lowest K = 3 mmol/L in last 2 days, will replace as needed                         Disposition Plan      Expected Discharge Date: 04/06/2023    Discharge Delays: Placement - TCU  Destination: home with family (Extended-stay hotel in [place].)          The patient's care was discussed with the Attending Physician, Dr. Corona.    Annette Holman MD PGY2  Hospitalist Service  [facility]  Securely message with [employer] (more info)  Text page via [email or web address] Paging/Directory   ______________________________________________________________________    Interval History   No overnight events. Patient reports her shortness of breath and cough are improving. Continues to have light brown sputum but amount is decreasing. She reports ongoing leg cramps that she attributes to low magnesium. She would like her home colace restarted for constipation. Denies chest pain or leg swelling.  Reports her urinary retention is improved.     Physical Exam   Vital Signs: Temp: 97.5  F (36.4  C) Temp src: Oral BP: 121/83 Pulse: 94   Resp: 18 SpO2: 91 % O2 Device: None (Room air)    Weight: 137 lbs 8 oz    Constitutional: awake, alert, cooperative, no apparent distress, and appears stated age  Respiratory: No increased work of breathing, good air exchange, clear to auscultation bilaterally, no crackles or wheezing  Cardiovascular: Regular rate and rhythm, normal S1 and S2, and no murmur noted  GI: Normal bowel sounds, soft, non-distended, non-tender  Skin: normal skin color, texture, turgor  Musculoskeletal: no lower extremity pitting edema present  Neurologic: Mental Status Exam:  Awake and oriented       Medical Decision Making           Data   ------------------------- PAST 24 HR DATA REVIEWED -----------------------------------------------    I have personally reviewed the following data over the past 24 hrs:    5.9  \   11.8   / 160     N/A N/A N/A /  N/A   N/A N/A N/A \       Imaging results reviewed over the past 24 hrs:   No results found for this or any previous visit (from  the past 24 hour(s)).

## 2023-04-05 NOTE — PROGRESS NOTES
"PRIMARY DIAGNOSIS: \"GENERIC\" NURSING  OUTPATIENT/OBSERVATION GOALS TO BE MET BEFORE DISCHARGE:  1. ADLs back to baseline: Yes    2. Activity and level of assistance: Up with standby assistance.    3. Pain status: Pain free.    4. Return to near baseline physical activity: No     Discharge Planner Nurse   Safe discharge environment identified: No  Barriers to discharge: Yes       Entered by: Cristino Sierra RN 04/05/2023 4:48 AM     Please review provider order for any additional goals.   Nurse to notify provider when observation goals have been met and patient is ready for discharge.  "

## 2023-04-05 NOTE — UTILIZATION REVIEW
Concurrent stay review; Secondary Review Determination         Under the authority of the Utilization Management Committee, the utilization review process indicated a secondary review on the above patient.  The review outcome is based on review of the medical records, discussions with staff, and applying clinical experience noted on the date of the review.          (x) Observation Status Appropriate - Concurrent stay review    RATIONALE FOR DETERMINATION   Ms. Monroe is a 68 yo female pt with h/o COPD and chronic pain/substance abuse who presented to the ID with SOB. Had episode of angioedema that was treated and resolved in the ED before hospital admission.   CXR without new infiltrates; continues on po steroids and po abx.  Vitals are stable and she is not hypoxic.  Mild hyponatremia noted this am; suspected d/t steroids but is being monitored.  Currently not on IVF or fluid restriction.      If hyponatremia continues to worsen and/or serial labs or new consultation required that will delay her discharge, then would consider changing to INPATIENT status.     Patient is clinically improving and there is no clear indication to change patient's status to inpatient. The severity of illness, intensity of service provided, expected LOS and risk for adverse outcome make the care appropriate for observation.      The information on this document is developed by the utilization review team in order for the business office to ensure compliance.  This only denotes the appropriateness of proper admission status and does not reflect the quality of care rendered.         The definitions of Inpatient Status and Observation Status used in making the determination above are those provided in the CMS Coverage Manual, Chapter 1 and Chapter 6, section 70.4.      Sincerely,     Eliza Jett, DO  Utilization Review  Physician Advisor

## 2023-04-05 NOTE — PROGRESS NOTES
"PRIMARY DIAGNOSIS: \"GENERIC\" NURSING  OUTPATIENT/OBSERVATION GOALS TO BE MET BEFORE DISCHARGE:  1. ADLs back to baseline: Yes    2. Activity and level of assistance: Up with standby assistance.    3. Pain status: Improved-controlled with oral pain medications.    4. Return to near baseline physical activity: Yes     Discharge Planner Nurse   Safe discharge environment identified: No  Barriers to discharge: Yes       Entered by: Allison Simeon RN 04/05/2023 1:30 PM      Patient is alert and oriented, had minimal pain today managed by topical creams , ambulates with SBA to the bathroom, is voiding though retaining some urine, encouraged fluids, worked with PT today, K+ and Mag protocols, replaced as indicated, with recheck in the morning  facing discharge delays due to placement.  Given PRN Ativan and atarax for anxiety.   Please review provider order for any additional goals.   Nurse to notify provider when observation goals have been met and patient is ready for discharge.  "

## 2023-04-05 NOTE — PROGRESS NOTES
Patient received scheduled nebulizer tx inline with Aerobika. Tolerated well. On RA, breath sounds are clear and diminished. Will continue to follow as needed.

## 2023-04-05 NOTE — PROGRESS NOTES
Pt given Xop/Atrovent nebs inline with AEROBIKA as ordered.  Pt on RA, o2 sats low to mid 90's, RR 18, BS diminished with crackles.  Will continue to monitor pt.

## 2023-04-05 NOTE — PROGRESS NOTES
"PRIMARY DIAGNOSIS: \"GENERIC\" NURSING  OUTPATIENT/OBSERVATION GOALS TO BE MET BEFORE DISCHARGE:  1. ADLs back to baseline: Yes    2. Activity and level of assistance: Up with standby assistance.    3. Pain status: Improved-controlled with oral pain medications.    4. Return to near baseline physical activity: Yes     Discharge Planner Nurse   Safe discharge environment identified: No  Barriers to discharge: Yes     Please review provider order for any additional goals.   Nurse to notify provider when observation goals have been met and patient is ready for discharge.  "

## 2023-04-05 NOTE — PROGRESS NOTES
"PRIMARY DIAGNOSIS: \"GENERIC\" NURSING  OUTPATIENT/OBSERVATION GOALS TO BE MET BEFORE DISCHARGE:  1. ADLs back to baseline: Yes    2. Activity and level of assistance: Up with standby assistance.    3. Pain status: Pain free.    4. Return to near baseline physical activity: No     Discharge Planner Nurse   Safe discharge environment identified: No  Barriers to discharge: Yes   Daily care provided as needed, pt calls appropriately, oximetry intact, pt on room air, LS clear, O2 sat WNL, pt ambulated to the restroom, voiding, bladder management per order, continue to monitor.     Please review provider order for any additional goals.   Nurse to notify provider when observation goals have been met and patient is ready for discharge.  "

## 2023-04-05 NOTE — PROGRESS NOTES
"PRIMARY DIAGNOSIS: \"GENERIC\" NURSING  OUTPATIENT/OBSERVATION GOALS TO BE MET BEFORE DISCHARGE:  1. ADLs back to baseline: Yes    2. Activity and level of assistance: Up with standby assistance.    3. Pain status: Pain free.    4. Return to near baseline physical activity: No     Discharge Planner Nurse   Safe discharge environment identified: No  Barriers to discharge: Yes       Entered by: Cristino Sierra RN 04/05/2023 2:43 AM   Pt is pleasant, watching tv in bed. Pt on room air, LS clear, VS WNL. PRN ativan administered. Will continue to monitor.  Please review provider order for any additional goals.   Nurse to notify provider when observation goals have been met and patient is ready for discharge.  "

## 2023-04-06 ENCOUNTER — APPOINTMENT (OUTPATIENT)
Dept: PHYSICAL THERAPY | Facility: CLINIC | Age: 68
End: 2023-04-06
Payer: COMMERCIAL

## 2023-04-06 VITALS
TEMPERATURE: 97.8 F | OXYGEN SATURATION: 95 % | WEIGHT: 138.2 LBS | HEART RATE: 79 BPM | DIASTOLIC BLOOD PRESSURE: 78 MMHG | HEIGHT: 65 IN | BODY MASS INDEX: 23.03 KG/M2 | RESPIRATION RATE: 18 BRPM | SYSTOLIC BLOOD PRESSURE: 138 MMHG

## 2023-04-06 LAB
ANION GAP SERPL CALCULATED.3IONS-SCNC: 10 MMOL/L (ref 5–18)
BUN SERPL-MCNC: 16 MG/DL (ref 8–22)
CALCIUM SERPL-MCNC: 8.1 MG/DL (ref 8.5–10.5)
CHLORIDE BLD-SCNC: 100 MMOL/L (ref 98–107)
CO2 SERPL-SCNC: 25 MMOL/L (ref 22–31)
CREAT SERPL-MCNC: 0.78 MG/DL (ref 0.6–1.1)
GFR SERPL CREATININE-BSD FRML MDRD: 82 ML/MIN/1.73M2
GLUCOSE BLD-MCNC: 116 MG/DL (ref 70–125)
HOLD SPECIMEN: NORMAL
MAGNESIUM SERPL-MCNC: 1.9 MG/DL (ref 1.8–2.6)
POTASSIUM BLD-SCNC: 3.6 MMOL/L (ref 3.5–5)
POTASSIUM BLD-SCNC: 3.7 MMOL/L (ref 3.5–5)
SODIUM SERPL-SCNC: 135 MMOL/L (ref 136–145)

## 2023-04-06 PROCEDURE — 80048 BASIC METABOLIC PNL TOTAL CA: CPT | Performed by: INTERNAL MEDICINE

## 2023-04-06 PROCEDURE — 94640 AIRWAY INHALATION TREATMENT: CPT | Mod: 76

## 2023-04-06 PROCEDURE — 97116 GAIT TRAINING THERAPY: CPT | Mod: GP

## 2023-04-06 PROCEDURE — 99239 HOSP IP/OBS DSCHRG MGMT >30: CPT | Performed by: INTERNAL MEDICINE

## 2023-04-06 PROCEDURE — 999N000157 HC STATISTIC RCP TIME EA 10 MIN

## 2023-04-06 PROCEDURE — 36415 COLL VENOUS BLD VENIPUNCTURE: CPT | Performed by: INTERNAL MEDICINE

## 2023-04-06 PROCEDURE — 94640 AIRWAY INHALATION TREATMENT: CPT

## 2023-04-06 PROCEDURE — G0378 HOSPITAL OBSERVATION PER HR: HCPCS

## 2023-04-06 PROCEDURE — 250N000012 HC RX MED GY IP 250 OP 636 PS 637: Performed by: INTERNAL MEDICINE

## 2023-04-06 PROCEDURE — 250N000013 HC RX MED GY IP 250 OP 250 PS 637: Performed by: INTERNAL MEDICINE

## 2023-04-06 PROCEDURE — 97112 NEUROMUSCULAR REEDUCATION: CPT | Mod: GP

## 2023-04-06 PROCEDURE — 96376 TX/PRO/DX INJ SAME DRUG ADON: CPT

## 2023-04-06 PROCEDURE — 84132 ASSAY OF SERUM POTASSIUM: CPT | Performed by: INTERNAL MEDICINE

## 2023-04-06 PROCEDURE — 250N000011 HC RX IP 250 OP 636: Performed by: INTERNAL MEDICINE

## 2023-04-06 PROCEDURE — 83735 ASSAY OF MAGNESIUM: CPT | Performed by: INTERNAL MEDICINE

## 2023-04-06 PROCEDURE — 250N000009 HC RX 250: Performed by: INTERNAL MEDICINE

## 2023-04-06 RX ORDER — VITAMIN B COMPLEX
25 TABLET ORAL DAILY
Qty: 30 TABLET | Refills: 0 | Status: SHIPPED | OUTPATIENT
Start: 2023-04-06

## 2023-04-06 RX ORDER — VITAMIN B COMPLEX
25 TABLET ORAL DAILY
Status: DISCONTINUED | OUTPATIENT
Start: 2023-04-06 | End: 2023-04-06 | Stop reason: HOSPADM

## 2023-04-06 RX ORDER — QUETIAPINE FUMARATE 100 MG/1
100 TABLET, FILM COATED ORAL EVERY MORNING
Qty: 30 TABLET | Refills: 0 | Status: ON HOLD | OUTPATIENT
Start: 2023-04-06 | End: 2023-04-30

## 2023-04-06 RX ORDER — POTASSIUM CHLORIDE 1500 MG/1
20 TABLET, EXTENDED RELEASE ORAL ONCE
Status: COMPLETED | OUTPATIENT
Start: 2023-04-06 | End: 2023-04-06

## 2023-04-06 RX ORDER — PREDNISONE 20 MG/1
40 TABLET ORAL DAILY
Qty: 4 TABLET | Refills: 0 | Status: SHIPPED | OUTPATIENT
Start: 2023-04-06 | End: 2023-04-08

## 2023-04-06 RX ORDER — CALCIUM CARBONATE 500 MG/1
1 TABLET, CHEWABLE ORAL 2 TIMES DAILY
Qty: 60 TABLET | Refills: 0 | Status: SHIPPED | OUTPATIENT
Start: 2023-04-06

## 2023-04-06 RX ORDER — MAGNESIUM SULFATE HEPTAHYDRATE 40 MG/ML
2 INJECTION, SOLUTION INTRAVENOUS ONCE
Status: COMPLETED | OUTPATIENT
Start: 2023-04-06 | End: 2023-04-06

## 2023-04-06 RX ORDER — NALOXONE HYDROCHLORIDE 0.4 MG/ML
0.2 INJECTION, SOLUTION INTRAMUSCULAR; INTRAVENOUS; SUBCUTANEOUS
Status: DISCONTINUED | OUTPATIENT
Start: 2023-04-06 | End: 2023-04-06 | Stop reason: HOSPADM

## 2023-04-06 RX ORDER — FLUOXETINE 40 MG/1
40 CAPSULE ORAL DAILY
Qty: 30 CAPSULE | Refills: 0 | Status: SHIPPED | OUTPATIENT
Start: 2023-04-06

## 2023-04-06 RX ORDER — POTASSIUM CHLORIDE 1.5 G/1.58G
20 POWDER, FOR SOLUTION ORAL ONCE
Status: COMPLETED | OUTPATIENT
Start: 2023-04-06 | End: 2023-04-06

## 2023-04-06 RX ORDER — LEVALBUTEROL INHALATION SOLUTION 1.25 MG/3ML
1.25 SOLUTION RESPIRATORY (INHALATION)
Status: DISCONTINUED | OUTPATIENT
Start: 2023-04-06 | End: 2023-04-06

## 2023-04-06 RX ORDER — NALOXONE HYDROCHLORIDE 0.4 MG/ML
0.4 INJECTION, SOLUTION INTRAMUSCULAR; INTRAVENOUS; SUBCUTANEOUS
Status: DISCONTINUED | OUTPATIENT
Start: 2023-04-06 | End: 2023-04-06 | Stop reason: HOSPADM

## 2023-04-06 RX ORDER — IPRATROPIUM BROMIDE AND ALBUTEROL SULFATE 2.5; .5 MG/3ML; MG/3ML
3 SOLUTION RESPIRATORY (INHALATION) 3 TIMES DAILY
Qty: 120 ML | Refills: 1 | Status: SHIPPED | OUTPATIENT
Start: 2023-04-06 | End: 2023-06-08 | Stop reason: DRUGHIGH

## 2023-04-06 RX ORDER — QUETIAPINE FUMARATE 300 MG/1
300 TABLET, FILM COATED ORAL AT BEDTIME
Qty: 30 TABLET | Refills: 0 | Status: ON HOLD | OUTPATIENT
Start: 2023-04-06 | End: 2023-04-30

## 2023-04-06 RX ORDER — CALCIUM CARBONATE 500(1250)
500 TABLET ORAL 2 TIMES DAILY WITH MEALS
Status: DISCONTINUED | OUTPATIENT
Start: 2023-04-06 | End: 2023-04-06 | Stop reason: HOSPADM

## 2023-04-06 RX ORDER — MAGNESIUM OXIDE 400 MG/1
400 TABLET ORAL DAILY
Qty: 30 TABLET | Refills: 0 | Status: SHIPPED | OUTPATIENT
Start: 2023-04-06 | End: 2023-06-08 | Stop reason: DRUGHIGH

## 2023-04-06 RX ORDER — LEVALBUTEROL INHALATION SOLUTION 1.25 MG/3ML
1.25 SOLUTION RESPIRATORY (INHALATION) 3 TIMES DAILY
Status: DISCONTINUED | OUTPATIENT
Start: 2023-04-06 | End: 2023-04-06 | Stop reason: HOSPADM

## 2023-04-06 RX ORDER — MAGNESIUM OXIDE 400 MG/1
400 TABLET ORAL EVERY 4 HOURS
Status: DISCONTINUED | OUTPATIENT
Start: 2023-04-06 | End: 2023-04-06 | Stop reason: ALTCHOICE

## 2023-04-06 RX ORDER — LEVOFLOXACIN 500 MG/1
500 TABLET, FILM COATED ORAL DAILY
Qty: 4 TABLET | Refills: 0 | Status: SHIPPED | OUTPATIENT
Start: 2023-04-06 | End: 2023-04-11

## 2023-04-06 RX ADMIN — IPRATROPIUM BROMIDE 0.5 MG: 0.5 SOLUTION RESPIRATORY (INHALATION) at 13:08

## 2023-04-06 RX ADMIN — BUPRENORPHINE AND NALOXONE 1 FILM: 4; 1 FILM, SOLUBLE BUCCAL; SUBLINGUAL at 08:45

## 2023-04-06 RX ADMIN — MAGNESIUM SULFATE HEPTAHYDRATE 2 G: 40 INJECTION, SOLUTION INTRAVENOUS at 12:09

## 2023-04-06 RX ADMIN — Medication 25 MCG: at 12:09

## 2023-04-06 RX ADMIN — DICLOFENAC SODIUM 2 G: 10 GEL TOPICAL at 12:09

## 2023-04-06 RX ADMIN — LORAZEPAM 0.5 MG: 2 INJECTION INTRAMUSCULAR; INTRAVENOUS at 03:50

## 2023-04-06 RX ADMIN — ACETAMINOPHEN 650 MG: 325 TABLET ORAL at 01:28

## 2023-04-06 RX ADMIN — TAMSULOSIN HYDROCHLORIDE 0.4 MG: 0.4 CAPSULE ORAL at 08:45

## 2023-04-06 RX ADMIN — LEVALBUTEROL HYDROCHLORIDE 1.25 MG: 1.25 SOLUTION RESPIRATORY (INHALATION) at 13:08

## 2023-04-06 RX ADMIN — SENNOSIDES AND DOCUSATE SODIUM 2 TABLET: 50; 8.6 TABLET ORAL at 08:44

## 2023-04-06 RX ADMIN — Medication 1 MG: at 01:28

## 2023-04-06 RX ADMIN — IPRATROPIUM BROMIDE 0.5 MG: 0.5 SOLUTION RESPIRATORY (INHALATION) at 08:26

## 2023-04-06 RX ADMIN — POTASSIUM CHLORIDE 20 MEQ: 1500 TABLET, EXTENDED RELEASE ORAL at 12:09

## 2023-04-06 RX ADMIN — DICLOFENAC SODIUM 2 G: 10 GEL TOPICAL at 08:46

## 2023-04-06 RX ADMIN — NICOTINE 1 PATCH: 14 PATCH, EXTENDED RELEASE TRANSDERMAL at 08:45

## 2023-04-06 RX ADMIN — PREDNISONE 40 MG: 20 TABLET ORAL at 08:45

## 2023-04-06 RX ADMIN — CALCIUM 500 MG: 500 TABLET ORAL at 12:09

## 2023-04-06 RX ADMIN — FLUOXETINE 40 MG: 20 CAPSULE ORAL at 08:45

## 2023-04-06 RX ADMIN — LEVALBUTEROL HYDROCHLORIDE 1.25 MG: 1.25 SOLUTION RESPIRATORY (INHALATION) at 08:26

## 2023-04-06 RX ADMIN — LORAZEPAM 0.5 MG: 2 INJECTION INTRAMUSCULAR; INTRAVENOUS at 11:44

## 2023-04-06 RX ADMIN — QUETIAPINE 100 MG: 100 TABLET ORAL at 08:45

## 2023-04-06 RX ADMIN — LIDOCAINE: 50 OINTMENT TOPICAL at 08:47

## 2023-04-06 ASSESSMENT — ACTIVITIES OF DAILY LIVING (ADL)
ADLS_ACUITY_SCORE: 35

## 2023-04-06 NOTE — PROGRESS NOTES
Care Management Follow Up    Length of Stay (days): 0    Expected Discharge Date: 04/06/2023     Concerns to be Addressed: no discharge needs identified, denies needs/concerns at this time (Pt's only requests are for medications. Informed the bedside RN would be notified.)     Patient plan of care discussed at interdisciplinary rounds: Yes    Anticipated Discharge Disposition: Home     Anticipated Discharge Services: None  Anticipated Discharge DME: None    Patient/family educated on Medicare website which has current facility and service quality ratings: no  Education Provided on the Discharge Plan:    Patient/Family in Agreement with the Plan: unable to assess    Referrals Placed by CM/SW:  (Not currently indicated.)  Private pay costs discussed: Not applicable    Additional Information:  9:39 AM  SW left VM for admissions at [facility] checking on beds/referral.  PT to work with pt at 10am.  Will follow for recommendations.  Pt may be well enough to discharge back to hotel.    12:24 PM  [facility] TCU declined due to methadone and suboxone medication.    3:17 PM  SW met with patient to discuss change in discharge recommendation to home with home care PT services.  Pt states she is homeless and unable to participate in home care.  Pt requested to discharge home tonight due to it being last night in extended stay hotel (pt also called it an apartment).  Pt requested shelter resources in [place] and copy of [place] [facility].  SW provided these items.  SW did discuss [facility] in [facility] however pt declined due to location.           BRYANT Vasquez     Care Management Discharge Note    Discharge Date: 04/07/2023       Discharge Disposition: hotel    Discharge Services: None    Discharge DME: None    Discharge Transportation: cab    Private pay costs discussed: Not applicable    PAS Confirmation Code:  NA  Patient/family educated on Medicare website which  has current facility and service quality ratings: no    Education Provided on the Discharge Plan:  yes    Persons Notified of Discharge Plans: pt  Patient/Family in Agreement with the Plan: unable to assess    Handoff Referral Completed: Yes    Additional Information:  3:23 PM  Pt requested to discharge.  Declined HC services.  Will discharge to extended stay hotel.  Homeless resources provided.          BRYANT Vasquez

## 2023-04-06 NOTE — PROVIDER NOTIFICATION
04/06/23 0847   RCAT Assessment   Reason for Assessment COPD   Pulmonary Status 3   Surgical Status 0   Chest X-ray 0   Respiratory Pattern 0   Mental Status 0   Breath Sounds 1   Cough Effectiveness 1   Level of Activity 0   O2 Required for SpO2>=92% 0   Acuity Level (points) 5   Acuity Level  5   Re-eval Interval Guideline Every 3 days   Re-evaluation Date 04/09/23     Pt seen on RA, breath sounds diminished. Pt wanting nebs TID with aerobika, RCAT done.     Toya Arguello, RT

## 2023-04-06 NOTE — PROGRESS NOTES
"PRIMARY DIAGNOSIS: \"GENERIC\" NURSING  OUTPATIENT/OBSERVATION GOALS TO BE MET BEFORE DISCHARGE:  1. ADLs back to baseline: Yes    2. Activity and level of assistance: Up with standby assistance.    3. Pain status: Improved-controlled with oral pain medications.    4. Return to near baseline physical activity: Yes     Discharge Planner Nurse   Safe discharge environment identified: No  Barriers to discharge: Yes, awaiting placement     Pt calls appropriately, report bilateral thigh pain, managed with topical ointment, up the restroom SBA, voiding, bladder scanned per protocol, continue to monitor.      Please review provider order for any additional goals.   Nurse to notify provider when observation goals have been met and patient is ready for discharge.  "

## 2023-04-06 NOTE — PROGRESS NOTES
[facility]    Date of Admission:  4/3/2023  4/6/2023   [facility] :Faculty Attestation   I have seen and examined the patient.   I have discussed the case with PA student, Roselia Reese.  I agree with the findings, assessment and plan.   Ray Corona, DO MS    Assessment & Plan   Renetta Farooq is a 67 year old female with history of hypertension, COPD, PTSD, bipolar disorder, opioid dependence/substance abuse on Suboxone, methamphetamine abuse, alcohol dependence, tobacco abuse, cognitive impairment, chronic right arm and leg weakness, recently discharged from outside hospital on 3/12/2023 with complaints of bradycardia, hypotension secondary to clonidine abuse. Admitted on 4/3/2023 for complaint of shortness of breath, diagnosed with COPD exacerbation and bronchitis. Episode of angioedema in ED with droperidol, now resolved. Currently hemodynamically stable on room air. Pending placement to TCU.     Mild chronic obstructive pulmonary disease exacerbation  Bronchitis  CXR on admission with mild hyperinflation. Recent CT chest with bronchitis. Not requiring supplemental oxygen at this time.   DuoNeb TID  Continue prednisone 40 mg daily to complete 5 day course (4/4-4/8)  Switched from IV to PO levofloxacin 500 mg every 24 hours (4/3-4/10)  Continuous pulse oximetry    Urinary retention  UA negative. Improving from patient perspective  Continue bladder protocol  Continue flomax 0.4 mg daily  Encourage ambulation    Hypokalemia  Hypomagnesemia  Hyponatremia, resolving hypervolemic hyponatremia.  Hypocalcemia  Not hyperglycemic   Sodium coming up, 135 on 4/6  Magnesium potassium replacement protocols  Fluid restriction 2500 mL/day  Recheck BMP in a.m.  Start calcium carbonate replacement.     Drug reaction, resolved  Angioedema, resolved     Chronic low back pain  Status post fusion C3-4, L4-5 and 6 and cervical spine.  Opioid use disorder  X-ray reveals possible loosening of pedicle screws at  T2.  Appears stable in 2022  Pain service consulted, appreciate recs (signed off 4/5)   - continue PTA suboxone, Prozac, seroquel    - PRN hydroxyzine, PRN lorazepam   - lidocaine ointment, voltaren gel   - recommend PTA meds at discharge       Social issues  Patient currently resides at extended stay [facility].  Care management/social work consultation for assistance with placement. Referrals sent for TCU placement at discharge as recommended by PT.     Cognitive impairment  PTSD  Bipolar 2 with chronic passive SI  Anxiety not otherwise specified  PTA medication: Fluoxetine 40 mg daily  Gabapentin 300 mg at bedtime  Quetiapine 3 mg at bedtime 100 mg in the morning  Ativan 0.5 mg every 6 hours as needed for anxiety  Hydroxyzine 25-50 mg every 6 hours as needed for anxiety     Hypertension  Patient is currently normotensive    Tobacco abuse  Nicotine 14 mg transdermal daily       Diet: Regular Diet Adult  Fluid restriction OTHER (SEE COMMENTS)    DVT Prophylaxis: Pneumatic Compression Devices  Burroughs Catheter: Not present  Lines: None     Cardiac Monitoring: None  Code Status: Full Code      Clinically Significant Risk Factors Present on Admission                             Disposition Plan     Expected Discharge Date: 04/06/2023    Discharge Delays: Placement - TCU  Destination: home with family (Extended-stay hot in [place].)          The patient's care was discussed with the Attending Physician, Dr. Corona, Bedside Nurse and Patient.    JEAN PAUL Randolph-S2  Hospitalist Service  [facility]  Securely message with [employer] (more info)  Text page via [facility] Paging/Directory   ______________________________________________________________________    Interval History   No events overnight. Patient states that her cough is becoming more productive with brown thick sputum. Still having some shortness of breath, mostly with walking around. Denies chest pain or abdominal pain. Urinary issues have  improved and patient is able to urinate as normal. Occasionally feels dizzy/lightheaded.     Physical Exam   Vital Signs: Temp: 98.2  F (36.8  C) Temp src: Oral BP: 124/60 Pulse: 92   Resp: 16 SpO2: 95 % O2 Device: None (Room air)    Weight: 138 lbs 3.2 oz  Gen- NAD, sitting in bed resting comfortably, appears mildly frail   HEENT - NCAT, EOMI  Cardiac- RRR, s1/s2 present without m/r/g/c  Pulmonary- mild end expiratory wheezing in the upper right lung field, patient coughing with deep breathing, otherwise lung fields are CTA  Extremities- No LE edema. Thin skin overlying the anterior tibia      Data   ------------------------- PAST 24 HR DATA REVIEWED -----------------------------------------------    I have personally reviewed the following data over the past 24 hrs:    N/A  \   N/A   / N/A     135 (L) 100 16 /  116   3.7; 3.6 25 0.78 \       TSH: N/A T4: N/A A1C: N/A

## 2023-04-06 NOTE — PROGRESS NOTES
Nebulizer Documentation  I attest that I have seen and evaluated Renetta Farooq. She has a diagnosis of J44.1 - Chronic obstructive pulmonary disease with (acute) exacerbation and a nebulizer machine is needed to administer medication to improve breathing passages.     I, the undersigned, certify that the above prescribed supplies are medically necessary for this patient and is both reasonable and necessary in reference to accepted standards of medical and necessary in reference to accepted standards of medical practice in the treatment of this patient's condition and is not prescribed as a convenience.    Ray Corona DO, MS  [facility] Service  Internal Medicine

## 2023-04-06 NOTE — PLAN OF CARE
Problem: Plan of Care - These are the overarching goals to be used throughout the patient stay.    Goal: Absence of Hospital-Acquired Illness or Injury  Intervention: Identify and Manage Fall Risk  Recent Flowsheet Documentation  Taken 4/4/2023 0101 by Elodia Ward RN  Safety Promotion/Fall Prevention:    activity supervised    assistive device/personal items within reach    increase visualization of patient    lighting adjusted    nonskid shoes/slippers when out of bed    patient and family education    room door open    room near nurse's station    room organization consistent    safety round/check completed    treat underlying cause    treat reversible contributory factors     Problem: Plan of Care - These are the overarching goals to be used throughout the patient stay.    Goal: Optimal Comfort and Wellbeing  Outcome: Progressing   Goal Outcome Evaluation:       Pt is very drowsy, arouses to voice and vigorous stimulation. Able to take Potassium replacement by mouth then went back to sleep. Nonverbal indicators of pain is absent. Soft BP noted, otherwise, VS stable.                 
  Problem: Plan of Care - These are the overarching goals to be used throughout the patient stay.    Goal: Absence of Hospital-Acquired Illness or Injury  Intervention: Identify and Manage Fall Risk  Recent Flowsheet Documentation  Taken 4/6/2023 0800 by Ashley Perez RN  Safety Promotion/Fall Prevention:   activity supervised   assistive device/personal items within reach   clutter free environment maintained   lighting adjusted   nonskid shoes/slippers when out of bed   room door open   room near nurse's station   room organization consistent   Goal Outcome Evaluation:         Patient is ambulating independently Patient requested discharge since she was no longer eligible for TCU. Received discharge orders for patient and DME order for nebulizer. Patient discharged with a nebulizer machine in hand. Discharge meds sent to patients pharmacy. Pts son provided transportation.                
"  Problem: Plan of Care - These are the overarching goals to be used throughout the patient stay.    Goal: Optimal Comfort and Wellbeing  Outcome: Progressing   PRIMARY DIAGNOSIS: \"GENERIC\" NURSING  OUTPATIENT/OBSERVATION GOALS TO BE MET BEFORE DISCHARGE:  1. ADLs back to baseline: Yes    2. Activity and level of assistance: Up with standby assistance.    3. Pain status: Improved-controlled with oral pain medications.    4. Return to near baseline physical activity: Yes     Goal Outcome Evaluation:                        "
Goal Outcome Evaluation:    PRIMARY DIAGNOSIS: Dyspnea, COPD exacerbation  OUTPATIENT/OBSERVATION GOALS TO BE MET BEFORE DISCHARGE:  1. ADLs back to baseline: Yes    2. Activity and level of assistance: Up with standby assistance.    3. Pain status: Improved-controlled with oral pain medications.    4. Return to near baseline physical activity: Yes     Discharge Planner Nurse   Safe discharge environment identified: Yes  Barriers to discharge: Yes, awaiting TCU placement       Entered by: Brigitte Allen RN 04/06/2023 4:54 AM    Patient reports persistent leg pain. Up with SBA to bathroom, voiding frequently in quantity sufficient amounts. VSS.   Please review provider order for any additional goals.   Nurse to notify provider when observation goals have been met and patient is ready for discharge.                      
PRIMARY DIAGNOSIS: COPD exacerbation and bronchitis  OUTPATIENT/OBSERVATION GOALS TO BE MET BEFORE DISCHARGE:  ADLs back to baseline: Yes    Activity and level of assistance: Up with standby assistance.    Pain status: chronic pain, no IV medications given    Return to near baseline physical activity: Yes     Discharge Planner Nurse   Safe discharge environment identified: Yes  Barriers to discharge: Yes - pulmonology consult, IV steroids       Entered by: Jane Ball RN 04/03/2023 10:50 PM     Please review provider order for any additional goals.   Nurse to notify provider when observation goals have been met and patient is ready for discharge.    On admission patient was very anxious stating she could not breathe. Vital signs stable, oxygen saturations were 91-94% on room air. Likely due to recent home seroquel dose patient became sleepy and is now sleeping comfortably. Easy to arouse to voice but falls back asleep quickly. Has not been OOB. Scheduled pain medications given. RT following with nebs.                      
Detail Level: Detailed
Additional Notes: Photo taken and placed in chart from todays visit

## 2023-04-06 NOTE — DISCHARGE SUMMARY
[facility]  Hospitalist Discharge Summary      Date of Admission:  4/3/2023  Date of Discharge:  4/6/2023  Discharging Provider: Ray Corona DO  Discharge Service: Hospitalist Service    Discharge Diagnoses   Acute exacerbation of chronic obstructive pulmonary disease  Acute bronchitis  Urinary retention  Hypokalemia  Hypomagnesemia  Hypotonic hypervolemic hyponatremia  Hypocalcemia  Acute angioedema secondary to drug reaction resolved  Chronic low back pain  Opioid use disorder  History of polysubstance abuse  Bipolar disorder type II  Anxiety disorder  Essential hypertension  Tobacco abuse    Follow-ups Needed After Discharge   Follow-up Appointments     Follow-up and recommended labs and tests       Follow up with primary care provider, Jamila Connelly, within 7 days for   hospital follow- up on COPD exacerbation, hypocalcemia, acute bronchitis, electrolyte abnormalities.  The following labs/tests are   recommended: BMP, Magnesium.        Discharge Disposition   Discharged to home.  Patient refused home care order.  Homelessness resources were provided by social work and care management  Condition at discharge: Stable    Hospital Course   Renetta Farooq is a 67 year old female with history of hypertension, COPD, PTSD, bipolar disorder, opioid dependence/substance abuse on Suboxone, methamphetamine abuse, alcohol dependence, tobacco abuse, cognitive impairment, chronic right arm and leg weakness, recently discharged from outside hospital on 3/12/2023 with complaints of bradycardia, hypotension secondary to clonidine abuse. Admitted on 4/3/2023 for complaint of shortness of breath, diagnosed with COPD exacerbation and bronchitis. Episode of angioedema in ED with droperidol, now resolved. Currently hemodynamically stable on room air.  Renetta was evaluated by physical and Occupational Therapy during hospitalization.  Patient was ambulating well and occupational and physical therapy  recommended no transitional care at time of discharge.  Patient was recommended home cares which she refused.  Care management did meet with the patient and provided homelessness resources.  Patient does have a place to stay on the evening of 4/6/2023.  She plans to contact [facility] tomorrow.    Consultations This Hospital Stay   IP RESPIRATORY CARE CHRONIC PULMONARY DISEASE SPECIALIST  PULMONARY IP CONSULT  CARE MANAGEMENT / SOCIAL WORK IP CONSULT  PAIN MANAGEMENT ADULT IP CONSULT  PHYSICAL THERAPY ADULT IP CONSULT  OCCUPATIONAL THERAPY ADULT IP CONSULT  IP RESPIRATORY CARE CHRONIC PULMONARY DISEASE SPECIALIST    Code Status   Full Code    Time Spent on this Encounter   I, Ray Corona DO, personally saw the patient today and spent greater than 30 minutes discharging this patient.       Ray Corona DO  [address] [address]  Phone: [phone]  Fax: [phone]  ______________________________________________________________________    Physical Exam   Vital Signs: Temp: 97.8  F (36.6  C) Temp src: Oral BP: 138/78 Pulse: 79   Resp: 18 SpO2: 95 % O2 Device: None (Room air)    Weight: 138 lbs 3.2 oz  Gen- NAD, sitting in bed resting comfortably, appears mildly frail   HEENT - NCAT, EOMI  Cardiac- RRR, s1/s2 present without m/r/g/c  Pulmonary- mild end expiratory wheezing in the upper right lung field, patient coughing with deep breathing, otherwise lung fields are CTA  Extremities- No LE edema. Thin skin overlying the anterior tibia       Primary Care Physician   Jamila Connelly    Discharge Orders      Primary Care - Care Coordination Referral      Reason for your hospital stay    COPD exacerbation, bronchitis, medication reaction/angioedema.     Follow-up and recommended labs and tests     Follow up with primary care provider, Jamila Connelly, within 7 days for hospital follow- up on COPD exacerbation.   The following labs/tests are recommended: BMP.     Activity    Your activity upon discharge: activity as tolerated     Walker Order    I, the undersigned, certify that the above prescribed supplies are medically necessary for this patient and is both reasonable and necessary in reference to accepted standards of medical and necessary in reference to accepted standards of medical practice in the treatment of this patient's condition and is not prescribed as a convenience.      Nebulizer and Supplies Order    Nebulizer Documentation  I attest that I have seen and evaluated Renetta Farooq. She has a diagnosis of J44.1 - Chronic obstructive pulmonary disease with (acute) exacerbation and a nebulizer machine is needed to administer medication to improve breathing passages.     I, the undersigned, certify that the above prescribed supplies are medically necessary for this patient and is both reasonable and necessary in reference to accepted standards of medical and necessary in reference to accepted standards of medical practice in the treatment of this patient's condition and is not prescribed as a convenience.     Nebulizer and Supplies Order for DME - ONLY FOR DME    I, the undersigned, certify that the above prescribed supplies are medically necessary for this patient and is both reasonable and necessary in reference to accepted standards of medical and necessary in reference to accepted standards of medical practice in the treatment of this patient's condition and is not prescribed as a convenience.      Diet    Follow this diet upon discharge: Orders Placed This Encounter      Fluid restriction OTHER (SEE COMMENTS)      Regular Diet Adult       Significant Results and Procedures   Most Recent 3 CBC's:Recent Labs   Lab Test 04/05/23  1001 04/04/23  0907 04/03/23  0804   WBC 5.9 4.5 12.1*   HGB 11.8 11.6* 13.4   MCV 92 90 89    153 208     Most Recent 3 BMP's:Recent Labs   Lab Test 04/06/23  0756  04/05/23  1001 04/04/23  0907   * 130* 133*   POTASSIUM 3.6  3.7 3.6 4.0   CHLORIDE 100 95* 98   CO2 25 25 26   BUN 16 19 17   CR 0.78 0.80 0.80   ANIONGAP 10 10 9   KARRIE 8.1* 8.3* 9.0    103 135*     Most Recent 2 LFT's:Recent Labs   Lab Test 03/30/23  1037 03/01/23  1933   AST 17 14   ALT 10 <9   ALKPHOS 81 80   BILITOTAL 0.6 0.4     Most Recent 3 BNP's:No lab results found.  Most Recent D-dimer:Recent Labs   Lab Test 10/14/18  0606   DD 1.36*     7-Day Micro Results     Collected Updated Procedure Result Status      03/30/2023 1731 04/04/2023 2131 Blood Culture Arm, Left [[id number]]   Blood from Arm, Left    Final result Component Value   Culture No Growth               03/30/2023 1731 04/04/2023 2131 Blood Culture Arm, Right [[id number]]   Blood from Arm, Right    Final result Component Value   Culture No Growth                   Most Recent TSH and T4:Recent Labs   Lab Test 04/05/23  1001   TSH 0.33     Most Recent Urinalysis:Recent Labs   Lab Test 04/05/23  1418 04/03/23  1709 10/14/18  0802 10/14/18  0802   COLOR Light Yellow Colorless   < > Straw   APPEARANCE Clear Clear   < > Clear   URINEGLC Negative Negative   < > Negative   URINEBILI Negative Negative   < > Negative   URINEKETONE Negative Negative   < > Trace*   SG 1.008 1.005   < > 1.006   UBLD Negative Negative   < > Moderate*   URINEPH 6.5 6.5   < > 6.5   PROTEIN Negative Negative   < > Trace*   UROBILINOGEN  --   --   --  <2.0 E.U./dL   NITRITE Negative Negative   < > Negative   LEUKEST Negative Negative   < > Negative   RBCU  --  1   < > 10-25*   WBCU  --  <1   < > 0-5    < > = values in this interval not displayed.     Most Recent ESR & CRP:Recent Labs   Lab Test 03/30/23  1037   CRP <0.1   ,   Results for orders placed or performed during the hospital encounter of 04/03/23   XR Chest 2 Views    Narrative    EXAM: XR CHEST 2 VIEWS  LOCATION: [facility]  DATE/TIME: 4/3/2023 9:13 AM    INDICATION:  Dyspnea.  COMPARISON: Chest x-rays dated 03/30/2023 and CT chest dated 03/30/2023.    FINDINGS: Lungs are persistently hyperaerated but are otherwise grossly clear without infiltrate, nodule, mass, effusion, pneumothorax, or acute osseous fracture. Heart size is grossly within normal limits. Cervical spine fusion hardware is partially   imaged and there is some lucency in the region of the pedicle screws at approximately T2 indicating possible loosening.      Impression    IMPRESSION:   1. Mild hyperaeration.  2. No other evidence of acute cardiopulmonary disease is identified on this study. No significant change since the prior study dated 03/30/2023.  3. Questionable loosening of the pedicle screws at T2 (portion of the posterior fusion hardware for the lower cervical and upper thoracic spine). Recommend clinical correlation.       Discharge Medications   Current Discharge Medication List      START taking these medications    Details   calcium carbonate (TUMS) 500 MG chewable tablet Take 1 tablet (500 mg) by mouth 2 times daily  Qty: 60 tablet, Refills: 0    Associated Diagnoses: Hypocalcemia         CONTINUE these medications which have CHANGED    Details   FLUoxetine (PROZAC) 40 MG capsule Take 1 capsule (40 mg) by mouth daily for 30 days  Qty: 30 capsule, Refills: 0    Associated Diagnoses: Bipolar affective disorder, manic, severe, with psychotic behavior (H)      ipratropium - albuterol 0.5 mg/2.5 mg/3 mL (DUONEB) 0.5-2.5 (3) MG/3ML neb solution Take 1 vial (3 mLs) by nebulization 3 times daily For 1 week then every 6 hours as needed  Qty: 120 mL, Refills: 1    Associated Diagnoses: COPD exacerbation (H)      levofloxacin (LEVAQUIN) 500 MG tablet Take 1 tablet (500 mg) by mouth daily for 4 days  Qty: 4 tablet, Refills: 0    Associated Diagnoses: COPD exacerbation (H)      magnesium oxide (MAG-OX) 400 MG tablet Take 1 tablet (400 mg) by mouth daily  Qty: 30 tablet, Refills: 0    Associated Diagnoses:  Hypomagnesemia      predniSONE (DELTASONE) 20 MG tablet Take 2 tablets (40 mg) by mouth daily for 2 days  Qty: 4 tablet, Refills: 0    Associated Diagnoses: COPD exacerbation (H)      !! QUEtiapine (SEROQUEL) 100 MG tablet Take 1 tablet (100 mg) by mouth every morning  Qty: 30 tablet, Refills: 0    Associated Diagnoses: Bipolar affective disorder, manic, severe, with psychotic behavior (H)      !! QUEtiapine (SEROQUEL) 300 MG tablet Take 1 tablet (300 mg) by mouth At Bedtime for 30 days  Qty: 30 tablet, Refills: 0    Associated Diagnoses: Bipolar affective disorder, manic, severe, with psychotic behavior (H); Acute psychosis (H)      Vitamin D3 (CHOLECALCIFEROL) 25 mcg (1000 units) tablet Take 1 tablet (25 mcg) by mouth daily  Qty: 30 tablet, Refills: 0    Associated Diagnoses: Bipolar affective disorder, manic, severe, with psychotic behavior (H)       !! - Potential duplicate medications found. Please discuss with provider.      CONTINUE these medications which have NOT CHANGED    Details   albuterol (PROAIR HFA;PROVENTIL HFA;VENTOLIN HFA) 90 mcg/actuation inhaler [ALBUTEROL (PROAIR HFA;PROVENTIL HFA;VENTOLIN HFA) 90 MCG/ACTUATION INHALER] Inhale 2 puffs every 6 (six) hours as needed for wheezing.  Qty: 1 each, Refills: 0    Associated Diagnoses: Mucopurulent chronic bronchitis (H)      budesonide-formoterol (SYMBICORT) 160-4.5 MCG/ACT Inhaler Inhale 2 puffs into the lungs 2 times daily      buprenorphine HCl-naloxone HCl (SUBOXONE) 4-1 MG per film Place 1 Film under the tongue 2 times daily      docusate sodium (COLACE) 100 MG capsule [DOCUSATE SODIUM (COLACE) 100 MG CAPSULE] Take 1 capsule (100 mg total) by mouth 2 (two) times a day.  Qty: 60 capsule, Refills: 0    Associated Diagnoses: Opioid use disorder, severe, in sustained remission, on maintenance therapy (H)      docusate sodium (COLACE) 100 MG tablet Take 100 mg by mouth 2 times daily as needed for constipation      gabapentin (NEURONTIN) 300 MG capsule  Take 1 capsule (300 mg) by mouth At Bedtime      nicotine (NICODERM CQ) 14 MG/24HR 24 hr patch Place 1 patch onto the skin daily  Qty: 14 patch, Refills: 1    Associated Diagnoses: Acute bronchitis, unspecified organism      NYSTOP [id number] UNIT/GM powder Apply topically 3 times daily as needed      omeprazole (PRILOSEC) 20 MG DR capsule Take 20 mg by mouth At Bedtime           Allergies   Allergies   Allergen Reactions     Amoxicillin Diarrhea     Droperidol Angioedema

## 2023-04-07 NOTE — PROGRESS NOTES
Physical Therapy Discharge Summary    Reason for therapy discharge:    Discharged to home.    Progress towards therapy goal(s). See goals on Care Plan in [facility] electronic health record for goal details.  Goals met    Therapy recommendation(s):    No further therapy is recommended.

## 2023-04-10 ENCOUNTER — TELEPHONE (OUTPATIENT)
Dept: RESPIRATORY THERAPY | Facility: CLINIC | Age: 68
End: 2023-04-10
Payer: COMMERCIAL

## 2023-04-11 ENCOUNTER — HOSPITAL ENCOUNTER (INPATIENT)
Facility: CLINIC | Age: 68
LOS: 4 days | Discharge: HOME | DRG: 643 | End: 2023-04-15
Attending: EMERGENCY MEDICINE | Admitting: FAMILY MEDICINE
Payer: COMMERCIAL

## 2023-04-11 DIAGNOSIS — R55 SYNCOPE, UNSPECIFIED SYNCOPE TYPE: ICD-10-CM

## 2023-04-11 DIAGNOSIS — R06.00 DYSPNEA, UNSPECIFIED TYPE: ICD-10-CM

## 2023-04-11 DIAGNOSIS — E27.40 ADRENAL INSUFFICIENCY (H): ICD-10-CM

## 2023-04-11 DIAGNOSIS — G44.219 EPISODIC TENSION-TYPE HEADACHE, NOT INTRACTABLE: Primary | ICD-10-CM

## 2023-04-11 DIAGNOSIS — R30.0 DYSURIA: ICD-10-CM

## 2023-04-11 DIAGNOSIS — I95.9 HYPOTENSION, UNSPECIFIED HYPOTENSION TYPE: ICD-10-CM

## 2023-04-11 DIAGNOSIS — M54.2 CHRONIC NECK PAIN: ICD-10-CM

## 2023-04-11 DIAGNOSIS — E87.6 HYPOKALEMIA: ICD-10-CM

## 2023-04-11 DIAGNOSIS — F41.0 PANIC ATTACK: ICD-10-CM

## 2023-04-11 DIAGNOSIS — G89.29 CHRONIC NECK PAIN: ICD-10-CM

## 2023-04-11 LAB
ALBUMIN UR-MCNC: NEGATIVE MG/DL
AMPHETAMINES UR QL SCN: NORMAL
ANION GAP SERPL CALCULATED.3IONS-SCNC: 12 MMOL/L (ref 5–18)
APPEARANCE UR: CLEAR
ATRIAL RATE - MUSE: 85 BPM
BARBITURATES UR QL: NORMAL
BENZODIAZ UR QL: NORMAL
BILIRUB UR QL STRIP: NEGATIVE
BNP SERPL-MCNC: <10 PG/ML (ref 0–114)
BUN SERPL-MCNC: 13 MG/DL (ref 8–22)
CALCIUM SERPL-MCNC: 8.6 MG/DL (ref 8.5–10.5)
CANNABINOIDS UR QL SCN: NORMAL
CHLORIDE BLD-SCNC: 98 MMOL/L (ref 98–107)
CO2 SERPL-SCNC: 23 MMOL/L (ref 22–31)
COCAINE UR QL: NORMAL
COLOR UR AUTO: COLORLESS
CREAT SERPL-MCNC: 0.89 MG/DL (ref 0.6–1.1)
CREAT UR-MCNC: 16 MG/DL
DIASTOLIC BLOOD PRESSURE - MUSE: NORMAL MMHG
ERYTHROCYTE [DISTWIDTH] IN BLOOD BY AUTOMATED COUNT: 12.4 % (ref 10–15)
ETHANOL SERPL-MCNC: <10 MG/DL
GFR SERPL CREATININE-BSD FRML MDRD: 70 ML/MIN/1.73M2
GLUCOSE BLD-MCNC: 184 MG/DL (ref 70–125)
GLUCOSE BLDC GLUCOMTR-MCNC: 200 MG/DL (ref 70–99)
GLUCOSE UR STRIP-MCNC: NEGATIVE MG/DL
HCT VFR BLD AUTO: 36.8 % (ref 35–47)
HGB BLD-MCNC: 12.4 G/DL (ref 11.7–15.7)
HGB UR QL STRIP: NEGATIVE
HOLD SPECIMEN: NORMAL
HOLD SPECIMEN: NORMAL
INTERPRETATION ECG - MUSE: NORMAL
KETONES UR STRIP-MCNC: NEGATIVE MG/DL
LACTATE SERPL-SCNC: 1.3 MMOL/L (ref 0.7–2)
LACTATE SERPL-SCNC: 1.3 MMOL/L (ref 0.7–2)
LEUKOCYTE ESTERASE UR QL STRIP: NEGATIVE
MAGNESIUM SERPL-MCNC: 1.5 MG/DL (ref 1.8–2.6)
MCH RBC QN AUTO: 31.2 PG (ref 26.5–33)
MCHC RBC AUTO-ENTMCNC: 33.7 G/DL (ref 31.5–36.5)
MCV RBC AUTO: 93 FL (ref 78–100)
NITRATE UR QL: NEGATIVE
OPIATES UR QL SCN: NORMAL
OXYCODONE UR QL: NORMAL
P AXIS - MUSE: 59 DEGREES
PCP UR QL SCN: NORMAL
PH UR STRIP: 6.5 [PH] (ref 5–7)
PLATELET # BLD AUTO: 143 10E3/UL (ref 150–450)
POTASSIUM BLD-SCNC: 3 MMOL/L (ref 3.5–5)
PR INTERVAL - MUSE: 144 MS
PROCALCITONIN SERPL-MCNC: 0.02 NG/ML (ref 0–0.49)
QRS DURATION - MUSE: 86 MS
QT - MUSE: 398 MS
QTC - MUSE: 473 MS
R AXIS - MUSE: 64 DEGREES
RBC # BLD AUTO: 3.98 10E6/UL (ref 3.8–5.2)
RBC URINE: <1 /HPF
SODIUM SERPL-SCNC: 133 MMOL/L (ref 136–145)
SP GR UR STRIP: 1 (ref 1–1.03)
SYSTOLIC BLOOD PRESSURE - MUSE: NORMAL MMHG
T AXIS - MUSE: 66 DEGREES
TROPONIN I SERPL-MCNC: <0.01 NG/ML (ref 0–0.29)
TSH SERPL DL<=0.005 MIU/L-ACNC: 1.55 UIU/ML (ref 0.3–5)
UROBILINOGEN UR STRIP-MCNC: <2 MG/DL
VENTRICULAR RATE- MUSE: 85 BPM
WBC # BLD AUTO: 6.5 10E3/UL (ref 4–11)
WBC URINE: 0 /HPF

## 2023-04-11 PROCEDURE — 200N000001 HC R&B ICU

## 2023-04-11 PROCEDURE — 3E043XZ INTRODUCTION OF VASOPRESSOR INTO CENTRAL VEIN, PERCUTANEOUS APPROACH: ICD-10-PCS

## 2023-04-11 PROCEDURE — 272N000452 HC KIT SHRLOCK 5FR POWER PICC TRIPLE LUMEN

## 2023-04-11 PROCEDURE — 36569 INSJ PICC 5 YR+ W/O IMAGING: CPT

## 2023-04-11 PROCEDURE — 250N000011 HC RX IP 250 OP 636: Performed by: EMERGENCY MEDICINE

## 2023-04-11 PROCEDURE — 83735 ASSAY OF MAGNESIUM: CPT | Performed by: EMERGENCY MEDICINE

## 2023-04-11 PROCEDURE — 36415 COLL VENOUS BLD VENIPUNCTURE: CPT | Performed by: EMERGENCY MEDICINE

## 2023-04-11 PROCEDURE — 250N000013 HC RX MED GY IP 250 OP 250 PS 637: Performed by: EMERGENCY MEDICINE

## 2023-04-11 PROCEDURE — 83880 ASSAY OF NATRIURETIC PEPTIDE: CPT

## 2023-04-11 PROCEDURE — 83605 ASSAY OF LACTIC ACID: CPT

## 2023-04-11 PROCEDURE — 82962 GLUCOSE BLOOD TEST: CPT

## 2023-04-11 PROCEDURE — 82533 TOTAL CORTISOL: CPT | Performed by: INTERNAL MEDICINE

## 2023-04-11 PROCEDURE — 82077 ASSAY SPEC XCP UR&BREATH IA: CPT | Performed by: EMERGENCY MEDICINE

## 2023-04-11 PROCEDURE — 258N000003 HC RX IP 258 OP 636: Performed by: INTERNAL MEDICINE

## 2023-04-11 PROCEDURE — 258N000003 HC RX IP 258 OP 636: Performed by: EMERGENCY MEDICINE

## 2023-04-11 PROCEDURE — 96365 THER/PROPH/DIAG IV INF INIT: CPT

## 2023-04-11 PROCEDURE — 81001 URINALYSIS AUTO W/SCOPE: CPT | Performed by: EMERGENCY MEDICINE

## 2023-04-11 PROCEDURE — 250N000011 HC RX IP 250 OP 636: Performed by: INTERNAL MEDICINE

## 2023-04-11 PROCEDURE — 84145 PROCALCITONIN (PCT): CPT | Performed by: INTERNAL MEDICINE

## 2023-04-11 PROCEDURE — 84484 ASSAY OF TROPONIN QUANT: CPT

## 2023-04-11 PROCEDURE — 99223 1ST HOSP IP/OBS HIGH 75: CPT | Mod: AI

## 2023-04-11 PROCEDURE — 80307 DRUG TEST PRSMV CHEM ANLYZR: CPT | Performed by: EMERGENCY MEDICINE

## 2023-04-11 PROCEDURE — 83605 ASSAY OF LACTIC ACID: CPT | Performed by: EMERGENCY MEDICINE

## 2023-04-11 PROCEDURE — 82024 ASSAY OF ACTH: CPT | Performed by: INTERNAL MEDICINE

## 2023-04-11 PROCEDURE — 99285 EMERGENCY DEPT VISIT HI MDM: CPT | Mod: 25

## 2023-04-11 PROCEDURE — 99291 CRITICAL CARE FIRST HOUR: CPT | Performed by: INTERNAL MEDICINE

## 2023-04-11 PROCEDURE — 96375 TX/PRO/DX INJ NEW DRUG ADDON: CPT

## 2023-04-11 PROCEDURE — 36415 COLL VENOUS BLD VENIPUNCTURE: CPT

## 2023-04-11 PROCEDURE — 80048 BASIC METABOLIC PNL TOTAL CA: CPT | Performed by: EMERGENCY MEDICINE

## 2023-04-11 PROCEDURE — 250N000009 HC RX 250: Performed by: EMERGENCY MEDICINE

## 2023-04-11 PROCEDURE — 85027 COMPLETE CBC AUTOMATED: CPT | Performed by: EMERGENCY MEDICINE

## 2023-04-11 PROCEDURE — 250N000013 HC RX MED GY IP 250 OP 250 PS 637

## 2023-04-11 PROCEDURE — 93005 ELECTROCARDIOGRAM TRACING: CPT | Performed by: EMERGENCY MEDICINE

## 2023-04-11 PROCEDURE — 96361 HYDRATE IV INFUSION ADD-ON: CPT

## 2023-04-11 PROCEDURE — 84443 ASSAY THYROID STIM HORMONE: CPT | Performed by: INTERNAL MEDICINE

## 2023-04-11 RX ORDER — PROCHLORPERAZINE 25 MG
12.5 SUPPOSITORY, RECTAL RECTAL EVERY 12 HOURS PRN
Status: DISCONTINUED | OUTPATIENT
Start: 2023-04-11 | End: 2023-04-15 | Stop reason: HOSPADM

## 2023-04-11 RX ORDER — IBUPROFEN 200 MG
600 TABLET ORAL EVERY 8 HOURS PRN
COMMUNITY

## 2023-04-11 RX ORDER — POTASSIUM CHLORIDE 1500 MG/1
40 TABLET, EXTENDED RELEASE ORAL ONCE
Status: COMPLETED | OUTPATIENT
Start: 2023-04-11 | End: 2023-04-11

## 2023-04-11 RX ORDER — CLONAZEPAM 0.5 MG/1
0.5 TABLET ORAL ONCE
Status: COMPLETED | OUTPATIENT
Start: 2023-04-11 | End: 2023-04-11

## 2023-04-11 RX ORDER — ALBUTEROL SULFATE 90 UG/1
2 AEROSOL, METERED RESPIRATORY (INHALATION) EVERY 6 HOURS PRN
Status: DISCONTINUED | OUTPATIENT
Start: 2023-04-11 | End: 2023-04-15 | Stop reason: HOSPADM

## 2023-04-11 RX ORDER — PANTOPRAZOLE SODIUM 40 MG/1
40 TABLET, DELAYED RELEASE ORAL AT BEDTIME
Status: DISCONTINUED | OUTPATIENT
Start: 2023-04-11 | End: 2023-04-15 | Stop reason: HOSPADM

## 2023-04-11 RX ORDER — ONDANSETRON 2 MG/ML
4 INJECTION INTRAMUSCULAR; INTRAVENOUS EVERY 6 HOURS PRN
Status: DISCONTINUED | OUTPATIENT
Start: 2023-04-11 | End: 2023-04-15 | Stop reason: HOSPADM

## 2023-04-11 RX ORDER — BUPRENORPHINE AND NALOXONE 2; .5 MG/1; MG/1
2 FILM, SOLUBLE BUCCAL; SUBLINGUAL 2 TIMES DAILY
Status: DISCONTINUED | OUTPATIENT
Start: 2023-04-11 | End: 2023-04-15 | Stop reason: HOSPADM

## 2023-04-11 RX ORDER — POLYETHYLENE GLYCOL 3350 17 G/17G
17 POWDER, FOR SOLUTION ORAL DAILY PRN
Status: DISCONTINUED | OUTPATIENT
Start: 2023-04-11 | End: 2023-04-12

## 2023-04-11 RX ORDER — LIDOCAINE 40 MG/G
CREAM TOPICAL
Status: ACTIVE | OUTPATIENT
Start: 2023-04-11 | End: 2023-04-14

## 2023-04-11 RX ORDER — LEVOFLOXACIN 500 MG/1
500 TABLET, FILM COATED ORAL DAILY
Status: ON HOLD | COMMUNITY
End: 2023-04-15

## 2023-04-11 RX ORDER — FLUTICASONE FUROATE AND VILANTEROL 100; 25 UG/1; UG/1
1 POWDER RESPIRATORY (INHALATION) DAILY
Status: DISCONTINUED | OUTPATIENT
Start: 2023-04-11 | End: 2023-04-15 | Stop reason: HOSPADM

## 2023-04-11 RX ORDER — GABAPENTIN 300 MG/1
300 CAPSULE ORAL 3 TIMES DAILY
Status: DISCONTINUED | OUTPATIENT
Start: 2023-04-11 | End: 2023-04-14

## 2023-04-11 RX ORDER — LIDOCAINE 40 MG/G
CREAM TOPICAL
Status: DISCONTINUED | OUTPATIENT
Start: 2023-04-11 | End: 2023-04-15 | Stop reason: HOSPADM

## 2023-04-11 RX ORDER — QUETIAPINE FUMARATE 25 MG/1
100 TABLET, FILM COATED ORAL EVERY MORNING
Status: DISCONTINUED | OUTPATIENT
Start: 2023-04-12 | End: 2023-04-15 | Stop reason: HOSPADM

## 2023-04-11 RX ORDER — POTASSIUM CHLORIDE 1500 MG/1
20 TABLET, EXTENDED RELEASE ORAL DAILY
COMMUNITY

## 2023-04-11 RX ORDER — NALOXONE HYDROCHLORIDE 0.4 MG/ML
1 INJECTION, SOLUTION INTRAMUSCULAR; INTRAVENOUS; SUBCUTANEOUS
Status: DISCONTINUED | OUTPATIENT
Start: 2023-04-11 | End: 2023-04-15 | Stop reason: HOSPADM

## 2023-04-11 RX ORDER — ACETAMINOPHEN 650 MG/1
650 SUPPOSITORY RECTAL EVERY 6 HOURS PRN
Status: DISCONTINUED | OUTPATIENT
Start: 2023-04-11 | End: 2023-04-15 | Stop reason: HOSPADM

## 2023-04-11 RX ORDER — QUETIAPINE FUMARATE 100 MG/1
300 TABLET, FILM COATED ORAL AT BEDTIME
Status: DISCONTINUED | OUTPATIENT
Start: 2023-04-11 | End: 2023-04-15 | Stop reason: HOSPADM

## 2023-04-11 RX ORDER — ACETAMINOPHEN 325 MG/1
650 TABLET ORAL EVERY 6 HOURS PRN
Status: DISCONTINUED | OUTPATIENT
Start: 2023-04-11 | End: 2023-04-15 | Stop reason: HOSPADM

## 2023-04-11 RX ORDER — COSYNTROPIN 0.25 MG/ML
250 INJECTION, POWDER, FOR SOLUTION INTRAMUSCULAR; INTRAVENOUS ONCE
Status: COMPLETED | OUTPATIENT
Start: 2023-04-11 | End: 2023-04-11

## 2023-04-11 RX ORDER — ONDANSETRON 4 MG/1
4 TABLET, ORALLY DISINTEGRATING ORAL EVERY 6 HOURS PRN
Status: DISCONTINUED | OUTPATIENT
Start: 2023-04-11 | End: 2023-04-15 | Stop reason: HOSPADM

## 2023-04-11 RX ORDER — NICOTINE 21 MG/24HR
1 PATCH, TRANSDERMAL 24 HOURS TRANSDERMAL DAILY
Status: DISCONTINUED | OUTPATIENT
Start: 2023-04-11 | End: 2023-04-15 | Stop reason: HOSPADM

## 2023-04-11 RX ORDER — PROCHLORPERAZINE MALEATE 5 MG
5 TABLET ORAL EVERY 6 HOURS PRN
Status: DISCONTINUED | OUTPATIENT
Start: 2023-04-11 | End: 2023-04-15 | Stop reason: HOSPADM

## 2023-04-11 RX ORDER — NOREPINEPHRINE BITARTRATE 0.02 MG/ML
.01-.6 INJECTION, SOLUTION INTRAVENOUS CONTINUOUS
Status: DISCONTINUED | OUTPATIENT
Start: 2023-04-11 | End: 2023-04-13

## 2023-04-11 RX ORDER — MAGNESIUM SULFATE HEPTAHYDRATE 40 MG/ML
2 INJECTION, SOLUTION INTRAVENOUS ONCE
Status: COMPLETED | OUTPATIENT
Start: 2023-04-11 | End: 2023-04-11

## 2023-04-11 RX ADMIN — SODIUM CHLORIDE 1000 ML: 9 INJECTION, SOLUTION INTRAVENOUS at 11:36

## 2023-04-11 RX ADMIN — CLONAZEPAM 0.5 MG: 0.5 TABLET ORAL at 15:54

## 2023-04-11 RX ADMIN — FLUOXETINE 40 MG: 20 CAPSULE ORAL at 18:29

## 2023-04-11 RX ADMIN — GABAPENTIN 300 MG: 300 CAPSULE ORAL at 21:21

## 2023-04-11 RX ADMIN — NICOTINE 1 PATCH: 14 PATCH, EXTENDED RELEASE TRANSDERMAL at 18:29

## 2023-04-11 RX ADMIN — BUPRENORPHINE AND NALOXONE 2 FILM: 2; .5 FILM BUCCAL; SUBLINGUAL at 21:20

## 2023-04-11 RX ADMIN — ACETAMINOPHEN 650 MG: 325 TABLET ORAL at 18:29

## 2023-04-11 RX ADMIN — SODIUM CHLORIDE 1000 ML: 9 INJECTION, SOLUTION INTRAVENOUS at 10:50

## 2023-04-11 RX ADMIN — Medication 0.03 MCG/KG/MIN: at 12:45

## 2023-04-11 RX ADMIN — POTASSIUM CHLORIDE 40 MEQ: 1500 TABLET, EXTENDED RELEASE ORAL at 11:26

## 2023-04-11 RX ADMIN — NALOXONE HYDROCHLORIDE 0.4 MG: 0.4 INJECTION, SOLUTION INTRAMUSCULAR; INTRAVENOUS; SUBCUTANEOUS at 10:46

## 2023-04-11 RX ADMIN — VASOPRESSIN 2.4 UNITS/HR: 20 INJECTION, SOLUTION INTRAMUSCULAR; SUBCUTANEOUS at 17:42

## 2023-04-11 RX ADMIN — PANTOPRAZOLE SODIUM 40 MG: 40 TABLET, DELAYED RELEASE ORAL at 21:21

## 2023-04-11 RX ADMIN — Medication 0.2 MCG/KG/MIN: at 18:45

## 2023-04-11 RX ADMIN — LIDOCAINE HYDROCHLORIDE 2 ML: 10 INJECTION, SOLUTION EPIDURAL; INFILTRATION; INTRACAUDAL; PERINEURAL at 15:08

## 2023-04-11 RX ADMIN — MAGNESIUM SULFATE HEPTAHYDRATE 2 G: 40 INJECTION, SOLUTION INTRAVENOUS at 11:26

## 2023-04-11 RX ADMIN — COSYNTROPIN 250 MCG: 0.25 INJECTION, POWDER, LYOPHILIZED, FOR SOLUTION INTRAVENOUS at 19:57

## 2023-04-11 RX ADMIN — QUETIAPINE 300 MG: 100 TABLET ORAL at 21:20

## 2023-04-11 ASSESSMENT — ACTIVITIES OF DAILY LIVING (ADL)
ADLS_ACUITY_SCORE: 35
CHANGE_IN_FUNCTIONAL_STATUS_SINCE_ONSET_OF_CURRENT_ILLNESS/INJURY: NO
ADLS_ACUITY_SCORE: 27
WALKING_OR_CLIMBING_STAIRS_DIFFICULTY: NO
NUMBER_OF_TIMES_PATIENT_HAS_FALLEN_WITHIN_LAST_SIX_MONTHS: 1
DRESSING/BATHING_DIFFICULTY: NO
ADLS_ACUITY_SCORE: 35
ADLS_ACUITY_SCORE: 25
TOILETING: 1-->ASSISTANCE (EQUIPMENT/PERSON) NEEDED
WEAR_GLASSES_OR_BLIND: NO
EQUIPMENT_CURRENTLY_USED_AT_HOME: CANE, QUAD;WALKER, HEMI
DIFFICULTY_EATING/SWALLOWING: NO
ADLS_ACUITY_SCORE: 35
FALL_HISTORY_WITHIN_LAST_SIX_MONTHS: YES
DOING_ERRANDS_INDEPENDENTLY_DIFFICULTY: YES
ADLS_ACUITY_SCORE: 25
DIFFICULTY_COMMUNICATING: NO
ADLS_ACUITY_SCORE: 25
TOILETING: 1-->ASSISTANCE (EQUIPMENT/PERSON) NEEDED (NOT DEVELOPMENTALLY APPROPRIATE)
CONCENTRATING,_REMEMBERING_OR_MAKING_DECISIONS_DIFFICULTY: YES
TOILETING_ASSISTANCE: TOILETING DIFFICULTY, REQUIRES EQUIPMENT
TOILETING_ISSUES: YES

## 2023-04-11 NOTE — LETTER
April 25, 2023      Renetta Farooq  [address] [place] [id number]        Dear ,    We are writing to inform you of your test results.    Your urine culture shows your UTI is resistant to the Bactrim we prescribed. If you are still experiencing symptoms, please ask for your PCP to send you a prescription for nitrofurantoin.     Resulted Orders   Urine Culture   Result Value Ref Range    Culture >100,000 CFU/mL Escherichia coli ESBL (A)        If you have any questions or concerns, please call the clinic at the number listed above.       Sincerely,      Corine Dennis MD

## 2023-04-11 NOTE — PROCEDURES
"Procedures  PICC Line Insertion Procedure Note  Pt. Name: Renetta Farooq  MRN:        [id number]    Procedure: Insertion of a  triple Lumen  5 fr  Bard SOLO (valved) Power PICC, Lot number [id number]    Indications: Vasopressors    Contraindications : None noted in notes    Procedure Details     Patient identified with 2 identifiers and \"Time Out\" conducted.  .     Central line insertion bundle followed: hand hygiene performed prior to procedure, site cleansed with cholraprep, hat, mask, sterile gloves, sterile gown worn, patient draped with maximum barrier head to toe drape, sterile field maintained.    The vein was assessed and found to be compressible and of adequate size.     Lidocaine 1% 3 ml administered sq to the insertion site. A 5 Fr PICC was inserted into the basilic vein of the right arm with ultrasound guidance. 1 attempt(s) required to access vein.   Catheter threaded without difficulty. Good blood return noted.    Modified Seldinger Technique used for insertion.    The 8 sharps that are included in the PICC insertion kit were accounted for and disposed of in the sharps container prior to breakdown of the sterile field.    Catheter secured with Statlock, biopatch and Tegaderm dressing applied.    Findings:    Total catheter length  40 cm, with 2 cm exposed. Mid upper arm circumference is 38 cm. Catheter was flushed with 30 cc NS. Patient  tolerated procedure well.    Tip placement verified by 3CG . Tip placement in the SVC.      CLABSI prevention brochure left at bedside.    Patient's primary RN notified PICC is ready for use.      Comments:        Inge Luna RN,BSN  [facility] - [facility]        "

## 2023-04-11 NOTE — CONSULTS
PULMONARY/CRITICAL CARE CONSULT NOTE    Date / Time of Admission:  4/11/2023 10:45 AM  Assessment:   67yoF with history of clonidine and suboxone abuse, cognitive impairment, previous hospitalizations for clonidine ingestion presents with hypotension, unresponsive to fluids. No obvious source of infection present.     Clinically Significant Risk Factors Present on Admission        # Hypokalemia: Lowest K = 3 mmol/L in last 2 days, will replace as needed     # Hypomagnesemia: Lowest Mg = 1.5 mg/dL in last 2 days, will replace as needed         # Circulatory Shock: currently requiring pressors for blood pressure support                 Principal Problem:    Hypokalemia  Active Problems:    Hypotension, unspecified hypotension type      Advance Directives:  Full Code  Critical Care Time greater than: 35 minutes, patient is critically ill and requires ICU monitoring due to hemodynamic instability.       Plan:   C/V:  1) Shock of unclear etiology--potentially medication related.  -Volume resuscitated  -Norepi at 0.2  -Vasopressin added  -Cosyntropin test pending  -Echo in am if still requiring high levels of vasopressin  -Cardiology consulted by primary service, not clear that there is much for them to add at this time.     Renal:  1) Hypokalemia  -Replete, patient on protocol    Neuro:  1) Significant medication abuse history  2) Chronic pain  -Home meds have been continued--prozac and Seroquel. Agree with holding Klonopin  -Suboxone has been continued, watch closely out of concern for hypotension  -Agree with psych consult given history  -Tox negative but doesn't test for many substances.     ID:  1) No evidence of infection  -Procalcitonin pending  -Agree with holding antibiotics until potential source identified    GI:  1) Concern for malnutrition    Endo:  1) Hypotension of unclear etiology  -Cosyntropin  -Check TSH        This patient is critically ill due to hemodynamic instability requiring vasopressor. 45  "minutes critical care time.             Subjective:    Cc: unresponsive    HPI: 67 year old female with history of bipolar disorder, chronic narcotic dependence, methamphetamine abuse who presented to ED 4/11 after unresponsive episode. In Ed found to be hypotensive despite volume resuscitation. Had similar presentation previously after intentional clonidine ingestion but then was bradycardic, currently HR . She is awake and alert currently. Doesn't know why she is hypotensive. Denies ingestion. Norepi initiated and titrated up to 0.2. Stable there with negative lactate, no fever or leukocytosis. Last echo 1/2023 with EF 65-70%, normal RV, trace TR so unlikely cardiac source given multiple presentations with similar.     Past Medical History:   Diagnosis Date     Acute psychosis (H) 10/14/2018     Alcohol dependence (H) 4/10/2015     Bipolar affective disorder, manic, severe, with psychotic behavior (H) 10/15/2018     Candida infection 10/19/2018     Chronic hepatitis C (H) 4/10/2015     Chronic neck pain      Closed traumatic brain injury, without loss of consciousness, sequela (H)      Dental abscess      Episodic tension-type headache, not intractable      Methamphetamine dependence (H) 10/15/2018     Pancytopenia (H) 4/10/2015     Weakness of right arm 4/10/2015       Social History     Tobacco Use     Smoking status: Every Day     Packs/day: 1.00     Years: 54.00     Pack years: 54.00     Types: Cigarettes     Start date: 1969     Smokeless tobacco: Never     Tobacco comments:     Seen IP by CTTS on 3/31/2023   Vaping Use     Vaping status: Not on file   Substance Use Topics     Alcohol use: Yes     Comment: Alcoholic Drinks/day: \"A lot.\"  (Couldn't quantify except to repeat \"A lot\" of wine, liquor and beer)       Family History   Problem Relation Age of Onset     Narcolepsy Mother      Acute myelogenous leukemia Mother      Cancer Father        Current Facility-Administered Medications   Medication     " "acetaminophen (TYLENOL) tablet 650 mg    Or     acetaminophen (TYLENOL) Suppository 650 mg     albuterol (PROVENTIL HFA/VENTOLIN HFA) inhaler     buprenorphine HCl-naloxone HCl (SUBOXONE) 4-1 MG per film 1 Film     FLUoxetine (PROzac) capsule 40 mg     fluticasone-vilanterol (BREO ELLIPTA) 100-25 MCG/ACT inhaler 1 puff     gabapentin (NEURONTIN) capsule 300 mg     lidocaine (LMX4) cream     lidocaine (LMX4) cream     lidocaine 1 % 0.1-1 mL     lidocaine 1 % 0.1-5 mL     melatonin tablet 1 mg     naloxone (NARCAN) injection 1 mg     nicotine (NICODERM CQ) 14 MG/24HR 24 hr patch 1 patch    And     nicotine Patch in Place     norepinephrine (LEVOPHED) 4 mg in  mL infusion PREMIX     omeprazole (priLOSEC) CR capsule 20 mg     ondansetron (ZOFRAN ODT) ODT tab 4 mg    Or     ondansetron (ZOFRAN) injection 4 mg     polyethylene glycol (MIRALAX) Packet 17 g     prochlorperazine (COMPAZINE) injection 5 mg    Or     prochlorperazine (COMPAZINE) tablet 5 mg    Or     prochlorperazine (COMPAZINE) suppository 12.5 mg     [START ON 4/12/2023] QUEtiapine (SEROquel) tablet 100 mg     QUEtiapine (SEROquel) tablet 300 mg     sodium chloride (PF) 0.9% PF flush 10-40 mL     sodium chloride (PF) 0.9% PF flush 3 mL     sodium chloride (PF) 0.9% PF flush 3 mL     vasopressin (VASOSTRICT) 20 Units in sodium chloride 0.9 % 100 mL standard conc infusion         Review of Systems: 12-point Review performed and negative aside from that noted in HPI.    Objective:    Vital signs:  BP 94/53   Pulse 80   Temp 97.4  F (36.3  C) (Temporal)   Resp 13   Ht 1.676 m (5' 6\")   Wt 59 kg (130 lb)   SpO2 94%   BMI 20.98 kg/m      GENERAL APPEARANCE: disheveled, appearing older than stated age.     EYES: EOMI, - PERRL     NECK: no adenopathy, no asymmetry, masses, or scars and thyroid normal to palpation     RESP: lungs clear to auscultation - no rales, rhonchi or wheezes     CV: regular rates and rhythm, normal S1 S2, no S3 or S4 and no " murmur, click or rub -     ABDOMEN:  soft, nontender, no HSM or masses and bowel sounds normal     MS: extremities normal- no gross deformities noted, no evidence of inflammation in joints, FROM in all extremities.     SKIN: no suspicious lesions or rashes     NEURO: Normal strength and tone, sensory exam grossly normal, mentation intact and speech normal     LYMPHATICS: No axillary, cervical, inguinal, or supraclavicular nodes        Data    CXR:  EKG:    Laboratory:  Results for orders placed or performed during the hospital encounter of 04/11/23   Basic metabolic panel   Result Value Ref Range    Sodium 133 (L) 136 - 145 mmol/L    Potassium 3.0 (L) 3.5 - 5.0 mmol/L    Chloride 98 98 - 107 mmol/L    Carbon Dioxide (CO2) 23 22 - 31 mmol/L    Anion Gap 12 5 - 18 mmol/L    Urea Nitrogen 13 8 - 22 mg/dL    Creatinine 0.89 0.60 - 1.10 mg/dL    Calcium 8.6 8.5 - 10.5 mg/dL    Glucose 184 (H) 70 - 125 mg/dL    GFR Estimate 70 >60 mL/min/1.73m2     Lab Results   Component Value Date    WBC 6.5 04/11/2023    HGB 12.4 04/11/2023    HCT 36.8 04/11/2023    MCV 93 04/11/2023     (L) 04/11/2023       Lactate 1.3

## 2023-04-11 NOTE — ED TRIAGE NOTES
Pt presents for seizure-like activity. Hypotensive and unresponsive in triage     Triage Assessment     Row Name 04/11/23 1039       Triage Assessment (Adult)    Airway WDL WDL       Respiratory WDL    Respiratory WDL WDL       Skin Circulation/Temperature WDL    Skin Circulation/Temperature WDL WDL       Cardiac WDL    Cardiac WDL X  hypotensive       Peripheral/Neurovascular WDL    Peripheral Neurovascular WDL WDL    Capillary Refill, General less than/equal to 3 secs       Cognitive/Neuro/Behavioral WDL    Cognitive/Neuro/Behavioral WDL WDL       [place] Coma Scale    Best Eye Response 1-->(E1) none    Best Motor Response 1-->(M1) none    Best Verbal Response 1-->(V1) none    [place] Coma Scale Score 3

## 2023-04-11 NOTE — PHARMACY-ADMISSION MEDICATION HISTORY
Pharmacist Admission Medication History    Admission medication history is complete. The information provided in this note is only as accurate as the sources available at the time of the update.    Medication reconciliation/reorder completed by provider prior to medication history? No    Information Source(s): Patient and CareEverywhere/SureScripts via in-person    Pertinent Information:  . Ms. Ricardo was just discharged from [facility] on 4/6/23 .     Changes made to PTA medication list:    Added: Potassium     Deleted: None    Changed: None    Medication Affordability:  Not including over the counter (OTC) medications, was there a time in the past 12 months when you did not take your medications as prescribed because of cost?: No    Allergies reviewed with patient and updates made in EHR: yes    Medication History Completed By: Ashley Lugo [facility] 4/11/2023 2:15 PM    PTA Med List   Medication Sig Note Last Dose     albuterol (PROAIR HFA;PROVENTIL HFA;VENTOLIN HFA) 90 mcg/actuation inhaler [ALBUTEROL (PROAIR HFA;PROVENTIL HFA;VENTOLIN HFA) 90 MCG/ACTUATION INHALER] Inhale 2 puffs every 6 (six) hours as needed for wheezing.  Unknown     budesonide-formoterol (SYMBICORT) 160-4.5 MCG/ACT Inhaler Inhale 2 puffs into the lungs 2 times daily  4/10/2023 at does not have - does not need in hospital     buprenorphine HCl-naloxone HCl (SUBOXONE) 4-1 MG per film Place 1 Film under the tongue 2 times daily  4/10/2023     calcium carbonate (TUMS) 500 MG chewable tablet Take 1 tablet (500 mg) by mouth 2 times daily  4/10/2023     docusate sodium (COLACE) 100 MG capsule [DOCUSATE SODIUM (COLACE) 100 MG CAPSULE] Take 1 capsule (100 mg total) by mouth 2 (two) times a day.  4/10/2023     docusate sodium (COLACE) 100 MG tablet Take 100 mg by mouth 2 times daily as needed for constipation  Unknown     FLUoxetine (PROZAC) 40 MG capsule Take 1 capsule (40 mg) by mouth daily for 30 days  4/10/2023     gabapentin  (NEURONTIN) 300 MG capsule Take 300 mg by mouth 3 times daily  4/10/2023     ibuprofen (ADVIL/MOTRIN) 200 MG tablet Take 600 mg by mouth every 8 hours as needed for pain  4/10/2023     ipratropium - albuterol 0.5 mg/2.5 mg/3 mL (DUONEB) 0.5-2.5 (3) MG/3ML neb solution Take 1 vial (3 mLs) by nebulization 3 times daily For 1 week then every 6 hours as needed (Patient taking differently: Take 3 mLs by nebulization every 6 hours as needed for shortness of breath For 1 week then every 6 hours as needed)  Unknown     levofloxacin (LEVAQUIN) 500 MG tablet Take 500 mg by mouth daily X 4 days - 2 doses left  4/10/2023     magnesium oxide (MAG-OX) 400 MG tablet Take 1 tablet (400 mg) by mouth daily  4/10/2023     nicotine (NICODERM CQ) 14 MG/24HR 24 hr patch Place 1 patch onto the skin daily  Past Week at patch not on     NYSTOP 197614 UNIT/GM powder Apply topically 3 times daily as needed  Unknown     potassium chloride ER (KLOR-CON M) 20 MEQ CR tablet Take 20 mEq by mouth daily  4/10/2023     QUEtiapine (SEROQUEL) 100 MG tablet Take 1 tablet (100 mg) by mouth every morning  4/10/2023     QUEtiapine (SEROQUEL) 300 MG tablet Take 1 tablet (300 mg) by mouth At Bedtime for 30 days 4/11/2023: Pt has not picked up 300mg - still using 3 - 100mg tabs 4/10/2023     Vitamin D3 (CHOLECALCIFEROL) 25 mcg (1000 units) tablet Take 1 tablet (25 mcg) by mouth daily  4/10/2023

## 2023-04-11 NOTE — ED PROVIDER NOTES
EMERGENCY DEPARTMENT ENCOUnter      NAME: Renetta Farooq  AGE: 67 year old female  YOB: 1955  MRN: [id number]  EVALUATION DATE & TIME: No admission date for patient encounter.    PCP: Jamila Connelly    ED PROVIDER: Vu Macias DO      Chief Complaint   Patient presents with     Generalized Weakness     Altered Mental Status         FINAL IMPRESSION:  1. Hypotension, unspecified hypotension type    2. Hypokalemia          ED COURSE & MEDICAL DECISION MAKING:    10:39 AM Called to triage for assessment. Patient unable to provide history at this time.  10:50 AM Rechecked the patient.  10:58 AM Rechecked the patient. Patient's son now at bedside.  11:56 AM the patient continues to be somewhat hypotensive.  We are continuing fluid resuscitation.  She is now awake and alert.  She complained of some right shoulder pain and she stated that she was unsure if she fell.  An x-ray was ordered but the patient is refusing this test.  I did discuss her case with poison control since she has a history of clonidine abuse.  If she did take extra doses of clonidine today this could account for her hypotension but typically that would also lead to bradycardia which she does not have today.  The patient does not admit to taking any additional clonidine today.  12:34 PM Rechecked the patient.  1:45 PM Spoke with Dr. Pereyra, intensivist. Discussed the patient's case. Patient will be admitted to the ICU.  1:54 PM Call placed to admitting service.  2:31 PM I spoke with the resident service physician. We discussed the patient's case and they agree to admit the patient.    The patient presented to the emergency department today due to altered mental status.  She was fairly unresponsive at the time of initial arrival.  She was also hypotensive.  Shortly after she was roomed she became much more arousable and has since been awake for the remainder of her ER stay.  She has been persistently tachycardic despite IV fluids so  Levophed was started and a PICC line has been ordered.  Laboratory testing has been unremarkable with no signs of sepsis or other specific etiologies to explain her symptoms.  Her case was discussed with the intensivist who is in agreement with the plan for admission to the ICU for further monitoring and treatment.    The patient is critically ill and has required 65 minutes of critical care time exclusive of procedures. This includes time spent interviewing the patient, ordering tests and medications, monitoring vital signs, reviewing results, patient updates, discussing the case with family and consultants, and admission.        Medical Decision Making    History:    Supplemental history from: N/A    External Record(s) reviewed: Documented in chart, if applicable.    Work Up:    Chart documentation includes differential considered and any EKGs or imaging independently interpreted by provider, where specified.    In additional to work up documented, I considered the following work up: Documented in chart, if applicable.    External consultation:    Discussion of management with another provider: Documented in chart, if applicable    Complicating factors:    Care impacted by chronic illness: Chronic Lung Disease    Care affected by social determinants of health: Alcohol Abuse and/or Recreational Drug Use    Disposition considerations: Admit.        At the conclusion of the encounter I discussed the results of all of the tests and the disposition. The questions were answered. The patient or family acknowledged understanding and was agreeable with the care plan.         MEDICATIONS GIVEN IN THE EMERGENCY:  Medications   naloxone (NARCAN) injection 1 mg (0.4 mg Intravenous $Given 4/11/23 1046)   norepinephrine (LEVOPHED) 4 mg in  mL infusion PREMIX (0.2 mcg/kg/min × 59 kg Intravenous Rate/Dose Change 4/11/23 6687)   lidocaine 1 % 0.1-5 mL (has no administration in time range)   lidocaine (LMX4) cream (has no  administration in time range)   sodium chloride (PF) 0.9% PF flush 10-40 mL (has no administration in time range)   0.9% sodium chloride BOLUS (0 mLs Intravenous Stopped 4/11/23 1126)   potassium chloride ER (KLOR-CON M) CR tablet 40 mEq (40 mEq Oral $Given 4/11/23 1126)   magnesium sulfate 2 g in 50 mL sterile water intermittent infusion (0 g Intravenous Stopped 4/11/23 1240)   0.9% sodium chloride BOLUS (0 mLs Intravenous Stopped 4/11/23 1224)       NEW PRESCRIPTIONS STARTED AT TODAY'S ER VISIT  New Prescriptions    No medications on file          =================================================================    HPI    HPI limited due to condition of the patient.      Renetta Farooq is a 67 year old female with a pertinent history of COPD, alcohol abuse, methamphetamine abuse, and opioid use who presents to this ED via drop off for evaluation of altered mental status.    Per chart review, the patient was admitted to [facility] from 4/3/23 to 4/6/23 (3 days). The patient initially presented to the ED for evaluation of shortness of breath, diagnosed with COPD exacerbation and bronchitis. The patient had an episode of angioedema in the ED after administration of droperidol. While admitted she was evaluated by physical and occupational therapy, ambulated well and safe for discharge. Patient refused home cares referral. Patient provided with homelessness resources by care management.     The patient was seen in [facility] on 4/9/23 presenting with shortness of breath. Chest x-ray with normal hear size and clear lungs. Labs were significant for mild hypokalemia, otherwise unremarkable. Hypokalemia supplemented orally. Overall workup reassuring. Patient was discharged to home.    Per triage nurse report, the patient was brought in to the Emergency Department by nursing staff from a private vehicle. The patient was unresponsive and per  of the vehicle had seizure-like activity. The  patient is unresponsive in triage. She is breathing on her own and has a strong pulse. She was recently admitted for electrolyte abnormalities. Blood pressure 67/49, repeat 77/52.    Per nursing staff, the patient was dropped off by her son. The patient's son was then seen driving away from the department.    REVIEW OF SYSTEMS   Unable to obtain ROS due to condition of the patient.      PAST MEDICAL HISTORY:  Past Medical History:   Diagnosis Date     Acute psychosis (H) 10/14/2018     Alcohol dependence (H) 4/10/2015     Bipolar affective disorder, manic, severe, with psychotic behavior (H) 10/15/2018     Candida infection 10/19/2018     Chronic hepatitis C (H) 4/10/2015     Chronic neck pain      Closed traumatic brain injury, without loss of consciousness, sequela (H)      Dental abscess      Episodic tension-type headache, not intractable      Methamphetamine dependence (H) 10/15/2018     Pancytopenia (H) 4/10/2015     Weakness of right arm 4/10/2015       PAST SURGICAL HISTORY:  Past Surgical History:   Procedure Laterality Date     ARTHROSCOPY SHOULDER ROTATOR CUFF REPAIR Bilateral      CERVICAL FUSION      twice     COSMETIC SURGERY       RHINOPLASTY       TONSILLECTOMY             CURRENT MEDICATIONS:    albuterol (PROAIR HFA;PROVENTIL HFA;VENTOLIN HFA) 90 mcg/actuation inhaler  budesonide-formoterol (SYMBICORT) 160-4.5 MCG/ACT Inhaler  buprenorphine HCl-naloxone HCl (SUBOXONE) 4-1 MG per film  calcium carbonate (TUMS) 500 MG chewable tablet  docusate sodium (COLACE) 100 MG capsule  docusate sodium (COLACE) 100 MG tablet  FLUoxetine (PROZAC) 40 MG capsule  gabapentin (NEURONTIN) 300 MG capsule  ibuprofen (ADVIL/MOTRIN) 200 MG tablet  ipratropium - albuterol 0.5 mg/2.5 mg/3 mL (DUONEB) 0.5-2.5 (3) MG/3ML neb solution  levofloxacin (LEVAQUIN) 500 MG tablet  magnesium oxide (MAG-OX) 400 MG tablet  nicotine (NICODERM CQ) 14 MG/24HR 24 hr patch  NYSTOP 604742 UNIT/GM powder  potassium chloride ER (KLOR-CON M) 20  "MEQ CR tablet  QUEtiapine (SEROQUEL) 100 MG tablet  QUEtiapine (SEROQUEL) 300 MG tablet  Vitamin D3 (CHOLECALCIFEROL) 25 mcg (1000 units) tablet  omeprazole (PRILOSEC) 20 MG DR capsule        ALLERGIES:  Allergies   Allergen Reactions     Amoxicillin Diarrhea     Droperidol Angioedema       FAMILY HISTORY:  Family History   Problem Relation Age of Onset     Narcolepsy Mother      Acute myelogenous leukemia Mother      Cancer Father        SOCIAL HISTORY:   Social History     Socioeconomic History     Marital status:      Spouse name: None     Number of children: None     Years of education: None     Highest education level: None   Tobacco Use     Smoking status: Every Day     Packs/day: 1.00     Years: 54.00     Pack years: 54.00     Types: Cigarettes     Start date: 1968     Smokeless tobacco: Never     Tobacco comments:     Seen IP by [facility] on 3/31/2023   Substance and Sexual Activity     Alcohol use: Yes     Comment: Alcoholic Drinks/day: \"A lot.\"  (Couldn't quantify except to repeat \"A lot\" of wine, liquor and beer)     Drug use: No   Social History Narrative    Lives alone in independent housing with psychiatric case assistance       VITALS:  Patient Vitals for the past 24 hrs:   BP Temp Temp src Pulse Resp SpO2 Height Weight   04/11/23 1425 (!) 88/53 -- -- 86 19 94 % -- --   04/11/23 1423 -- -- -- 87 15 97 % -- --   04/11/23 1421 -- -- -- 81 12 96 % -- --   04/11/23 1420 (!) 82/49 -- -- 79 20 96 % -- --   04/11/23 1417 (!) 83/53 -- -- 82 19 96 % -- --   04/11/23 1409 (!) 85/48 -- -- 80 23 96 % -- --   04/11/23 1402 (!) 82/46 -- -- 83 23 96 % -- --   04/11/23 1345 100/58 -- -- 98 30 96 % -- --   04/11/23 1335 (!) 85/45 -- -- 107 -- 98 % -- --   04/11/23 1332 (!) 85/54 -- -- 91 29 97 % -- --   04/11/23 1316 (!) 66/46 -- -- 105 -- 98 % -- --   04/11/23 1305 (!) 84/52 -- -- 93 17 97 % -- --   04/11/23 1302 (!) 79/47 -- -- 101 21 96 % -- --   04/11/23 1256 (!) 79/48 -- -- 93 19 96 % -- --   04/11/23 1245 (!) " "71/46 -- -- 88 17 96 % -- --   04/11/23 1236 (!) 67/42 -- -- 88 15 93 % -- --   04/11/23 1213 (!) 72/46 -- -- 86 25 94 % -- --   04/11/23 1200 (!) 82/50 -- -- 91 -- 96 % -- --   04/11/23 1159 (!) 86/51 -- -- 92 23 95 % -- --   04/11/23 1154 (!) 78/45 -- -- 92 24 96 % -- --   04/11/23 1148 (!) 70/42 -- -- 83 18 94 % -- --   04/11/23 1146 (!) 68/39 -- -- 85 30 95 % -- --   04/11/23 1130 (!) 89/50 -- -- 87 14 96 % -- --   04/11/23 1113 100/64 -- -- 87 -- 97 % -- --   04/11/23 1106 -- -- -- -- -- -- 1.676 m (5' 6\") 59 kg (130 lb)   04/11/23 1101 98/46 -- -- 88 10 96 % -- --   04/11/23 1056 (!) 79/48 -- -- 90 12 97 % -- --   04/11/23 1049 (!) 86/52 -- -- 84 12 94 % -- --   04/11/23 1048 -- -- -- -- 12 -- -- --   04/11/23 1037 (!) 67/49 97.4  F (36.3  C) Temporal 95 14 (!) 89 % -- --       PHYSICAL EXAM    Constitutional:  Well developed, Well nourished,  HENT:  Normocephalic, Atraumatic, Oropharynx moist, Nose normal.   Eyes:  EOMI, Conjunctiva normal, No discharge.   Respiratory:  Normal breath sounds, No respiratory distress, No wheezing, No chest tenderness.   Cardiovascular:  Normal heart rate, Normal rhythm, No murmurs  GI:  Soft, No tenderness, No guarding, No CVA tenderness.   Musculoskeletal:  No tenderness to palpation or major deformities noted.   Extremities: No lower extremity edema.  Neurologic: Minimally responsive to painful stimuli         LAB:  All pertinent labs reviewed and interpreted.  Results for orders placed or performed during the hospital encounter of 04/11/23   CBC with platelets   Result Value Ref Range    WBC Count 6.5 4.0 - 11.0 10e3/uL    RBC Count 3.98 3.80 - 5.20 10e6/uL    Hemoglobin 12.4 11.7 - 15.7 g/dL    Hematocrit 36.8 35.0 - 47.0 %    MCV 93 78 - 100 fL    MCH 31.2 26.5 - 33.0 pg    MCHC 33.7 31.5 - 36.5 g/dL    RDW 12.4 10.0 - 15.0 %    Platelet Count 143 (L) 150 - 450 10e3/uL   Basic metabolic panel   Result Value Ref Range    Sodium 133 (L) 136 - 145 mmol/L    Potassium 3.0 (L) " 3.5 - 5.0 mmol/L    Chloride 98 98 - 107 mmol/L    Carbon Dioxide (CO2) 23 22 - 31 mmol/L    Anion Gap 12 5 - 18 mmol/L    Urea Nitrogen 13 8 - 22 mg/dL    Creatinine 0.89 0.60 - 1.10 mg/dL    Calcium 8.6 8.5 - 10.5 mg/dL    Glucose 184 (H) 70 - 125 mg/dL    GFR Estimate 70 >60 mL/min/1.73m2   Lactic acid whole blood   Result Value Ref Range    Lactic Acid 1.3 0.7 - 2.0 mmol/L   UA with Microscopic reflex to Culture    Specimen: Urine, Catheter   Result Value Ref Range    Color Urine Colorless Colorless, Straw, Light Yellow, Yellow    Appearance Urine Clear Clear    Glucose Urine Negative Negative mg/dL    Bilirubin Urine Negative Negative    Ketones Urine Negative Negative mg/dL    Specific Gravity Urine 1.004 1.001 - 1.030    Blood Urine Negative Negative    pH Urine 6.5 5.0 - 7.0    Protein Albumin Urine Negative Negative mg/dL    Urobilinogen Urine <2.0 <2.0 mg/dL    Nitrite Urine Negative Negative    Leukocyte Esterase Urine Negative Negative    RBC Urine <1 <=2 /HPF    WBC Urine 0 <=5 /HPF   Result Value Ref Range    Magnesium 1.5 (L) 1.8 - 2.6 mg/dL   Extra Blue Top Tube   Result Value Ref Range    Hold Specimen JIC          EKG:    Normal sinus rhythm at 85 bpm.  Normal axis.  No signs of acute ischemia.  QRS 86 ms, QTc 473 ms.    I have independently reviewed and interpreted this EKG          I, Cristino Holland, am serving as a scribe to document services personally performed by Dr. Macias based on my observation and the provider's statements to me. I, Vu Macias, DO attest that Cristino Holland is acting in a scribe capacity, has observed my performance of the services and has documented them in accordance with my direction.    Vu Macias DO  Emergency Medicine  [facility] EMERGENCY ROOM  [address] [address] [phone]  Dept: [phone]       Vu Macias MD  04/14/23 2102       Vu Macias,  MD  04/14/23 4231

## 2023-04-11 NOTE — H&P
[facility]    History and Physical - Hospitalist Service       Date of Admission:  4/11/2023    Assessment & Plan      Renetta Farooq is a 67 year old female admitted on 4/11/2023. She has a history of hypertension, COPD, PTSD, bipolar disorder, opioid dependence, substance abuse on Suboxone, methamphetamine abuse, alcohol dependence, tobacco abuse, cognitive impairment, chronic right arm and right leg weaknes and is admitted for hypotension and syncope.     Sustained hypotension  Concern for clonidine overuse  Patient presented to ED triage slumped in chair with blood pressure 67/49 initially.  Received 2 L of NS without improvement in blood pressure.  Started on Levophed drip with improvement of blood pressure.  Patient blood pressure soft on exam, patient otherwise alert and oriented, nontoxic-appearing.  No leukocytosis, no lactic acidosis or other infectious symptoms, stable hemoglobin and no source of obvious bleed, or any source of infection.  UDS negative, no alcohol in system.  Previously seen in prior admissions for similar symptoms but also had bradycardia at that time thought to be secondary to clonidine overdose, see below.  At this point unsure what this exactly causing her hypotension, and will continue measures to sustain normotension.  -Intensive care consult, appreciate recommendations  -Continue Levophed gtt, wean as able   -Low threshold for broad-spectrum antibiotics and cultures treat for sepsis  -AM CBC    Syncopal episodes  Hx of Clonidine abuse  Possible panic attacks  [facility].  At that time patient was taking nearly 30 clonidine a day secondary to her heart racing and anxiety.  Has had several episodes of what sounds like syncopal episodes as patient not having any loss of bladder or bowel and no tonic-clonic like movements.  No history of seizures.  Described as falls after patient stands up.  Previous  echo on 1/24/2023 normal per our records.  Also recently had nuclear med stress echo which appeared to be normal.  Her symptoms were previously thought to be due to using the clonidine.  However, both patient and her son report that patient has not taken clonidine in several weeks, and denies any other ingestion.  Emergency department did speak with poison control who just recommended supportive blood pressure cares at this time.  EKG unremarkable today.  Negative troponin, negative BNP.1/24/23 Normal echo.  Patient however does report a history of use of clonazepam 3 times daily which has not been refilled as patient has been taking off of that.  Believe that there is a possibility that these may be secondary to anxiety attacks.  Patient given 1 dose of clonazepam prior to PICC line given anxiety.  -Cardiology consult, appreciate recommendations  -We will consider psychiatric consultation should be cleared by radiology  -Hold off on scheduled clonazepam at this time  -AM BMP  -Orthostatics prior to discharge    Hypokalemia  Hypomagnesiemia  Hyponatremia  -Replace as necessary  -Follow BMP    COPD  Stable.  No wheezing on exam.  -PTA Symbicort and albuterol    Neurogenic bladder  -Burroughs catheter    Bipolar disorder  PTSD  -Continue PTA Prozac and Seroquel    Chronic low back pain  History of opioid use disorder   -PTA Suboxone  -PTA gabapentin    Difficult social situation  Currently living in extended stay hotel, previously homeless.  -Social work consult for safe discharge planning     Diet: Combination Diet Regular Diet Adult  DVT Prophylaxis: Pneumatic Compression Devices  Burroughs Catheter: Not present  Fluids: PO  Lines: PRESENT      PICC 04/11/23 Triple Lumen Right Basilic Multiple medications-Site Assessment: WDL      Cardiac Monitoring: ACTIVE order. Indication: Syncope- high cardiac risk (48 hours)  Code Status: Full Code    Disposition Plan      Expected Discharge Date: 04/13/2023                  Nicho  "DO Rasta  Hospitalist Service  [facility]  Securely message with [employer] (more info)  Text page via [employer] Paging/Directory   ______________________________________________________________________    Chief Complaint   Syncope and hypotension    History is obtained from the patient and son, Alvaro.     History of Present Illness   Renetta Farooq is a 67 year old female who has a history of hypertension, COPD, PTSD, bipolar disorder, opioid dependence, substance abuse on Suboxone, methamphetamine abuse, alcohol dependence, tobacco abuse, cognitive impairment, chronic right arm and right leg weakness, and is admitted for syncopal episode and hypotension.    Per patient and her son, patient has been \"seizing up or something\" for the past 6 months.  Son reports that this happened 5 or 8 times.  Describes a situation in which patient reports she is not feeling well\" cannot breathe\", that she then sits forward and starts to take deep breaths in and when she goes to stand up she has had her \"eyes rolled back\" and passed out.  Ports that this is also happened when patient has gone from laying to sitting.  Denies any tonic clonic like movements.  No loss of bowel or bladder.  Patient usually does not have a postictal state.  Also reports several instances of her \"heart racing\" and states that she is able to measure these and they get as high as [phone].  She was previously taking clonidine for that use.    Today however, son reports that patient had similar feeling, fainted, and then he subsequently took patient to emergency department.  Reports that she was slightly confused during the drive to the emergency department and continued to be while in triage.    Of note, patient has been recently discharged from other hospital on 3/12/2023 with concern for clonidine abuse.  At that time had complaints of bradycardia and hypotension.  Patient and son adamantly deny that patient has used any clonidine in at " least 1 week.    In the emergency department, patient was found unresponsive, slumped over in wheelchair.  Initially found to be hypotensive, 70s over 30s,.  She was given some Narcan with possibly some mild improvement of symptoms.  Ever since she has been awake and alert, but has remained persistently hypertensive.  She received 2 units of normal saline without resolution.  She was subsequently started on a Levophed drip.  Poison control was called to discuss possible clonidine overdose.  They had no recommendations other than supportive care at that time.  Emergency room physician spoke with intensivist who also recommended supportive cares with blood pressure management.      Past Medical History    Past Medical History:   Diagnosis Date     Acute psychosis (H) 10/14/2018     Alcohol dependence (H) 4/10/2015     Bipolar affective disorder, manic, severe, with psychotic behavior (H) 10/15/2018     Candida infection 10/19/2018     Chronic hepatitis C (H) 4/10/2015     Chronic neck pain      Closed traumatic brain injury, without loss of consciousness, sequela (H)      Dental abscess      Episodic tension-type headache, not intractable      Methamphetamine dependence (H) 10/15/2018     Pancytopenia (H) 4/10/2015     Weakness of right arm 4/10/2015       Past Surgical History   Past Surgical History:   Procedure Laterality Date     ARTHROSCOPY SHOULDER ROTATOR CUFF REPAIR Bilateral      CERVICAL FUSION      twice     COSMETIC SURGERY       RHINOPLASTY       TONSILLECTOMY         Prior to Admission Medications   Prior to Admission Medications   Prescriptions Last Dose Informant Patient Reported? Taking?   FLUoxetine (PROZAC) 40 MG capsule 4/10/2023  No Yes   Sig: Take 1 capsule (40 mg) by mouth daily for 30 days   NYSTOP 530799 UNIT/GM powder Unknown  Yes Yes   Sig: Apply topically 3 times daily as needed   QUEtiapine (SEROQUEL) 100 MG tablet 4/10/2023  No Yes   Sig: Take 1 tablet (100 mg) by mouth every morning    QUEtiapine (SEROQUEL) 300 MG tablet 4/10/2023  No Yes   Sig: Take 1 tablet (300 mg) by mouth At Bedtime for 30 days   Vitamin D3 (CHOLECALCIFEROL) 25 mcg (1000 units) tablet 4/10/2023  No Yes   Sig: Take 1 tablet (25 mcg) by mouth daily   albuterol (PROAIR HFA;PROVENTIL HFA;VENTOLIN HFA) 90 mcg/actuation inhaler Unknown  No Yes   Sig: [ALBUTEROL (PROAIR HFA;PROVENTIL HFA;VENTOLIN HFA) 90 MCG/ACTUATION INHALER] Inhale 2 puffs every 6 (six) hours as needed for wheezing.   budesonide-formoterol (SYMBICORT) 160-4.5 MCG/ACT Inhaler 4/10/2023 at does not have - does not need in hospital  Yes Yes   Sig: Inhale 2 puffs into the lungs 2 times daily   buprenorphine HCl-naloxone HCl (SUBOXONE) 4-1 MG per film 4/10/2023  Yes Yes   Sig: Place 1 Film under the tongue 2 times daily   calcium carbonate (TUMS) 500 MG chewable tablet 4/10/2023  No Yes   Sig: Take 1 tablet (500 mg) by mouth 2 times daily   docusate sodium (COLACE) 100 MG capsule 4/10/2023  No Yes   Sig: [DOCUSATE SODIUM (COLACE) 100 MG CAPSULE] Take 1 capsule (100 mg total) by mouth 2 (two) times a day.   docusate sodium (COLACE) 100 MG tablet Unknown  Yes Yes   Sig: Take 100 mg by mouth 2 times daily as needed for constipation   gabapentin (NEURONTIN) 300 MG capsule 4/10/2023  Yes Yes   Sig: Take 300 mg by mouth 3 times daily   ibuprofen (ADVIL/MOTRIN) 200 MG tablet 4/10/2023  Yes Yes   Sig: Take 600 mg by mouth every 8 hours as needed for pain   ipratropium - albuterol 0.5 mg/2.5 mg/3 mL (DUONEB) 0.5-2.5 (3) MG/3ML neb solution Unknown  No Yes   Sig: Take 1 vial (3 mLs) by nebulization 3 times daily For 1 week then every 6 hours as needed   Patient taking differently: Take 3 mLs by nebulization every 6 hours as needed for shortness of breath For 1 week then every 6 hours as needed   levofloxacin (LEVAQUIN) 500 MG tablet 4/10/2023  Yes Yes   Sig: Take 500 mg by mouth daily X 4 days - 2 doses left   magnesium oxide (MAG-OX) 400 MG tablet 4/10/2023  No Yes   Sig:  "Take 1 tablet (400 mg) by mouth daily   nicotine (NICODERM CQ) 14 MG/24HR 24 hr patch Past Week at patch not on  No Yes   Sig: Place 1 patch onto the skin daily   omeprazole (PRILOSEC) 20 MG DR capsule   Yes No   Sig: Take 20 mg by mouth At Bedtime   potassium chloride ER (KLOR-CON M) 20 MEQ CR tablet 4/10/2023  Yes Yes   Sig: Take 20 mEq by mouth daily      Facility-Administered Medications: None        Review of Systems    CONSTITUTIONAL: NEGATIVE for fever, chills, change in weight  ENT/MOUTH: NEGATIVE for ear, mouth and throat problems  RESP: NEGATIVE for significant cough or SOB  CV: POSITIVE FOR SYNCOPE  GI: NEGATIVE for nausea, abdominal pain, heartburn, or change in bowel habits  MUSCULOSKELETAL: NEGATIVE for significant arthralgias or myalgia  NEURO: NEGATIVE for weakness, dizziness or paresthesias  ENDOCRINE: NEGATIVE for temperature intolerance, skin/hair changes  PSYCHIATRIC: NEGATIVE for changes in mood or affect    Social History   I have reviewed this patient's social history and updated it with pertinent information if needed.  Social History     Tobacco Use     Smoking status: Every Day     Packs/day: 1.00     Years: 54.00     Pack years: 54.00     Types: Cigarettes     Start date: 1968     Smokeless tobacco: Never     Tobacco comments:     Seen IP by CTTS on 3/31/2023   Substance Use Topics     Alcohol use: Yes     Comment: Alcoholic Drinks/day: \"A lot.\"  (Couldn't quantify except to repeat \"A lot\" of wine, liquor and beer)     Drug use: No       Family History   I have reviewed this patient's family history and updated it with pertinent information if needed.  Family History   Problem Relation Age of Onset     Narcolepsy Mother      Acute myelogenous leukemia Mother      Cancer Father        Allergies   Allergies   Allergen Reactions     Amoxicillin Diarrhea     Droperidol Angioedema        Physical Exam   Vital Signs: Temp: 97.4  F (36.3  C) Temp src: Temporal BP: 94/53 Pulse: 80   Resp: 13 SpO2: " 94 % O2 Device: None (Room air)    Weight: 130 lbs 0 oz  General: alert and oriented x3, slightly uncomfortable as patient needing to use the bathroom  HEENT: atraumatic, conjunctiva clear without erythema, EOM's intact, no nasal discharge  Neck: supple  Cardiac: RRR w/o audible murmur  Resp: bilateral clear lungs w/o wheezing, crackles or rhonchi; breathing comfortably on RA  Abdomen: soft, non-tender to palpation, no masses. BS normal  Extremities: no peripheral edema  Skin: no rashes or suspicious legions on exposed skin  Neuro: grossly normal CN; normal strength, sensation, & tone  Psych: affect congruent with mood    Data   Recent Labs   Lab 04/11/23  1102 04/11/23  1050 04/06/23  0756 04/05/23  1001   WBC  --  6.5  --  5.9   HGB  --  12.4  --  11.8   MCV  --  93  --  92   PLT  --  143*  --  160   NA  --  133* 135* 130*   POTASSIUM  --  3.0* 3.6  3.7 3.6   CHLORIDE  --  98 100 95*   CO2  --  23 25 25   BUN  --  13 16 19   CR  --  0.89 0.78 0.80   ANIONGAP  --  12 10 10   KARRIE  --  8.6 8.1* 8.3*   * 184* 116 103

## 2023-04-12 ENCOUNTER — APPOINTMENT (OUTPATIENT)
Dept: CARDIOLOGY | Facility: CLINIC | Age: 68
DRG: 643 | End: 2023-04-12
Attending: INTERNAL MEDICINE
Payer: COMMERCIAL

## 2023-04-12 LAB
ACTH PLAS-MCNC: <10 PG/ML
ANION GAP SERPL CALCULATED.3IONS-SCNC: 7 MMOL/L (ref 5–18)
APAP SERPL-MCNC: <3 UG/ML (ref 10–20)
BUN SERPL-MCNC: 12 MG/DL (ref 8–22)
C REACTIVE PROTEIN LHE: 0.2 MG/DL (ref 0–?)
CALCIUM SERPL-MCNC: 8.2 MG/DL (ref 8.5–10.5)
CHLORIDE BLD-SCNC: 106 MMOL/L (ref 98–107)
CO2 SERPL-SCNC: 25 MMOL/L (ref 22–31)
CORTICOSTER 1H P 250 UG ACTH SERPL-SCNC: 19.3 UG/DL (ref ?–150)
CORTIS SERPL-MCNC: 4.4 UG/DL
CREAT SERPL-MCNC: 0.67 MG/DL (ref 0.6–1.1)
ERYTHROCYTE [DISTWIDTH] IN BLOOD BY AUTOMATED COUNT: 12.7 % (ref 10–15)
GFR SERPL CREATININE-BSD FRML MDRD: >90 ML/MIN/1.73M2
GLUCOSE BLD-MCNC: 142 MG/DL (ref 70–125)
HCT VFR BLD AUTO: 32.4 % (ref 35–47)
HGB BLD-MCNC: 10.6 G/DL (ref 11.7–15.7)
MAGNESIUM SERPL-MCNC: 1.8 MG/DL (ref 1.8–2.6)
MCH RBC QN AUTO: 31.4 PG (ref 26.5–33)
MCHC RBC AUTO-ENTMCNC: 32.7 G/DL (ref 31.5–36.5)
MCV RBC AUTO: 96 FL (ref 78–100)
PLATELET # BLD AUTO: 138 10E3/UL (ref 150–450)
POTASSIUM BLD-SCNC: 3.4 MMOL/L (ref 3.5–5)
POTASSIUM BLD-SCNC: 3.5 MMOL/L (ref 3.5–5)
RBC # BLD AUTO: 3.38 10E6/UL (ref 3.8–5.2)
SODIUM SERPL-SCNC: 138 MMOL/L (ref 136–145)
WBC # BLD AUTO: 8.2 10E3/UL (ref 4–11)

## 2023-04-12 PROCEDURE — 250N000013 HC RX MED GY IP 250 OP 250 PS 637: Performed by: STUDENT IN AN ORGANIZED HEALTH CARE EDUCATION/TRAINING PROGRAM

## 2023-04-12 PROCEDURE — 99222 1ST HOSP IP/OBS MODERATE 55: CPT | Mod: 25 | Performed by: INTERNAL MEDICINE

## 2023-04-12 PROCEDURE — 82533 TOTAL CORTISOL: CPT | Performed by: INTERNAL MEDICINE

## 2023-04-12 PROCEDURE — 83735 ASSAY OF MAGNESIUM: CPT | Performed by: FAMILY MEDICINE

## 2023-04-12 PROCEDURE — 93306 TTE W/DOPPLER COMPLETE: CPT | Mod: 26 | Performed by: INTERNAL MEDICINE

## 2023-04-12 PROCEDURE — 85027 COMPLETE CBC AUTOMATED: CPT

## 2023-04-12 PROCEDURE — 250N000013 HC RX MED GY IP 250 OP 250 PS 637

## 2023-04-12 PROCEDURE — 258N000003 HC RX IP 258 OP 636: Performed by: INTERNAL MEDICINE

## 2023-04-12 PROCEDURE — 250N000013 HC RX MED GY IP 250 OP 250 PS 637: Performed by: INTERNAL MEDICINE

## 2023-04-12 PROCEDURE — 200N000001 HC R&B ICU

## 2023-04-12 PROCEDURE — 80143 DRUG ASSAY ACETAMINOPHEN: CPT | Performed by: INTERNAL MEDICINE

## 2023-04-12 PROCEDURE — 93306 TTE W/DOPPLER COMPLETE: CPT

## 2023-04-12 PROCEDURE — 84132 ASSAY OF SERUM POTASSIUM: CPT | Performed by: FAMILY MEDICINE

## 2023-04-12 PROCEDURE — 86140 C-REACTIVE PROTEIN: CPT | Performed by: INTERNAL MEDICINE

## 2023-04-12 PROCEDURE — P9045 ALBUMIN (HUMAN), 5%, 250 ML: HCPCS | Performed by: INTERNAL MEDICINE

## 2023-04-12 PROCEDURE — 80048 BASIC METABOLIC PNL TOTAL CA: CPT

## 2023-04-12 PROCEDURE — 99233 SBSQ HOSP IP/OBS HIGH 50: CPT | Mod: GC

## 2023-04-12 PROCEDURE — 99291 CRITICAL CARE FIRST HOUR: CPT | Performed by: INTERNAL MEDICINE

## 2023-04-12 PROCEDURE — 250N000011 HC RX IP 250 OP 636: Performed by: INTERNAL MEDICINE

## 2023-04-12 PROCEDURE — 250N000009 HC RX 250: Performed by: EMERGENCY MEDICINE

## 2023-04-12 PROCEDURE — 250N000013 HC RX MED GY IP 250 OP 250 PS 637: Performed by: FAMILY MEDICINE

## 2023-04-12 RX ORDER — CYCLOBENZAPRINE HCL 5 MG
5 TABLET ORAL EVERY 8 HOURS PRN
Status: DISCONTINUED | OUTPATIENT
Start: 2023-04-12 | End: 2023-04-15 | Stop reason: HOSPADM

## 2023-04-12 RX ORDER — MAGNESIUM OXIDE 400 MG/1
400 TABLET ORAL DAILY
Status: DISCONTINUED | OUTPATIENT
Start: 2023-04-12 | End: 2023-04-15 | Stop reason: HOSPADM

## 2023-04-12 RX ORDER — DOCUSATE SODIUM 100 MG/1
100 CAPSULE, LIQUID FILLED ORAL 2 TIMES DAILY
Status: DISCONTINUED | OUTPATIENT
Start: 2023-04-12 | End: 2023-04-15 | Stop reason: HOSPADM

## 2023-04-12 RX ORDER — SENNOSIDES 8.6 MG
8.6 TABLET ORAL 2 TIMES DAILY PRN
Status: DISCONTINUED | OUTPATIENT
Start: 2023-04-12 | End: 2023-04-15 | Stop reason: HOSPADM

## 2023-04-12 RX ORDER — POLYETHYLENE GLYCOL 3350 17 G/17G
17 POWDER, FOR SOLUTION ORAL 3 TIMES DAILY PRN
Status: DISCONTINUED | OUTPATIENT
Start: 2023-04-12 | End: 2023-04-15 | Stop reason: HOSPADM

## 2023-04-12 RX ORDER — POTASSIUM CHLORIDE 1500 MG/1
40 TABLET, EXTENDED RELEASE ORAL ONCE
Status: COMPLETED | OUTPATIENT
Start: 2023-04-12 | End: 2023-04-12

## 2023-04-12 RX ADMIN — ACETAMINOPHEN 650 MG: 325 TABLET ORAL at 14:16

## 2023-04-12 RX ADMIN — POTASSIUM CHLORIDE 40 MEQ: 1500 TABLET, EXTENDED RELEASE ORAL at 06:50

## 2023-04-12 RX ADMIN — ACETAMINOPHEN 650 MG: 325 TABLET ORAL at 21:02

## 2023-04-12 RX ADMIN — Medication 0.21 MCG/KG/MIN: at 05:28

## 2023-04-12 RX ADMIN — ALBUMIN HUMAN 25 G: 0.05 INJECTION, SOLUTION INTRAVENOUS at 09:15

## 2023-04-12 RX ADMIN — GABAPENTIN 300 MG: 300 CAPSULE ORAL at 20:17

## 2023-04-12 RX ADMIN — FLUTICASONE FUROATE AND VILANTEROL TRIFENATATE 1 PUFF: 100; 25 POWDER RESPIRATORY (INHALATION) at 09:19

## 2023-04-12 RX ADMIN — GABAPENTIN 300 MG: 300 CAPSULE ORAL at 09:19

## 2023-04-12 RX ADMIN — BUPRENORPHINE AND NALOXONE 2 FILM: 2; .5 FILM BUCCAL; SUBLINGUAL at 09:19

## 2023-04-12 RX ADMIN — Medication 0.22 MCG/KG/MIN: at 00:08

## 2023-04-12 RX ADMIN — QUETIAPINE 300 MG: 100 TABLET ORAL at 20:17

## 2023-04-12 RX ADMIN — GABAPENTIN 300 MG: 300 CAPSULE ORAL at 14:16

## 2023-04-12 RX ADMIN — DOCUSATE SODIUM 100 MG: 100 CAPSULE, LIQUID FILLED ORAL at 20:17

## 2023-04-12 RX ADMIN — BUPRENORPHINE AND NALOXONE 2 FILM: 2; .5 FILM BUCCAL; SUBLINGUAL at 20:16

## 2023-04-12 RX ADMIN — HYDROCORTISONE SODIUM SUCCINATE 50 MG: 100 INJECTION, POWDER, FOR SOLUTION INTRAMUSCULAR; INTRAVENOUS at 09:12

## 2023-04-12 RX ADMIN — ACETAMINOPHEN 650 MG: 325 TABLET ORAL at 07:34

## 2023-04-12 RX ADMIN — VASOPRESSIN 2.4 UNITS/HR: 20 INJECTION, SOLUTION INTRAMUSCULAR; SUBCUTANEOUS at 01:26

## 2023-04-12 RX ADMIN — Medication 0.08 MCG/KG/MIN: at 12:46

## 2023-04-12 RX ADMIN — FLUOXETINE 40 MG: 20 CAPSULE ORAL at 09:19

## 2023-04-12 RX ADMIN — HYDROCORTISONE SODIUM SUCCINATE 50 MG: 100 INJECTION, POWDER, FOR SOLUTION INTRAMUSCULAR; INTRAVENOUS at 14:17

## 2023-04-12 RX ADMIN — NICOTINE 1 PATCH: 14 PATCH, EXTENDED RELEASE TRANSDERMAL at 09:22

## 2023-04-12 RX ADMIN — VASOPRESSIN 2.4 UNITS/HR: 20 INJECTION, SOLUTION INTRAMUSCULAR; SUBCUTANEOUS at 09:34

## 2023-04-12 RX ADMIN — ALBUMIN HUMAN 25 G: 0.05 INJECTION, SOLUTION INTRAVENOUS at 16:59

## 2023-04-12 RX ADMIN — Medication 400 MG: at 17:03

## 2023-04-12 RX ADMIN — DICLOFENAC SODIUM 4 G: 10 GEL TOPICAL at 16:59

## 2023-04-12 RX ADMIN — PANTOPRAZOLE SODIUM 40 MG: 40 TABLET, DELAYED RELEASE ORAL at 20:17

## 2023-04-12 RX ADMIN — QUETIAPINE FUMARATE 100 MG: 25 TABLET ORAL at 07:34

## 2023-04-12 RX ADMIN — HYDROCORTISONE SODIUM SUCCINATE 50 MG: 100 INJECTION, POWDER, FOR SOLUTION INTRAMUSCULAR; INTRAVENOUS at 21:02

## 2023-04-12 ASSESSMENT — ACTIVITIES OF DAILY LIVING (ADL)
ADLS_ACUITY_SCORE: 27
ADLS_ACUITY_SCORE: 29
ADLS_ACUITY_SCORE: 29
ADLS_ACUITY_SCORE: 27
ADLS_ACUITY_SCORE: 27
DEPENDENT_IADLS:: TRANSPORTATION
ADLS_ACUITY_SCORE: 29
ADLS_ACUITY_SCORE: 27

## 2023-04-12 NOTE — PROGRESS NOTES
[facility]    Progress Note - Hospitalist Service       Date of Admission:  4/11/2023    Assessment & Plan   Renetta Farooq is a 67 year old female admitted on 4/11/2023. She has a history of hypertension, COPD, PTSD, bipolar disorder, opioid dependence, substance abuse on Suboxone, methamphetamine abuse, alcohol dependence, tobacco abuse, cognitive impairment, chronic right arm and right leg weaknes and is admitted for persistent hypotension and recent recurrent syncopal episodes. She was admitted to ICU for pressor support for shock of unknown etiology.     Shock without clear etiology  Concern for clonidine overuse  Concern for adrenal insufficiency  Patient presented to ED triage slumped in chair with blood pressure 67/49 initially.  Received 2 L of NS without improvement in blood pressure.  Started on Levophed and Vasopressin with improvement of blood pressure.  Patient remains alert and oriented, nontoxic-appearing.  No leukocytosis, no lactic acidosis or other infectious symptoms, stable hemoglobin and no source of obvious bleed, or any source of infection.  UDS negative, no alcohol in system.  Previously seen during prior admissions for similar symptoms but also had bradycardia at that time thought to be secondary to clonidine overdose, see below.  Less likely septic shock given no obvious infectious source and negative infection markers.  Cardiogenic shock less likely given echo with normal systolic function EF of 75%. Cosyntropin test without appropriate rise concerning for possible adrenal insufficiency.   -Intensive care consulted   -Levophed    -Vasopressin   -Hydrocortisone  -Low threshold for broad-spectrum antibiotics and cultures if infectious source identified  -Follow AM labs     Syncopal episodes  Hx of Clonidine abuse  Possible panic attacks  [facility] for similar symptoms with syncope and falls when patient stands up.  At that time  "patient was taking nearly 30 clonidine a day secondary to her heart racing and anxiety.  Has had several episodes of what sounds like syncopal episodes as patient not having any loss of bladder or bowel and no tonic-clonic like movements.  No history of seizures. Previous echo on 1/24/2023 normal per our records and repeat echo on admission with hyperdynamic systolic performance with EF of 75%. Both patient and her son report that patient has not taken clonidine in several weeks, and denies any other ingestion. EKG unremarkable.  Negative troponin, negative BNP.  Patient however does report a history of use of clonazepam 3 times daily which has not been refilled as patient has been taking off of that.  Syncopal episodes may be secondary to anxiety attacks.  Patient given 1 dose of clonazepam prior to PICC line given anxiety.  -Cardiology consult, appreciate recommendations  -Consider psych consult pending cardiology input  -Hold scheduled clonazepam at this time  -Follow AM labs  -Orthostatics prior to discharge  -Teds stocking     Hypokalemia  Hypomagnesiemia  Hyponatremia, resolved  -RN replacement protocol for magnesium and potassium  -Follow BMP     COPD  Stable.  No wheezing on exam.  -PTA Symbicort and albuterol     Neurogenic bladder  -Burroughs catheter     Bipolar disorder  PTSD  -Continue PTA Prozac and Seroquel  -Consider psych consult pending cardiology input, as above     Chronic low back pain  History of opioid use disorder   -PTA Suboxone  -PTA gabapentin    Muscle jerking  Reports \"shmuel horses\" in her bilateral extremities previously improved with clonazapam.   -Replacement of electrolytes as above  -Tylenol as needed  -PTA gabapentin    Tobacco use  -Nicotine replacement therapy     Difficult social situation  Currently living in extended stay hotel, previously homeless.  -Social work consult for safe discharge planning        Diet: Combination Diet Regular Diet Adult    DVT Prophylaxis: Pneumatic " "Compression Devices  Burroughs Catheter: PRESENT, indication: Strict 1-2 Hour I&O;Neurogenic Bladder  Fluids: PO  Lines: PRESENT      PICC 04/11/23 Triple Lumen Right Basilic Multiple medications-Site Assessment: WDL      Cardiac Monitoring: ACTIVE order. Indication: Syncope- high cardiac risk (48 hours)  Code Status: Full Code      Clinically Significant Risk Factors Present on Admission        # Hypokalemia: Lowest K = 3 mmol/L in last 2 days, will replace as needed     # Hypomagnesemia: Lowest Mg = 1.5 mg/dL in last 2 days, will replace as needed         # Circulatory Shock: currently requiring pressors for blood pressure support             Disposition Plan     Expected Discharge Date: 04/13/2023                The patient's care was discussed with the Attending Physician, Dr. Garza.    Eusebia Seo MD  Hospitalist Service  [facility]  Securely message with [employer] (more info)  Text page via [employer] Paging/Directory   ______________________________________________________________________    Interval History   This morning reports \"shmuel horses\" occurring intermittently in her bilateral upper and lower extremities. Concerned it may be related to potassium or magnesium. Reports relief with clonazapam previously.     Physical Exam   Vital Signs: Temp: 98.3  F (36.8  C) Temp src: Oral BP: (!) 83/53 Pulse: 77   Resp: 30 SpO2: 90 % O2 Device: None (Room air)    Weight: 136 lbs 10.96 oz  General: alert and oriented x3, no acute distress  HEENT: atraumatic, conjunctiva clear without erythema, no nasal discharge  Neck: supple  Cardiac: RRR w/o audible murmur  Resp: bilateral clear lungs w/o wheezing, crackles or rhonchi; breathing comfortably on RA  Abdomen: soft, non-tender to palpation, no masses. BS normal  Extremities: no peripheral edema  Skin: no rashes or suspicious legions on exposed skin  Neuro: grossly normal CN; normal strength, sensation, & tone  Psych: affect congruent with " mood    Data     I have personally reviewed the following data over the past 24 hrs:    8.2  \   10.6 (L)   / 138 (L)     138 106 12 /  142 (H)   3.5 25 0.67 \       Trop: N/A BNP: N/A       TSH: N/A T4: N/A A1C: N/A       Procal: 0.02 CRP: 0.2 Lactic Acid: 1.3         Imaging results reviewed over the past 24 hrs:   Recent Results (from the past 24 hour(s))   Echocardiogram Complete    Narrative    [id number]  [id number]  [id number]  [id number]^[address] [address]     Name: LEO DE LA ROSA  MRN: [id number]  : 1955  Study Date: 2023 08:18 AM  Age: 67 yrs  Gender: Female  Patient Location: [facility]  Reason For Study: Shock  Ordering Physician: JUDITH LU  Performed By: EE/HAM     BSA: 1.7 m2  Height: 66 in  Weight: 136 lb  HR: 91  BP: 94/50 mmHg  ______________________________________________________________________________  Procedure  Complete Portable Echo Adult.  ______________________________________________________________________________  Interpretation Summary     1. The left ventricle is normal in size. Left ventricular systolic performance  is hyperdynamic with a visually estimated ejection fraction of 75%.  2. No significant valvular heart disease is identified on this study.  3. Normal right ventricular size and systolic performance.  ______________________________________________________________________________  Left ventricle:  The left ventricle is normal in size. Left ventricular systolic performance is  hyperdynamic with a visually estimated ejection fraction of 75%. There is  normal regional wall motion. Left ventricular wall thickness is normal.     Assessment of LV Diastolic Function: The cumulative findings suggest normal  diastolic filling [The septal e' velocity is > 7 cm/s & lateral e' velocity  is  > 10 cm/s. The average E/e' is < 14. The TR velocity cannot be determined due  to insufficient tricuspid insufficiency signal. Left atrial  volume index is  less than 34 mL/mÂ ].     Right ventricle:  Normal right ventricular size and systolic performance.     Left atrium:  The left atrium is of normal size.     Right atrium:  The right atrium is of normal size.     IVC:  The IVC is not well-visualized.     Aortic valve:  The aortic valve is comprised of three cusps. There is mild flow acceleration  through the aortic valve undoubtedly related to the hyperdynamic state of the  ventricle. No significant aortic stenosis is suspected.. There is no  significant aortic insufficiency.     Mitral valve:  The mitral valve appears morphologically normal. There is trace mitral  insufficiency.     Tricuspid valve:  The tricuspid valve is grossly morphologically normal. There is trace  tricuspid insufficiency.     Pulmonic valve:  The pulmonic valve is grossly morphologically normal.     Thoracic aorta:  The aortic root and proximal ascending aorta are of normal dimension.     Pericardium:  There is no significant pericardial effusion.  ______________________________________________________________________________  ______________________________________________________________________________  MMode/2D Measurements & Calculations  IVSd: 0.88 cm  LVIDd: 4.4 cm  LVIDs: 2.1 cm  LVPWd: 0.94 cm  FS: 51.8 %  LV mass(C)d: 128.8 grams  LV mass(C)dI: 75.8 grams/m2  Ao root diam: 3.1 cm  LA dimension: 2.5 cm  asc Aorta Diam: 3.3 cm  LA/Ao: 0.78  LVOT diam: 1.9 cm  LVOT area: 3.0 cm2  LA Volume Indexed (AL/bp): 17.2 ml/m2     RV Base: 4.5 cm  RWT: 0.43  TAPSE: 3.0 cm     Time Measurements  MM HR: 91.0 BPM     Doppler Measurements & Calculations  MV E max mando: 83.9 cm/sec  MV A max mando: 71.0 cm/sec  MV E/A: 1.2  MV dec slope: 506.9 cm/sec2  MV dec time: 0.17 sec  Ao V2 max: 258.1 cm/sec  Ao max P.0 mmHg  Ao V2 mean: 157.9 cm/sec  Ao mean P.5 mmHg  Ao V2 VTI: 38.9 cm  KASSIE(I,D): 2.2 cm2  KASSIE(V,D): 2.3 cm2  LV V1 max P.4 mmHg  LV V1 max: 202.7 cm/sec  LV V1 VTI:  28.5 cm  SV(LVOT): 84.9 ml  SI(LVOT): 50.0 ml/m2  PA acc time: 0.13 sec  AV Sekou Ratio (DI): 0.79  KASSIE Index (cm2/m2): 1.3     E/E': 5.2  E/E' av.5  Lateral E/e': 5.2  Medial E/e': 7.7  Peak E' Sekou: 16.0 cm/sec  RV S Sekou: 23.2 cm/sec     ______________________________________________________________________________  Report approved by: Bob Basurto 2023 11:42 AM

## 2023-04-12 NOTE — CONSULTS
[facility]   [address], [place] [address]   [email or web address]   OFFICE: [phone]     CARDIOLOGY INPATIENT CONSULT NOTE     Impression and Plan     Assessment:  Hypotension -  She is adamant that she has not taken clonidine in over a week. Cosyntropin stimulation test was abnormal, indicating likely adrenal insufficiency. Her LV function is normal, so this is not cardiogenic.  Tachycardia - per report will often have tachycardia with HR up to 160 bpm prior to passing out. No evidence of tachycardia here today. Without arrhythmias identified, anxiety or panic attacks are possible cause.   Electrolyte abnormalities    Plan:  Defer blood pressure support to Dr. Pereyra  Continue telemetry monitoring  If no arrhythmia identified on telemetry then consider outpatient mobile cardiac telemetry monitor.    Primary Cardiologist: Dr. Mandy Duran - [facility]    History of Present Illness      Ms. Renetta Farooq is a 67 year old female with history of hypertension, COPD, PTSD, bipolar disorder, and opioid dependence, substance abuse including methamphetamine abuse and alcohol dependence who was admitted with an episode of reported loss of consciousness.  She has had several episodes where she would suddenly not feel well with difficulties breathing and prompt her to sit forward and take deep breaths.  When she attempts to stand up then she gets lightheaded and may pass out.  This is happened 5-8 times over the past 6 months.  This was witnessed by her son which prompted her presentation to the emergency department.  She has had another admission with similar presentation at an outside hospital approximately a month ago that was thought to be due to clonidine overuse.  That hospitalization was complicated by bradycardia and hypotension.  Since that time she has not used any additional clonidine.  On presentation her blood pressure was quite low.  She was treated  "with fluid resuscitation without significant improvement in her symptoms.  She was started on norepinephrine and vasopressin infusion but she made control of her airway as well as her mental status.  Vasopressin has been able to be discontinued but she remains on norepinephrine to support her blood pressure.    Currently complains of cramping and \"shmuel horses\" in her arms, hands, legs.      Other than noted above, Ms. Farooq denies any chest pain/pressure/tightness, shortness of breath at rest or with exertion, light headedness/dizziness, pre-syncope, syncope, lower extremity swelling, palpitations, paroxysmal nocturnal dyspnea (PND), or orthopnea.          Review of Systems:  Further review of systems is otherwise negative/noncontributory (based on review of medical record (admission H&P) and 13 point review of systems reviewed. Pertinent positives noted).    Cardiac Diagnostics     ECG: Personally reviewed and interpreted: sinus rhythm with nonspecific ST abnormality    Telemetry (personally reviewed): sinus rhythm.    Most recent:  Echocardiogram (results reviewed): TTE 4/12/2023  1. The left ventricle is normal in size. Left ventricular systolic performance  is hyperdynamic with a visually estimated ejection fraction of 75%.  2. No significant valvular heart disease is identified on this study.  3. Normal right ventricular size and systolic performance.        Medical History  Surgical History Family History Social History   Past Medical History:   Diagnosis Date     Acute psychosis (H) 10/14/2018     Alcohol dependence (H) 4/10/2015     Bipolar affective disorder, manic, severe, with psychotic behavior (H) 10/15/2018     Candida infection 10/19/2018     Chronic hepatitis C (H) 4/10/2015     Chronic neck pain      Closed traumatic brain injury, without loss of consciousness, sequela (H)      Dental abscess      Episodic tension-type headache, not intractable      Methamphetamine dependence (H) 10/15/2018 " "    Pancytopenia (H) 4/10/2015     Weakness of right arm 4/10/2015     Past Surgical History:   Procedure Laterality Date     ARTHROSCOPY SHOULDER ROTATOR CUFF REPAIR Bilateral      CERVICAL FUSION      twice     COSMETIC SURGERY       RHINOPLASTY       TONSILLECTOMY       Family History   Problem Relation Age of Onset     Narcolepsy Mother      Acute myelogenous leukemia Mother      Cancer Father            Social History     Socioeconomic History     Marital status:      Spouse name: Not on file     Number of children: Not on file     Years of education: Not on file     Highest education level: Not on file   Occupational History     Not on file   Tobacco Use     Smoking status: Every Day     Packs/day: 1.00     Years: 54.00     Pack years: 54.00     Types: Cigarettes     Start date: 1968     Smokeless tobacco: Never     Tobacco comments:     Seen IP by CTTS on 3/31/2023   Vaping Use     Vaping status: Not on file   Substance and Sexual Activity     Alcohol use: Yes     Comment: Alcoholic Drinks/day: \"A lot.\"  (Couldn't quantify except to repeat \"A lot\" of wine, liquor and beer)     Drug use: No     Sexual activity: Not on file   Other Topics Concern     Not on file   Social History Narrative    Lives alone in independent housing with psychiatric case assistance     Social Determinants of Health     Financial Resource Strain: Not on file   Food Insecurity: Not on file   Transportation Needs: Not on file   Physical Activity: Not on file   Stress: Not on file   Social Connections: Not on file   Intimate Partner Violence: Not on file   Housing Stability: Not on file             Physical Examination   VITALS: BP (!) 83/53   Pulse 77   Temp 98.3  F (36.8  C) (Oral)   Resp 23   Ht 1.676 m (5' 6\")   Wt 62 kg (136 lb 11 oz)   SpO2 90%   BMI 22.06 kg/m    BMI: Body mass index is 22.06 kg/m .  Wt Readings from Last 3 Encounters:   04/12/23 62 kg (136 lb 11 oz)   04/05/23 62.7 kg (138 lb 3.2 oz)   04/01/23 59.3 " kg (130 lb 12.8 oz)       Intake/Output Summary (Last 24 hours) at 4/12/2023 1138  Last data filed at 4/12/2023 0915  Gross per 24 hour   Intake 1893.27 ml   Output 5075 ml   Net -3181.73 ml       General: pleasant female. No acute distress.   HENT: external ears normal. Nares patent. Mucous membranes moist.  Eyes: perrla, extraocular muscles intact. No scleral icterus.   Neck: No JVD  Lungs: clear to auscultation  COR: regular rate and rhythm, No murmurs, rubs, or gallops  Abd: nondistended, BS present  Extrem: No edema         Non-cardiac Imaging Studies Reviewed      No recent imaging.       Lab Results Reviewed    Chemistry/lipid CBC Cardiac Enzymes/BNP/TSH/INR   Recent Labs   Lab Test 02/25/16  0759   CHOL 136   HDL 46*   LDL 83   TRIG 34     Recent Labs   Lab Test 02/25/16  0759 01/27/15  0945   LDL 83 70     Recent Labs   Lab Test 04/12/23  1111 04/12/23  0537   NA  --  138   POTASSIUM 3.5 3.4*   CHLORIDE  --  106   CO2  --  25   GLC  --  142*   BUN  --  12   CR  --  0.67   GFRESTIMATED  --  >90   KARRIE  --  8.2*     Recent Labs   Lab Test 04/12/23  0537 04/11/23  1050 04/06/23  0756   CR 0.67 0.89 0.78     No results for input(s): A1C in the last 46583 hours.       Recent Labs   Lab Test 04/12/23  0537   WBC 8.2   HGB 10.6*   HCT 32.4*   MCV 96   *     Recent Labs   Lab Test 04/12/23  0537 04/11/23  1050 04/05/23  1001   HGB 10.6* 12.4 11.8    Recent Labs   Lab Test 04/11/23  1050 03/30/23  1037 03/05/23  1748   TROPONINI <0.01 <0.01 0.01     Recent Labs   Lab Test 04/11/23  1050 03/30/23  1037 03/05/23  1748   BNP <10 14 213*     Recent Labs   Lab Test 04/11/23  1050   TSH 1.55     No results for input(s): INR in the last 45445 hours.        Current Inpatient Scheduled Medications   Scheduled Meds:    buprenorphine HCl-naloxone HCl  2 Film Sublingual BID     FLUoxetine  40 mg Oral Daily     fluticasone-vilanterol  1 puff Inhalation Daily     gabapentin  300 mg Oral TID     hydrocortisone sodium  succinate PF  50 mg Intravenous Q6H     nicotine  1 patch Transdermal Daily    And     nicotine   Transdermal Q8H BIENVENIDO     pantoprazole  40 mg Oral At Bedtime     QUEtiapine  100 mg Oral QAM     QUEtiapine  300 mg Oral At Bedtime     sodium chloride (PF)  3 mL Intracatheter Q8H     Continuous Infusions:    norepinephrine 0.1 mcg/kg/min (04/12/23 1108)     vasopressin 2.4 Units/hr (04/12/23 0934)       No current outpatient medications on file.          Medications Prior to Admission   Prior to Admission medications    Medication Sig Start Date End Date Taking? Authorizing Provider   albuterol (PROAIR HFA;PROVENTIL HFA;VENTOLIN HFA) 90 mcg/actuation inhaler [ALBUTEROL (PROAIR HFA;PROVENTIL HFA;VENTOLIN HFA) 90 MCG/ACTUATION INHALER] Inhale 2 puffs every 6 (six) hours as needed for wheezing. 11/20/18  Yes Leora Hogan MD   budesonide-formoterol (SYMBICORT) 160-4.5 MCG/ACT Inhaler Inhale 2 puffs into the lungs 2 times daily   Yes Unknown, Entered By History   buprenorphine HCl-naloxone HCl (SUBOXONE) 4-1 MG per film Place 1 Film under the tongue 2 times daily   Yes Unknown, Entered By History   calcium carbonate (TUMS) 500 MG chewable tablet Take 1 tablet (500 mg) by mouth 2 times daily 4/6/23  Yes Ray Corona DO   docusate sodium (COLACE) 100 MG capsule [DOCUSATE SODIUM (COLACE) 100 MG CAPSULE] Take 1 capsule (100 mg total) by mouth 2 (two) times a day. 11/20/18  Yes Leora Hogan MD   docusate sodium (COLACE) 100 MG tablet Take 100 mg by mouth 2 times daily as needed for constipation   Yes Unknown, Entered By History   FLUoxetine (PROZAC) 40 MG capsule Take 1 capsule (40 mg) by mouth daily for 30 days 4/6/23 5/6/23 Yes Ray Corona DO   gabapentin (NEURONTIN) 300 MG capsule Take 300 mg by mouth 3 times daily 4/1/23  Yes Laila Grayson MD   ibuprofen (ADVIL/MOTRIN) 200 MG tablet Take 600 mg by mouth every 8 hours as needed for pain   Yes Unknown, Entered By History   ipratropium - albuterol 0.5  mg/2.5 mg/3 mL (DUONEB) 0.5-2.5 (3) MG/3ML neb solution Take 1 vial (3 mLs) by nebulization 3 times daily For 1 week then every 6 hours as needed  Patient taking differently: Take 3 mLs by nebulization every 6 hours as needed for shortness of breath For 1 week then every 6 hours as needed 4/6/23  Yes Ray Corona DO   levofloxacin (LEVAQUIN) 500 MG tablet Take 500 mg by mouth daily X 4 days - 2 doses left   Yes Unknown, Entered By History   magnesium oxide (MAG-OX) 400 MG tablet Take 1 tablet (400 mg) by mouth daily 4/6/23  Yes Ray Corona DO   nicotine (NICODERM CQ) 14 MG/24HR 24 hr patch Place 1 patch onto the skin daily 4/1/23  Yes Laila Grayson MD   NYSTOP 903048 UNIT/GM powder Apply topically 3 times daily as needed 3/27/21  Yes Reported, Patient   potassium chloride ER (KLOR-CON M) 20 MEQ CR tablet Take 20 mEq by mouth daily   Yes Unknown, Entered By History   QUEtiapine (SEROQUEL) 100 MG tablet Take 1 tablet (100 mg) by mouth every morning 4/6/23  Yes Ray Corona DO   QUEtiapine (SEROQUEL) 300 MG tablet Take 1 tablet (300 mg) by mouth At Bedtime for 30 days 4/6/23 5/6/23 Yes Ray Corona DO   Vitamin D3 (CHOLECALCIFEROL) 25 mcg (1000 units) tablet Take 1 tablet (25 mcg) by mouth daily 4/6/23  Yes Ray Corona DO   omeprazole (PRILOSEC) 20 MG DR capsule Take 20 mg by mouth At Bedtime    Reported, Patient

## 2023-04-12 NOTE — PLAN OF CARE
Problem: Plan of Care - These are the overarching goals to be used throughout the patient stay.    Goal: Optimal Comfort and Wellbeing  Outcome: Progressing     Problem: Syncope  Goal: Absence of Syncopal Symptoms  Outcome: Progressing     Patient Aox4, pleasant, cooperative. C/o headache controlled with janine/prn pain medication. Pressures stable (MAP > 65) on IV medication. Otherwise vitally stable on room air. Bedrest. Tolerating reg diet.

## 2023-04-12 NOTE — CONSULTS
Care Management Initial Consult    General Information  Assessment completed with: Patient, VM-chart review,    Type of CM/SW Visit: Initial Assessment    Primary Care Provider verified and updated as needed:     Readmission within the last 30 days: previous discharge plan unsuccessful      Reason for Consult: discharge planning  Advance Care Planning:            Communication Assessment  Patient's communication style: spoken language (English or Bilingual)    Hearing Difficulty or Deaf: no   Wear Glasses or Blind: no    Cognitive  Cognitive/Neuro/Behavioral: WDL                      Living Environment:   People in home: child(jimmy), adult     Current living Arrangements: hotel/motel      Able to return to prior arrangements: yes       Family/Social Support:  Care provided by: self, child(jimmy)  Provides care for: no one, unable/limited ability to care for self  Marital Status:   Children          Description of Support System: Supportive    Support Assessment:  (Housing instability; living in a hotel with son who is currently unemployed)    Current Resources:   Patient receiving home care services: No     Community Resources: PCA, County Worker  Equipment currently used at home: cane, quad, walker, alison  Supplies currently used at home: None    Employment/Financial:  Employment Status: retired, disabled        Financial Concerns: unable to afford rent/mortgage (waiting for section 8 application approval)   Referral to Financial Worker: No       Lifestyle & Psychosocial Needs:  Social Determinants of Health     Tobacco Use: High Risk (4/11/2023)    Patient History      Smoking Tobacco Use: Every Day      Smokeless Tobacco Use: Never      Passive Exposure: Not on file   Alcohol Use: Not on file   Financial Resource Strain: Not on file   Food Insecurity: Not on file   Transportation Needs: Not on file   Physical Activity: Not on file   Stress: Not on file   Social Connections: Not on file   Intimate Partner  "Violence: Not on file   Depression: Not at risk (7/30/2021)    PHQ-2      PHQ-2 Score: 0   Housing Stability: Not on file       Functional Status:  Prior to admission patient needed assistance:   Dependent ADLs:: Ambulation-cane, Ambulation-walker, Grooming  Dependent IADLs:: Transportation       Mental Health Status:  Mental Health Status: Past Concern       Chemical Dependency Status:  Chemical Dependency Status: Past Concern             Values/Beliefs:  Spiritual, Cultural Beliefs, Congregation Practices, Values that affect care: no               Additional Information:  Initial CM assessment completed with Pt. Pt reports she has been staying at an extended-stay hotel with her son. Pt reports she is retired and using her social security to pay for the hotel stay. Pt's son is currently unemployed and has been looking for a job. Pt reported he no longer can receive unemployment. Pt states she has a PCA that assists her 3 hours/week. Pt reports that she is waiting on her Section 8 application to be approved so she can find housing. Pt reports that she was evicted from her last apartment as she was paying $400/month and then it was raised to $1500/month. Pt stated she was kicked out of her apartment without being able to take any of her belongings with her. Pt reports she had a walker and cane in the apartment and is now holding her son's hand when ambulating. Pt states she has a mental health worker Jenelle Lainez but does not have her phone to share Jenelle's contact information. Pt requested SW to leave as she was tired and uncomfortable from the \"shmuel horses\" happening in her arms and legs. CM to follow and assess further for needs.     DORA Ann        "

## 2023-04-12 NOTE — PLAN OF CARE
Goal: Optimal Comfort and Wellbeing  Outcome: Progressing     Problem: Risk for Delirium  Goal: Improved Attention and Thought Clarity  Outcome: Progressing     Problem: Syncope  Goal: Absence of Syncopal Symptoms  Outcome: Progressing     Pt A&Ox4, complained of some leg pain, scheduled meds effective. Still on vasopressin and levo, triturating gtts per order and MAP goal. Poison control recommended Acetaminophen level, lab drawn. Vitals stable, will continue to monitor.

## 2023-04-12 NOTE — PLAN OF CARE
"Goal Outcome Evaluation:  Pt A+O, able to make needs known. Resting comfortably between cares, upon waking continually c/o cramping in hands/ feet/legs. States this happens at home but \"not this bad.\" Given tylenol and scheduled gabapentin, MDs notified, voltaren gel + flexeril PRN now ordered. Echo completed at bedside. Albumin and hydrocortisone administered, able to wean off vasopressin. Levophed infusion titrated as able. Intermittently requiring 1L NC when sleeping, otherwise RA. Declined compression socks today. Spoke with Alvaro from poison control this morning for update. Will continue to monitor. Tolerating regular diet. Burroughs remains patent. K/Mg protocol. Unknown when last BM per pt. Bowel regimen now ordered.      Plan of Care Reviewed With: patient      Darlene Sung RN        Problem: Plan of Care - These are the overarching goals to be used throughout the patient stay.    Goal: Plan of Care Review  Description: The Plan of Care Review/Shift note should be completed every shift.  The Outcome Evaluation is a brief statement about your assessment that the patient is improving, declining, or no change.  This information will be displayed automatically on your shift note.  4/12/2023 1527 by Darlene Sung RN  Outcome: Progressing  Flowsheets (Taken 4/12/2023 1527)  Plan of Care Reviewed With: patient  4/12/2023 1526 by Darlene Sung RN  Outcome: Progressing  Flowsheets (Taken 4/12/2023 1526)  Plan of Care Reviewed With: patient     Problem: Syncope  Goal: Absence of Syncopal Symptoms  4/12/2023 1527 by Darlene Sung RN  Outcome: Progressing  4/12/2023 1526 by Darlene Sung RN  Outcome: Progressing            "

## 2023-04-12 NOTE — PROGRESS NOTES
PULMONARY / CRITICAL CARE PROGRESS NOTE    Date / Time of Admission:  4/11/2023 10:45 AM    Assessment:   Principal Problem:    Hypokalemia  Active Problems:    Hypotension, unspecified hypotension type      Code Status:  Full Code    67yoF with history of clonidine and suboxone abuse, cognitive impairment, previous hospitalizations for clonidine ingestion presents with hypotension, unresponsive to fluids. No obvious source of infection present. Shock is not hemorrhagic, not septic so concern for cardiogenic, neurogenic versus medication-related.    Plan:   C/V:  1) Shock of unclear etiology--potentially medication related.  -Volume resuscitated  -Norepi down to 0.08  -Vasopressin off  -Cosyntropin test showed inappropriate rise, will initiate stress dose steroids.   -Echo today found preserved EF, no abnormalities to explain the hypotension.  -may need midodrine as well to get off vasopressor.        Renal:  1) Hypokalemia  -Replete, patient on protocol     Neuro:  1) Significant medication abuse history  2) Chronic pain  -Home meds have been continued--prozac and Seroquel. Agree with holding Klonopin.  -Agree with psych consult given history  -Tox negative but doesn't test for many substances.      ID:  1) No evidence of infection  -Procalcitonin negative. No Leukocytosis. CRP pending.   -Agree with holding antibiotics until potential source identified     GI:  1) Concern for malnutrition     Endo:  1) Hypotension of unclear etiology  -Stress dose steroids added 4/12  -TSH normal.         ICU DAILY CHECKLIST                           Can patient transfer out of MICU? no    FAST HUG:    Feeding:  Feeding: Yes.  Patient is receiving ORAL    Burroughs:Yes  Analgesia/Sedation:No   Thromboembolic prophylaxis: Yes; Mode:  Heparin  HOB>30:  Yes  Stress Ulcer Protocol Active: Yes; Mode: PPI  Glycemic Control: Any glucose > 180 no; Mode of Insulin Therapy: Sliding Scale Insulin    Clinically Significant Risk Factors Present on  Admission        # Hypokalemia: Lowest K = 3 mmol/L in last 2 days, will replace as needed     # Hypomagnesemia: Lowest Mg = 1.5 mg/dL in last 2 days, will replace as needed         # Circulatory Shock: currently requiring pressors for blood pressure support             Critical Care Time: 30 minutes  This patient is critically ill due to hemodynamic instability.         Subjective:   HPI:  Renetta Farooq is a 67 year old female with history of bipolar disorder, chronic narcotic dependence, methamphetamine abuse who presented to ED 4/11 after unresponsive episode. In Ed found to be hypotensive despite volume resuscitation. Had similar presentation previously after intentional clonidine ingestion but then was bradycardic, currently HR . She is awake and alert currently. Doesn't know why she is hypotensive. Denies ingestion. Norepi initiated and titrated up to 0.2. Stable there with negative lactate, no fever or leukocytosis. Last echo 1/2023 with EF 65-70%, normal RV, trace TR so unlikely cardiac source given multiple presentations with similar.     Still unable to wean vasopressor. Bps very labile. Cosyntropin test yesterday was inappropriate rise, steroids initiated.     Principal Problem:    Hypokalemia  Active Problems:    Hypotension, unspecified hypotension type      Allergies: Amoxicillin and Droperidol     MEDS:  Scheduled Meds:    buprenorphine HCl-naloxone HCl  2 Film Sublingual BID     FLUoxetine  40 mg Oral Daily     fluticasone-vilanterol  1 puff Inhalation Daily     gabapentin  300 mg Oral TID     nicotine  1 patch Transdermal Daily    And     nicotine   Transdermal Q8H BIENVENIDO     pantoprazole  40 mg Oral At Bedtime     QUEtiapine  100 mg Oral QAM     QUEtiapine  300 mg Oral At Bedtime     sodium chloride (PF)  3 mL Intracatheter Q8H     Continuous Infusions:    norepinephrine 0.18 mcg/kg/min (04/12/23 0736)     vasopressin 2.4 Units/hr (04/12/23 0600)     PRN Meds:.acetaminophen **OR**  "acetaminophen, albuterol, lidocaine 4%, lidocaine 4%, lidocaine (buffered or not buffered), lidocaine (buffered or not buffered), melatonin, naloxone, ondansetron **OR** ondansetron, polyethylene glycol, prochlorperazine **OR** prochlorperazine **OR** prochlorperazine, sodium chloride (PF), sodium chloride (PF)      Objective:   VITALS:  /56   Pulse 91   Temp 98.5  F (36.9  C) (Oral)   Resp 27   Ht 1.676 m (5' 6\")   Wt 62 kg (136 lb 11 oz)   SpO2 93%   BMI 22.06 kg/m    EXAM:  General appearance: alert, appears older than stated age, cooperative and disheveled  Neck: no adenopathy and supple, symmetrical, trachea midline  Lungs: clear to auscultation bilaterally  Heart: RRR, S1 and S2  Abdomen: soft, non-tender; bowel sounds normal; no masses,  no organomegaly  Extremities: No edema  Skin: Skin color, texture, turgor normal. No rashes or lesions  Neurologic: Grossly normal    I&O:     Intake/Output Summary (Last 24 hours) at 4/12/2023 0802  Last data filed at 4/12/2023 0600  Gross per 24 hour   Intake 1533.27 ml   Output 4325 ml   Net -2791.73 ml           Data Review:  Chest Xray  Personally reviewed image/s and Personally reviewed impression/s  EXAM: XR CHEST 2 VIEWS  LOCATION: [facility]  DATE/TIME: 4/3/2023 9:13 AM     INDICATION: Dyspnea.  COMPARISON: Chest x-rays dated 03/30/2023 and CT chest dated 03/30/2023.     FINDINGS: Lungs are persistently hyperaerated but are otherwise grossly clear without infiltrate, nodule, mass, effusion, pneumothorax, or acute osseous fracture. Heart size is grossly within normal limits. Cervical spine fusion hardware is partially   imaged and there is some lucency in the region of the pedicle screws at approximately T2 indicating possible loosening.                                                                      IMPRESSION:   1. Mild hyperaeration.  2. No other evidence of acute cardiopulmonary disease is identified on this study. No " significant change since the prior study dated 03/30/2023.  3. Questionable loosening of the pedicle screws at T2 (portion of the posterior fusion hardware for the lower cervical and upper thoracic spine). Recommend clinical correlation.    LABS      Latest Ref Rng & Units 3/31/2023     6:33 AM 4/3/2023     8:04 AM 4/4/2023     9:07 AM 4/5/2023    10:01 AM 4/6/2023     7:56 AM 4/11/2023    10:50 AM 4/12/2023     5:37 AM   Glucose Values   Glucose 70 - 125 mg/dL 133   88   135   103   116   184   142         Results for orders placed or performed during the hospital encounter of 04/11/23   Basic metabolic panel   Result Value Ref Range    Sodium 138 136 - 145 mmol/L    Potassium 3.4 (L) 3.5 - 5.0 mmol/L    Chloride 106 98 - 107 mmol/L    Carbon Dioxide (CO2) 25 22 - 31 mmol/L    Anion Gap 7 5 - 18 mmol/L    Urea Nitrogen 12 8 - 22 mg/dL    Creatinine 0.67 0.60 - 1.10 mg/dL    Calcium 8.2 (L) 8.5 - 10.5 mg/dL    Glucose 142 (H) 70 - 125 mg/dL    GFR Estimate >90 >60 mL/min/1.73m2     Lab Results   Component Value Date    WBC 8.2 04/12/2023    HGB 10.6 (L) 04/12/2023    HCT 32.4 (L) 04/12/2023    MCV 96 04/12/2023     (L) 04/12/2023         By:  Ce Pereyra MD  Pulmonary/Critical Care

## 2023-04-13 LAB
ANION GAP SERPL CALCULATED.3IONS-SCNC: 8 MMOL/L (ref 5–18)
BUN SERPL-MCNC: 11 MG/DL (ref 8–22)
CALCIUM SERPL-MCNC: 8.6 MG/DL (ref 8.5–10.5)
CHLORIDE BLD-SCNC: 106 MMOL/L (ref 98–107)
CLUE CELLS: ABNORMAL
CO2 SERPL-SCNC: 25 MMOL/L (ref 22–31)
CORTIS SERPL-MCNC: 5.6 UG/DL
CREAT SERPL-MCNC: 0.64 MG/DL (ref 0.6–1.1)
ERYTHROCYTE [DISTWIDTH] IN BLOOD BY AUTOMATED COUNT: 12.4 % (ref 10–15)
GFR SERPL CREATININE-BSD FRML MDRD: >90 ML/MIN/1.73M2
GLUCOSE BLD-MCNC: 104 MG/DL (ref 70–125)
HCT VFR BLD AUTO: 28.3 % (ref 35–47)
HGB BLD-MCNC: 9.7 G/DL (ref 11.7–15.7)
MAGNESIUM SERPL-MCNC: 1.9 MG/DL (ref 1.8–2.6)
MCH RBC QN AUTO: 32 PG (ref 26.5–33)
MCHC RBC AUTO-ENTMCNC: 34.3 G/DL (ref 31.5–36.5)
MCV RBC AUTO: 93 FL (ref 78–100)
PLATELET # BLD AUTO: 110 10E3/UL (ref 150–450)
POTASSIUM BLD-SCNC: 3.6 MMOL/L (ref 3.5–5)
RBC # BLD AUTO: 3.03 10E6/UL (ref 3.8–5.2)
SODIUM SERPL-SCNC: 139 MMOL/L (ref 136–145)
TRICHOMONAS, WET PREP: ABNORMAL
WBC # BLD AUTO: 5.9 10E3/UL (ref 4–11)
WBC'S/HIGH POWER FIELD, WET PREP: ABNORMAL
YEAST, WET PREP: ABNORMAL

## 2023-04-13 PROCEDURE — 99232 SBSQ HOSP IP/OBS MODERATE 35: CPT | Mod: GC

## 2023-04-13 PROCEDURE — 250N000013 HC RX MED GY IP 250 OP 250 PS 637: Performed by: INTERNAL MEDICINE

## 2023-04-13 PROCEDURE — 83735 ASSAY OF MAGNESIUM: CPT | Performed by: FAMILY MEDICINE

## 2023-04-13 PROCEDURE — 85027 COMPLETE CBC AUTOMATED: CPT

## 2023-04-13 PROCEDURE — 87210 SMEAR WET MOUNT SALINE/INK: CPT | Performed by: STUDENT IN AN ORGANIZED HEALTH CARE EDUCATION/TRAINING PROGRAM

## 2023-04-13 PROCEDURE — 80048 BASIC METABOLIC PNL TOTAL CA: CPT

## 2023-04-13 PROCEDURE — 250N000013 HC RX MED GY IP 250 OP 250 PS 637: Performed by: STUDENT IN AN ORGANIZED HEALTH CARE EDUCATION/TRAINING PROGRAM

## 2023-04-13 PROCEDURE — 120N000013 HC R&B IMCU

## 2023-04-13 PROCEDURE — 250N000011 HC RX IP 250 OP 636: Performed by: INTERNAL MEDICINE

## 2023-04-13 PROCEDURE — 99207 PR NO CHARGE LOS: CPT | Performed by: INTERNAL MEDICINE

## 2023-04-13 PROCEDURE — 250N000013 HC RX MED GY IP 250 OP 250 PS 637

## 2023-04-13 PROCEDURE — 99291 CRITICAL CARE FIRST HOUR: CPT | Performed by: INTERNAL MEDICINE

## 2023-04-13 RX ORDER — NYSTATIN 100000 U/G
CREAM TOPICAL 2 TIMES DAILY PRN
Status: DISCONTINUED | OUTPATIENT
Start: 2023-04-13 | End: 2023-04-13

## 2023-04-13 RX ORDER — POLYETHYLENE GLYCOL 3350 17 G/17G
17 POWDER, FOR SOLUTION ORAL DAILY
Status: DISCONTINUED | OUTPATIENT
Start: 2023-04-13 | End: 2023-04-15 | Stop reason: HOSPADM

## 2023-04-13 RX ORDER — ENOXAPARIN SODIUM 100 MG/ML
40 INJECTION SUBCUTANEOUS EVERY 24 HOURS
Status: DISCONTINUED | OUTPATIENT
Start: 2023-04-13 | End: 2023-04-15 | Stop reason: HOSPADM

## 2023-04-13 RX ORDER — MAGNESIUM OXIDE 400 MG/1
400 TABLET ORAL EVERY 4 HOURS
Status: COMPLETED | OUTPATIENT
Start: 2023-04-13 | End: 2023-04-13

## 2023-04-13 RX ORDER — POTASSIUM CHLORIDE 1500 MG/1
20 TABLET, EXTENDED RELEASE ORAL ONCE
Status: COMPLETED | OUTPATIENT
Start: 2023-04-13 | End: 2023-04-13

## 2023-04-13 RX ADMIN — MICONAZOLE NITRATE: 2 POWDER TOPICAL at 21:02

## 2023-04-13 RX ADMIN — BUPRENORPHINE AND NALOXONE 2 FILM: 2; .5 FILM BUCCAL; SUBLINGUAL at 21:01

## 2023-04-13 RX ADMIN — HYDROCORTISONE SODIUM SUCCINATE 50 MG: 100 INJECTION, POWDER, FOR SOLUTION INTRAMUSCULAR; INTRAVENOUS at 18:49

## 2023-04-13 RX ADMIN — DOCUSATE SODIUM 100 MG: 100 CAPSULE, LIQUID FILLED ORAL at 21:02

## 2023-04-13 RX ADMIN — FLUTICASONE FUROATE AND VILANTEROL TRIFENATATE 1 PUFF: 100; 25 POWDER RESPIRATORY (INHALATION) at 11:06

## 2023-04-13 RX ADMIN — ACETAMINOPHEN 650 MG: 325 TABLET ORAL at 21:58

## 2023-04-13 RX ADMIN — Medication 400 MG: at 09:07

## 2023-04-13 RX ADMIN — FLUOXETINE 40 MG: 20 CAPSULE ORAL at 09:07

## 2023-04-13 RX ADMIN — QUETIAPINE FUMARATE 100 MG: 25 TABLET ORAL at 06:43

## 2023-04-13 RX ADMIN — CYCLOBENZAPRINE HYDROCHLORIDE 5 MG: 5 TABLET, FILM COATED ORAL at 01:16

## 2023-04-13 RX ADMIN — NICOTINE 1 PATCH: 14 PATCH, EXTENDED RELEASE TRANSDERMAL at 09:07

## 2023-04-13 RX ADMIN — GABAPENTIN 300 MG: 300 CAPSULE ORAL at 09:07

## 2023-04-13 RX ADMIN — POTASSIUM CHLORIDE 20 MEQ: 1500 TABLET, EXTENDED RELEASE ORAL at 11:06

## 2023-04-13 RX ADMIN — BUPRENORPHINE AND NALOXONE 2 FILM: 2; .5 FILM BUCCAL; SUBLINGUAL at 09:07

## 2023-04-13 RX ADMIN — SENNOSIDES 8.6 MG: 8.6 TABLET, FILM COATED ORAL at 21:58

## 2023-04-13 RX ADMIN — HYDROCORTISONE SODIUM SUCCINATE 50 MG: 100 INJECTION, POWDER, FOR SOLUTION INTRAMUSCULAR; INTRAVENOUS at 03:39

## 2023-04-13 RX ADMIN — CYCLOBENZAPRINE HYDROCHLORIDE 5 MG: 5 TABLET, FILM COATED ORAL at 21:58

## 2023-04-13 RX ADMIN — GABAPENTIN 300 MG: 300 CAPSULE ORAL at 21:01

## 2023-04-13 RX ADMIN — QUETIAPINE 300 MG: 100 TABLET ORAL at 21:01

## 2023-04-13 RX ADMIN — MAGNESIUM OXIDE TAB 400 MG (241.3 MG ELEMENTAL MG) 400 MG: 400 (241.3 MG) TAB at 14:30

## 2023-04-13 RX ADMIN — MAGNESIUM OXIDE TAB 400 MG (241.3 MG ELEMENTAL MG) 400 MG: 400 (241.3 MG) TAB at 11:06

## 2023-04-13 RX ADMIN — CYCLOBENZAPRINE HYDROCHLORIDE 5 MG: 5 TABLET, FILM COATED ORAL at 11:06

## 2023-04-13 RX ADMIN — HYDROCORTISONE SODIUM SUCCINATE 50 MG: 100 INJECTION, POWDER, FOR SOLUTION INTRAMUSCULAR; INTRAVENOUS at 12:52

## 2023-04-13 RX ADMIN — SENNOSIDES 8.6 MG: 8.6 TABLET, FILM COATED ORAL at 09:07

## 2023-04-13 RX ADMIN — DOCUSATE SODIUM 100 MG: 100 CAPSULE, LIQUID FILLED ORAL at 09:07

## 2023-04-13 RX ADMIN — NYSTATIN: 100000 CREAM TOPICAL at 01:16

## 2023-04-13 RX ADMIN — GABAPENTIN 300 MG: 300 CAPSULE ORAL at 14:30

## 2023-04-13 RX ADMIN — PANTOPRAZOLE SODIUM 40 MG: 40 TABLET, DELAYED RELEASE ORAL at 21:02

## 2023-04-13 RX ADMIN — MICONAZOLE NITRATE: 2 POWDER TOPICAL at 12:53

## 2023-04-13 ASSESSMENT — ACTIVITIES OF DAILY LIVING (ADL)
ADLS_ACUITY_SCORE: 29

## 2023-04-13 NOTE — PROGRESS NOTES
Chart reviewed. Remains on norepinephrine.  No arrhythmias on telemetry  Consider outpatient mobile cardiac telemetry monitor and follow up with primary cardiologist at [facility].  Will sign off. Please contact me with additional questions or concerns.

## 2023-04-13 NOTE — PLAN OF CARE
Problem: Excessive Substance Use  Goal: Optimized Energy Level (Excessive Substance Use)  Outcome: Not Progressing    Problem: Syncope  Goal: Absence of Syncopal Symptoms  Outcome: Progressing    Problem: Cardiac Output Decreased  Goal: Effective Cardiac Output  Outcome: Progressing  Intervention: Optimize Cardiac Output    Pt was given her scheduled Suboxone, acetaminophen for c/o headache 10/10, and flexeril for finger cramping.  Pt did not have any syncopal or seizures.  Pt remained off Vasopressin and Levo was titrated to 0.03 mcg/kg/min.  Pt did not tolerated Levo at 0.02 mcg/kg/min.  Pt noted to have a rash under both breasts; Nystatin cream applied.  Pt also feels that she may have a vaginal yeast infection; to be examined in AM by MD.  Continue to monitor.

## 2023-04-13 NOTE — PROGRESS NOTES
[facility]:  CRITICAL CARE PROGRESS NOTE     Date / Time of Admission:  4/11/2023 10:45 AM    ID: Renetta Farooq is a 67 year old female, smoker, with PTSD, bipolar disorder, opioid dependence, substance abuse on Suboxone, methamphetamine abuse, alcohol dependence, COPD, recurrent hospitalizations/ED visits for multiple ailments who was brought to [facility] ED on 4/11 for seizure-like activity and unresponsiveness/syncope, subsequently admitted to the ICU for marked circulatory shock on multiple vasopressors.         Changes for today: 1.   Escalate magnesium and potassium replacement.  2. Add on cortisol to yesterday morning a.m. labs, spot study was 5.6 at 5:37 AM, which helps supports adrenal insufficiency.  3. No steroid changes today, will reduce IV steroids once off vasopressors.  4. Constipation - add Miralax.  Patient given Golytely ~ 1.5 weeks ago and had severe diarrhea, so reticent about medications.        Assessment/Plan:   Neurologic: Syncopal event, resolved.  Concerned that possibly related to medication induced or maybe adrenal insufficiency with hypotension. TTE normal thus far. Chronic issues: PTSD, bipolar disorder, opioid dependence, substance abuse on Suboxone, methamphetamine abuse, alcohol dependence.  Uncertain when last drink was.    Continue PTA Suboxone BID    Continue PTA fluoxetine daily, gabapentin TID, quetiapine QAM/QHS     Monitor for any withdrawal symptoms. Monitor QT with antipsychotics.     Pain control: PRNs     Pulmonary: COPD without exacerbation.  At baseline is on Symibcort inhaler     Wean supplemental O2 as tolerated; goal O2 sat > 92%.  HOB > 30 degrees to limit aspiration risk.      Continue Breo Ellipta daily - substitute for home inhl    Encourage tobacco cessation. Continue nicotine patch daily.     Cardiovascular: Circulatory shock.  Etiology for shock physiology at this time may be related to adrenal insufficiency.  She remains  dependent on vasopressors despite IV steroids, but there has been some improvement.  In the past, there was concern that her hypotension may have been drug-induced from clonidine overdose.  However, patient adamantly denies any clonidine overdose or control of her home meds. TTE was unremarkable, normal LVEF    Cardiac monitoring.  SBP >= 90 mmHg, MAP >= 65 mmHg.  EKG PRN.    Norepinephrine gtt as needed for BP goals.      Discontinue vasopressin IV.     Continue hydrocortisone 50 mg IV Q6H (start 4/12)     GI/: GERD without esophagitis. History of constipation.    Nutrition: Oral intake.     Last BM:  None.  Meds: docusate BID. Add scheduled Miralax.    GI prophylaxis: PPI daily.     Renal: Neurogenic bladder.  Burroughs placed on 4/11.  Electrolyte abnormalities.     Monitor I/O's.  Electrolyte repletion PRN.  Avoid/limit nephrotoxic agents.    IVFs: saline lock.     Mg replacement + oral Mg daily     Heme/Onc: Normocytic anemia.  Thrombocytopenia.      Initiate lovenox.  Hold parameters for platelets.     DVT prophylaxis: SCDs. Initiate Lovenox..     Endocrine: Suspected adrenal insufficiency.  Had cosyntropin stim test 4/11 during the evening, cortisol increased from 4.4 to 19.3.  Additinally, added a spot cortisol level on morning of 4/12 at 5:37 am, and was below 8 (level: 5.6).  Started on hydrocortisone IV on 4/12.     FSBG Q4H, Aspart insulin SS (not indicated).  Hypoglycemia protocol.    Hydrocortisone 50 mg IV Q6H (start 4/12)    Continue current IV dosing.  Will wean down once off vasopressors.      Infectious diseases: Erythema below breasts.  Concern for fungal + poor hygiene.     Miconazole power to below breast/skin folds    Rheum/Musculoskeletal: Muscle cramps.     Activity: OOB to chair once stable.     Replacing electrolytes.       RISK FACTORS PRESENT ON ADMISSION:  Clinically Significant Risk Factors                # Thrombocytopenia: Lowest platelets = 110 in last 2 days, will monitor for  bleeding                      Lines/Drains/Tubes:  KEYON PICC, placed 4/11  Burroughs catheter, placed 4/11     Code Status:  Full Code     The patient and/or the family was educated about the above plan of care and indicated understanding.  Discussed with the patient's son, Alvaro Lechuga. He is in agreement with having better outpatient followup as well to limit recurrent needs of the urgent care/ED environments (Dr. Garza discussed this with me earlier today)     This patient is considered critically ill and requires ICU level of care due to circulatory shock, requiring vasopressors.     Total Critical Care time, not including separate billable procedure time: 40 minutes.    Benita Randall MD, MPH  Pulmonary/Critical Care Medicine  04/13/2023   12:24 PM         ICU DAILY CHECKLIST:   ICU DAILY CHECKLIST           Can patient transfer out of MICU? no    FAST HUG:    Feeding:  yes.  Patient is receiving ORAL    Burroughs: yes  Analgesia/Sedation: no; PRNs    Thromboembolic prophylaxis: yes; Mode:  Lovenox and SCDs  HOB>30:  yes  Stress Ulcer Protocol Active: yes; Mode: PPI  Glycemic Control: Any glucose > 180 no; Mode of Insulin Therapy: Not indicated    INTUBATED:  Can patient have daily waking:  n/a  Can patient have spontaneous breathing trial:  n/a    Restraints? no    PHYSICAL THERAPY AND MOBILITY:  Can patient have PT and mobility trial: yes  Activity: OOB to Chair        Subjective/Interval History:   4/12: Started hydrocortisone IV.  Vasopressin stopped.     Overnight, no acute events.  Patient really anxious today, concerned about leg/hand cramping.  Feels the Flexeril does help.  Anxious about low potassium and low magnesium  Denies any chest pain/pressure, no shortness of breath, wheezing  Denies overdosing on clonidine, states that her son manages her medications but she also ran out of home meds    Review of Systems:  The Review of Systems is negative other than noted in the HPI        Allergies/Medications:  "  Allergies:     Allergies   Allergen Reactions     Amoxicillin Diarrhea     Droperidol Angioedema       Continuous Infusions:    norepinephrine 0.01 mcg/kg/min (04/13/23 1118)     vasopressin Stopped (04/12/23 1354)     Scheduled Medications:    buprenorphine HCl-naloxone HCl  2 Film Sublingual BID     docusate sodium  100 mg Oral BID     FLUoxetine  40 mg Oral Daily     fluticasone-vilanterol  1 puff Inhalation Daily     gabapentin  300 mg Oral TID     hydrocortisone sodium succinate PF  50 mg Intravenous Q6H     magnesium oxide  400 mg Oral Q4H     magnesium oxide  400 mg Oral Daily     miconazole   Topical BID     nicotine  1 patch Transdermal Daily    And     nicotine   Transdermal Q8H BIENVENIDO     pantoprazole  40 mg Oral At Bedtime     QUEtiapine  100 mg Oral QAM     QUEtiapine  300 mg Oral At Bedtime     sodium chloride (PF)  3 mL Intracatheter Q8H           Objective:   Vitals:  /56   Pulse 79   Temp 98.2  F (36.8  C) (Oral)   Resp 18   Ht 1.676 m (5' 6\")   Wt 62.3 kg (137 lb 6.4 oz)   SpO2 92%   BMI 22.18 kg/m    Vent: Resp: 18    GEN: Pleasant female, slightly anxious  HEENT: Normocephalic, atraumatic.  Extraoccular eye movements intact, anicteric sclera. Moist mucous membranes.   NECK: Supple.    PULM: Non-labored breathing.  No use of accessory muscles.  Clear to ausculation bilaterally.   CVS: Regular rate and rhythm.  Normal S1, S2.  No rubs, murmurs, or gallops.    ABDOMEN: Normoactive bowel sounds.  Non-tender to palpation.  Non-distended.    EXTREMITES:  No clubbing, cyanosis, or edema.    NEURO:  Awake.  Oriented to person, place, time and situation.  Cranial nerves 2-12 grossly intact.  Moving all extremities.      Intake/Output: I/O last 3 completed shifts:  In: 2357.89 [P.O.:1360; I.V.:997.89]  Out: 3775 [Urine:3775]        Pertinent Studies:   All laboratory data reviewed  Serum Glucose range:   Recent Labs   Lab 04/13/23  0420 04/12/23  0537 04/11/23  1102 04/11/23  1050    " 142* 200* 184*     ABG: No lab results found in last 7 days.  CBC:   Recent Labs   Lab 04/13/23  0420 04/12/23  0537 04/11/23  1050   WBC 5.9 8.2 6.5   HGB 9.7* 10.6* 12.4   HCT 28.3* 32.4* 36.8   MCV 93 96 93   * 138* 143*     Chemistry:   Recent Labs   Lab 04/13/23  0420 04/12/23  1111 04/12/23  0537 04/11/23  1050     --  138 133*   POTASSIUM 3.6 3.5 3.4* 3.0*   CHLORIDE 106  --  106 98   CO2 25  --  25 23   BUN 11  --  12 13   CR 0.64  --  0.67 0.89   GFRESTIMATED >90  --  >90 70   KARRIE 8.6  --  8.2* 8.6   MAG 1.9  --  1.8 1.5*     Coags:  No results for input(s): INR, PROTIME, PTT in the last 168 hours.    Invalid input(s): APTT  Cardiac Markers:  Recent Labs   Lab 04/11/23  1050   TROPONINI <0.01      Latest Reference Range & Units 04/11/23 19:52 04/11/23 21:06 04/12/23 05:37   Cortisol Stimulation Post 60 >20.0 - 150.0 ug/dL  19.3 (L)    Cortisol Serum ug/dL 4.4  5.6   (L): Data is abnormally low     Microbiology:  Wet prep vaginal swab, 4/13: 1+ WBCs, absent clue cells, absent yeast, absent trichomonas        Cardiology/Radiology:   Cardiac: All cardiac studies reviewed by me.    EKG: Reviewed    TTE, 4/12/23:  Summary:  1. The left ventricle is normal in size. Left ventricular systolic performance is hyperdynamic with a visually estimated ejection fraction of 75%. 2. No significant valvular heart disease is identified on this study. 3. Normal right ventricular size and systolic performance.    Radiology:  All imaging studies reviewed by me.    Chest X-Ray, 4/3 (prior visit):  1. Mild hyperaeration. 2. No other evidence of acute cardiopulmonary disease is identified on this study. No significant change since the prior study dated 03/30/2023. 3. Questionable loosening of the pedicle screws at T2 (portion of the posterior fusion hardware for the lower cervical and upper thoracic spine). Recommend clinical correlation.

## 2023-04-13 NOTE — PROGRESS NOTES
[facility]    Progress Note - Hospitalist Service       Date of Admission:  4/11/2023    Assessment & Plan   Renetta Farooq is a 67 year old female admitted on 4/11/2023. She has a history of hypertension, COPD, PTSD, bipolar disorder, opioid dependence, substance abuse on Suboxone, methamphetamine abuse, alcohol dependence, tobacco abuse, cognitive impairment, chronic right arm and right leg weaknes and is admitted for persistent hypotension and recent recurrent syncopal episodes. She was admitted to ICU for pressor support for shock of unknown etiology now off vasopressin and weaning off levophed.     Shock without clear etiology  Concern for clonidine overuse  Concern for adrenal insufficiency  Patient presented to ED triage slumped in chair with blood pressure 67/49 initially.  Received 2 L of NS without improvement in blood pressure.  Started on Levophed and Vasopressin with improvement of blood pressure.  Patient remains alert and oriented, nontoxic-appearing.  No leukocytosis, no lactic acidosis or other infectious symptoms, stable hemoglobin and no source of obvious bleed, or any source of infection.  UDS negative, no alcohol in system.  Previously seen during prior admissions for similar symptoms but also had bradycardia at that time thought to be secondary to clonidine overdose, see below.  Less likely septic shock given no obvious infectious source and negative infection markers.  Cardiogenic shock less likely given echo with normal systolic function EF of 75%. Cosyntropin test without appropriate rise concerning for possible adrenal insufficiency.  -Intensive care consulted   -Levophed, wean as able   -Vasopressin, weaned off   -Hydrocortisone started 4/12  -Low threshold for broad-spectrum antibiotics and cultures if infectious source identified  -Follow AM labs     Syncopal episodes  Hx of Clonidine abuse  Possible panic attacks  Previously admitted to [facility] in March  "for similar symptoms with syncope and falls when patient stands up.  At that time patient was taking nearly 30 clonidine a day secondary to her heart racing and anxiety.  Has had several episodes of what sounds like syncopal episodes as patient not having any loss of bladder or bowel and no tonic-clonic like movements.  No history of seizures. Previous echo on 1/24/2023 normal per our records and repeat echo on admission with hyperdynamic systolic performance with EF of 75%. Both patient and her son report that patient has not taken clonidine in several weeks, and denies any other ingestion. EKG unremarkable and no arrhythmias on telemetry.  Negative troponin, negative BNP.  Patient however does report a history of use of clonazepam 3 times daily which has not been refilled as patient has been taking off of that.  Syncopal episodes may be secondary to anxiety attacks.  Patient given 1 dose of clonazepam prior to PICC line given anxiety.  -Cardiology consulted, now signed off   -Consider outpatient cardiac monitor and follow-up with primary cardiologist at [facility]  -Hold scheduled clonazepam at this time  -Follow AM labs  -Orthostatics prior to discharge  -Teds stocking not yet applied    Muscle jerking  Reports \"shmuel horses\" in her bilateral extremities previously improved with clonazapam.   -PRN tylenol  -PRN voltaren gel  -PRN flexeril  -PTA gabapentin  -Replacement of electrolytes as below     Hypokalemia   Hypomagnesiemia  Hyponatremia, resolved  -RN replacement protocol for magnesium and potassium, high replacement  -Follow BMP    Intertrigo  Concern for vaginal candidiasis  Wet prep negative for yeast. No vaginal discharge on exam. Mild erythema under breast skin folds.   -Miconazole powder     COPD  Stable.  No wheezing on exam.  -PTA Symbicort and albuterol     Neurogenic bladder  -Burroughs catheter     Bipolar disorder  PTSD  -Continue PTA Prozac and Seroquel     Chronic low back pain  History of " opioid use disorder   -PTA Suboxone  -PTA gabapentin    Tobacco use  -Nicotine replacement therapy     Difficult social situation  Currently living in extended stay hotel, previously homeless.  -Social work consult for safe discharge planning        Diet: Combination Diet Regular Diet Adult    DVT Prophylaxis: Pneumatic Compression Devices  Burroughs Catheter: PRESENT, indication: Strict 1-2 Hour I&O  Fluids: PO  Lines: PRESENT      PICC 04/11/23 Triple Lumen Right Basilic Multiple medications-Site Assessment: WDL      Cardiac Monitoring: ACTIVE order. Indication: Syncope- high cardiac risk (48 hours)  Code Status: Full Code      Clinically Significant Risk Factors        # Hypokalemia: Lowest K = 3 mmol/L in last 2 days, will replace as needed     # Hypomagnesemia: Lowest Mg = 1.5 mg/dL in last 2 days, will replace as needed     # Thrombocytopenia: Lowest platelets = 110 in last 2 days, will monitor for bleeding                  Disposition Plan      Expected Discharge Date: 04/15/2023      Destination: home with family (extended stay [facility])          The patient's care was discussed with the Attending Physician, Dr. Garza.    Eusebia Seo MD  Hospitalist Service  [facility]  Securely message with [employer] (more info)  Text page via [facility] Paging/Directory   ______________________________________________________________________    Interval History   This morning patient has concerns about intertrigo under her breasts and vaginal yeast infection.  Wet prep was collected at bedside.  She states that her muscle spasms were improved with Flexeril.  She is currently not having in the muscle spasm pain.  Patient denies any shortness of breath.    Physical Exam   Vital Signs: Temp: 98  F (36.7  C) Temp src: Oral BP: 106/55 Pulse: 63   Resp: 22 SpO2: 95 % O2 Device: Nasal cannula Oxygen Delivery: 1 LPM  Weight: 137 lbs 6.4 oz  General: alert and oriented x3, no acute distress  HEENT: atraumatic,  conjunctiva clear without erythema, no nasal discharge  Neck: supple  Cardiac: RRR w/o audible murmur  Resp: bilateral clear lungs w/o wheezing, crackles or rhonchi; breathing comfortably on RA  Abdomen: soft, non-tender to palpation, no masses. BS normal  Extremities: no peripheral edema  Skin: Area of erythema under the left breast with some dry scaly skin  Neuro: grossly normal CN; normal strength, sensation, & tone  Psych: affect congruent with mood  : no obvious vaginal discharge    Data     I have personally reviewed the following data over the past 24 hrs:    5.9  \   9.7 (L)   / 110 (L)     139 106 11 /  104   3.6 25 0.64 \       Procal: N/A CRP: N/A Lactic Acid: N/A         Imaging results reviewed over the past 24 hrs:   Recent Results (from the past 24 hour(s))   Echocardiogram Complete    Narrative    [id number]  [id number]  [id number]  [id number]^TABITHA^[place] [address]     Name: LEO DE LA ROSA  MRN: [id number]  : 1955  Study Date: 2023 08:18 AM  Age: 67 yrs  Gender: Female  Patient Location: [facility]  Reason For Study: Shock  Ordering Physician: JUDITH LU  Performed By: KATE/HAM     BSA: 1.7 m2  Height: 66 in  Weight: 136 lb  HR: 91  BP: 94/50 mmHg  ______________________________________________________________________________  Procedure  Complete Portable Echo Adult.  ______________________________________________________________________________  Interpretation Summary     1. The left ventricle is normal in size. Left ventricular systolic performance  is hyperdynamic with a visually estimated ejection fraction of 75%.  2. No significant valvular heart disease is identified on this study.  3. Normal right ventricular size and systolic performance.  ______________________________________________________________________________  Left ventricle:  The left ventricle is normal in size. Left ventricular systolic performance is  hyperdynamic with a  visually estimated ejection fraction of 75%. There is  normal regional wall motion. Left ventricular wall thickness is normal.     Assessment of LV Diastolic Function: The cumulative findings suggest normal  diastolic filling [The septal e' velocity is > 7 cm/s & lateral e' velocity  is  > 10 cm/s. The average E/e' is < 14. The TR velocity cannot be determined due  to insufficient tricuspid insufficiency signal. Left atrial volume index is  less than 34 mL/mÂ ].     Right ventricle:  Normal right ventricular size and systolic performance.     Left atrium:  The left atrium is of normal size.     Right atrium:  The right atrium is of normal size.     IVC:  The IVC is not well-visualized.     Aortic valve:  The aortic valve is comprised of three cusps. There is mild flow acceleration  through the aortic valve undoubtedly related to the hyperdynamic state of the  ventricle. No significant aortic stenosis is suspected.. There is no  significant aortic insufficiency.     Mitral valve:  The mitral valve appears morphologically normal. There is trace mitral  insufficiency.     Tricuspid valve:  The tricuspid valve is grossly morphologically normal. There is trace  tricuspid insufficiency.     Pulmonic valve:  The pulmonic valve is grossly morphologically normal.     Thoracic aorta:  The aortic root and proximal ascending aorta are of normal dimension.     Pericardium:  There is no significant pericardial effusion.  ______________________________________________________________________________  ______________________________________________________________________________  MMode/2D Measurements & Calculations  IVSd: 0.88 cm  LVIDd: 4.4 cm  LVIDs: 2.1 cm  LVPWd: 0.94 cm  FS: 51.8 %  LV mass(C)d: 128.8 grams  LV mass(C)dI: 75.8 grams/m2  Ao root diam: 3.1 cm  LA dimension: 2.5 cm  asc Aorta Diam: 3.3 cm  LA/Ao: 0.78  LVOT diam: 1.9 cm  LVOT area: 3.0 cm2  LA Volume Indexed (AL/bp): 17.2 ml/m2     RV Base: 4.5 cm  RWT:  0.43  TAPSE: 3.0 cm     Time Measurements  MM HR: 91.0 BPM     Doppler Measurements & Calculations  MV E max sekou: 83.9 cm/sec  MV A max sekou: 71.0 cm/sec  MV E/A: 1.2  MV dec slope: 506.9 cm/sec2  MV dec time: 0.17 sec  Ao V2 max: 258.1 cm/sec  Ao max P.0 mmHg  Ao V2 mean: 157.9 cm/sec  Ao mean P.5 mmHg  Ao V2 VTI: 38.9 cm  KASSIE(I,D): 2.2 cm2  KASSIE(V,D): 2.3 cm2  LV V1 max P.4 mmHg  LV V1 max: 202.7 cm/sec  LV V1 VTI: 28.5 cm  SV(LVOT): 84.9 ml  SI(LVOT): 50.0 ml/m2  PA acc time: 0.13 sec  AV Sekou Ratio (DI): 0.79  KASSIE Index (cm2/m2): 1.3     E/E': 5.2  E/E' av.5  Lateral E/e': 5.2  Medial E/e': 7.7  Peak E' Sekou: 16.0 cm/sec  RV S Sekou: 23.2 cm/sec     ______________________________________________________________________________  Report approved by: Bob Basurto 2023 11:42 AM

## 2023-04-13 NOTE — PROGRESS NOTES
Pulmonary/Critical Care Medicine update  Patient has been off vasopressors for ~ 6 hours.   At this point, hemodynamics okay so I will downgrade her to med/surg-telemetry status.     Downgrade orders placed.     Patient should hopefully be able to transition down on IV hydrocortisone tomorrow, anticipate to either Q8H or BID and then the day after that down to once.     Critical care medicine service will sign off.  However, please call if any issues/concerns. Please also call the son tomorrow with updates. Primary team has been paged regarding downgrade.    Benita Randall MD, MPH  Pulmonary & Critical Care Medicine  04/13/2023  6:53 PM

## 2023-04-14 LAB
ALBUMIN UR-MCNC: 50 MG/DL
ANION GAP SERPL CALCULATED.3IONS-SCNC: 9 MMOL/L (ref 5–18)
APPEARANCE UR: ABNORMAL
BACTERIA #/AREA URNS HPF: ABNORMAL /HPF
BILIRUB UR QL STRIP: NEGATIVE
BUN SERPL-MCNC: 14 MG/DL (ref 8–22)
CALCIUM SERPL-MCNC: 8.8 MG/DL (ref 8.5–10.5)
CALCIUM, IONIZED MEASURED: 1.18 MMOL/L (ref 1.11–1.3)
CHLORIDE BLD-SCNC: 106 MMOL/L (ref 98–107)
CO2 SERPL-SCNC: 26 MMOL/L (ref 22–31)
COLOR UR AUTO: ABNORMAL
CREAT SERPL-MCNC: 0.71 MG/DL (ref 0.6–1.1)
ERYTHROCYTE [DISTWIDTH] IN BLOOD BY AUTOMATED COUNT: 12.3 % (ref 10–15)
GFR SERPL CREATININE-BSD FRML MDRD: >90 ML/MIN/1.73M2
GLUCOSE BLD-MCNC: 103 MG/DL (ref 70–125)
GLUCOSE UR STRIP-MCNC: NEGATIVE MG/DL
HCT VFR BLD AUTO: 32.7 % (ref 35–47)
HGB BLD-MCNC: 10.9 G/DL (ref 11.7–15.7)
HGB UR QL STRIP: ABNORMAL
ION CA PH 7.4: 1.17 MMOL/L (ref 1.11–1.3)
KETONES UR STRIP-MCNC: NEGATIVE MG/DL
LEUKOCYTE ESTERASE UR QL STRIP: ABNORMAL
MAGNESIUM SERPL-MCNC: 2 MG/DL (ref 1.8–2.6)
MCH RBC QN AUTO: 31.7 PG (ref 26.5–33)
MCHC RBC AUTO-ENTMCNC: 33.3 G/DL (ref 31.5–36.5)
MCV RBC AUTO: 95 FL (ref 78–100)
NITRATE UR QL: NEGATIVE
PH UR STRIP: 7.5 [PH] (ref 5–7)
PH: 7.39 (ref 7.35–7.45)
PLATELET # BLD AUTO: 129 10E3/UL (ref 150–450)
POTASSIUM BLD-SCNC: 3.6 MMOL/L (ref 3.5–5)
RBC # BLD AUTO: 3.44 10E6/UL (ref 3.8–5.2)
RBC URINE: 178 /HPF
SODIUM SERPL-SCNC: 141 MMOL/L (ref 136–145)
SP GR UR STRIP: 1.01 (ref 1–1.03)
UROBILINOGEN UR STRIP-MCNC: <2 MG/DL
WBC # BLD AUTO: 7.6 10E3/UL (ref 4–11)
WBC CLUMPS #/AREA URNS HPF: PRESENT /HPF
WBC URINE: >182 /HPF

## 2023-04-14 PROCEDURE — 250N000011 HC RX IP 250 OP 636

## 2023-04-14 PROCEDURE — 80048 BASIC METABOLIC PNL TOTAL CA: CPT | Performed by: INTERNAL MEDICINE

## 2023-04-14 PROCEDURE — 250N000013 HC RX MED GY IP 250 OP 250 PS 637: Performed by: INTERNAL MEDICINE

## 2023-04-14 PROCEDURE — 82330 ASSAY OF CALCIUM: CPT | Performed by: INTERNAL MEDICINE

## 2023-04-14 PROCEDURE — 99232 SBSQ HOSP IP/OBS MODERATE 35: CPT

## 2023-04-14 PROCEDURE — 250N000011 HC RX IP 250 OP 636: Performed by: INTERNAL MEDICINE

## 2023-04-14 PROCEDURE — 81001 URINALYSIS AUTO W/SCOPE: CPT | Performed by: FAMILY MEDICINE

## 2023-04-14 PROCEDURE — 250N000013 HC RX MED GY IP 250 OP 250 PS 637: Performed by: FAMILY MEDICINE

## 2023-04-14 PROCEDURE — 120N000013 HC R&B IMCU

## 2023-04-14 PROCEDURE — 87186 SC STD MICRODIL/AGAR DIL: CPT | Performed by: FAMILY MEDICINE

## 2023-04-14 PROCEDURE — 83735 ASSAY OF MAGNESIUM: CPT | Performed by: INTERNAL MEDICINE

## 2023-04-14 PROCEDURE — 85014 HEMATOCRIT: CPT | Performed by: INTERNAL MEDICINE

## 2023-04-14 RX ORDER — HYDROXYZINE HYDROCHLORIDE 25 MG/1
50 TABLET, FILM COATED ORAL EVERY 6 HOURS PRN
Status: DISCONTINUED | OUTPATIENT
Start: 2023-04-14 | End: 2023-04-15 | Stop reason: HOSPADM

## 2023-04-14 RX ORDER — GABAPENTIN 300 MG/1
900 CAPSULE ORAL 3 TIMES DAILY
Status: DISCONTINUED | OUTPATIENT
Start: 2023-04-14 | End: 2023-04-14

## 2023-04-14 RX ORDER — HYDROXYZINE HYDROCHLORIDE 25 MG/1
25 TABLET, FILM COATED ORAL EVERY 6 HOURS PRN
Status: DISCONTINUED | OUTPATIENT
Start: 2023-04-14 | End: 2023-04-15 | Stop reason: HOSPADM

## 2023-04-14 RX ORDER — GABAPENTIN 300 MG/1
600 CAPSULE ORAL 3 TIMES DAILY
Status: DISCONTINUED | OUTPATIENT
Start: 2023-04-14 | End: 2023-04-15 | Stop reason: HOSPADM

## 2023-04-14 RX ORDER — MAGNESIUM OXIDE 400 MG/1
400 TABLET ORAL EVERY 4 HOURS
Status: DISPENSED | OUTPATIENT
Start: 2023-04-14 | End: 2023-04-14

## 2023-04-14 RX ORDER — POTASSIUM CHLORIDE 1500 MG/1
20 TABLET, EXTENDED RELEASE ORAL ONCE
Status: COMPLETED | OUTPATIENT
Start: 2023-04-14 | End: 2023-04-14

## 2023-04-14 RX ADMIN — HYDROCORTISONE SODIUM SUCCINATE 50 MG: 100 INJECTION, POWDER, FOR SOLUTION INTRAMUSCULAR; INTRAVENOUS at 17:12

## 2023-04-14 RX ADMIN — MICONAZOLE NITRATE: 2 POWDER TOPICAL at 09:40

## 2023-04-14 RX ADMIN — FLUTICASONE FUROATE AND VILANTEROL TRIFENATATE 1 PUFF: 100; 25 POWDER RESPIRATORY (INHALATION) at 09:40

## 2023-04-14 RX ADMIN — BUPRENORPHINE AND NALOXONE 2 FILM: 2; .5 FILM BUCCAL; SUBLINGUAL at 20:08

## 2023-04-14 RX ADMIN — GABAPENTIN 300 MG: 300 CAPSULE ORAL at 14:13

## 2023-04-14 RX ADMIN — POTASSIUM CHLORIDE 20 MEQ: 1500 TABLET, EXTENDED RELEASE ORAL at 06:25

## 2023-04-14 RX ADMIN — Medication 400 MG: at 06:25

## 2023-04-14 RX ADMIN — NICOTINE 1 PATCH: 14 PATCH, EXTENDED RELEASE TRANSDERMAL at 09:39

## 2023-04-14 RX ADMIN — MICONAZOLE NITRATE: 2 POWDER TOPICAL at 20:08

## 2023-04-14 RX ADMIN — DOCUSATE SODIUM 100 MG: 100 CAPSULE, LIQUID FILLED ORAL at 20:08

## 2023-04-14 RX ADMIN — QUETIAPINE 300 MG: 100 TABLET ORAL at 20:08

## 2023-04-14 RX ADMIN — GABAPENTIN 600 MG: 300 CAPSULE ORAL at 20:08

## 2023-04-14 RX ADMIN — GABAPENTIN 300 MG: 300 CAPSULE ORAL at 09:39

## 2023-04-14 RX ADMIN — PANTOPRAZOLE SODIUM 40 MG: 40 TABLET, DELAYED RELEASE ORAL at 20:08

## 2023-04-14 RX ADMIN — HYDROXYZINE HYDROCHLORIDE 50 MG: 25 TABLET ORAL at 18:47

## 2023-04-14 RX ADMIN — DOCUSATE SODIUM 100 MG: 100 CAPSULE, LIQUID FILLED ORAL at 09:39

## 2023-04-14 RX ADMIN — CYCLOBENZAPRINE HYDROCHLORIDE 5 MG: 5 TABLET, FILM COATED ORAL at 18:47

## 2023-04-14 RX ADMIN — HYDROXYZINE HYDROCHLORIDE 25 MG: 25 TABLET ORAL at 14:13

## 2023-04-14 RX ADMIN — ACETAMINOPHEN 650 MG: 325 TABLET ORAL at 04:41

## 2023-04-14 RX ADMIN — FLUOXETINE 40 MG: 20 CAPSULE ORAL at 09:39

## 2023-04-14 RX ADMIN — HYDROCORTISONE SODIUM SUCCINATE 50 MG: 100 INJECTION, POWDER, FOR SOLUTION INTRAMUSCULAR; INTRAVENOUS at 01:03

## 2023-04-14 RX ADMIN — BUPRENORPHINE AND NALOXONE 2 FILM: 2; .5 FILM BUCCAL; SUBLINGUAL at 09:39

## 2023-04-14 RX ADMIN — Medication 400 MG: at 09:39

## 2023-04-14 RX ADMIN — HYDROCORTISONE SODIUM SUCCINATE 50 MG: 100 INJECTION, POWDER, FOR SOLUTION INTRAMUSCULAR; INTRAVENOUS at 06:53

## 2023-04-14 RX ADMIN — QUETIAPINE FUMARATE 100 MG: 25 TABLET ORAL at 06:26

## 2023-04-14 RX ADMIN — CYCLOBENZAPRINE HYDROCHLORIDE 5 MG: 5 TABLET, FILM COATED ORAL at 06:25

## 2023-04-14 RX ADMIN — ACETAMINOPHEN 650 MG: 325 TABLET ORAL at 20:08

## 2023-04-14 ASSESSMENT — ACTIVITIES OF DAILY LIVING (ADL)
ADLS_ACUITY_SCORE: 29

## 2023-04-14 NOTE — PLAN OF CARE
Patient appropriate all shift. Norepinephrine weaned off.  O2 weaned off.  Complained of cramping in hands and feet in AM, relieved with prn flexeril.  No complaints throughout afternoon.  Ambulated in room and up to chair without difficulty.  Burroughs catheter remains in place.  K and MG high replacement protocols.  Problem: Risk for Delirium  Goal: Improved Behavioral Control  Outcome: Progressing  Intervention: Minimize Safety Risk  Recent Flowsheet Documentation  Taken 4/13/2023 1600 by Caty Mclaughlin RN  Trust Relationship/Rapport:   care explained   choices provided   questions answered   emotional support provided  Taken 4/13/2023 1200 by Caty Mclaughlin, REYES  Trust Relationship/Rapport:   care explained   choices provided   questions answered   emotional support provided  Taken 4/13/2023 0800 by Caty Mclaughlin RN  Trust Relationship/Rapport:   care explained   choices provided   questions answered   emotional support provided  Goal: Improved Attention and Thought Clarity  Outcome: Progressing     Problem: Fall Injury Risk  Goal: Absence of Fall and Fall-Related Injury  Outcome: Progressing     Problem: Syncope  Goal: Absence of Syncopal Symptoms  Outcome: Met     Problem: Cardiac Output Decreased  Goal: Effective Cardiac Output  Outcome: Met  Intervention: Optimize Cardiac Output  Recent Flowsheet Documentation  Taken 4/13/2023 1600 by Caty Mclaughlin RN  Head of Bed (HOB) Positioning: HOB at 30-45 degrees  Taken 4/13/2023 1200 by Caty Mclaughlin RN  Head of Bed (HOB) Positioning: HOB at 20-30 degrees  Taken 4/13/2023 0800 by Caty Mclaughlin RN  Head of Bed (HOB) Positioning: HOB at 20-30 degrees   Goal Outcome Evaluation:

## 2023-04-14 NOTE — PLAN OF CARE
Goal Outcome Evaluation:      Plan of Care Reviewed With: patient    Overall Patient Progress: improvingOverall Patient Progress: improving    Outcome Evaluation: Pt received in bed, AOx4, on RA, VSS. Has been off pressors since 4/13 afternoon. Pt is NSR on tele, BP soft but stable. C/o cramps to BLE, good relief with PRN Flexeril and APAP. Nystatin cream applied to bill area 2/2 c/o itchiness. Burroughs maintained 2/2 hx of neurogenic bladder, good UOP. K/Mg replaced this am. No other event, pt is resting.

## 2023-04-14 NOTE — PROGRESS NOTES
[facility]    Progress Note - Hospitalist Service       Date of Admission:  4/11/2023    Assessment & Plan   Renetta Farooq is a 67 year old female admitted on 4/11/2023. She has a history of hypertension, COPD, PTSD, bipolar disorder, opioid dependence, substance abuse on Suboxone, methamphetamine abuse, alcohol dependence, tobacco abuse, cognitive impairment, chronic right arm and right leg weaknes and is admitted for persistent hypotension and recent recurrent syncopal episodes. She was admitted to ICU for pressor support for shock of unknown etiology requiring vasopressin and Levophed from 4/11 - 4/12.  Patient is receiving stress dose steroids for suspected adrenal insufficiency.     Shock without clear etiology, resolved  Concern for clonidine overuse  Concern for adrenal insufficiency  Patient presented to ED triage slumped in chair with blood pressure 67/49 initially.  Received 2 L of NS without improvement in blood pressure.  Patient required pressure support with vasopressin and Levophed from 4/11 - 4/12. Patient remains alert and oriented, nontoxic-appearing.  No leukocytosis, no lactic acidosis or other infectious symptoms, stable hemoglobin and no source of obvious bleed, or any source of infection.  UDS negative, no alcohol in system.  Previously seen during prior admissions for similar symptoms but also had bradycardia at that time thought to be secondary to clonidine overdose, see below.  Less likely septic shock given no obvious infectious source and negative infection markers.  Cardiogenic shock less likely given echo with normal systolic function EF of 75%. Cosyntropin test without appropriate rise and random a.m. cortisol is 5.6 concerning for possible adrenal insufficiency.  -Hydrocortisone 50 mg Q6H started 4/12, start hydrocortisone 50 mg at 7am and 4pm on 4/14  -Intensive care consulted, now signed off  -Follow AM labs     Syncopal episodes  Hx of Clonidine  "abuse  Possible panic attacks  Previously admitted to [facility] in March for similar symptoms with syncope and falls when patient stands up.  At that time patient was taking nearly 30 clonidine a day secondary to her heart racing and anxiety.  Has had several episodes of what sounds like syncopal episodes as patient not having any loss of bladder or bowel and no tonic-clonic like movements.  No history of seizures. Previous echo on 1/24/2023 normal per our records and repeat echo on admission with hyperdynamic systolic performance with EF of 75%. Both patient and her son report that patient has not taken clonidine in several weeks, and denies any other ingestion. EKG unremarkable and no arrhythmias on telemetry.  Negative troponin, negative BNP.  Patient however does report a history of use of clonazepam 3 times daily which has not been refilled as patient has been taking off of that.  Syncopal episodes may be secondary to anxiety attacks.  Patient given 1 dose of clonazepam prior to PICC line given anxiety.  -Cardiology consulted, now signed off   -Outpatient cardiac monitor and follow-up with primary cardiologist at [facility]  -Hold scheduled clonazepam  -Orthostatics prior to discharge  -Teds stocking not yet applied  -Follow AM labs    Transient hypoxia  Placed on oxygen overnight at 1 to 2 L nasal cannula.  Otherwise satting normally on room air during the day.  Unclear etiology of transient hypoxia overnight.  Does have underlying COPD.  -As needed oxygen to maintain saturations    Muscle jerking  Reports \"shmuel horses\" in her bilateral extremities previously improved with clonazapam.  Ionized calcium levels normal.  Electrolytes have been replaced.  -PRN tylenol  -PRN voltaren gel  -PRN flexeril  -PTA gabapentin  -Replacement of electrolytes as below     Hypokalemia, improved  Hypomagnesiemia,  improved  Hyponatremia, resolved  -RN replacement protocol for magnesium and potassium, high " "replacement  -Follow BMP    Intertrigo  Concern for vaginal candidiasis  Wet prep negative for yeast. No vaginal discharge on exam. Mild erythema under breast skin folds.   -Miconazole powder     COPD  Stable.  -PTA Symbicort and albuterol     Neurogenic bladder  -Burroughs catheter     Bipolar disorder  PTSD  -Continue PTA Prozac and Seroquel     Chronic low back pain  History of opioid use disorder   -PTA Suboxone  -PTA gabapentin    Tobacco use  -Nicotine replacement therapy     Difficult social situation  Currently living in extended stay hotel, previously homeless.  -Social work consult for safe discharge planning        Diet: Combination Diet Regular Diet Adult    DVT Prophylaxis: Lovenox  Burroughs Catheter: PRESENT, indication: Neurogenic Bladder  Fluids: PO  Lines: PRESENT      PICC 04/11/23 Triple Lumen Right Basilic Multiple medications-Site Assessment: WDL      Cardiac Monitoring: ACTIVE order. Indication: Syncope- high cardiac risk (48 hours)  Code Status: Full Code      Clinically Significant Risk Factors                # Thrombocytopenia: Lowest platelets = 110 in last 2 days, will monitor for bleeding                  Disposition Plan      Expected Discharge Date: 04/15/2023      Destination: home with family (extended stay hotel)  Discharge Comments: potassium placement        The patient's care was discussed with the Attending Physician, Dr. Henley.    Eusebia Seo MD  Hospitalist Service  [facility]  Securely message with [employer] (more info)  Text page via [employer] Paging/Directory   ______________________________________________________________________    Interval History   No acute events overnight.  After patient been off all pressors for 6 hours she was downgraded to med telemetry status.  Noted to have persistent cramping in her hands and feet relieved with as needed Flexeril.  This morning patient feels like she has \"bounding heart\" and at times feels a little bit sweaty.  " She says she has experienced this before.  She also noted that she felt tired when she was up using bedside commode and walking around her room yesterday.    Physical Exam   Vital Signs: Temp: 97.8  F (36.6  C) Temp src: Oral BP: 96/54 Pulse: 89   Resp: 11 SpO2: 92 % O2 Device: None (Room air) Oxygen Delivery: 2 LPM  Weight: 136 lbs 0 oz  General: alert and oriented x3, no acute distress  HEENT: atraumatic, conjunctiva clear without erythema, no nasal discharge  Neck: supple  Cardiac: RRR w/o audible murmur  Resp:  breathing comfortably on RA, mild wheeze appreciated on the left  Abdomen: soft, non-tender to palpation, no masses. BS normal  Extremities: no peripheral edema  Skin: Area of erythema under the left breast with some dry scaly skin  Neuro: grossly normal CN; normal strength, sensation, & tone  Psych: affect congruent with mood  : no obvious vaginal discharge    Data     I have personally reviewed the following data over the past 24 hrs:    7.6  \   10.9 (L)   / 129 (L)     141 106 14 /  103   3.6 26 0.71 \       Imaging results reviewed over the past 24 hrs:   No results found for this or any previous visit (from the past 24 hour(s)).

## 2023-04-15 VITALS
WEIGHT: 135.9 LBS | BODY MASS INDEX: 21.84 KG/M2 | RESPIRATION RATE: 27 BRPM | TEMPERATURE: 98.4 F | SYSTOLIC BLOOD PRESSURE: 120 MMHG | DIASTOLIC BLOOD PRESSURE: 70 MMHG | HEIGHT: 66 IN | OXYGEN SATURATION: 96 % | HEART RATE: 94 BPM

## 2023-04-15 LAB
ANION GAP SERPL CALCULATED.3IONS-SCNC: 10 MMOL/L (ref 5–18)
BUN SERPL-MCNC: 11 MG/DL (ref 8–22)
CALCIUM SERPL-MCNC: 8.6 MG/DL (ref 8.5–10.5)
CHLORIDE BLD-SCNC: 105 MMOL/L (ref 98–107)
CO2 SERPL-SCNC: 28 MMOL/L (ref 22–31)
CREAT SERPL-MCNC: 0.63 MG/DL (ref 0.6–1.1)
ERYTHROCYTE [DISTWIDTH] IN BLOOD BY AUTOMATED COUNT: 12.5 % (ref 10–15)
GFR SERPL CREATININE-BSD FRML MDRD: >90 ML/MIN/1.73M2
GLUCOSE BLD-MCNC: 84 MG/DL (ref 70–125)
HCT VFR BLD AUTO: 31 % (ref 35–47)
HGB BLD-MCNC: 10.5 G/DL (ref 11.7–15.7)
MAGNESIUM SERPL-MCNC: 1.7 MG/DL (ref 1.8–2.6)
MCH RBC QN AUTO: 31.3 PG (ref 26.5–33)
MCHC RBC AUTO-ENTMCNC: 33.9 G/DL (ref 31.5–36.5)
MCV RBC AUTO: 93 FL (ref 78–100)
PLATELET # BLD AUTO: 110 10E3/UL (ref 150–450)
POTASSIUM BLD-SCNC: 3.2 MMOL/L (ref 3.5–5)
POTASSIUM BLD-SCNC: 3.7 MMOL/L (ref 3.5–5)
RBC # BLD AUTO: 3.35 10E6/UL (ref 3.8–5.2)
SODIUM SERPL-SCNC: 143 MMOL/L (ref 136–145)
WBC # BLD AUTO: 7.4 10E3/UL (ref 4–11)

## 2023-04-15 PROCEDURE — 250N000011 HC RX IP 250 OP 636

## 2023-04-15 PROCEDURE — 80048 BASIC METABOLIC PNL TOTAL CA: CPT | Performed by: INTERNAL MEDICINE

## 2023-04-15 PROCEDURE — 250N000011 HC RX IP 250 OP 636: Performed by: INTERNAL MEDICINE

## 2023-04-15 PROCEDURE — 85014 HEMATOCRIT: CPT | Performed by: INTERNAL MEDICINE

## 2023-04-15 PROCEDURE — 250N000013 HC RX MED GY IP 250 OP 250 PS 637: Performed by: FAMILY MEDICINE

## 2023-04-15 PROCEDURE — 250N000013 HC RX MED GY IP 250 OP 250 PS 637: Performed by: INTERNAL MEDICINE

## 2023-04-15 PROCEDURE — 99238 HOSP IP/OBS DSCHRG MGMT 30/<: CPT | Mod: GC

## 2023-04-15 PROCEDURE — 84132 ASSAY OF SERUM POTASSIUM: CPT | Performed by: FAMILY MEDICINE

## 2023-04-15 PROCEDURE — 250N000011 HC RX IP 250 OP 636: Performed by: FAMILY MEDICINE

## 2023-04-15 PROCEDURE — 83735 ASSAY OF MAGNESIUM: CPT | Performed by: FAMILY MEDICINE

## 2023-04-15 RX ORDER — CLONIDINE HYDROCHLORIDE 0.1 MG/1
0.1 TABLET ORAL 2 TIMES DAILY
Qty: 15 TABLET | Refills: 0 | Status: ON HOLD | OUTPATIENT
Start: 2023-04-15 | End: 2023-04-30

## 2023-04-15 RX ORDER — GABAPENTIN 300 MG/1
900 CAPSULE ORAL 3 TIMES DAILY
Qty: 120 CAPSULE | Refills: 0 | Status: SHIPPED | OUTPATIENT
Start: 2023-04-15 | End: 2023-05-01

## 2023-04-15 RX ORDER — MAGNESIUM SULFATE HEPTAHYDRATE 40 MG/ML
2 INJECTION, SOLUTION INTRAVENOUS ONCE
Status: COMPLETED | OUTPATIENT
Start: 2023-04-15 | End: 2023-04-15

## 2023-04-15 RX ORDER — HYDROXYZINE HYDROCHLORIDE 25 MG/1
25-50 TABLET, FILM COATED ORAL EVERY 6 HOURS PRN
Qty: 90 TABLET | Refills: 0 | Status: SHIPPED | OUTPATIENT
Start: 2023-04-15 | End: 2023-05-01

## 2023-04-15 RX ORDER — SULFAMETHOXAZOLE/TRIMETHOPRIM 800-160 MG
1 TABLET ORAL 2 TIMES DAILY
Status: DISCONTINUED | OUTPATIENT
Start: 2023-04-15 | End: 2023-04-15 | Stop reason: HOSPADM

## 2023-04-15 RX ORDER — SULFAMETHOXAZOLE/TRIMETHOPRIM 800-160 MG
1 TABLET ORAL 2 TIMES DAILY
Qty: 6 TABLET | Refills: 0 | Status: SHIPPED | OUTPATIENT
Start: 2023-04-15 | End: 2023-04-18

## 2023-04-15 RX ORDER — POTASSIUM CHLORIDE 1500 MG/1
40 TABLET, EXTENDED RELEASE ORAL ONCE
Status: COMPLETED | OUTPATIENT
Start: 2023-04-15 | End: 2023-04-15

## 2023-04-15 RX ORDER — HYDROCORTISONE 10 MG/1
50 TABLET ORAL DAILY
Qty: 90 TABLET | Refills: 0 | Status: ON HOLD | OUTPATIENT
Start: 2023-04-15 | End: 2023-04-30

## 2023-04-15 RX ORDER — CYCLOBENZAPRINE HCL 5 MG
5 TABLET ORAL EVERY 8 HOURS PRN
Qty: 30 TABLET | Refills: 0 | Status: SHIPPED | OUTPATIENT
Start: 2023-04-15 | End: 2023-05-01

## 2023-04-15 RX ORDER — CLONIDINE HYDROCHLORIDE 0.1 MG/1
0.1 TABLET ORAL ONCE
Status: COMPLETED | OUTPATIENT
Start: 2023-04-15 | End: 2023-04-15

## 2023-04-15 RX ADMIN — CYCLOBENZAPRINE HYDROCHLORIDE 5 MG: 5 TABLET, FILM COATED ORAL at 16:38

## 2023-04-15 RX ADMIN — GABAPENTIN 600 MG: 300 CAPSULE ORAL at 13:55

## 2023-04-15 RX ADMIN — NICOTINE 1 PATCH: 14 PATCH, EXTENDED RELEASE TRANSDERMAL at 08:40

## 2023-04-15 RX ADMIN — CYCLOBENZAPRINE HYDROCHLORIDE 5 MG: 5 TABLET, FILM COATED ORAL at 02:27

## 2023-04-15 RX ADMIN — GABAPENTIN 600 MG: 300 CAPSULE ORAL at 08:41

## 2023-04-15 RX ADMIN — HYDROXYZINE HYDROCHLORIDE 50 MG: 25 TABLET ORAL at 13:55

## 2023-04-15 RX ADMIN — MAGNESIUM SULFATE HEPTAHYDRATE 2 G: 40 INJECTION, SOLUTION INTRAVENOUS at 06:05

## 2023-04-15 RX ADMIN — CLONIDINE HYDROCHLORIDE 0.1 MG: 0.1 TABLET ORAL at 16:57

## 2023-04-15 RX ADMIN — HYDROXYZINE HYDROCHLORIDE 50 MG: 25 TABLET ORAL at 02:27

## 2023-04-15 RX ADMIN — QUETIAPINE FUMARATE 100 MG: 25 TABLET ORAL at 06:33

## 2023-04-15 RX ADMIN — HYDROCORTISONE SODIUM SUCCINATE 50 MG: 100 INJECTION, POWDER, FOR SOLUTION INTRAMUSCULAR; INTRAVENOUS at 06:33

## 2023-04-15 RX ADMIN — Medication 400 MG: at 08:40

## 2023-04-15 RX ADMIN — POTASSIUM CHLORIDE 40 MEQ: 1500 TABLET, EXTENDED RELEASE ORAL at 06:05

## 2023-04-15 RX ADMIN — BUPRENORPHINE AND NALOXONE 2 FILM: 2; .5 FILM BUCCAL; SUBLINGUAL at 08:40

## 2023-04-15 RX ADMIN — FLUTICASONE FUROATE AND VILANTEROL TRIFENATATE 1 PUFF: 100; 25 POWDER RESPIRATORY (INHALATION) at 08:40

## 2023-04-15 RX ADMIN — DOCUSATE SODIUM 100 MG: 100 CAPSULE, LIQUID FILLED ORAL at 08:40

## 2023-04-15 RX ADMIN — MICONAZOLE NITRATE: 2 POWDER TOPICAL at 08:41

## 2023-04-15 RX ADMIN — FLUOXETINE 40 MG: 20 CAPSULE ORAL at 08:40

## 2023-04-15 ASSESSMENT — ACTIVITIES OF DAILY LIVING (ADL)
ADLS_ACUITY_SCORE: 30
ADLS_ACUITY_SCORE: 29
ADLS_ACUITY_SCORE: 30
ADLS_ACUITY_SCORE: 30
ADLS_ACUITY_SCORE: 29
ADLS_ACUITY_SCORE: 30

## 2023-04-15 NOTE — PROGRESS NOTES
Care Management Follow Up    Length of Stay (days): 4    Expected Discharge Date: 04/16/2023     Concerns to be Addressed: discharge planning     Patient plan of care discussed at interdisciplinary rounds: Yes    Anticipated Discharge Disposition: Home (return to Extended Stay Hotel)     Anticipated Discharge Services:  (TBD)  Anticipated Discharge DME:  (per treatment team)    Patient/family educated on Medicare website which has current facility and service quality ratings:  (N/A)  Education Provided on the Discharge Plan:  (N/A)  Patient/Family in Agreement with the Plan:  (TBD)    Referrals Placed by CM/SW:  (none at this time)  Private pay costs discussed: Not applicable    Additional Information:  No needs anticipated from CM at discharge per pt and SW initial assessment; independent at baseline and staying in hotel at this time. Pt has PCA 3 hours a week who assists with some IADL's. Pt noted that she will follow up with  if needs arise post discharge. Pt to follow up with PCP post discharge if needs arise. Transport TBD.  10:09 AM    CHRIS Freeman  4/15/2023

## 2023-04-15 NOTE — PLAN OF CARE
Pt alert and oriented. NSR on tele. Burroughs removed and pt voiding. Pt having dysuria and urine cloudy. UA ordered and UC pending. Encouraged pt to drink plenty of fluids. K+ and Mag levels both good and will be rechecked in the AM.   Problem: Plan of Care - These are the overarching goals to be used throughout the patient stay.    Goal: Optimal Comfort and Wellbeing  Outcome: Progressing  Intervention: Monitor Pain and Promote Comfort  Recent Flowsheet Documentation  Taken 4/14/2023 2008 by Shalini Jones, RN  Pain Management Interventions: medication (see MAR)     Problem: Electrolyte Imbalance  Goal: Electrolyte Imbalance: Plan of Care  Outcome: Progressing

## 2023-04-15 NOTE — DISCHARGE SUMMARY
[facility]  Discharge Summary - Medicine & Pediatrics       Date of Admission:  4/11/2023  Date of Discharge:  4/15/2023  Discharging Provider: Dr. Corine Dennis and Dr. Micheal Henley  Discharge Service: Hospitalist Service    Discharge Diagnoses   Hypotension, requiring pressors, resolved  Adrenal insufficiency  Hypomagnesemia  Hypokalemia  Syncope, unspecified  Anxiety with panic attacks    Follow-ups Needed After Discharge   Follow-up Appointments     Follow-up and recommended labs and tests       Follow up with primary care provider, Jamila Connelly, within 7 days to   evaluate medication change and for hospital follow- up.  The following   labs/tests are recommended: blood pressure, BMP, Magnesium, Zio Patch.             Unresulted Labs Ordered in the Past 30 Days of this Admission     Date and Time Order Name Status Description    4/14/2023  5:44 PM Urine Culture Preliminary       These results will be followed up by Dr. Corine Dennis.     Discharge Disposition   Discharged to current living situation in extended stay hotel.     Condition at discharge: Stable      Hospital Course   Renetta Farooq was admitted on 4/11/2023 for hypotension of unknown etiology and syncopal episodes.  The following problems were addressed during her hospitalization:    Shock without clear etiology, resolved  Concern for clonidine overuse  Concern for adrenal insufficiency  Patient presented to ED triage slumped in chair with blood pressure 67/49 initially.  ICU followed throughout the course of hospitalization. Received 2 L of NS without improvement in blood pressure.  Patient required pressure support with vasopressin and Levophed from 4/11 - 4/12. Patient remains alert and oriented, nontoxic-appearing.  No leukocytosis, no lactic acidosis or other infectious symptoms, stable hemoglobin and no source of obvious bleed, or any source of infection.  UDS negative, no alcohol in system.  Previously seen  during prior admissions for similar symptoms but also had bradycardia at that time thought to be secondary to clonidine overdose, see below.  Less likely septic shock given no obvious infectious source and negative infection markers.  Cardiogenic shock less likely given echo with normal systolic function EF of 75%. Cosyntropin test without appropriate rise and random a.m. cortisol is 5.6 concerning for possible adrenal insufficiency. Patient was started on IV hydrocortisone and transitioned to oral hydrocortisone 50mg. Doses to be given at 7am and 4pm to minimize sleep disruption. Recommend follow up with endocrinology to see if acute vs chronic adrenal insufficiency.        Syncopal episodes  Hx of Clonidine abuse  Possible panic attacks  Previously admitted to [facility] in March for similar symptoms with syncope and falls when patient stands up.  At that time patient was taking nearly 30 clonidine a day secondary to her heart racing and anxiety.  Has had several episodes of what sounds like syncopal episodes as patient not having any loss of bladder or bowel and no tonic-clonic like movements, though patient and son refer to them as seizures.  No history of seizures. Previous echo on 1/24/2023 normal per our records and repeat echo on admission with hyperdynamic systolic performance with EF of 75%. Both patient and her son report that patient has not taken clonidine in several weeks, and denies any other ingestion. EKG unremarkable and no arrhythmias on telemetry.  Negative troponin, negative BNP.  Patient however does report a history of use of clonazepam 3 times daily which has not been refilled as it has been removed from patient's med list.  Syncopal episodes may be secondary to anxiety attacks.  Patient given 1 dose of clonazepam prior to PICC line given anxiety. No further doses given. Recommend outpatient follow up with cardiologist in  system, Marybeth olea to rule out arrhythmia. While patient has marked  "anxiety and these episodes are likely secondary to panic attacks, recommend searching for alternative therapies to manage symptoms.         Transient hypoxia  Placed on oxygen overnight at 1 to 2 L nasal cannula.  Otherwise satting normally on room air during the day.  Unclear etiology of transient hypoxia overnight.  Does have underlying COPD.     Muscle jerking  Reports \"shmuel horses\" in her bilateral extremities previously improved with clonazapam.  Ionized calcium levels normal.  Electrolytes have been replaced. Symptoms appear with low magnesium. Treated Tylenol, voltaren gel, Flexeril, and gabapentin.      Hypokalemia, improved  Hypomagnesiemia,  improved  Hyponatremia, resolved  K and Mg replacement protocol while admitted.      Intertrigo  Concern for vaginal candidiasis  Patient reported intermittent vaginal itching. Wet prep negative for yeast. No vaginal discharge on exam. Mild erythema under breast skin folds. Treated with miconazole powder.      COPD  Stable. Continued PTA Symbicort and albuterol.     Neurogenic bladder  Burroughs catheter during admission.      Bipolar disorder  PTSD  Continue PTA Prozac and Seroquel.     Chronic low back pain  History of opioid use disorder   Continue PTA Suboxone and gabapentin.      Tobacco use  Nicotine replacement therapy. Patient looking forward to quitting smoking. Would like to continue use of patches outpatient.      Difficult social situation  Currently living in extended stay hotel, previously homeless. Son is full-time caretaker and is currently unemployed. Paying for hotel with social security. Current pending section 8 housing application. Patient has a PCA that helps at the hotel room. Social work consulted for safe discharge planning.        Consultations This Hospital Stay   VASCULAR ACCESS ADULT IP CONSULT  CARDIOLOGY IP CONSULT  INTENSIVIST IP CONSULT  CARE MANAGEMENT / SOCIAL WORK IP CONSULT    Code Status   Full Code       The patient was discussed " with Dr. Micheal Henley and Teaching Service Team.    Corine Dennis MD  [facility] Family Medicine Residency Teaching Service  [facility] 3 ICU  [address] [address]  Phone: [phone]  Fax: [phone]  ______________________________________________________________________    Physical Exam   Vital Signs: Temp: 97.7  F (36.5  C) Temp src: Oral BP: 111/60 Pulse: 82   Resp: 15 SpO2: 95 % O2 Device: None (Room air)    Weight: 135 lbs 14.4 oz  Constitutional: awake, alert, cooperative, no apparent distress, and appears older than stated age, cachetic   Respiratory: No increased work of breathing, good air exchange, clear to auscultation bilaterally, no crackles or wheezing  Cardiovascular: Normal apical impulse, regular rate and rhythm, normal S1 and S2, no S3 or S4, and no murmur noted  GI: No scars, normal bowel sounds, soft, non-distended, non-tender, no masses palpated, no hepatosplenomegally  Musculoskeletal: There is no redness, warmth, or swelling of the joints.  Full range of motion noted.  Motor strength is 5 out of 5 all extremities bilaterally.  Tone is normal.  Neuropsychiatric: General: normal, calm and normal eye contact  Level of consciousness: alert / normal  Affect: anxious  Orientation: oriented to self, place, time and situation  Memory and insight: normal, memory for past and recent events intact, thought process normal and insight fair, judgement fair      Primary Care Physician   Jamila Connelly    Discharge Orders      Reason for your hospital stay    Hypotension, cause unclear, possibly adrenal insufficiency     Activity    Your activity upon discharge: activity as tolerated. Practice deep breathing and relaxation techniques when sitting up.     Follow-up and recommended labs and tests     Follow up with primary care provider, Jamila Connelly, within 7 days to evaluate medication change and for hospital follow- up.  The following labs/tests  are recommended: blood pressure, BMP, Magnesium, Zio Patch.     Adult Leadless EKG Monitor 8 to 14 Days     Diet    Follow this diet upon discharge: Orders Placed This Encounter      Combination Diet Regular Diet Adult       Significant Results and Procedures   Most Recent 3 CBC's:Recent Labs   Lab Test 04/15/23  0435 04/14/23 0449 04/13/23  0420   WBC 7.4 7.6 5.9   HGB 10.5* 10.9* 9.7*   MCV 93 95 93   * 129* 110*     Most Recent 3 BMP's:Recent Labs   Lab Test 04/15/23  1140 04/15/23  0435 04/14/23 0449 04/13/23  0420   NA  --  143 141 139   POTASSIUM 3.7 3.2* 3.6 3.6   CHLORIDE  --  105 106 106   CO2  --  28 26 25   BUN  --  11 14 11   CR  --  0.63 0.71 0.64   ANIONGAP  --  10 9 8   KARRIE  --  8.6 8.8 8.6   GLC  --  84 103 104     Most Recent 2 LFT's:Recent Labs   Lab Test 03/30/23  1037 03/01/23  1933   AST 17 14   ALT 10 <9   ALKPHOS 81 80   BILITOTAL 0.6 0.4     Most Recent 3 Troponin's:No lab results found.  Most Recent D-dimer:Recent Labs   Lab Test 10/14/18  0606   DD 1.36*     Most Recent TSH and T4:Recent Labs   Lab Test 04/11/23  1050   TSH 1.55     Most Recent 6 glucoses:Recent Labs   Lab Test 04/15/23  0435 04/14/23  0449 04/13/23  0420 04/12/23  0537 04/11/23  1102 04/11/23  1050   GLC 84 103 104 142* 200* 184*     Most Recent Urinalysis:Recent Labs   Lab Test 04/14/23  1732 10/14/18  0802 10/14/18  0802   COLOR Light Yellow   < > Straw   APPEARANCE Turbid*   < > Clear   URINEGLC Negative   < > Negative   URINEBILI Negative   < > Negative   URINEKETONE Negative   < > Trace*   SG 1.007   < > 1.006   UBLD 0.5 mg/dL*   < > Moderate*   URINEPH 7.5*   < > 6.5   PROTEIN 50*   < > Trace*   UROBILINOGEN  --   --  <2.0 E.U./dL   NITRITE Negative   < > Negative   LEUKEST 500 Gabriel/uL*   < > Negative   RBCU 178*   < > 10-25*   WBCU >182*   < > 0-5    < > = values in this interval not displayed.     Most Recent CPK:Recent Labs   Lab Test 03/30/23  1037      ,   Results for orders placed or performed  during the hospital encounter of 23   Echocardiogram Complete    Narrative    [id number]  [id number]  [id number]  [id number] [address]     Name: LEO DE LA ROSA  MRN: [id number]  : 1955  Study Date: 2023 08:18 AM  Age: 67 yrs  Gender: Female  Patient [facility]: [facility]  Reason For Study: Shock  Ordering Physician: JUDITH LU  Performed By: KATE/HAM     BSA: 1.7 m2  Height: 66 in  Weight: 136 lb  HR: 91  BP: 94/50 mmHg  ______________________________________________________________________________  Procedure  Complete Portable Echo Adult.  ______________________________________________________________________________  Interpretation Summary     1. The left ventricle is normal in size. Left ventricular systolic performance  is hyperdynamic with a visually estimated ejection fraction of 75%.  2. No significant valvular heart disease is identified on this study.  3. Normal right ventricular size and systolic performance.  ______________________________________________________________________________  Left ventricle:  The left ventricle is normal in size. Left ventricular systolic performance is  hyperdynamic with a visually estimated ejection fraction of 75%. There is  normal regional wall motion. Left ventricular wall thickness is normal.     Assessment of LV Diastolic Function: The cumulative findings suggest normal  diastolic filling [The septal e' velocity is > 7 cm/s & lateral e' velocity  is  > 10 cm/s. The average E/e' is < 14. The TR velocity cannot be determined due  to insufficient tricuspid insufficiency signal. Left atrial volume index is  less than 34 mL/mÂ ].     Right ventricle:  Normal right ventricular size and systolic performance.     Left atrium:  The left atrium is of normal size.     Right atrium:  The right atrium is of normal size.     IVC:  The IVC is not well-visualized.     Aortic valve:  The aortic valve is comprised of  three cusps. There is mild flow acceleration  through the aortic valve undoubtedly related to the hyperdynamic state of the  ventricle. No significant aortic stenosis is suspected.. There is no  significant aortic insufficiency.     Mitral valve:  The mitral valve appears morphologically normal. There is trace mitral  insufficiency.     Tricuspid valve:  The tricuspid valve is grossly morphologically normal. There is trace  tricuspid insufficiency.     Pulmonic valve:  The pulmonic valve is grossly morphologically normal.     Thoracic aorta:  The aortic root and proximal ascending aorta are of normal dimension.     Pericardium:  There is no significant pericardial effusion.  ______________________________________________________________________________  ______________________________________________________________________________  MMode/2D Measurements & Calculations  IVSd: 0.88 cm  LVIDd: 4.4 cm  LVIDs: 2.1 cm  LVPWd: 0.94 cm  FS: 51.8 %  LV mass(C)d: 128.8 grams  LV mass(C)dI: 75.8 grams/m2  Ao root diam: 3.1 cm  LA dimension: 2.5 cm  asc Aorta Diam: 3.3 cm  LA/Ao: 0.78  LVOT diam: 1.9 cm  LVOT area: 3.0 cm2  LA Volume Indexed (AL/bp): 17.2 ml/m2     RV Base: 4.5 cm  RWT: 0.43  TAPSE: 3.0 cm     Time Measurements  MM HR: 91.0 BPM     Doppler Measurements & Calculations  MV E max sekou: 83.9 cm/sec  MV A max sekou: 71.0 cm/sec  MV E/A: 1.2  MV dec slope: 506.9 cm/sec2  MV dec time: 0.17 sec  Ao V2 max: 258.1 cm/sec  Ao max P.0 mmHg  Ao V2 mean: 157.9 cm/sec  Ao mean P.5 mmHg  Ao V2 VTI: 38.9 cm  KASSIE(I,D): 2.2 cm2  KASSIE(V,D): 2.3 cm2  LV V1 max P.4 mmHg  LV V1 max: 202.7 cm/sec  LV V1 VTI: 28.5 cm  SV(LVOT): 84.9 ml  SI(LVOT): 50.0 ml/m2  PA acc time: 0.13 sec  AV Sekou Ratio (DI): 0.79  KASSIE Index (cm2/m2): 1.3     E/E': 5.2  E/E' av.5  Lateral E/e': 5.2  Medial E/e': 7.7  Peak E' Sekou: 16.0 cm/sec  RV S Sekou: 23.2 cm/sec      ______________________________________________________________________________  Report approved by: Bob Basurto 04/12/2023 11:42 AM               Discharge Medications   Current Discharge Medication List      START taking these medications    Details   cyclobenzaprine (FLEXERIL) 5 MG tablet Take 1 tablet (5 mg) by mouth every 8 hours as needed for muscle spasms (Use if voltaren ineffective)  Qty: 30 tablet, Refills: 0    Associated Diagnoses: Chronic neck pain      diclofenac (VOLTAREN) 1 % topical gel Apply 4 g topically 4 times daily as needed for moderate pain  Qty: 350 g, Refills: 0    Associated Diagnoses: Episodic tension-type headache, not intractable; Chronic neck pain      hydrocortisone (CORTEF) 10 MG tablet Take 5 tablets (50 mg) by mouth daily  Qty: 90 tablet, Refills: 0    Associated Diagnoses: Adrenal insufficiency (H)      hydrOXYzine (ATARAX) 25 MG tablet Take 1-2 tablets (25-50 mg) by mouth every 6 hours as needed for other or anxiety (adjuvant pain)  Qty: 90 tablet, Refills: 0    Associated Diagnoses: Panic attack      sulfamethoxazole-trimethoprim (BACTRIM DS) 800-160 MG tablet Take 1 tablet by mouth 2 times daily for 3 days  Qty: 6 tablet, Refills: 0    Associated Diagnoses: Dysuria         CONTINUE these medications which have CHANGED    Details   gabapentin (NEURONTIN) 300 MG capsule Take 3 capsules (900 mg) by mouth 3 times daily  Qty: 120 capsule, Refills: 0    Associated Diagnoses: Chronic neck pain         CONTINUE these medications which have NOT CHANGED    Details   albuterol (PROAIR HFA;PROVENTIL HFA;VENTOLIN HFA) 90 mcg/actuation inhaler [ALBUTEROL (PROAIR HFA;PROVENTIL HFA;VENTOLIN HFA) 90 MCG/ACTUATION INHALER] Inhale 2 puffs every 6 (six) hours as needed for wheezing.  Qty: 1 each, Refills: 0    Associated Diagnoses: Mucopurulent chronic bronchitis (H)      budesonide-formoterol (SYMBICORT) 160-4.5 MCG/ACT Inhaler Inhale 2 puffs into the lungs 2 times daily       buprenorphine HCl-naloxone HCl (SUBOXONE) 4-1 MG per film Place 1 Film under the tongue 2 times daily      calcium carbonate (TUMS) 500 MG chewable tablet Take 1 tablet (500 mg) by mouth 2 times daily  Qty: 60 tablet, Refills: 0    Associated Diagnoses: Hypocalcemia      docusate sodium (COLACE) 100 MG capsule [DOCUSATE SODIUM (COLACE) 100 MG CAPSULE] Take 1 capsule (100 mg total) by mouth 2 (two) times a day.  Qty: 60 capsule, Refills: 0    Associated Diagnoses: Opioid use disorder, severe, in sustained remission, on maintenance therapy (H)      FLUoxetine (PROZAC) 40 MG capsule Take 1 capsule (40 mg) by mouth daily for 30 days  Qty: 30 capsule, Refills: 0    Associated Diagnoses: Bipolar affective disorder, manic, severe, with psychotic behavior (H)      ibuprofen (ADVIL/MOTRIN) 200 MG tablet Take 600 mg by mouth every 8 hours as needed for pain      ipratropium - albuterol 0.5 mg/2.5 mg/3 mL (DUONEB) 0.5-2.5 (3) MG/3ML neb solution Take 1 vial (3 mLs) by nebulization 3 times daily For 1 week then every 6 hours as needed  Qty: 120 mL, Refills: 1    Associated Diagnoses: COPD exacerbation (H)      magnesium oxide (MAG-OX) 400 MG tablet Take 1 tablet (400 mg) by mouth daily  Qty: 30 tablet, Refills: 0    Associated Diagnoses: Hypomagnesemia      nicotine (NICODERM CQ) 14 MG/24HR 24 hr patch Place 1 patch onto the skin daily  Qty: 14 patch, Refills: 1    Associated Diagnoses: Acute bronchitis, unspecified organism      NYSTOP 026043 UNIT/GM powder Apply topically 3 times daily as needed      potassium chloride ER (KLOR-CON M) 20 MEQ CR tablet Take 20 mEq by mouth daily      !! QUEtiapine (SEROQUEL) 100 MG tablet Take 1 tablet (100 mg) by mouth every morning  Qty: 30 tablet, Refills: 0    Associated Diagnoses: Bipolar affective disorder, manic, severe, with psychotic behavior (H)      !! QUEtiapine (SEROQUEL) 300 MG tablet Take 1 tablet (300 mg) by mouth At Bedtime for 30 days  Qty: 30 tablet, Refills: 0     Associated Diagnoses: Bipolar affective disorder, manic, severe, with psychotic behavior (H); Acute psychosis (H)      Vitamin D3 (CHOLECALCIFEROL) 25 mcg (1000 units) tablet Take 1 tablet (25 mcg) by mouth daily  Qty: 30 tablet, Refills: 0    Associated Diagnoses: Bipolar affective disorder, manic, severe, with psychotic behavior (H)      omeprazole (PRILOSEC) 20 MG DR capsule Take 20 mg by mouth At Bedtime       !! - Potential duplicate medications found. Please discuss with provider.      STOP taking these medications       docusate sodium (COLACE) 100 MG tablet Comments:   Reason for Stopping:         levofloxacin (LEVAQUIN) 500 MG tablet Comments:   Reason for Stopping:             Allergies   Allergies   Allergen Reactions     Amoxicillin Diarrhea     Droperidol Angioedema

## 2023-04-15 NOTE — PLAN OF CARE
Patient Aox4, pleasant but anxious, cooperative. C/o of right thigh cramps controlled with janine/prn pain medications. Ind in room. Vitally stable on room air. Discharge instructions reviewed with patient to her satisfaction. All questions answered. Patient discharged in stable condition via a private vehicle.

## 2023-04-15 NOTE — PLAN OF CARE
Goal Outcome Evaluation:      Plan of Care Reviewed With: patient    Overall Patient Progress: improvingOverall Patient Progress: improving    Outcome Evaluation: Pt A&Ox4, up to BSC ad noel, calls appropriately, on RA, and VSS. Tele SR, denies pain/discomfort. Pt has had excellent UOP of light yeallow, clear urine in BSC, cohen was discontinued.  Pt given PRN flexeril and atarax with good relief.  Pt resting comfortably.      Problem: Risk for Delirium  Goal: Improved Attention and Thought Clarity  Outcome: Progressing     Problem: Fall Injury Risk  Goal: Absence of Fall and Fall-Related Injury  Intervention: Promote Injury-Free Environment  Recent Flowsheet Documentation  Taken 4/15/2023 0000 by Beronica Main RN  Safety Promotion/Fall Prevention:   assistive device/personal items within reach   clutter free environment maintained   lighting adjusted   mobility aid in reach   nonskid shoes/slippers when out of bed   patient and family education     Problem: Electrolyte Imbalance  Goal: Electrolyte Imbalance: Plan of Care  Outcome: Progressing

## 2023-04-17 LAB — BACTERIA UR CULT: ABNORMAL

## 2023-04-19 ENCOUNTER — APPOINTMENT (OUTPATIENT)
Dept: ULTRASOUND IMAGING | Facility: CLINIC | Age: 68
End: 2023-04-19
Attending: EMERGENCY MEDICINE
Payer: COMMERCIAL

## 2023-04-19 ENCOUNTER — HOSPITAL ENCOUNTER (EMERGENCY)
Facility: CLINIC | Age: 68
Discharge: HOME | End: 2023-04-19
Attending: EMERGENCY MEDICINE | Admitting: EMERGENCY MEDICINE
Payer: COMMERCIAL

## 2023-04-19 VITALS
DIASTOLIC BLOOD PRESSURE: 55 MMHG | BODY MASS INDEX: 21.53 KG/M2 | TEMPERATURE: 98.5 F | WEIGHT: 134 LBS | HEIGHT: 66 IN | HEART RATE: 68 BPM | RESPIRATION RATE: 20 BRPM | SYSTOLIC BLOOD PRESSURE: 103 MMHG | OXYGEN SATURATION: 95 %

## 2023-04-19 DIAGNOSIS — N81.10 VAGINAL PROLAPSE WITHOUT UTERINE PROLAPSE: ICD-10-CM

## 2023-04-19 DIAGNOSIS — N39.0 URINARY TRACT INFECTION WITHOUT HEMATURIA, SITE UNSPECIFIED: ICD-10-CM

## 2023-04-19 LAB
ABO/RH(D): NORMAL
ALBUMIN UR-MCNC: 10 MG/DL
ANION GAP SERPL CALCULATED.3IONS-SCNC: 9 MMOL/L (ref 5–18)
ANTIBODY SCREEN: NEGATIVE
APPEARANCE UR: ABNORMAL
APTT PPP: 24 SECONDS (ref 22–38)
BACTERIA #/AREA URNS HPF: ABNORMAL /HPF
BASOPHILS # BLD AUTO: 0 10E3/UL (ref 0–0.2)
BASOPHILS NFR BLD AUTO: 0 %
BILIRUB UR QL STRIP: NEGATIVE
BUN SERPL-MCNC: 18 MG/DL (ref 8–22)
CALCIUM SERPL-MCNC: 9 MG/DL (ref 8.5–10.5)
CHLORIDE BLD-SCNC: 102 MMOL/L (ref 98–107)
CO2 SERPL-SCNC: 23 MMOL/L (ref 22–31)
COLOR UR AUTO: ABNORMAL
CREAT SERPL-MCNC: 0.8 MG/DL (ref 0.6–1.1)
EOSINOPHIL # BLD AUTO: 0 10E3/UL (ref 0–0.7)
EOSINOPHIL NFR BLD AUTO: 1 %
ERYTHROCYTE [DISTWIDTH] IN BLOOD BY AUTOMATED COUNT: 12.9 % (ref 10–15)
GFR SERPL CREATININE-BSD FRML MDRD: 80 ML/MIN/1.73M2
GLUCOSE BLD-MCNC: 102 MG/DL (ref 70–125)
GLUCOSE UR STRIP-MCNC: NEGATIVE MG/DL
HCT VFR BLD AUTO: 26.9 % (ref 35–47)
HGB BLD-MCNC: 9.1 G/DL (ref 11.7–15.7)
HGB UR QL STRIP: ABNORMAL
HOLD SPECIMEN: NORMAL
HOLD SPECIMEN: NORMAL
HYALINE CASTS: 1 /LPF
IMM GRANULOCYTES # BLD: 0.1 10E3/UL
IMM GRANULOCYTES NFR BLD: 1 %
INR PPP: 1.01 (ref 0.85–1.15)
KETONES UR STRIP-MCNC: NEGATIVE MG/DL
LEUKOCYTE ESTERASE UR QL STRIP: ABNORMAL
LYMPHOCYTES # BLD AUTO: 1.5 10E3/UL (ref 0.8–5.3)
LYMPHOCYTES NFR BLD AUTO: 24 %
MCH RBC QN AUTO: 31.6 PG (ref 26.5–33)
MCHC RBC AUTO-ENTMCNC: 33.8 G/DL (ref 31.5–36.5)
MCV RBC AUTO: 93 FL (ref 78–100)
MONOCYTES # BLD AUTO: 0.7 10E3/UL (ref 0–1.3)
MONOCYTES NFR BLD AUTO: 11 %
NEUTROPHILS # BLD AUTO: 4 10E3/UL (ref 1.6–8.3)
NEUTROPHILS NFR BLD AUTO: 63 %
NITRATE UR QL: POSITIVE
NRBC # BLD AUTO: 0 10E3/UL
NRBC BLD AUTO-RTO: 0 /100
PH UR STRIP: 7.5 [PH] (ref 5–7)
PLATELET # BLD AUTO: 173 10E3/UL (ref 150–450)
POTASSIUM BLD-SCNC: 5 MMOL/L (ref 3.5–5)
RBC # BLD AUTO: 2.88 10E6/UL (ref 3.8–5.2)
RBC URINE: 26 /HPF
SODIUM SERPL-SCNC: 134 MMOL/L (ref 136–145)
SP GR UR STRIP: 1.01 (ref 1–1.03)
SPECIMEN EXPIRATION DATE: NORMAL
SQUAMOUS EPITHELIAL: <1 /HPF
UROBILINOGEN UR STRIP-MCNC: <2 MG/DL
WBC # BLD AUTO: 6.3 10E3/UL (ref 4–11)
WBC CLUMPS #/AREA URNS HPF: PRESENT /HPF
WBC URINE: 67 /HPF

## 2023-04-19 PROCEDURE — 85025 COMPLETE CBC W/AUTO DIFF WBC: CPT | Performed by: EMERGENCY MEDICINE

## 2023-04-19 PROCEDURE — 99284 EMERGENCY DEPT VISIT MOD MDM: CPT | Mod: 25

## 2023-04-19 PROCEDURE — 86901 BLOOD TYPING SEROLOGIC RH(D): CPT | Performed by: EMERGENCY MEDICINE

## 2023-04-19 PROCEDURE — 36415 COLL VENOUS BLD VENIPUNCTURE: CPT | Performed by: EMERGENCY MEDICINE

## 2023-04-19 PROCEDURE — 81003 URINALYSIS AUTO W/O SCOPE: CPT | Performed by: EMERGENCY MEDICINE

## 2023-04-19 PROCEDURE — 76856 US EXAM PELVIC COMPLETE: CPT

## 2023-04-19 PROCEDURE — 87186 SC STD MICRODIL/AGAR DIL: CPT | Performed by: EMERGENCY MEDICINE

## 2023-04-19 PROCEDURE — 85610 PROTHROMBIN TIME: CPT | Performed by: EMERGENCY MEDICINE

## 2023-04-19 PROCEDURE — 85730 THROMBOPLASTIN TIME PARTIAL: CPT | Performed by: EMERGENCY MEDICINE

## 2023-04-19 PROCEDURE — 80048 BASIC METABOLIC PNL TOTAL CA: CPT | Performed by: EMERGENCY MEDICINE

## 2023-04-19 RX ORDER — ACETAMINOPHEN 325 MG/1
650 TABLET ORAL ONCE
Status: COMPLETED | OUTPATIENT
Start: 2023-04-19 | End: 2023-04-19

## 2023-04-19 RX ORDER — NITROFURANTOIN 25; 75 MG/1; MG/1
100 CAPSULE ORAL 2 TIMES DAILY
Qty: 14 CAPSULE | Refills: 0 | Status: SHIPPED | OUTPATIENT
Start: 2023-04-19 | End: 2023-05-29

## 2023-04-19 ASSESSMENT — ENCOUNTER SYMPTOMS
DYSURIA: 1
DYSURIA: 1
LIGHT-HEADEDNESS: 0
DIZZINESS: 0
FREQUENCY: 1

## 2023-04-19 ASSESSMENT — ACTIVITIES OF DAILY LIVING (ADL)
ADLS_ACUITY_SCORE: 33
ADLS_ACUITY_SCORE: 35

## 2023-04-19 NOTE — ED TRIAGE NOTES
"Arrives to ED with c/o \"something falling out of my vagina\". Reports large amount of vaginal bleeding that occurred 10 minutes PTA. Pt anxious and tearful in triage. Recent admission for SOB and reports being treated for UTI.      Triage Assessment     Row Name 04/19/23 1584       Triage Assessment (Adult)    Airway WDL WDL       Respiratory WDL    Respiratory WDL WDL       Skin Circulation/Temperature WDL    Skin Circulation/Temperature WDL WDL       Cardiac WDL    Cardiac WDL WDL       Peripheral/Neurovascular WDL    Peripheral Neurovascular WDL WDL       Cognitive/Neuro/Behavioral WDL    Cognitive/Neuro/Behavioral WDL WDL              "

## 2023-04-19 NOTE — ED PROVIDER NOTES
EMERGENCY DEPARTMENT ENCOUNTER     NAME: Renetta Farooq   AGE: 67 year old female   YOB: 1955   MRN: [id number]   EVALUATION DATE & TIME: No admission date for patient encounter.   PCP: Jamila Connelly     Chief Complaint   Patient presents with     Vaginal Problem     Vaginal Bleeding   :    FINAL IMPRESSION       1. Urinary tract infection without hematuria, site unspecified    2. Vaginal prolapse without uterine prolapse           ED COURSE & MEDICAL DECISION MAKING      Pertinent Labs & Imaging studies reviewed. (See chart for details)   67 year old female  presents to the Emergency Department for evaluation of continued urinary symptoms that have recurred.  She notes that she was in the hospital recently and on chart review I see she was in the ICU with what was thought to be possible adrenal insufficiency versus clonidine overdose.  On chart review, she did have a UTI that was resistant to some organisms, and she notes that once again she is having foul-smelling urine.  In addition, she notes that she saw some tissue protruding through her vaginal introitus and it had some bleeding tonight. Initial Vitals Reviewed. Initial exam notable for well-appearing patient who has what looks to be a small uterine versus vaginal prolapse that is only coming out about 1 cm and appears already to be reduced.  It looks like there had been a little bit of bleeding but this has since resolved.  Her hemoglobin is stable with no significant sign of blood loss, and I did do an ultrasound which shows the uterus to be in correct position so this is likely a vaginal prolapse.  Patient adamantly requested surgical management tonight, and I did discuss with her and her son the limitations of the emergency department and I will refer to gynecology for outpatient management of this.  Her urinalysis is nitrite positive again and based on her previous culture it looks like the best oral option for this E. coli is Macrobid  so I will prescribe it.  Patient is comfortable with this plan for outpatient follow-up and treatment and discharged in stable condition.  Of note, she has no fever, no leukocytosis, I am not suspicious of something like sepsis and I do not think she needs admission for this UTI.        7:16 PM I performed my initial interview and exam.  9:56 PM I rechecked and updated the patient prior to discharge.    At the conclusion of the encounter I discussed the results of all of the tests and the disposition. The questions were answered. The patient or family acknowledged understanding and was agreeable with the care plan.         MEDICATIONS GIVEN IN THE EMERGENCY:   Medications - No data to display   NEW PRESCRIPTIONS STARTED AT TODAY'S ER VISIT   New Prescriptions    NITROFURANTOIN MACROCRYSTAL-MONOHYDRATE (MACROBID) 100 MG CAPSULE    Take 1 capsule (100 mg) by mouth 2 times daily       Medical Decision Making    History:    Supplemental history from: Documented in chart, if applicable, son    External Record(s) reviewed: Documented in chart, if applicable., hostpital admissions- multiple in march/april 2023    Work Up:    Chart documentation includes differential considered and any EKGs or imaging independently interpreted by provider, where specified.    In additional to work up documented, I considered the following work up: Documented in chart, if applicable.    External consultation:    Discussion of management with another provider: Documented in chart, if applicable    Complicating factors:    Care impacted by chronic illness: Mental Health    Care affected by social determinants of health: Access to Medical Care, Alcohol Abuse and/or Recreational Drug Use and Housing Insecurity    Disposition considerations: Discharge. No recommendations on prescription strength medication(s). I considered admission, but ultimately discharged patient With outpatient follow-up  plan.    ================================================================   HISTORY OF PRESENT ILLNESS       Patient information was obtained from: Patient   Use of Intrepreter: N/A     Renetta Farooq is a 67 year old female with history of methamphetamine dependence, alcohol dependence, and opioid use disorder who presents to the ED by walk in for evaluation of vaginal bleeding.    Per Chart Review,  4/11/23  - 4/15/23 The patient was admitted to [facility] for further evaluation of hypotension and syncopal episodes. Patient reported intermittent vaginal itching and was treated with miconazole powder. Burroughs catheter placed during admission for neurogenic bladder. Patient was prescribed bactrim for her dysuria.    Patient reports that she has had continued dysuria and increased urinary frequency since abrahan discharged from the hospital on 4/15/23. Patient reports that today after urinating she stood up and then noticed a large amount of blood going down her legs ands says that she felt something coming out of her vagina. Patient is unsure of what is coming out of hr vagina but she says that it feels like a tumor. Patient denies lightheadedness, dizziness, syncope, or any other concerns at this time.    ================================================================    REVIEW OF SYSTEMS     Review of Systems   Genitourinary: Positive for dysuria, frequency and vaginal bleeding.   Neurological: Negative for dizziness, syncope and light-headedness.   All other systems reviewed and are negative.        PAST HISTORY     PAST MEDICAL HISTORY:   Past Medical History:   Diagnosis Date     Acute psychosis (H) 10/14/2018     Alcohol dependence (H) 4/10/2015     Bipolar affective disorder, manic, severe, with psychotic behavior (H) 10/15/2018     Candida infection 10/19/2018     Chronic hepatitis C (H) 4/10/2015     Chronic neck pain      Closed traumatic brain injury, without loss of consciousness, sequela (H)       Dental abscess      Episodic tension-type headache, not intractable      Methamphetamine dependence (H) 10/15/2018     Pancytopenia (H) 4/10/2015     Weakness of right arm 4/10/2015      PAST SURGICAL HISTORY:   Past Surgical History:   Procedure Laterality Date     ARTHROSCOPY SHOULDER ROTATOR CUFF REPAIR Bilateral      CERVICAL FUSION      twice     COSMETIC SURGERY       RHINOPLASTY       TONSILLECTOMY        CURRENT MEDICATIONS:   albuterol (PROAIR HFA;PROVENTIL HFA;VENTOLIN HFA) 90 mcg/actuation inhaler  budesonide-formoterol (SYMBICORT) 160-4.5 MCG/ACT Inhaler  buprenorphine HCl-naloxone HCl (SUBOXONE) 4-1 MG per film  calcium carbonate (TUMS) 500 MG chewable tablet  cloNIDine (CATAPRES) 0.1 MG tablet  cyclobenzaprine (FLEXERIL) 5 MG tablet  diclofenac (VOLTAREN) 1 % topical gel  docusate sodium (COLACE) 100 MG capsule  FLUoxetine (PROZAC) 40 MG capsule  gabapentin (NEURONTIN) 300 MG capsule  hydrocortisone (CORTEF) 10 MG tablet  hydrOXYzine (ATARAX) 25 MG tablet  ibuprofen (ADVIL/MOTRIN) 200 MG tablet  ipratropium - albuterol 0.5 mg/2.5 mg/3 mL (DUONEB) 0.5-2.5 (3) MG/3ML neb solution  magnesium oxide (MAG-OX) 400 MG tablet  nicotine (NICODERM CQ) 14 MG/24HR 24 hr patch  NYSTOP 640292 UNIT/GM powder  omeprazole (PRILOSEC) 20 MG DR capsule  potassium chloride ER (KLOR-CON M) 20 MEQ CR tablet  QUEtiapine (SEROQUEL) 100 MG tablet  QUEtiapine (SEROQUEL) 300 MG tablet  Vitamin D3 (CHOLECALCIFEROL) 25 mcg (1000 units) tablet      ALLERGIES:   Allergies   Allergen Reactions     Amoxicillin Diarrhea     Droperidol Angioedema      FAMILY HISTORY:   Family History   Problem Relation Age of Onset     Narcolepsy Mother      Acute myelogenous leukemia Mother      Cancer Father       SOCIAL HISTORY:   Social History     Socioeconomic History     Marital status:    Tobacco Use     Smoking status: Every Day     Packs/day: 1.00     Years: 54.00     Pack years: 54.00     Types: Cigarettes     Start date: 1968      "Smokeless tobacco: Never     Tobacco comments:     Seen IP by [facility] on 3/31/2023   Substance and Sexual Activity     Alcohol use: Yes     Comment: Alcoholic Drinks/day: \"A lot.\"  (Couldn't quantify except to repeat \"A lot\" of wine, liquor and beer)     Drug use: No   Social History Narrative    Lives alone in independent housing with psychiatric case assistance        VITALS  Patient Vitals for the past 24 hrs:   BP Temp Temp src Pulse Resp SpO2 Height Weight   04/19/23 1814 (!) 146/75 99.4  F (37.4  C) Temporal 98 20 96 % 1.676 m (5' 6\") 60.8 kg (134 lb)        ================================================================    PHYSICAL EXAM     VITAL SIGNS: BP (!) 146/75   Pulse 98   Temp 99.4  F (37.4  C) (Temporal)   Resp 20   Ht 1.676 m (5' 6\")   Wt 60.8 kg (134 lb)   SpO2 96%   BMI 21.63 kg/m     Constitutional:  Awake, no acute distress   HENT:  Atraumatic, oropharynx without exudate or erythema, membranes moist  Lymph:  No adenopathy  Eyes: EOM intact, PERRL, no injection  Neck: Supple  Respiratory:  Clear to auscultation bilaterally, no wheezes or crackles   Cardiovascular:  Regular rate and rhythm, single S1 and S2   GI:  Soft, nontender, nondistended, no rebound or guarding   : Approximately 1 cm vaginal versus uterine prolapse visible at the vaginal introitus.  No active bleeding but some dried blood around it  Musculoskeletal:  Moves all extremities, no lower extremity edema, no deformities    Skin:  Warm, dry  Neurologic:  Alert and oriented x3, no focal deficits noted     ================================================================  LAB       All pertinent labs reviewed and interpreted.   Labs Ordered and Resulted from Time of ED Arrival to Time of ED Departure   BASIC METABOLIC PANEL - Abnormal       Result Value    Sodium 134 (*)     Potassium 5.0      Chloride 102      Carbon Dioxide (CO2) 23      Anion Gap 9      Urea Nitrogen 18      Creatinine 0.80      Calcium 9.0      Glucose 102   "    GFR Estimate 80     ROUTINE UA WITH MICROSCOPIC REFLEX TO CULTURE - Abnormal    Color Urine Light Yellow      Appearance Urine Turbid (*)     Glucose Urine Negative      Bilirubin Urine Negative      Ketones Urine Negative      Specific Gravity Urine 1.013      Blood Urine 0.5 mg/dL (*)     pH Urine 7.5 (*)     Protein Albumin Urine 10 (*)     Urobilinogen Urine <2.0      Nitrite Urine Positive (*)     Leukocyte Esterase Urine 500 Gabriel/uL (*)     Bacteria Urine Few (*)     WBC Clumps Urine Present (*)     RBC Urine 26 (*)     WBC Urine 67 (*)     Squamous Epithelials Urine <1      Hyaline Casts Urine 1     CBC WITH PLATELETS AND DIFFERENTIAL - Abnormal    WBC Count 6.3      RBC Count 2.88 (*)     Hemoglobin 9.1 (*)     Hematocrit 26.9 (*)     MCV 93      MCH 31.6      MCHC 33.8      RDW 12.9      Platelet Count 173      % Neutrophils 63      % Lymphocytes 24      % Monocytes 11      % Eosinophils 1      % Basophils 0      % Immature Granulocytes 1      NRBCs per 100 WBC 0      Absolute Neutrophils 4.0      Absolute Lymphocytes 1.5      Absolute Monocytes 0.7      Absolute Eosinophils 0.0      Absolute Basophils 0.0      Absolute Immature Granulocytes 0.1      Absolute NRBCs 0.0     PARTIAL THROMBOPLASTIN TIME - Normal    aPTT 24     INR - Normal    INR 1.01     TYPE AND SCREEN, ADULT    ABO/RH(D) A POS      Antibody Screen Negative      SPECIMEN EXPIRATION DATE [id number]     URINE CULTURE   ABO/RH TYPE AND SCREEN        ===============================================================  RADIOLOGY       Reviewed all pertinent imaging. Please see official radiology report.   Pelvis US, complete   Final Result   IMPRESSION:      1.  Uterus has a normal position within the pelvis.      2.  Endometrium is slightly thickened for a patient with postmenopausal bleeding (6 mm), although the transabdominal technique limits the accuracy of the measurement. Consider a follow-up transvaginal exam if there is persistent  vaginal bleeding to    better evaluate the endometrium.      3.  Normal ovaries.            ================================================================  EKG         I have independently reviewed and interpreted the EKG(s) documented above.     ================================================================  PROCEDURES         I, Mele Johnson, am serving as a scribe to document services personally performed by Dr. Mejia based on my observation and the provider's statements to me. I, Lorena Mejia MD attest that Mele Johnson is acting in a scribe capacity, has observed my performance of the services and has documented them in accordance with my direction.     Lorena Mejia M.D.   Emergency Medicine   [facility] EMERGENCY ROOM  [address] [facility] [address]  [phone]  Dept: [phone]        Lorena Mejia MD  04/19/23 [id number]

## 2023-04-20 NOTE — DISCHARGE INSTRUCTIONS
The ultrasound shows that the uterus is still up in the normal place, so what you are seeing is likely a prolapse of some of the walls of the vagina.  This may intermittently have some bleeding, so I recommend you follow-up with a gynecologist to discuss surgical management.  You can call the number above to schedule this.  Your urine test today shows that there appears to be a urinary tract infection, but there are no signs of sepsis or need to stay in the hospital.  I am starting some antibiotics for you.

## 2023-04-21 ENCOUNTER — MEDICAL CORRESPONDENCE (OUTPATIENT)
Dept: HEALTH INFORMATION MANAGEMENT | Facility: CLINIC | Age: 68
End: 2023-04-21
Payer: COMMERCIAL

## 2023-04-21 ENCOUNTER — TRANSFERRED RECORDS (OUTPATIENT)
Dept: HEALTH INFORMATION MANAGEMENT | Facility: CLINIC | Age: 68
End: 2023-04-21
Payer: COMMERCIAL

## 2023-04-21 LAB
CREATININE (EXTERNAL): 0.89 MG/DL (ref 0.5–1.05)
GFR ESTIMATED (EXTERNAL): 71 ML/MIN/1.73M2
GFR ESTIMATED (IF AFRICAN AMERICAN) (EXTERNAL): NORMAL ML/MIN/1.73M2
GLUCOSE (EXTERNAL): 83 MG/DL (ref 65–99)
POTASSIUM (EXTERNAL): 4.1 MMOL/L (ref 3.5–5.3)

## 2023-04-22 LAB — BACTERIA UR CULT: ABNORMAL

## 2023-04-27 ENCOUNTER — HOSPITAL ENCOUNTER (OUTPATIENT)
Facility: CLINIC | Age: 68
Setting detail: OBSERVATION
Discharge: HOME | End: 2023-04-30
Attending: EMERGENCY MEDICINE | Admitting: HOSPITALIST
Payer: COMMERCIAL

## 2023-04-27 ENCOUNTER — APPOINTMENT (OUTPATIENT)
Dept: RADIOLOGY | Facility: CLINIC | Age: 68
End: 2023-04-27
Attending: EMERGENCY MEDICINE
Payer: COMMERCIAL

## 2023-04-27 DIAGNOSIS — Z78.9 UNABLE TO CARE FOR SELF: ICD-10-CM

## 2023-04-27 DIAGNOSIS — M54.2 CHRONIC NECK PAIN: ICD-10-CM

## 2023-04-27 DIAGNOSIS — F41.0 PANIC ATTACK: ICD-10-CM

## 2023-04-27 DIAGNOSIS — G89.29 CHRONIC NECK PAIN: ICD-10-CM

## 2023-04-27 DIAGNOSIS — M62.81 GENERALIZED MUSCLE WEAKNESS: ICD-10-CM

## 2023-04-27 DIAGNOSIS — G25.3 MYOCLONIC JERKING: ICD-10-CM

## 2023-04-27 DIAGNOSIS — N30.00 ACUTE CYSTITIS WITHOUT HEMATURIA: Primary | ICD-10-CM

## 2023-04-27 DIAGNOSIS — J44.1 COPD EXACERBATION (H): ICD-10-CM

## 2023-04-27 DIAGNOSIS — F31.2 BIPOLAR AFFECTIVE DISORDER, MANIC, SEVERE, WITH PSYCHOTIC BEHAVIOR (H): ICD-10-CM

## 2023-04-27 DIAGNOSIS — E27.40 ADRENAL INSUFFICIENCY (H): ICD-10-CM

## 2023-04-27 DIAGNOSIS — Z59.00 HOMELESSNESS: ICD-10-CM

## 2023-04-27 DIAGNOSIS — F23 ACUTE PSYCHOSIS (H): ICD-10-CM

## 2023-04-27 PROBLEM — S06.5XAA SDH (SUBDURAL HEMATOMA) (H): Status: ACTIVE | Noted: 2019-11-14

## 2023-04-27 PROBLEM — J42 CHRONIC BRONCHITIS (H): Status: ACTIVE | Noted: 2022-01-08

## 2023-04-27 PROBLEM — E55.9 VITAMIN D DEFICIENCY: Status: ACTIVE | Noted: 2017-05-16

## 2023-04-27 PROBLEM — R07.89 OTHER CHEST PAIN: Status: ACTIVE | Noted: 2023-01-22

## 2023-04-27 PROBLEM — S06.0X9A CONCUSSION WITH < 1 HR LOSS OF CONSCIOUSNESS: Status: ACTIVE | Noted: 2022-06-03

## 2023-04-27 PROBLEM — D69.6 PLATELETS DECREASED (H): Status: ACTIVE | Noted: 2017-05-16

## 2023-04-27 PROBLEM — S01.01XA SCALP LACERATION: Status: ACTIVE | Noted: 2022-06-03

## 2023-04-27 PROBLEM — F15.20 METHAMPHETAMINE DEPENDENCE (H): Chronic | Status: ACTIVE | Noted: 2018-10-15
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
## 2023-04-27 PROBLEM — R62.7 ADULT FAILURE TO THRIVE: Status: ACTIVE | Noted: 2022-05-28

## 2023-04-27 PROBLEM — U07.1 PNEUMONIA DUE TO COVID-19 VIRUS: Status: ACTIVE | Noted: 2022-01-08

## 2023-04-27 PROBLEM — I50.31 ACUTE DIASTOLIC HEART FAILURE (H): Status: ACTIVE | Noted: 2023-02-11

## 2023-04-27 PROBLEM — F19.90 MISUSE OF MEDICATION: Status: ACTIVE | Noted: 2023-03-10

## 2023-04-27 PROBLEM — R00.2 PALPITATIONS: Status: ACTIVE | Noted: 2022-12-22

## 2023-04-27 PROBLEM — S09.90XA INJURY OF HEAD: Status: ACTIVE | Noted: 2022-06-03

## 2023-04-27 PROBLEM — J12.82 PNEUMONIA DUE TO COVID-19 VIRUS: Status: ACTIVE | Noted: 2022-01-08

## 2023-04-27 PROBLEM — E87.1 HYPONATREMIA: Status: ACTIVE | Noted: 2019-11-14

## 2023-04-27 PROBLEM — N17.9 AKI (ACUTE KIDNEY INJURY) (H): Status: ACTIVE | Noted: 2020-08-14

## 2023-04-27 LAB
ANION GAP SERPL CALCULATED.3IONS-SCNC: 13 MMOL/L (ref 5–18)
ATRIAL RATE - MUSE: 74 BPM
BNP SERPL-MCNC: 11 PG/ML (ref 0–114)
BUN SERPL-MCNC: 11 MG/DL (ref 8–22)
CALCIUM SERPL-MCNC: 8.6 MG/DL (ref 8.5–10.5)
CHLORIDE BLD-SCNC: 99 MMOL/L (ref 98–107)
CO2 SERPL-SCNC: 25 MMOL/L (ref 22–31)
CREAT SERPL-MCNC: 0.92 MG/DL (ref 0.6–1.1)
CREAT SERPL-MCNC: 0.95 MG/DL (ref 0.6–1.1)
DIASTOLIC BLOOD PRESSURE - MUSE: 64 MMHG
ERYTHROCYTE [DISTWIDTH] IN BLOOD BY AUTOMATED COUNT: 13.1 % (ref 10–15)
FLUAV RNA SPEC QL NAA+PROBE: NEGATIVE
FLUBV RNA RESP QL NAA+PROBE: NEGATIVE
GFR SERPL CREATININE-BSD FRML MDRD: 65 ML/MIN/1.73M2
GFR SERPL CREATININE-BSD FRML MDRD: 67 ML/MIN/1.73M2
GLUCOSE BLD-MCNC: 92 MG/DL (ref 70–125)
HCT VFR BLD AUTO: 32.3 % (ref 35–47)
HGB BLD-MCNC: 10.7 G/DL (ref 11.7–15.7)
INTERPRETATION ECG - MUSE: NORMAL
MAGNESIUM SERPL-MCNC: 2.1 MG/DL (ref 1.8–2.6)
MCH RBC QN AUTO: 31.1 PG (ref 26.5–33)
MCHC RBC AUTO-ENTMCNC: 33.1 G/DL (ref 31.5–36.5)
MCV RBC AUTO: 94 FL (ref 78–100)
P AXIS - MUSE: 37 DEGREES
PLATELET # BLD AUTO: 209 10E3/UL (ref 150–450)
POTASSIUM BLD-SCNC: 3.4 MMOL/L (ref 3.5–5)
PR INTERVAL - MUSE: 150 MS
QRS DURATION - MUSE: 88 MS
QT - MUSE: 412 MS
QTC - MUSE: 457 MS
R AXIS - MUSE: 10 DEGREES
RBC # BLD AUTO: 3.44 10E6/UL (ref 3.8–5.2)
RSV RNA SPEC NAA+PROBE: NEGATIVE
SARS-COV-2 RNA RESP QL NAA+PROBE: NEGATIVE
SODIUM SERPL-SCNC: 137 MMOL/L (ref 136–145)
SYSTOLIC BLOOD PRESSURE - MUSE: 110 MMHG
T AXIS - MUSE: 54 DEGREES
TROPONIN I SERPL-MCNC: <0.01 NG/ML (ref 0–0.29)
VENTRICULAR RATE- MUSE: 74 BPM
WBC # BLD AUTO: 4.8 10E3/UL (ref 4–11)

## 2023-04-27 PROCEDURE — 36415 COLL VENOUS BLD VENIPUNCTURE: CPT | Performed by: HOSPITALIST

## 2023-04-27 PROCEDURE — 94640 AIRWAY INHALATION TREATMENT: CPT

## 2023-04-27 PROCEDURE — 82565 ASSAY OF CREATININE: CPT | Performed by: HOSPITALIST

## 2023-04-27 PROCEDURE — 36415 COLL VENOUS BLD VENIPUNCTURE: CPT | Performed by: EMERGENCY MEDICINE

## 2023-04-27 PROCEDURE — C9803 HOPD COVID-19 SPEC COLLECT: HCPCS

## 2023-04-27 PROCEDURE — 94799 UNLISTED PULMONARY SVC/PX: CPT

## 2023-04-27 PROCEDURE — 999N000157 HC STATISTIC RCP TIME EA 10 MIN

## 2023-04-27 PROCEDURE — 99222 1ST HOSP IP/OBS MODERATE 55: CPT | Mod: AI | Performed by: HOSPITALIST

## 2023-04-27 PROCEDURE — 96372 THER/PROPH/DIAG INJ SC/IM: CPT | Performed by: HOSPITALIST

## 2023-04-27 PROCEDURE — 87637 SARSCOV2&INF A&B&RSV AMP PRB: CPT | Performed by: EMERGENCY MEDICINE

## 2023-04-27 PROCEDURE — 250N000013 HC RX MED GY IP 250 OP 250 PS 637: Performed by: HOSPITALIST

## 2023-04-27 PROCEDURE — 250N000011 HC RX IP 250 OP 636: Performed by: HOSPITALIST

## 2023-04-27 PROCEDURE — 250N000009 HC RX 250: Performed by: EMERGENCY MEDICINE

## 2023-04-27 PROCEDURE — 83880 ASSAY OF NATRIURETIC PEPTIDE: CPT | Performed by: EMERGENCY MEDICINE

## 2023-04-27 PROCEDURE — 85027 COMPLETE CBC AUTOMATED: CPT | Performed by: EMERGENCY MEDICINE

## 2023-04-27 PROCEDURE — 250N000009 HC RX 250: Performed by: HOSPITALIST

## 2023-04-27 PROCEDURE — 99285 EMERGENCY DEPT VISIT HI MDM: CPT | Mod: 25,CS

## 2023-04-27 PROCEDURE — 80048 BASIC METABOLIC PNL TOTAL CA: CPT | Performed by: EMERGENCY MEDICINE

## 2023-04-27 PROCEDURE — 94640 AIRWAY INHALATION TREATMENT: CPT | Mod: 76

## 2023-04-27 PROCEDURE — 83735 ASSAY OF MAGNESIUM: CPT | Performed by: EMERGENCY MEDICINE

## 2023-04-27 PROCEDURE — G0378 HOSPITAL OBSERVATION PER HR: HCPCS

## 2023-04-27 PROCEDURE — 250N000012 HC RX MED GY IP 250 OP 636 PS 637: Performed by: HOSPITALIST

## 2023-04-27 PROCEDURE — 71046 X-RAY EXAM CHEST 2 VIEWS: CPT

## 2023-04-27 PROCEDURE — 84484 ASSAY OF TROPONIN QUANT: CPT | Performed by: EMERGENCY MEDICINE

## 2023-04-27 PROCEDURE — 93005 ELECTROCARDIOGRAM TRACING: CPT | Performed by: EMERGENCY MEDICINE

## 2023-04-27 RX ORDER — POTASSIUM CHLORIDE 1500 MG/1
20 TABLET, EXTENDED RELEASE ORAL DAILY
Status: DISCONTINUED | OUTPATIENT
Start: 2023-04-27 | End: 2023-04-30 | Stop reason: HOSPADM

## 2023-04-27 RX ORDER — CLONIDINE HYDROCHLORIDE 0.1 MG/1
0.1 TABLET ORAL 2 TIMES DAILY
Status: DISCONTINUED | OUTPATIENT
Start: 2023-04-27 | End: 2023-04-28

## 2023-04-27 RX ORDER — IPRATROPIUM BROMIDE AND ALBUTEROL SULFATE 2.5; .5 MG/3ML; MG/3ML
3 SOLUTION RESPIRATORY (INHALATION) 3 TIMES DAILY
Status: DISCONTINUED | OUTPATIENT
Start: 2023-04-27 | End: 2023-04-30

## 2023-04-27 RX ORDER — PANTOPRAZOLE SODIUM 40 MG/1
40 TABLET, DELAYED RELEASE ORAL DAILY PRN
Status: DISCONTINUED | OUTPATIENT
Start: 2023-04-27 | End: 2023-04-30 | Stop reason: HOSPADM

## 2023-04-27 RX ORDER — IPRATROPIUM BROMIDE AND ALBUTEROL SULFATE 2.5; .5 MG/3ML; MG/3ML
3 SOLUTION RESPIRATORY (INHALATION) ONCE
Status: COMPLETED | OUTPATIENT
Start: 2023-04-27 | End: 2023-04-27

## 2023-04-27 RX ORDER — HYDROXYZINE HYDROCHLORIDE 25 MG/1
25-50 TABLET, FILM COATED ORAL EVERY 6 HOURS PRN
Status: DISCONTINUED | OUTPATIENT
Start: 2023-04-27 | End: 2023-04-30 | Stop reason: HOSPADM

## 2023-04-27 RX ORDER — QUETIAPINE FUMARATE 100 MG/1
300 TABLET, FILM COATED ORAL AT BEDTIME
Status: DISCONTINUED | OUTPATIENT
Start: 2023-04-27 | End: 2023-04-30 | Stop reason: HOSPADM

## 2023-04-27 RX ORDER — DOCUSATE SODIUM 100 MG/1
100 CAPSULE, LIQUID FILLED ORAL 2 TIMES DAILY
Status: DISCONTINUED | OUTPATIENT
Start: 2023-04-27 | End: 2023-04-30 | Stop reason: HOSPADM

## 2023-04-27 RX ORDER — FLUTICASONE FUROATE AND VILANTEROL 100; 25 UG/1; UG/1
1 POWDER RESPIRATORY (INHALATION) DAILY
Status: DISCONTINUED | OUTPATIENT
Start: 2023-04-27 | End: 2023-04-30 | Stop reason: HOSPADM

## 2023-04-27 RX ORDER — ENOXAPARIN SODIUM 100 MG/ML
40 INJECTION SUBCUTANEOUS EVERY 24 HOURS
Status: DISCONTINUED | OUTPATIENT
Start: 2023-04-27 | End: 2023-04-30 | Stop reason: HOSPADM

## 2023-04-27 RX ORDER — MAGNESIUM OXIDE 400 MG/1
400 TABLET ORAL DAILY
Status: DISCONTINUED | OUTPATIENT
Start: 2023-04-27 | End: 2023-04-30 | Stop reason: HOSPADM

## 2023-04-27 RX ORDER — ACETAMINOPHEN 650 MG/1
650 SUPPOSITORY RECTAL EVERY 6 HOURS PRN
Status: DISCONTINUED | OUTPATIENT
Start: 2023-04-27 | End: 2023-04-30 | Stop reason: HOSPADM

## 2023-04-27 RX ORDER — FLUTICASONE PROPIONATE AND SALMETEROL 250; 50 UG/1; UG/1
1 POWDER RESPIRATORY (INHALATION) DAILY
COMMUNITY

## 2023-04-27 RX ORDER — ALBUTEROL SULFATE 90 UG/1
2 AEROSOL, METERED RESPIRATORY (INHALATION) EVERY 6 HOURS PRN
Status: DISCONTINUED | OUTPATIENT
Start: 2023-04-27 | End: 2023-04-30 | Stop reason: HOSPADM

## 2023-04-27 RX ORDER — ONDANSETRON 2 MG/ML
4 INJECTION INTRAMUSCULAR; INTRAVENOUS EVERY 6 HOURS PRN
Status: DISCONTINUED | OUTPATIENT
Start: 2023-04-27 | End: 2023-04-30 | Stop reason: HOSPADM

## 2023-04-27 RX ORDER — PREDNISONE 20 MG/1
40 TABLET ORAL DAILY
Status: DISCONTINUED | OUTPATIENT
Start: 2023-04-27 | End: 2023-04-30 | Stop reason: HOSPADM

## 2023-04-27 RX ORDER — CEFDINIR 300 MG/1
300 CAPSULE ORAL 2 TIMES DAILY
Status: DISCONTINUED | OUTPATIENT
Start: 2023-04-27 | End: 2023-04-30 | Stop reason: HOSPADM

## 2023-04-27 RX ORDER — POLYETHYLENE GLYCOL 3350 17 G
2 POWDER IN PACKET (EA) ORAL
Status: DISCONTINUED | OUTPATIENT
Start: 2023-04-27 | End: 2023-04-30 | Stop reason: HOSPADM

## 2023-04-27 RX ORDER — BISACODYL 10 MG
10 SUPPOSITORY, RECTAL RECTAL DAILY PRN
Status: DISCONTINUED | OUTPATIENT
Start: 2023-04-27 | End: 2023-04-30 | Stop reason: HOSPADM

## 2023-04-27 RX ORDER — NICOTINE 21 MG/24HR
1 PATCH, TRANSDERMAL 24 HOURS TRANSDERMAL DAILY
Status: DISCONTINUED | OUTPATIENT
Start: 2023-04-27 | End: 2023-04-30 | Stop reason: HOSPADM

## 2023-04-27 RX ORDER — CYCLOBENZAPRINE HCL 5 MG
5 TABLET ORAL EVERY 8 HOURS PRN
Status: DISCONTINUED | OUTPATIENT
Start: 2023-04-27 | End: 2023-04-28

## 2023-04-27 RX ORDER — LIDOCAINE 40 MG/G
CREAM TOPICAL
Status: DISCONTINUED | OUTPATIENT
Start: 2023-04-27 | End: 2023-04-30 | Stop reason: HOSPADM

## 2023-04-27 RX ORDER — IBUPROFEN 600 MG/1
600 TABLET, FILM COATED ORAL EVERY 8 HOURS PRN
Status: DISCONTINUED | OUTPATIENT
Start: 2023-04-27 | End: 2023-04-30 | Stop reason: HOSPADM

## 2023-04-27 RX ORDER — ACETAMINOPHEN 325 MG/1
650 TABLET ORAL EVERY 6 HOURS PRN
Status: DISCONTINUED | OUTPATIENT
Start: 2023-04-27 | End: 2023-04-30 | Stop reason: HOSPADM

## 2023-04-27 RX ORDER — FUROSEMIDE 20 MG
20 TABLET ORAL DAILY PRN
COMMUNITY

## 2023-04-27 RX ORDER — ONDANSETRON 4 MG/1
4 TABLET, ORALLY DISINTEGRATING ORAL EVERY 6 HOURS PRN
Status: DISCONTINUED | OUTPATIENT
Start: 2023-04-27 | End: 2023-04-30 | Stop reason: HOSPADM

## 2023-04-27 RX ORDER — GABAPENTIN 300 MG/1
900 CAPSULE ORAL 3 TIMES DAILY
Status: DISCONTINUED | OUTPATIENT
Start: 2023-04-27 | End: 2023-04-30 | Stop reason: HOSPADM

## 2023-04-27 RX ORDER — CALCIUM CARBONATE 500 MG/1
500 TABLET, CHEWABLE ORAL 2 TIMES DAILY
Status: DISCONTINUED | OUTPATIENT
Start: 2023-04-27 | End: 2023-04-30 | Stop reason: HOSPADM

## 2023-04-27 RX ORDER — AMOXICILLIN 250 MG
1 CAPSULE ORAL 2 TIMES DAILY PRN
Status: DISCONTINUED | OUTPATIENT
Start: 2023-04-27 | End: 2023-04-30 | Stop reason: HOSPADM

## 2023-04-27 RX ORDER — POLYETHYLENE GLYCOL 3350 17 G/17G
17 POWDER, FOR SOLUTION ORAL DAILY PRN
Status: DISCONTINUED | OUTPATIENT
Start: 2023-04-27 | End: 2023-04-30 | Stop reason: HOSPADM

## 2023-04-27 RX ORDER — VITAMIN B COMPLEX
25 TABLET ORAL DAILY
Status: DISCONTINUED | OUTPATIENT
Start: 2023-04-27 | End: 2023-04-30 | Stop reason: HOSPADM

## 2023-04-27 RX ORDER — QUETIAPINE FUMARATE 100 MG/1
100 TABLET, FILM COATED ORAL EVERY MORNING
Status: DISCONTINUED | OUTPATIENT
Start: 2023-04-28 | End: 2023-04-30 | Stop reason: HOSPADM

## 2023-04-27 RX ORDER — AMOXICILLIN 250 MG
2 CAPSULE ORAL 2 TIMES DAILY PRN
Status: DISCONTINUED | OUTPATIENT
Start: 2023-04-27 | End: 2023-04-30 | Stop reason: HOSPADM

## 2023-04-27 RX ORDER — BUPRENORPHINE AND NALOXONE 4; 1 MG/1; MG/1
1 FILM, SOLUBLE BUCCAL; SUBLINGUAL 2 TIMES DAILY
Status: DISCONTINUED | OUTPATIENT
Start: 2023-04-27 | End: 2023-04-30 | Stop reason: HOSPADM

## 2023-04-27 RX ORDER — IPRATROPIUM BROMIDE AND ALBUTEROL SULFATE 2.5; .5 MG/3ML; MG/3ML
3 SOLUTION RESPIRATORY (INHALATION)
Status: DISCONTINUED | OUTPATIENT
Start: 2023-04-27 | End: 2023-04-27

## 2023-04-27 RX ADMIN — IPRATROPIUM BROMIDE AND ALBUTEROL SULFATE 3 ML: .5; 3 SOLUTION RESPIRATORY (INHALATION) at 19:33

## 2023-04-27 RX ADMIN — Medication 1 MG: at 21:52

## 2023-04-27 RX ADMIN — FLUOXETINE 40 MG: 20 CAPSULE ORAL at 18:31

## 2023-04-27 RX ADMIN — QUETIAPINE FUMARATE 300 MG: 300 TABLET ORAL at 21:54

## 2023-04-27 RX ADMIN — HYDROXYZINE HYDROCHLORIDE 50 MG: 25 TABLET ORAL at 18:29

## 2023-04-27 RX ADMIN — GABAPENTIN 900 MG: 300 CAPSULE ORAL at 21:53

## 2023-04-27 RX ADMIN — ACETAMINOPHEN 650 MG: 325 TABLET ORAL at 18:30

## 2023-04-27 RX ADMIN — IPRATROPIUM BROMIDE AND ALBUTEROL SULFATE 3 ML: .5; 3 SOLUTION RESPIRATORY (INHALATION) at 11:32

## 2023-04-27 RX ADMIN — PREDNISONE 40 MG: 20 TABLET ORAL at 18:29

## 2023-04-27 RX ADMIN — CHOLECALCIFEROL TAB 25 MCG (1000 UNIT) 25 MCG: 25 TAB at 18:30

## 2023-04-27 RX ADMIN — POTASSIUM CHLORIDE 20 MEQ: 1500 TABLET, EXTENDED RELEASE ORAL at 18:30

## 2023-04-27 RX ADMIN — BUPRENORPHINE AND NALOXONE 1 FILM: 4; 1 FILM, SOLUBLE BUCCAL; SUBLINGUAL at 21:27

## 2023-04-27 RX ADMIN — NICOTINE 1 PATCH: 14 PATCH, EXTENDED RELEASE TRANSDERMAL at 18:26

## 2023-04-27 RX ADMIN — CALCIUM CARBONATE (ANTACID) CHEW TAB 500 MG 500 MG: 500 CHEW TAB at 21:53

## 2023-04-27 RX ADMIN — FLUTICASONE FUROATE AND VILANTEROL TRIFENATATE 1 PUFF: 100; 25 POWDER RESPIRATORY (INHALATION) at 18:32

## 2023-04-27 RX ADMIN — CEFDINIR 300 MG: 300 CAPSULE ORAL at 21:54

## 2023-04-27 RX ADMIN — CLONIDINE HYDROCHLORIDE 0.1 MG: 0.1 TABLET ORAL at 21:54

## 2023-04-27 RX ADMIN — ENOXAPARIN SODIUM 40 MG: 100 INJECTION SUBCUTANEOUS at 18:27

## 2023-04-27 RX ADMIN — Medication 400 MG: at 18:29

## 2023-04-27 RX ADMIN — DOCUSATE SODIUM 100 MG: 100 CAPSULE, LIQUID FILLED ORAL at 21:53

## 2023-04-27 RX ADMIN — CEFDINIR 300 MG: 300 CAPSULE ORAL at 18:30

## 2023-04-27 SDOH — ECONOMIC STABILITY - HOUSING INSECURITY: HOMELESSNESS UNSPECIFIED: Z59.00

## 2023-04-27 ASSESSMENT — ACTIVITIES OF DAILY LIVING (ADL)
ADLS_ACUITY_SCORE: 35
DEPENDENT_IADLS:: TRANSPORTATION;CLEANING;LAUNDRY;SHOPPING

## 2023-04-27 ASSESSMENT — ENCOUNTER SYMPTOMS
CONSTIPATION: 1
COUGH: 1
DIAPHORESIS: 1

## 2023-04-27 NOTE — ED PROVIDER NOTES
EMERGENCY DEPARTMENT ENCOUNTER      NAME: Renetta Farooq  AGE: 67 year old female  YOB: 1955  MRN: [id number]  EVALUATION DATE & TIME: 4/27/2023 10:21 AM    PCP: Jamila Connelly    ED PROVIDER: Hossein Lantigua M.D.      Chief Complaint   Patient presents with     Shortness of Breath         FINAL IMPRESSION:  1. COPD exacerbation (H)    2. Generalized muscle weakness    3. Myoclonic jerking    4. Homelessness          ED COURSE & MEDICAL DECISION MAKING:    Pertinent Labs & Imaging studies reviewed. (See chart for details)  ED Course as of 04/27/23 1456   Thu Apr 27, 2023   1026 Patient is a 67-year-old woman with history of COPD, history of alcohol dependence, methamphetamine dependence, here with cough, fever for the last 5 days, concerned she aspirated when drinking Coca-Cola when laying too far back in her bed.  She reports being on Bactrim as an outpatient, most recent dose was this morning.  She arrived by medics.  Medics also reported that she and her son were staying in hotel and were notified that they are being kicked out this morning.  On arrival here she has a normal blood pressure, heart rate, oxygenation and respiratory rate.  She is not in respiratory distress, she does appear uncomfortable, but keeps her eyes closed during entire conversation and exam.  There is some faint wheezing and rhonchi in the left posterior lung fields.  Pending a chest x-ray to screen for signs of progressive infection, DuoNebs ordered.  Symptoms likely consistent with COPD however.  Screening EKG and troponin also ordered.   1050 EKG shows sinus rhythm with a rate of 74.  Septal T wave inversions, nonspecific.  No acute ischemic ST or T wave morphology.  Normal axis, normal intervals.  When compared to prior EKG on April 11, 2023, T wave inversion in lead V2 is new, otherwise no significant change.  Impression: Sinus rhythm with a rate of 74, nonspecific and nonischemic T wave morphology changes.   1105  WBC: 4.8   1105 Hemoglobin(!): 10.7  Chronic baseline anemia   1114 Potassium(!): 3.4  Minimal hypokalemia, would not be symptomatic   1130 Troponin I: <0.01   1147 Symptomatic Influenza A/B, RSV, & SARS-CoV2 PCR (COVID-19) Nasopharyngeal  Negative viral panel   1148 B-Type Natriuretic Peptide ([facility] Only)  Normal, patients symptoms are not due to heart failure   1156 Chest XR,  PA & LAT  Based my view of chest x-ray there is increased 1 opacity in the right lower lobe with coarse breath sounds throughout consistent with either fibrosis or scarring, or underlying inflammation   1211 Chest XR,  PA & LAT  A few thin strands of fibrosis and/or linear atelectasis in the mid and lower lungs unchanged. Lungs are otherwise clear. No pleural effusion. Heart size and pulmonary vascularity within normal limits. Postop change cervical spine with   instrumentation. No significant change.   1224 I rechecked the patient, discussed reassuring work-up here so far.  She reports that her spasms have been getting worse of late and also told me that she was contacted last week by her primary care doctor at the clinic to see that her magnesium was low.  I will add on magnesium level.  She has not been taking the prescribed amount of hydrocortisone for her adrenal insufficiency, she said that it makes her sweat and feel comfortable.   1225 Magnesium added on   1242 Magnesium: 2.1  WNL   1421 Our nursing care manager reviewed the patient's chart, would not be able to get the patient into any long-term care facility directly from the ED.  Combination of medical issues, medication noncompliance, and psychosocial factors make her discharge complicated and somewhat risky.         Additional ED Course Timestamps:  10:18 AM I met with the patient and performed my initial interview and exam   12:24 PM I rechecked the patient   2:54 PM I discussed the patient with Dr. Thomas from the hospitalist service who agrees to admit the patient.       Medical Decision Making    History:    Supplemental history from: Documented in chart, if applicable and EMS    External Record(s) reviewed: Documented in chart, if applicable. and Inpatient Record: Admission on 4/11/23    Work Up:    Chart documentation includes differential considered and any EKGs or imaging independently interpreted by provider, where specified.    In additional to work up documented, I considered the following work up: Documented in chart, if applicable.    External consultation:    Discussion of management with another provider: Documented in chart, if applicable    Complicating factors:    Care impacted by chronic illness: Chronic Lung Disease and Smoking / Nicotine Use    Care affected by social determinants of health: Access to Medical Care, Alcohol Abuse and/or Recreational Drug Use, Housing Insecurity, Low Income and Medication Noncompliance    Disposition considerations: Admit.    At the conclusion of the encounter I discussed the results of all of the tests and the disposition. The questions were answered. The patient or family acknowledged understanding and was agreeable with the care plan.       MEDICATIONS GIVEN IN THE EMERGENCY:  Medications   ipratropium - albuterol 0.5 mg/2.5 mg/3 mL (DUONEB) neb solution 3 mL (3 mLs Nebulization $Given 4/27/23 1132)   ipratropium - albuterol 0.5 mg/2.5 mg/3 mL (DUONEB) neb solution 3 mL (3 mLs Nebulization $Given 4/27/23 1132)         NEW PRESCRIPTIONS STARTED AT TODAY'S ER VISIT  New Prescriptions    No medications on file          =================================================================    HPI    Patient information was obtained from: Patient and EMS    Use of : N/A       Renetta Farooq is a 67 year old female with a pertinent history of COPD, anxiety, alcohol dependence (with history of withdrawal), polysubstance abuse, bipolar affective disorder, acute psychosis, and tobacco use who presents to this ED for evaluation  "of cough and multiple other complaints.     Per chart review: The patient was admitted to [facility] ICU from 4/11/23-4/15/23 for hypotension and syncopal episodes. The patient required pressure support with vasopressin and levophed from 4/11-4/12. The patient also had syncopal episodes and a history of clonidine abuse. The patient had an echocardiogram that showed a hyperdynamic systolic performance with an EF of 75%. Cardiac workup unremarkable. The patient denied taking Clonidine for several weeks or any other ingestions. Syncopal episodes likely due to panic attacks.     Per chart review: The patient was seen in this ED on 4/19/23 for foul smelling urine and vaginal prolapse. The patient was diagnosed with a UTI and prescribed 7 days of Macrobid. The vagina prolapsed about 1 CM and was stable, so the patient was advised to follow up with gynecology as an outpatient.     Per EMS: The patient called EMS for multiple complaints, including a recent pneumonia diagnosis. The patient has a history of medication noncompliance and has not taken her medication in three days. The patient was vitally stable and said she would be unable to walk, but was able to ambulate to the stretcher without difficulty. They note the patient was picked up from a hotel where she and her son have been living, but notes they are being kicked out at 11 AM today.     The patient reports a cough from pneumonia as her main complaint. The patient reports they finished taking Macrobid that she said was prescribed for pneumonia (per chart review, the patient was prescribed Macrobid for a UTI). The patient also states she has been \"diaphoretic all day and all night\". The patient also complains of a \"prolapsed uterus\" where her \"rectum pushed into the cervix\", so she has had a difficult time passing a bowel movement. The patient also reports she aspirated \"five days ago\" when she was drinking coca cola. The patient also states she \"can't " "walk\" since being diagnosed with pneumonia.                  REVIEW OF SYSTEMS   Review of Systems   Constitutional: Positive for diaphoresis.   Respiratory: Positive for cough.    Gastrointestinal: Positive for constipation.   All other systems reviewed and are negative.       PAST MEDICAL HISTORY:  Past Medical History:   Diagnosis Date     Acute psychosis (H) 10/14/2018     Alcohol dependence (H) 4/10/2015     Bipolar affective disorder, manic, severe, with psychotic behavior (H) 10/15/2018     Candida infection 10/19/2018     Chronic hepatitis C (H) 4/10/2015     Chronic neck pain      Closed traumatic brain injury, without loss of consciousness, sequela (H)      Dental abscess      Episodic tension-type headache, not intractable      Methamphetamine dependence (H) 10/15/2018     Pancytopenia (H) 4/10/2015     Weakness of right arm 4/10/2015       PAST SURGICAL HISTORY:  Past Surgical History:   Procedure Laterality Date     ARTHROSCOPY SHOULDER ROTATOR CUFF REPAIR Bilateral      CERVICAL FUSION      twice     COSMETIC SURGERY       RHINOPLASTY       TONSILLECTOMY             CURRENT MEDICATIONS:    No current facility-administered medications for this encounter.     Current Outpatient Medications   Medication     albuterol (PROAIR HFA;PROVENTIL HFA;VENTOLIN HFA) 90 mcg/actuation inhaler     budesonide-formoterol (SYMBICORT) 160-4.5 MCG/ACT Inhaler     buprenorphine HCl-naloxone HCl (SUBOXONE) 4-1 MG per film     calcium carbonate (TUMS) 500 MG chewable tablet     cloNIDine (CATAPRES) 0.1 MG tablet     cyclobenzaprine (FLEXERIL) 5 MG tablet     diclofenac (VOLTAREN) 1 % topical gel     docusate sodium (COLACE) 100 MG capsule     FLUoxetine (PROZAC) 40 MG capsule     gabapentin (NEURONTIN) 300 MG capsule     hydrocortisone (CORTEF) 10 MG tablet     hydrOXYzine (ATARAX) 25 MG tablet     ibuprofen (ADVIL/MOTRIN) 200 MG tablet     ipratropium - albuterol 0.5 mg/2.5 mg/3 mL (DUONEB) 0.5-2.5 (3) MG/3ML neb solution " "    magnesium oxide (MAG-OX) 400 MG tablet     nicotine (NICODERM CQ) 14 MG/24HR 24 hr patch     nitroFURantoin macrocrystal-monohydrate (MACROBID) 100 MG capsule     NYSTOP [id number] UNIT/GM powder     omeprazole (PRILOSEC) 20 MG DR capsule     potassium chloride ER (KLOR-CON M) 20 MEQ CR tablet     QUEtiapine (SEROQUEL) 100 MG tablet     QUEtiapine (SEROQUEL) 300 MG tablet     Vitamin D3 (CHOLECALCIFEROL) 25 mcg (1000 units) tablet       ALLERGIES:  Allergies   Allergen Reactions     Amoxicillin Diarrhea     Droperidol Angioedema       FAMILY HISTORY:  Family History   Problem Relation Age of Onset     Narcolepsy Mother      Acute myelogenous leukemia Mother      Cancer Father        SOCIAL HISTORY:   Social History     Socioeconomic History     Marital status:      Spouse name: None     Number of children: None     Years of education: None     Highest education level: None   Tobacco Use     Smoking status: Every Day     Packs/day: 1.00     Years: 54.00     Pack years: 54.00     Types: Cigarettes     Start date: 1968     Smokeless tobacco: Never     Tobacco comments:     Seen IP by [facility] on 3/31/2023   Substance and Sexual Activity     Alcohol use: Yes     Comment: Alcoholic Drinks/day: \"A lot.\"  (Couldn't quantify except to repeat \"A lot\" of wine, liquor and beer)     Drug use: No   Social History Narrative    Lives alone in independent housing with psychiatric case assistance       VITALS:  /57   Pulse 96   Temp 98.3  F (36.8  C)   Resp 19   Ht 1.676 m (5' 6\")   Wt 63.5 kg (140 lb)   SpO2 94%   BMI 22.60 kg/m      PHYSICAL EXAM    Constitutional: Well developed, well nourished. Uncomfortable appearing, patient kept eyes closed during the exam  HENT: Atraumatic, mucous membranes moist, nose normal. Edentulous Neck- Supple, gross ROM intact.   Eyes: Pupils mid-range, conjunctiva without injection, no discharge.   Respiratory: No respiratory distress, speaks full sentences easily. Occasional " wheezing rhonchi in left posterior lung fields   Cardiovascular: Normal heart rate, regular rhythm, no murmurs.   GI: Soft, no tenderness to deep palpation in all quadrants, no masses.  Musculoskeletal: Moving all 4 extremities intentionally and without pain. No obvious deformity.  Skin: Warm, dry, no rash.  Neurologic: Alert & oriented x 3, cranial nerves grossly intact.  Psychiatric: Affect normal, cooperative.       LAB:  All pertinent labs reviewed and interpreted.  Labs Ordered and Resulted from Time of ED Arrival to Time of ED Departure   CBC WITH PLATELETS - Abnormal       Result Value    WBC Count 4.8      RBC Count 3.44 (*)     Hemoglobin 10.7 (*)     Hematocrit 32.3 (*)     MCV 94      MCH 31.1      MCHC 33.1      RDW 13.1      Platelet Count 209     BASIC METABOLIC PANEL - Abnormal    Sodium 137      Potassium 3.4 (*)     Chloride 99      Carbon Dioxide (CO2) 25      Anion Gap 13      Urea Nitrogen 11      Creatinine 0.92      Calcium 8.6      Glucose 92      GFR Estimate 67     INFLUENZA A/B, RSV, & SARS-COV2 PCR - Normal    Influenza A PCR Negative      Influenza B PCR Negative      RSV PCR Negative      SARS CoV2 PCR Negative     TROPONIN I - Normal    Troponin I <0.01     B-TYPE NATRIURETIC PEPTIDE ( [facility] ONLY) - Normal    BNP 11     MAGNESIUM - Normal    Magnesium 2.1         RADIOLOGY:  Reviewed all pertinent imaging. Please see official radiology report.  Chest XR,  PA & LAT   Final Result   IMPRESSION: A few thin strands of fibrosis and/or linear atelectasis in the mid and lower lungs unchanged. Lungs are otherwise clear. No pleural effusion. Heart size and pulmonary vascularity within normal limits. Postop change cervical spine with    instrumentation. No significant change.          EKG:      All EKG interpretations will be found in ED course above.    Dr. Lantigua reviewed and independently interpreted the patient's EKG.      Natasha HNUTER am serving as a scribe to document services  personally performed by Dr. Hossein Lantigua based on my observation and the provider's statements to me. I, Hossein Lantigua MD attest that Natasha Watt is acting in a scribe capacity, has observed my performance of the services and has documented them in accordance with my direction.    Hossein Lantigua M.D.  Emergency Medicine  [facility] EMERGENCY ROOM  [address] [address]  [phone]  Dept: [phone]     Hossein Lantigua MD  04/27/23 0587

## 2023-04-27 NOTE — PHARMACY-ADMISSION MEDICATION HISTORY
Pharmacist Admission Medication History    Admission medication history is complete. The information provided in this note is only as accurate as the sources available at the time of the update.    Medication reconciliation/reorder completed by provider prior to medication history? No    Information Source(s): Patient and CareEverywhere/SureScripts via in-person    Pertinent Information:   - Patient says she takes clonidine 0.2mg TID, but it's prescribed 0.1mg bid.    - hydrocortisone oral - patient says it makes her sweat    Changes made to PTA medication list:    Added: furosemide, Wixela    Deleted:symbicort    Changed: omeprazole to prn    Medications available for use during hospital stay: albuterol, Wixela.        Allergies reviewed with patient and updates made in EHR: yes    Medication History Completed By: Anita Zendjeas RPH 4/27/2023 3:47 PM    Prior to Admission medications    Medication Sig Last Dose Taking? Auth Provider Long Term End Date   albuterol (PROAIR HFA;PROVENTIL HFA;VENTOLIN HFA) 90 mcg/actuation inhaler [ALBUTEROL (PROAIR HFA;PROVENTIL HFA;VENTOLIN HFA) 90 MCG/ACTUATION INHALER] Inhale 2 puffs every 6 (six) hours as needed for wheezing. has with Yes Leora Hogan MD Yes    buprenorphine HCl-naloxone HCl (SUBOXONE) 4-1 MG per film Place 1 Film under the tongue 2 times daily 4/26/2023 at AM Yes Unknown, Entered By History     cloNIDine (CATAPRES) 0.1 MG tablet Take 1 tablet (0.1 mg) by mouth 2 times daily  Patient taking differently: Take 0.2 mg by mouth 3 times daily 4/27/2023 at AM Yes Micheal Henley MD Yes    cyclobenzaprine (FLEXERIL) 5 MG tablet Take 1 tablet (5 mg) by mouth every 8 hours as needed for muscle spasms (Use if voltaren ineffective) Unknown Yes Micheal Henley MD     diclofenac (VOLTAREN) 1 % topical gel Apply 4 g topically 4 times daily as needed for moderate pain Unknown Yes Micheal Henley MD     docusate sodium (COLACE) 100 MG capsule [DOCUSATE SODIUM (COLACE) 100 MG  CAPSULE] Take 1 capsule (100 mg total) by mouth 2 (two) times a day. ~ 1 week ago Yes Leora Hogan MD     FLUoxetine (PROZAC) 40 MG capsule Take 1 capsule (40 mg) by mouth daily for 30 days 4/26/2023 Yes Ray Corona DO Yes 5/6/23   fluticasone-salmeterol (ADVAIR) 250-50 MCG/ACT inhaler Inhale 1 puff into the lungs daily 4/27/2023 at AM, has with Yes Unknown, Entered By History No    furosemide (LASIX) 20 MG tablet Take 20 mg by mouth daily X 5 days 4/26/2023 Yes Unknown, Entered By History No    gabapentin (NEURONTIN) 300 MG capsule Take 3 capsules (900 mg) by mouth 3 times daily 4/26/2023 Yes Micheal Henley MD Yes    hydrocortisone (CORTEF) 10 MG tablet Take 5 tablets (50 mg) by mouth daily 4/25/2023 Yes Micheal Henley MD Yes    hydrOXYzine (ATARAX) 25 MG tablet Take 1-2 tablets (25-50 mg) by mouth every 6 hours as needed for other or anxiety (adjuvant pain) 4/27/2023 at 0600 Yes Micheal Henley MD     ibuprofen (ADVIL/MOTRIN) 200 MG tablet Take 600 mg by mouth every 8 hours as needed for pain 4/27/2023 at AM Yes Unknown, Entered By History     ipratropium - albuterol 0.5 mg/2.5 mg/3 mL (DUONEB) 0.5-2.5 (3) MG/3ML neb solution Take 1 vial (3 mLs) by nebulization 3 times daily For 1 week then every 6 hours as needed 4/27/2023 Yes Ray Corona DO Yes    magnesium oxide (MAG-OX) 400 MG tablet Take 1 tablet (400 mg) by mouth daily 4/26/2023 Yes Ray Corona DO     nicotine (NICODERM CQ) 14 MG/24HR 24 hr patch Place 1 patch onto the skin daily not currently on Yes Laila Grayson MD     nitroFURantoin macrocrystal-monohydrate (MACROBID) 100 MG capsule Take 1 capsule (100 mg) by mouth 2 times daily 4/27/2023 at AM Yes Lorena Mejia MD     NYSTOP [id number] UNIT/GM powder Apply topically 3 times daily as needed  Yes Reported, Patient     omeprazole (PRILOSEC) 20 MG DR capsule Take 20 mg by mouth daily as needed Unknown Yes Reported, Patient     potassium chloride ER (KLOR-CON M) 20 MEQ CR tablet  Take 20 mEq by mouth daily 4/26/2023 at AM Yes Unknown, Entered By History No    QUEtiapine (SEROQUEL) 100 MG tablet Take 1 tablet (100 mg) by mouth every morning 4/25/2023 Yes Ray Corona DO Yes    QUEtiapine (SEROQUEL) 300 MG tablet Take 1 tablet (300 mg) by mouth At Bedtime for 30 days 4/25/2023 Yes Ray Corona DO Yes 5/6/23   Vitamin D3 (CHOLECALCIFEROL) 25 mcg (1000 units) tablet Take 1 tablet (25 mcg) by mouth daily Past Month at Ran out of supply Yes Ray Corona DO     calcium carbonate (TUMS) 500 MG chewable tablet Take 1 tablet (500 mg) by mouth 2 times daily  Patient not taking: Reported on 4/27/2023 Not Taking  Ray Corona DO

## 2023-04-27 NOTE — ED TRIAGE NOTES
Patient presents to the ED via EMS with complaints of SOB, appears in no acute distress, RR=16, lung sounds clear, no wheeze or rhonchi. EMS report that patient told them she is loosing her hotel room today.     Triage Assessment     Row Name 04/27/23 1024       Triage Assessment (Adult)    Additional Documentation Breath Sounds (Group)       Respiratory WDL    Respiratory WDL X  c/o SOB       Breath Sounds    All Lung Fields Breath Sounds Anterior:;Posterior:;clear       Skin Circulation/Temperature WDL    Skin Circulation/Temperature WDL WDL       Cardiac WDL    Cardiac WDL WDL       Peripheral/Neurovascular WDL    Peripheral Neurovascular WDL WDL       Cognitive/Neuro/Behavioral WDL    Cognitive/Neuro/Behavioral WDL WDL

## 2023-04-27 NOTE — H&P
"[facility]    History and Physical - Hospitalist Service       Date of Admission:  4/27/2023    Assessment & Plan      Renetta Farooq is a 67 year old female admitted on 4/27/2023. She has a hx of adrenal insufficiency, chronic hepatitis C, Bipolar disorder with psychotic behavior, ADHD, KARLENE, MDD, COPD, chronic opioid dependence on Suboxone, and homeless presents with unable to care for herself with UTI and possible mild acute COPD exacerbation not requiring oxygen.     Acute COPD Exacerbation  -Prednisone 40 mg every day   -Duonebs ordered scheduled initially and then transition/wean as per RT as per protocol pending clinical improvement.  -Cefdinir ordered for UTI treatment   -Additional nebulizer as needed.   -Oxygen O2 supplementation to maintain O2 >88% - 93%. Wean O2 as tolerated.  -RCAT as needed.  -If coughing symptoms are severe, would utilize benzonatate tessalon pearls, dextromethorphan as needed for suppressing cough symptoms; consider additional as needed and judicious use of codeine and/or guaifenesin as appropriate.     Acute Cystitis - Urinary Tract Infection  -Presented with urinary symptoms.  -Urinalysis with reflex culture ordered. Follow-up results.   -Continue cefdinir and follow-up urine culture speciation/sensitivities to tailor antibiotics.   -Delirium precautions.   -If becomes acutely agitated or develops acute delirium, utilize verbal redirection first, and involve patient's family/contacts if available. Pharmacologic as-needed interventions as second-line, and would only judiciously consider 1:1 sitter and/or restraints if patient becomes a physical danger to self and/or to others/staff or if previous interventions unsuccessful or not effective.     Adrenal Insufficiency  -Patient stopped taking her home hydrocortisone cold turkey because it \"made me sweat\"  -Reorder with med rec; however, on prednisone burst first for possible COPDe    Bipolar disorder with " "psychotic behavior  ADHD  KARLENE  MDD  Chronic opioid dependence on Suboxone  -Acute pain management team with Suboxone needs  -Case management, SW consults  -Order home medications    HTN  -Home meds pending med rec, and add hold parameters       Diet: Regular Diet Adult    DVT Prophylaxis: Enoxaparin (Lovenox) SQ  Burroughs Catheter: Not present  Lines: None     Cardiac Monitoring: None  Code Status:    DNR    Clinically Significant Risk Factors Present on Admission                  # Hypertension: home medication list includes antihypertensive(s)              Disposition Plan      Expected Discharge Date: 04/28/2023                  Mark Thomas MD  Hospitalist Service  [facility]  Securely message with [employer] (more info)  Text page via [employer] Paging/Directory     ______________________________________________________________________    Chief Complaint   Coughing and no place to go    History is obtained from the patient    History of Present Illness   Renetta Farooq is a 67 year old female admitted on 4/27/2023. She has a hx of adrenal insufficiency, chronic hepatitis C, Bipolar disorder with psychotic behavior, ADHD, KARLENE, MDD, COPD, chronic opioid dependence on Suboxone, and homeless presents with unable to care for herself with UTI and possible mild acute COPD exacerbation not requiring oxygen.     She was evicted from her hotel this morning; in this context, she has had several days of coughing and reports of fever and states she was put on Macrobid for a UTI that she has not completed. She endorses urinary symptoms including frequency and urge. She has SOB and coughing with chest tightness only during coughing and currently denies any ongoing chest pain or pressure. She ran out of her home clonidine and HTN. She kept referring to having \"seizures\" that she means to describe as her passing out episodes for which she saw cardiology and endocrinology in the past few months and was " "diagnosed with Adrenal Insufficiency. She stopped taking her hydrocortisone cold turkey because it \"made me sweat.\" No diagnosis of seizure or anti-epileptics. Otherwise, there is no endorsement of any rigors, chest pain when not coughing, nausea or vomiting or abdominal pain, changes in bowel movements/pattern, urinary symptoms, focal weakness, or any other new and associated symptoms at this time. 14 point review of systems performed without any other pertinent positives at this time.     The social history, family history, and past medical history were directly reviewed. All questions the patient had at this time were answered to verbalized and stated satisfaction and understanding. Code status was discussed and the patient confirmed wishes for DNR for this hospitalization.        Past Medical History    Past Medical History:   Diagnosis Date     Acute psychosis (H) 10/14/2018     Alcohol dependence (H) 4/10/2015     Bipolar affective disorder, manic, severe, with psychotic behavior (H) 10/15/2018     Candida infection 10/19/2018     Chronic hepatitis C (H) 4/10/2015     Chronic neck pain      Closed traumatic brain injury, without loss of consciousness, sequela (H)      Dental abscess      Episodic tension-type headache, not intractable      Methamphetamine dependence (H) 10/15/2018     Pancytopenia (H) 4/10/2015     Weakness of right arm 4/10/2015       Past Surgical History   Past Surgical History:   Procedure Laterality Date     ARTHROSCOPY SHOULDER ROTATOR CUFF REPAIR Bilateral      CERVICAL FUSION      twice     COSMETIC SURGERY       RHINOPLASTY       TONSILLECTOMY         Prior to Admission Medications   Prior to Admission Medications   Prescriptions Last Dose Informant Patient Reported? Taking?   FLUoxetine (PROZAC) 40 MG capsule   No No   Sig: Take 1 capsule (40 mg) by mouth daily for 30 days   NYSTOP [id number] UNIT/GM powder   Yes No   Sig: Apply topically 3 times daily as needed   QUEtiapine (SEROQUEL) " 100 MG tablet   No No   Sig: Take 1 tablet (100 mg) by mouth every morning   QUEtiapine (SEROQUEL) 300 MG tablet   No No   Sig: Take 1 tablet (300 mg) by mouth At Bedtime for 30 days   Vitamin D3 (CHOLECALCIFEROL) 25 mcg (1000 units) tablet   No No   Sig: Take 1 tablet (25 mcg) by mouth daily   albuterol (PROAIR HFA;PROVENTIL HFA;VENTOLIN HFA) 90 mcg/actuation inhaler   No No   Sig: [ALBUTEROL (PROAIR HFA;PROVENTIL HFA;VENTOLIN HFA) 90 MCG/ACTUATION INHALER] Inhale 2 puffs every 6 (six) hours as needed for wheezing.   budesonide-formoterol (SYMBICORT) 160-4.5 MCG/ACT Inhaler   Yes No   Sig: Inhale 2 puffs into the lungs 2 times daily   buprenorphine HCl-naloxone HCl (SUBOXONE) 4-1 MG per film   Yes No   Sig: Place 1 Film under the tongue 2 times daily   calcium carbonate (TUMS) 500 MG chewable tablet   No No   Sig: Take 1 tablet (500 mg) by mouth 2 times daily   cloNIDine (CATAPRES) 0.1 MG tablet   No No   Sig: Take 1 tablet (0.1 mg) by mouth 2 times daily   cyclobenzaprine (FLEXERIL) 5 MG tablet   No No   Sig: Take 1 tablet (5 mg) by mouth every 8 hours as needed for muscle spasms (Use if voltaren ineffective)   diclofenac (VOLTAREN) 1 % topical gel   No No   Sig: Apply 4 g topically 4 times daily as needed for moderate pain   docusate sodium (COLACE) 100 MG capsule   No No   Sig: [DOCUSATE SODIUM (COLACE) 100 MG CAPSULE] Take 1 capsule (100 mg total) by mouth 2 (two) times a day.   gabapentin (NEURONTIN) 300 MG capsule   No No   Sig: Take 3 capsules (900 mg) by mouth 3 times daily   hydrOXYzine (ATARAX) 25 MG tablet   No No   Sig: Take 1-2 tablets (25-50 mg) by mouth every 6 hours as needed for other or anxiety (adjuvant pain)   hydrocortisone (CORTEF) 10 MG tablet   No No   Sig: Take 5 tablets (50 mg) by mouth daily   ibuprofen (ADVIL/MOTRIN) 200 MG tablet   Yes No   Sig: Take 600 mg by mouth every 8 hours as needed for pain   ipratropium - albuterol 0.5 mg/2.5 mg/3 mL (DUONEB) 0.5-2.5 (3) MG/3ML neb solution    No No   Sig: Take 1 vial (3 mLs) by nebulization 3 times daily For 1 week then every 6 hours as needed   magnesium oxide (MAG-OX) 400 MG tablet   No No   Sig: Take 1 tablet (400 mg) by mouth daily   nicotine (NICODERM CQ) 14 MG/24HR 24 hr patch   No No   Sig: Place 1 patch onto the skin daily   nitroFURantoin macrocrystal-monohydrate (MACROBID) 100 MG capsule   No No   Sig: Take 1 capsule (100 mg) by mouth 2 times daily   omeprazole (PRILOSEC) 20 MG DR capsule   Yes No   Sig: Take 20 mg by mouth At Bedtime   potassium chloride ER (KLOR-CON M) 20 MEQ CR tablet   Yes No   Sig: Take 20 mEq by mouth daily      Facility-Administered Medications: None           Physical Exam   Vital Signs: Temp: 98.3  F (36.8  C) Temp src: Oral BP: 104/57 Pulse: 96   Resp: 19 SpO2: 94 % O2 Device: None (Room air)    Weight: 140 lbs 0 oz    GENERAL:  Alert, appears comfortable, in no acute distress, appears stated age on ambient room air   HEAD:  Normocephalic, without obvious abnormality, atraumatic   EYES:  PERRL, conjunctiva/corneas clear, no scleral icterus, EOM's intact   NOSE: Nares normal, septum midline, mucosa normal, no drainage   THROAT: Lips, mucosa, and tongue normal; teeth and gums normal, mouth moist   NECK: Supple, symmetrical, trachea midline   BACK:   Symmetric, no curvature, ROM normal   LUNGS:   Clear to auscultation bilaterally, no rales, rhonchi, or wheezing, symmetric chest rise on inhalation, respirations unlabored   CHEST WALL:  No tenderness or deformity   HEART:  Regular rate and rhythm, S1 and S2 normal, no murmur, rub, or gallop    ABDOMEN:   Soft, non-tender, bowel sounds active all four quadrants, no masses, no organomegaly, no rebound or guarding   EXTREMITIES: Extremities normal, atraumatic, no cyanosis or edema    SKIN: Dry to touch, no exanthems in the visualized areas   NEURO: Alert, oriented x 4, moves all four extremities freely/spontaneously   PSYCH: Cooperative, behavior is appropriate           Medical Decision Making     58 MINUTES SPENT BY ME on the date of service doing chart review, history, exam, documentation & further activities per the note.      Data     I have personally reviewed the following data over the past 24 hrs:    4.8  \   10.7 (L)   / 209     137 99 11 / 92   3.4 (L) 25 0.92 \       Trop: N/A BNP: 11       Imaging results reviewed over the past 24 hrs:   Recent Results (from the past 24 hour(s))   Chest XR,  PA & LAT    Narrative    EXAM: XR CHEST 2 VIEWS  LOCATION: [facility]  DATE/TIME: 4/27/2023 11:52 AM CDT    INDICATION: eval for pneumonia  COMPARISON: 4/9/2023 and 4/3/2023      Impression    IMPRESSION: A few thin strands of fibrosis and/or linear atelectasis in the mid and lower lungs unchanged. Lungs are otherwise clear. No pleural effusion. Heart size and pulmonary vascularity within normal limits. Postop change cervical spine with   instrumentation. No significant change.

## 2023-04-27 NOTE — ED NOTES
"Ambulated pt with the assist of two and a walker. After about five steps pt stated she couldn't go any further and needed to go back to the bed. RN reminded pt that she needed to take the walker. Pt became agitated and stated \" I don't need it\" and walked by herself with zero difficulties to her bed.  "

## 2023-04-27 NOTE — ED NOTES
Bed: [facility]-07  Expected date: 4/27/23  Expected time: 10:05 AM  Means of arrival:   Comments:  66 yo lazaro Cook  Dx sob recent pneumonia SpO2  VSs

## 2023-04-27 NOTE — ED NOTES
"PT performed an ambulation trial with a walker and a two person assist. When getting out of the bed and walking to the doorway the PT appeared to lift her knees in a \"high knee\" formation and appeared to have a difficult time With balance. When the writer asked the PT to take a few steps more out into the hallway the PT let go of the walker and stated she did not want to walk anymore. The PT ambulated with no walker, no staff assistance, with a normal steady gait back to her bed. The MD was informed.   "

## 2023-04-28 ENCOUNTER — APPOINTMENT (OUTPATIENT)
Dept: PHYSICAL THERAPY | Facility: CLINIC | Age: 68
End: 2023-04-28
Attending: HOSPITALIST
Payer: COMMERCIAL

## 2023-04-28 ENCOUNTER — APPOINTMENT (OUTPATIENT)
Dept: OCCUPATIONAL THERAPY | Facility: CLINIC | Age: 68
End: 2023-04-28
Attending: HOSPITALIST
Payer: COMMERCIAL

## 2023-04-28 LAB
ANION GAP SERPL CALCULATED.3IONS-SCNC: 8 MMOL/L (ref 5–18)
BASOPHILS # BLD AUTO: 0 10E3/UL (ref 0–0.2)
BASOPHILS NFR BLD AUTO: 0 %
BUN SERPL-MCNC: 11 MG/DL (ref 8–22)
CALCIUM SERPL-MCNC: 9.3 MG/DL (ref 8.5–10.5)
CHLORIDE BLD-SCNC: 101 MMOL/L (ref 98–107)
CO2 SERPL-SCNC: 26 MMOL/L (ref 22–31)
CREAT SERPL-MCNC: 0.82 MG/DL (ref 0.6–1.1)
EOSINOPHIL # BLD AUTO: 0 10E3/UL (ref 0–0.7)
EOSINOPHIL NFR BLD AUTO: 0 %
ERYTHROCYTE [DISTWIDTH] IN BLOOD BY AUTOMATED COUNT: 12.7 % (ref 10–15)
GFR SERPL CREATININE-BSD FRML MDRD: 77 ML/MIN/1.73M2
GLUCOSE BLD-MCNC: 132 MG/DL (ref 70–125)
HCT VFR BLD AUTO: 34.1 % (ref 35–47)
HGB BLD-MCNC: 10.9 G/DL (ref 11.7–15.7)
IMM GRANULOCYTES # BLD: 0.1 10E3/UL
IMM GRANULOCYTES NFR BLD: 2 %
LYMPHOCYTES # BLD AUTO: 0.5 10E3/UL (ref 0.8–5.3)
LYMPHOCYTES NFR BLD AUTO: 7 %
MCH RBC QN AUTO: 30.8 PG (ref 26.5–33)
MCHC RBC AUTO-ENTMCNC: 32 G/DL (ref 31.5–36.5)
MCV RBC AUTO: 96 FL (ref 78–100)
MONOCYTES # BLD AUTO: 0.1 10E3/UL (ref 0–1.3)
MONOCYTES NFR BLD AUTO: 2 %
NEUTROPHILS # BLD AUTO: 5.8 10E3/UL (ref 1.6–8.3)
NEUTROPHILS NFR BLD AUTO: 89 %
NRBC # BLD AUTO: 0 10E3/UL
NRBC BLD AUTO-RTO: 0 /100
PLATELET # BLD AUTO: 203 10E3/UL (ref 150–450)
POTASSIUM BLD-SCNC: 4.8 MMOL/L (ref 3.5–5)
RBC # BLD AUTO: 3.54 10E6/UL (ref 3.8–5.2)
SODIUM SERPL-SCNC: 135 MMOL/L (ref 136–145)
WBC # BLD AUTO: 6.5 10E3/UL (ref 4–11)

## 2023-04-28 PROCEDURE — 96372 THER/PROPH/DIAG INJ SC/IM: CPT | Performed by: HOSPITALIST

## 2023-04-28 PROCEDURE — 97116 GAIT TRAINING THERAPY: CPT | Mod: GP

## 2023-04-28 PROCEDURE — 85025 COMPLETE CBC W/AUTO DIFF WBC: CPT | Performed by: HOSPITALIST

## 2023-04-28 PROCEDURE — 250N000011 HC RX IP 250 OP 636: Performed by: HOSPITALIST

## 2023-04-28 PROCEDURE — 250N000012 HC RX MED GY IP 250 OP 636 PS 637: Performed by: HOSPITALIST

## 2023-04-28 PROCEDURE — G0378 HOSPITAL OBSERVATION PER HR: HCPCS

## 2023-04-28 PROCEDURE — 999N000157 HC STATISTIC RCP TIME EA 10 MIN

## 2023-04-28 PROCEDURE — 97165 OT EVAL LOW COMPLEX 30 MIN: CPT | Mod: GO

## 2023-04-28 PROCEDURE — 97530 THERAPEUTIC ACTIVITIES: CPT | Mod: GP

## 2023-04-28 PROCEDURE — 99207 PR CDG-CUT & PASTE-POTENTIAL IMPACT ON LEVEL: CPT | Performed by: HOSPITALIST

## 2023-04-28 PROCEDURE — 94640 AIRWAY INHALATION TREATMENT: CPT

## 2023-04-28 PROCEDURE — 97535 SELF CARE MNGMENT TRAINING: CPT | Mod: GO

## 2023-04-28 PROCEDURE — 94799 UNLISTED PULMONARY SVC/PX: CPT

## 2023-04-28 PROCEDURE — 82310 ASSAY OF CALCIUM: CPT | Performed by: HOSPITALIST

## 2023-04-28 PROCEDURE — 99232 SBSQ HOSP IP/OBS MODERATE 35: CPT | Performed by: HOSPITALIST

## 2023-04-28 PROCEDURE — 250N000013 HC RX MED GY IP 250 OP 250 PS 637: Performed by: HOSPITALIST

## 2023-04-28 PROCEDURE — 250N000009 HC RX 250: Performed by: HOSPITALIST

## 2023-04-28 PROCEDURE — 36415 COLL VENOUS BLD VENIPUNCTURE: CPT | Performed by: HOSPITALIST

## 2023-04-28 PROCEDURE — 97162 PT EVAL MOD COMPLEX 30 MIN: CPT | Mod: GP

## 2023-04-28 RX ORDER — LIDOCAINE HYDROCHLORIDE 20 MG/ML
5 SOLUTION OROPHARYNGEAL
Status: DISCONTINUED | OUTPATIENT
Start: 2023-04-28 | End: 2023-04-30 | Stop reason: HOSPADM

## 2023-04-28 RX ORDER — CLONIDINE HYDROCHLORIDE 0.1 MG/1
0.1 TABLET ORAL 2 TIMES DAILY
Status: DISCONTINUED | OUTPATIENT
Start: 2023-04-28 | End: 2023-04-30 | Stop reason: HOSPADM

## 2023-04-28 RX ORDER — METHOCARBAMOL 500 MG/1
500 TABLET, FILM COATED ORAL 4 TIMES DAILY PRN
Status: DISCONTINUED | OUTPATIENT
Start: 2023-04-28 | End: 2023-04-30 | Stop reason: HOSPADM

## 2023-04-28 RX ADMIN — GABAPENTIN 900 MG: 300 CAPSULE ORAL at 21:48

## 2023-04-28 RX ADMIN — FLUOXETINE 40 MG: 20 CAPSULE ORAL at 09:07

## 2023-04-28 RX ADMIN — ENOXAPARIN SODIUM 40 MG: 100 INJECTION SUBCUTANEOUS at 18:01

## 2023-04-28 RX ADMIN — CALCIUM CARBONATE (ANTACID) CHEW TAB 500 MG 500 MG: 500 CHEW TAB at 09:07

## 2023-04-28 RX ADMIN — DOCUSATE SODIUM 100 MG: 100 CAPSULE, LIQUID FILLED ORAL at 09:11

## 2023-04-28 RX ADMIN — QUETIAPINE FUMARATE 300 MG: 300 TABLET ORAL at 21:49

## 2023-04-28 RX ADMIN — FLUTICASONE FUROATE AND VILANTEROL TRIFENATATE 1 PUFF: 100; 25 POWDER RESPIRATORY (INHALATION) at 09:10

## 2023-04-28 RX ADMIN — BUPRENORPHINE AND NALOXONE 1 FILM: 4; 1 FILM, SOLUBLE BUCCAL; SUBLINGUAL at 21:48

## 2023-04-28 RX ADMIN — GABAPENTIN 900 MG: 300 CAPSULE ORAL at 09:57

## 2023-04-28 RX ADMIN — GABAPENTIN 900 MG: 300 CAPSULE ORAL at 14:49

## 2023-04-28 RX ADMIN — NICOTINE 1 PATCH: 14 PATCH, EXTENDED RELEASE TRANSDERMAL at 18:02

## 2023-04-28 RX ADMIN — CHOLECALCIFEROL TAB 25 MCG (1000 UNIT) 25 MCG: 25 TAB at 09:08

## 2023-04-28 RX ADMIN — BUPRENORPHINE AND NALOXONE 1 FILM: 4; 1 FILM, SOLUBLE BUCCAL; SUBLINGUAL at 09:58

## 2023-04-28 RX ADMIN — HYDROXYZINE HYDROCHLORIDE 25 MG: 25 TABLET ORAL at 07:00

## 2023-04-28 RX ADMIN — CLONIDINE HYDROCHLORIDE 0.1 MG: 0.1 TABLET ORAL at 18:01

## 2023-04-28 RX ADMIN — HYDROXYZINE HYDROCHLORIDE 50 MG: 25 TABLET ORAL at 21:58

## 2023-04-28 RX ADMIN — CEFDINIR 300 MG: 300 CAPSULE ORAL at 21:49

## 2023-04-28 RX ADMIN — CEFDINIR 300 MG: 300 CAPSULE ORAL at 09:02

## 2023-04-28 RX ADMIN — Medication 400 MG: at 09:06

## 2023-04-28 RX ADMIN — DOCUSATE SODIUM 100 MG: 100 CAPSULE, LIQUID FILLED ORAL at 21:48

## 2023-04-28 RX ADMIN — HYDROXYZINE HYDROCHLORIDE 50 MG: 25 TABLET ORAL at 15:19

## 2023-04-28 RX ADMIN — METHOCARBAMOL TABLETS 500 MG: 500 TABLET, COATED ORAL at 15:18

## 2023-04-28 RX ADMIN — METHOCARBAMOL TABLETS 500 MG: 500 TABLET, COATED ORAL at 21:49

## 2023-04-28 RX ADMIN — POTASSIUM CHLORIDE 20 MEQ: 1500 TABLET, EXTENDED RELEASE ORAL at 09:07

## 2023-04-28 RX ADMIN — CALCIUM CARBONATE (ANTACID) CHEW TAB 500 MG 500 MG: 500 CHEW TAB at 21:48

## 2023-04-28 RX ADMIN — QUETIAPINE 100 MG: 100 TABLET ORAL at 09:09

## 2023-04-28 RX ADMIN — IPRATROPIUM BROMIDE AND ALBUTEROL SULFATE 3 ML: .5; 3 SOLUTION RESPIRATORY (INHALATION) at 07:27

## 2023-04-28 RX ADMIN — PREDNISONE 40 MG: 20 TABLET ORAL at 09:06

## 2023-04-28 RX ADMIN — LIDOCAINE HYDROCHLORIDE 5 ML: 20 SOLUTION TOPICAL at 14:49

## 2023-04-28 RX ADMIN — LIDOCAINE HYDROCHLORIDE 5 ML: 20 SOLUTION TOPICAL at 21:58

## 2023-04-28 ASSESSMENT — ACTIVITIES OF DAILY LIVING (ADL)
ADLS_ACUITY_SCORE: 37
ADLS_ACUITY_SCORE: 35
ADLS_ACUITY_SCORE: 37
ADLS_ACUITY_SCORE: 35
ADLS_ACUITY_SCORE: 37
ADLS_ACUITY_SCORE: 35
ADLS_ACUITY_SCORE: 37
ADLS_ACUITY_SCORE: 37
ADLS_ACUITY_SCORE: 35
ADLS_ACUITY_SCORE: 35
ADLS_ACUITY_SCORE: 37
ADLS_ACUITY_SCORE: 37

## 2023-04-28 NOTE — CONSULTS
Care Management Initial Consult      General Information  Assessment completed with: Patient, VM-chart review,    Type of CM/SW Visit: Initial Assessment  Primary Care Provider verified and updated as needed: Yes   Readmission within the last 30 days: lack of support   Return Category: New Diagnosis  Reason for Consult: discharge planning, health care directive, transportation  Advance Care Planning: Advance Care Planning Reviewed: no concerns identified        Communication Assessment  Patient's communication style: spoken language (English or Bilingual)        Cognitive  Cognitive/Neuro/Behavioral: WDL                      Living Environment:   People in home: child(jimmy), adult (Pt is generally accompanied by her son.)     Current living Arrangements: homeless      Able to return to prior arrangements: no (Pt states inability to afford staying in a hotel any longer and is homeless as of this morning.)  Living Arrangement Comments: Homeless as of today 4/27.    Family/Social Support:  Care provided by: self, child(jimmy), other (see comments) (PCA.)  Provides care for: no one, unable/limited ability to care for self  Marital Status:   Children (Son Alvaro)          Description of Support System: Involved, Supportive (As needed/able.)    Support Assessment:  (Pt has some support from the [facility] and support from her son as he is able. Son is also homeless as of today 4/27.)    Current Resources:   Patient receiving home care services: No  Community Resources: [facility] Worker  Equipment currently used at home:    Supplies currently used at home: None    Employment/Financial:  Employment Status: retired, disabled     Financial Concerns: unable to afford rent/mortgage   Referral to Financial Worker: No     Does the patient's insurance plan have a 3 day qualifying hospital stay waiver?  No    Lifestyle & Psychosocial Needs:  Social Determinants of Health     Tobacco Use: High Risk (4/27/2023)    Patient History     "  Smoking Tobacco Use: Every Day      Smokeless Tobacco Use: Never      Passive Exposure: Not on file   Alcohol Use: Not on file   Financial Resource Strain: Not on file   Food Insecurity: Not on file   Transportation Needs: Not on file   Physical Activity: Not on file   Stress: Not on file   Social Connections: Not on file   Intimate Partner Violence: Not on file   Depression: Not at risk (7/30/2021)    PHQ-2      PHQ-2 Score: 0   Housing Stability: Not on file     Functional Status:  Prior to admission patient needed assistance:   Dependent ADLs:: Ambulation-walker, Ambulation-cane  Dependent IADLs:: Transportation, Cleaning, Laundry, Shopping  Assessment of Functional Status: At functional baseline    Mental Health Status:  Mental Health Status: No Current Concerns       Chemical Dependency Status:  Chemical Dependency Status: No Current Concerns           Values/Beliefs:  Spiritual, Cultural Beliefs, Yarsanism Practices, Values that affect care: no (None identified.)             Additional Information:  Writer met with pt to introduce Care Management(CM), obtain an initial assessment, and provide SHANNON info. Pt states she is no longer able to afford to stay in the hotel she had been sharing with her son. As of today 4/27 pt is technically homeless as well as her son. Pt stated willingness to be placed in a facility whether TCU or LTC. Pt/family was provided with the Medicare Compare list for Transitional/Long-term Care services and discussed associated Medicare star ratings to assist with choice for referrals/discharge planning. Education was given to pt/family that star ratings are updated/maintained by Medicare and can be reviewed by visiting www.medicare.gov - Yes. Pt declined website stating no preference in placement. No referrals placed as therapy consults have not been ordered as of yet.    4/27 per Dr.Garrison O.-\"67 year old female with a pertinent history of COPD, anxiety, alcohol dependence (with " "history of withdrawal), polysubstance abuse, bipolar affective disorder, acute psychosis, and tobacco use who presents to this ED for evaluation of cough and multiple other complaints.      Per chart review: The patient was admitted to [facility] ICU from 4/11/23-4/15/23 for hypotension and syncopal episodes. The patient required pressure support with vasopressin and levophed from 4/11-4/12. The patient also had syncopal episodes and a history of clonidine abuse. The patient had an echocardiogram that showed a hyperdynamic systolic performance with an EF of 75%. Cardiac workup unremarkable. The patient denied taking Clonidine for several weeks or any other ingestions. Syncopal episodes likely due to panic attacks.      Per chart review: The patient was seen in this ED on 4/19/23 for foul smelling urine and vaginal prolapse. The patient was diagnosed with a UTI and prescribed 7 days of Macrobid. The vagina prolapsed about 1 CM and was stable, so the patient was advised to follow up with gynecology as an outpatient.      Per EMS: The patient called EMS for multiple complaints, including a recent pneumonia diagnosis. The patient has a history of medication noncompliance and has not taken her medication in three days. The patient was vitally stable and said she would be unable to walk, but was able to ambulate to the stretcher without difficulty. They note the patient was picked up from a hotel where she and her son have been living, but notes they are being kicked out at 11 AM today.      The patient reports a cough from pneumonia as her main complaint. The patient reports they finished taking Macrobid that she said was prescribed for pneumonia (per chart review, the patient was prescribed Macrobid for a UTI). The patient also states she has been \"diaphoretic all day and all night\". The patient also complains of a \"prolapsed uterus\" where her \"rectum pushed into the cervix\", so she has had a difficult time " "passing a bowel movement. The patient also reports she aspirated \"five days ago\" when she was drinking coca cola. The patient also states she \"can't walk\" since being diagnosed with pneumonia.\"      PT/OT Consults requested. Anticipate LTC or TCU placement after therapy consults. CM to follow-up on consults and assist with service coordination needs as indicated. Son to transport if available.    Osmany Aguilar RN        "

## 2023-04-28 NOTE — PROGRESS NOTES
"Vss. A&O. Pain rated between 8-10 on 0-10 pain scale r/t mouth pain/sores.     PRIMARY DIAGNOSIS: \"GENERIC\" NURSING  OUTPATIENT/OBSERVATION GOALS TO BE MET BEFORE DISCHARGE:  1. ADLs back to baseline: Yes    2. Activity and level of assistance: Ambulating independently.    3. Pain status: Improved-controlled with oral pain medications.    4. Return to near baseline physical activity: Yes     Discharge Planner Nurse   Safe discharge environment identified: No (pt currently homeless)  Barriers to discharge: Yes       Entered by: Eusebia Jones RN 04/28/2023 1:52 PM     Please review provider order for any additional goals.   Nurse to notify provider when observation goals have been met and patient is ready for discharge.  "

## 2023-04-28 NOTE — PROGRESS NOTES
Care Management Follow Up    Information:  Pt is actively seeking an apartment. Writer witnessed pt doing so and offered additional resources. Pt was thankful and stated she would continue to try to find an apartment. Pt does not qualify for TCU placement at this time and cannot afford to pay for it privately if desired. Pt declined MA application/LTC placement. CM to follow.    Osmany Aguilar RN

## 2023-04-28 NOTE — PLAN OF CARE
"Vss. A&O. Pain rated between 8-10 on 0-10 pain scale r/t mouth pain/sores.     PRIMARY DIAGNOSIS: \"GENERIC\" NURSING  OUTPATIENT/OBSERVATION GOALS TO BE MET BEFORE DISCHARGE:  ADLs back to baseline: Yes    Activity and level of assistance: Ambulating independently.    Pain status: Improved-controlled with oral pain medications.    Return to near baseline physical activity: Yes     Discharge Planner Nurse   Safe discharge environment identified: No (pt currently homeless)   Barriers to discharge: Yes       Entered by: Eusebia Jones RN 04/28/2023 1:51 PM     Please review provider order for any additional goals.   Nurse to notify provider when observation goals have been met and patient is ready for discharge.  "

## 2023-04-28 NOTE — PROGRESS NOTES
04/28/23 1300   Appointment Info   Signing Clinician's Name / Credentials (PT) Billie Baugh, BEENA   Student Supervision Direct Patient Contact Provided   Living Environment   People in Home child(jimmy), adult   Current Living Arrangements homeless   Living Environment Comments Pt mentioned that she recently got kicked out of her hotel where she was staying and was not able to grab her AD's and no longer has them. Son acts as her PCA and typically gives her HHA when ambulating.   Self-Care   Usual Activity Tolerance good   Current Activity Tolerance good   Equipment Currently Used at Home other (see comments)  (had SPC, 4WW, AFO, and FWW but no longer has equipment.)   Fall history within last six months yes   Number of times patient has fallen within last six months   (pt mentioned that shes been falling a lot due to drop foot in R LE)   General Information   Onset of Illness/Injury or Date of Surgery 04/27/23   Referring Physician Mark Thomas MD   Patient/Family Therapy Goals Statement (PT) stop falling   Pertinent History of Current Problem (include personal factors and/or comorbidities that impact the POC) unable to care for self, also has history of drop foot, stroke, and dyspnea   Existing Precautions/Restrictions no known precautions/restrictions   Weight-Bearing Status - LLE full weight-bearing   Weight-Bearing Status - RLE full weight-bearing   Cognition   Affect/Mental Status (Cognition) WFL   Cognitive Status Comments has some cognitive/memory issues at baseline due to past medical history   Range of Motion (ROM)   ROM Comment WFL   Strength (Manual Muscle Testing)   Strength Comments WFL   Transfers   Transfers sit-stand transfer   Maintains Weight-bearing Status (Transfers) able to maintain   Transfer Safety Concerns Noted decreased balance during turns;decreased step length   Impairments Contributing to Impaired Transfers impaired balance   Sit-Stand Transfer   Sit-Stand [place] (Transfers)  supervision;verbal cues;contact guard   Assistive Device (Sit-Stand Transfers) walker, 4-wheeled   Gait/Stairs (Locomotion)   [place] Level (Gait) supervision;verbal cues;contact guard   Assistive Device (Gait) walker, 4-wheeled   Distance in Feet 10   Distance in Feet (Gait) 10', 30   Pattern (Gait) step-through   Deviations/Abnormal Patterns (Gait) gait speed decreased   Maintains Weight-bearing Status (Gait) able to maintain   Clinical Impression   Criteria for Skilled Therapeutic Intervention Yes, treatment indicated   PT Diagnosis (PT) impaired functional mobility   Influenced by the following impairments generalized weakness   Functional limitations due to impairments transfers, gait, stairs   Clinical Presentation (PT Evaluation Complexity) Stable/Uncomplicated   Clinical Presentation Rationale Pt presents as medically diagnosed   Clinical Decision Making (Complexity) low complexity   Planned Therapy Interventions (PT) balance training;gait training;home exercise program;patient/family education;ROM (range of motion);stair training;strengthening;stretching   Anticipated Equipment Needs at Discharge (PT) walker, rolling   Risk & Benefits of therapy have been explained evaluation/treatment results reviewed;patient   PT Total Evaluation Time   PT Eval, Moderate Complexity Minutes (22182) 10   Physical Therapy Goals   PT Frequency Daily   PT Predicted Duration/Target Date for Goal Attainment 04/30/23   PT Goals Transfers;Gait;PT Goal 1   PT: Transfers Sit to/from stand;Modified independent;Assistive device   PT: Gait Assistive device;Supervision/stand-by assist;150 feet   PT: Goal 1 pt will be able to indep complete HEP   Interventions   Interventions Quick Adds Gait Training;Therapeutic Activity;Therapeutic Procedure   Therapeutic Activity   Therapeutic Activities: dynamic activities to improve functional performance Minutes (55495) 9   Treatment Detail/Skilled Intervention supine<>sit, SBA. Sit<>stand x 2  with FWW, SBA. cueing for safe hand placement, standing balance fo bill cares, cueing for hand placement and safety, no LOB   Gait Training   Gait Training Minutes (37450) 10   Treatment Detail/Skilled Intervention Pt ambulated x 10' to the bathroom with 4WW, CGA. Pt standing balance x 4 minutes for toileting and hand washing. Pt ambulated another 30' with R sided gait deviations due to R foot drop.   [place] Level (Gait Training) contact guard   Physical Assistance Level (Gait Training) supervision;verbal cues   Weight Bearing (Gait Training) full weight-bearing   Assistive Device (Gait Training) rolling walker  (4WW)   Pattern Analysis (Gait Training) swing-through gait   Gait Analysis Deviations decreased evita;decreased step length;decreased toe-to-floor clearance   Impairments (Gait Analysis/Training) balance impaired;strength decreased   PT Discharge Planning   PT Plan balance, gait with HHA   PT Discharge Recommendation (DC Rec) home with home care physical therapy;home with assist   PT Rationale for DC Rec home with 24/7 assist from son and home PT to assist with endurance, gait, and balance, pt is able to transfer and ambulate with 4WW SBA-CGA, but would benefit from continued therapy to improve strength and balance to decrease risk for falls.   PT Brief overview of current status Ambulated with 4WW, CGA.   Total Session Time   Timed Code Treatment Minutes 19   Total Session Time (sum of timed and untimed services) 29

## 2023-04-28 NOTE — PROGRESS NOTES
"   04/28/23 1442   Appointment Info   Signing Clinician's Name / Credentials (OT) YADIEL Naranjo   Quick Adds   Quick Adds Certification   Living Environment   People in Home child(jimmy), adult  (son)   Current Living Arrangements homeless   Living Environment Comments Pt does not have housing as of yesterday. Previously was living in hotel w/ son. Pt previously had FWW, AFO, shower chair but lost all DME from recent housing change.   Self-Care   Usual Activity Tolerance good   Current Activity Tolerance moderate   Equipment Currently Used at Home shower chair;cane, quad;walker, rolling;other (see comments)  (AFO; no longer has any DME)   Fall history within last six months yes   Number of times patient has fallen within last six months 1  (\"1 in past 6 months but 30 over the past year\")   Activity/Exercise/Self-Care Comment Pt reports being IND w/ most ADLs but son assists w/ mobility, housekeeping, cooking, shopping, transportation, med mgmt.   General Information   Onset of Illness/Injury or Date of Surgery 04/27/23   Referring Physician Mark Thomas MD   Patient/Family Therapy Goal Statement (OT) \"I need to find housing. I have nowhere to go.\"   Additional Occupational Profile Info/Pertinent History of Current Problem Renetta Farooq is a 67 year old female admitted on 4/27/2023. She has a hx of adrenal insufficiency, chronic hepatitis C, Bipolar disorder with psychotic behavior, ADHD, KARLENE, MDD, COPD, chronic opioid dependence on Suboxone, and homeless presents with unable to care for herself with UTI and possible mild acute COPD exacerbation not requiring oxygen.   Existing Precautions/Restrictions fall   Cognitive Status Examination   Orientation Status orientation to person, place and time   Sensory   Sensory Quick Adds sensation intact   Pain Assessment   Patient Currently in Pain Yes, see Vital Sign flowsheet   Posture   Posture not impaired   Range of Motion Comprehensive   General Range of Motion " bilateral upper extremity ROM WNL   Strength Comprehensive (MMT)   General Manual Muscle Testing (MMT) Assessment upper extremity strength deficits identified   Comment, General Manual Muscle Testing (MMT) Assessment R sided weakness 2' to previous stroke   Upper Extremity (Manual Muscle Testing)   Comment, MMT: Upper Extremity 4/5 on R side   Bed Mobility   Bed Mobility No deficits identified   Transfers   Transfers No deficits identified   Activities of Daily Living   BADL Assessment/Intervention lower body dressing;grooming;bathing   Bathing Assessment/Intervention   [place] Level (Bathing) minimum assist (75% patient effort)   Lower Body Dressing Assessment/Training   [place] Level (Lower Body Dressing) supervision   Grooming Assessment/Training   [place] Level (Grooming) supervision   Toileting   [place] Level (Toileting) supervision   Clinical Impression   Criteria for Skilled Therapeutic Interventions Met (OT) Yes, treatment indicated   OT Diagnosis decreased ADLs   Influenced by the following impairments weakness, deconditioning, unable to care for self   OT Problem List-Impairments impacting ADL activity tolerance impaired;balance;strength   Assessment of Occupational Performance 1-3 Performance Deficits   Identified Performance Deficits LE dressing, standing ADLs, bathing, g/h   Planned Therapy Interventions (OT) ADL retraining;strengthening;home program guidelines   Clinical Decision Making Complexity (OT) low complexity   Anticipated Equipment Needs Upon Discharge (OT) shower chair;walker, rolling   Risk & Benefits of therapy have been explained evaluation/treatment results reviewed;patient   OT Total Evaluation Time   OT Eval, Low Complexity Minutes (51612) 10   Therapy Certification   Medical Diagnosis unable to care for self, weakness   Start of Care Date 04/28/23   Certification date from 04/28/23   Certification date to 05/28/23   OT Goals   Therapy Frequency (OT) Daily   OT  "Predicted Duration/Target Date for Goal Attainment 05/01/23   OT Goals Lower Body Dressing;Hygiene/Grooming;OT Goal 1   OT: Hygiene/Grooming modified independent;while standing  (for 5-10 mins w/o symptoms)   OT: Lower Body Dressing Modified independent   OT: Goal 1 Pt will be IND w/ HEP using theraband   Interventions   Interventions Quick Adds Self-Care/Home Management   Self-Care/Home Management   Self-Care/Home Mgmt/ADL, Compensatory, Meal Prep Minutes (49768) 11   Symptoms Noted During/After Treatment (Meal Preparation/Planning Training) fatigue   Treatment Detail/Skilled Intervention Pt upright in bed - IND w/ bed mobility. Pt presents w/ anxiousness due to recent loss of housing and claims of increased HR over recent days - \"HR gets up into the 160s when walking and then I feel like I will pass out.\" Pt has R foot drop and needs SBA for LE dressing. Pt STS w/ SBA and amb. 25 ft into BR and around room w/ FWW and SBA; no LOB or symptoms. Pt fatigued after short bout of activity. Pt IND w/ toilet transfer. Pt ended session upright in bed and all needs w/in reach.   OT Discharge Planning   OT Plan 5/1: Le dressing, standing tolerance for ADLs; UE theraband ex. Likely DC in 1-2 more sessions   OT Discharge Recommendation (DC Rec) (S)  home with home care occupational therapy   OT Rationale for DC Rec Pt curerntly does not have housing and will need shower chair, FWW, AFO and raised toilet seat for home pending new housing setup. Pt would benefit from home care to facilitate safe transition home w/ ADLs once new housing is established. Pt has recent history of multiple falls.   OT Brief overview of current status SBA for ADLs   Total Session Time   Timed Code Treatment Minutes 11   Total Session Time (sum of timed and untimed services) 21    [facility]  OUTPATIENT OCCUPATIONAL THERAPY  EVALUATION  PLAN OF TREATMENT FOR OUTPATIENT REHABILITATION  (COMPLETE FOR INITIAL CLAIMS " ONLY)  Patient's Last Name, First Name, M.I.  YOB: 1955  FarooqRenetta calderón                          Provider's Name  [facility] Record No.  [id number]                             Onset Date:  04/27/23   Start of Care Date:  04/28/23   Type:     ___PT   _X_OT   ___SLP Medical Diagnosis:  unable to care for self, weakness                    OT Diagnosis:  decreased ADLs Visits from SOC:  1     See note for plan of treatment, functional goals and certification details    I CERTIFY THE NEED FOR THESE SERVICES FURNISHED UNDER        THIS PLAN OF TREATMENT AND WHILE UNDER MY CARE     (Physician co-signature of this document indicates review and certification of the therapy plan).

## 2023-04-28 NOTE — PROGRESS NOTES
"[facility] ACUTE PAIN SERVICE    ([facility], [facility], [facility])   Consult Note    Date of Admission:  4/27/2023  Date of Consult: 04/28/23  Physician requesting consult: Dr. Mark Thomas   Reason for consult: Chronic pain, on Suboxone, homeless, has multiple psych conditions     Assessment/Plan:     Renetta Farooq is a 67 year old female who was admitted on 4/27/2023.  I was asked to see the patient for chronic pain. Admitted for COPD exacerbation, generalized muscle weakness, myoclonic jerking, homelessness. History of adrenal insufficiency, chronic hepatitis C, Bipolar disorder with psychotic behavior, ADHD, KARLENE, MDD, COPD, chronic opioid dependence on Suboxone, and homeless presents with unable to care for herself with UTI and possible mild acute COPD exacerbation not requiring oxygen. Remains off of oxygen. The patient does  smoke and reports drinking \"a lot\" of etoh daily.     Of note, On 4/15 gabapentin was increased from 900 mg/day to 2700 mg/day, flexeril was filled (high anticholinergic side effects), voltaren rx filled, and atarax filled (high anticholinergic krista's).    Patient reports that Flexeril does not due much for chronic spasms/leg tightness, and since high risk of side effects, will stop this and trial Robaxin instead. Reports Suboxone does not help pain and she would like to go back on Methadone TID. However, she does not have someone to prescribe this as outpatient. Would not recommend methadone for this chronic pain, or pure opioid agonists. Patient describes spine pain that is 'always there'. Admits her lifestyle likely does not help the pain. She 'basically lays in bed all day every day', partially from the pain but partially 'just because'.   Has mouth sores, unsure where they are from. Very painful. Will try topical lidocaine product, Dr. Thomas to assess next time he sees patient.     Would not alter home opioid plan (Suboxone) without talking to outpatient " provider. She reports current plan doesn't work, would be very cautious with opioids in this situation.  She would like referral to pain clinic. Did discuss options.    Discussed with Dr. Mark Thomas    PLAN:   1) Pain is consistent with chronic pain, RA? (was on disease modifying agents previously but reports she can no longer access them). Muscle spasms in R leg/hip. Mag and Potassium normal today. The patient's home MME was Suboxone 4-1mg BID mg daily. She reports this does not help with pain chronically. She reports she is on this for pain only, not opioid dependence history. .   2)Multimodal Medication Therapy  Topical: diclofenac gel prn - new start home med  Trial viscous lidocaine for mouth sores  NSAID'S: crcl = 65.8 ml/min, hgb 10.9, ibuprofen 600 mg po q8h prn  Muscle Relaxants: flexeril 5 mg po q8h prn - new start home med  Adjuvants: gabapentin 900 mg po TID, APAP prn, hydroxyzine 25-50 mg po q6h prn - new start home med  Antidepressants/anxiolytics: home prozac, home seroquel  Opioids: suboxone 4-1 SL BID  IV Pain medication: none  3)Non-medication interventions  Acupuncture consult- will offer once she gets a room    4)Constipation Prophylaxis  None : says currently not constipated and she doesn't usually take, will monitor  5) Follow up   -Discharge Recommendations - We recommend prescribing the following at the time of discharge: no scripts, continue home suboxone       MN  pulled from system on 04/28/23. Last refill on 4/16/23 for gabapentin 300 mg #120 (13 day supply) from Micheal Henley, on 4/26/23 for suboxone 4-1 #28 (14 day supply) from Ruthann Zabala PA-C. This indicated ongoing use of suboxone and gabapentin. Per fill history last increase in suboxone was 1/30/23 and on 4/15/23 for gabapentin from 900 mg/day to 2700 mg/day    Home pain/psych meds include: suboxone 4-1 SL BID - last filled 4/19/23 from Ruthann Zabala PAC - fills regularly - last increase per fill history was 1/23, flexeril 5  "mg po q8h prn - filled x 1 rx on 4/15 #30 (10 day supply), voltaren gel 4g po qid prn-- filled x 1 rx on 4/15/23, prozac 40 mg po daily - takes regularly, gabapentin 900 mg po TID- last filled 4/15/23, atarax 25-50 mg po q6h prn -filled x 1 rx on 4/15/23, ibuprofen 600 mg po q8h prn, seroquel 100 mg po qam and 300 mg po at bedtime - last filled 4/6/23       History of Present Illness (HPI):       Renetta Farooq is a 67 year old old female .  The pain is reported to be  chronic, located in the spine, hip, mouth, and does not radiate.  Current pain is rated at 7/10 and goal is 0/10. The patient is with chronic pain. Per MN  review, the patient has opioid tolerance. Opioid induced side effects noted, including: dependence    Review of medical record/Summary of labs and care everywhere.     Past pain treatments have included: clonazepam 10/2022    Last UDS: 4/11/23 - negative for all       Social History  Reviewed, and she  reports that she has been smoking cigarettes. She started smoking about 55 years ago. She has a 54.00 pack-year smoking history. She has never used smokeless tobacco. She reports current alcohol use. She reports that she does not use drugs.  Social History     Tobacco Use     Smoking status: Every Day     Packs/day: 1.00     Years: 54.00     Pack years: 54.00     Types: Cigarettes     Start date: 1969     Smokeless tobacco: Never     Tobacco comments:     Seen IP by CTTS on 3/31/2023   Vaping Use     Vaping status: Not on file   Substance Use Topics     Alcohol use: Yes     Comment: Alcoholic Drinks/day: \"A lot.\"  (Couldn't quantify except to repeat \"A lot\" of wine, liquor and beer)          Imaging   Chest XR,  PA & LAT    Result Date: 4/27/2023  EXAM: XR CHEST 2 VIEWS LOCATION: [facility] DATE/TIME: 4/27/2023 11:52 AM CDT INDICATION: eval for pneumonia COMPARISON: 4/9/2023 and 4/3/2023     IMPRESSION: A few thin strands of fibrosis and/or linear atelectasis in the mid " and lower lungs unchanged. Lungs are otherwise clear. No pleural effusion. Heart size and pulmonary vascularity within normal limits. Postop change cervical spine with instrumentation. No significant change.    Pelvis US, complete    Result Date: 2023  EXAM: US PELVIC TRANSABDOMINAL LOCATION: [facility] DATE/TIME: 2023 9:05 PM CDT INDICATION: Likely uterine prolapse. COMPARISON: CT abdomen pelvis 2023. TECHNIQUE: Transabdominal scans were performed. FINDINGS: UTERUS: 5.2 x 4.5 x 2.3 cm. Normal position and appearance. No fibroids. ENDOMETRIUM: 6 mm. Suboptimally evaluated due to the transabdominal technique. Visualized portions appear homogenous, without focal areas of thickening. RIGHT OVARY: 2.6 x 2.2 x 0.8 cm. Normal. LEFT OVARY: 2.3 x 1.5 x 1.6 cm. Normal. No significant free fluid.     IMPRESSION: 1.  Uterus has a normal position within the pelvis. 2.  Endometrium is slightly thickened for a patient with postmenopausal bleeding (6 mm), although the transabdominal technique limits the accuracy of the measurement. Consider a follow-up transvaginal exam if there is persistent vaginal bleeding to better evaluate the endometrium. 3.  Normal ovaries.    Echocardiogram Complete    Result Date: 2023  [id number]^[email or web address]^[place] [address]  Name: LEO DE LA ROSA MRN: [id number] : 1955 Study Date: 2023 08:18 AM Age: 67 yrs Gender: Female Patient Location: [facility] Reason For Study: Shock Ordering Physician: JUDITH LU Performed By: KATE/HAM  BSA: 1.7 m2 Height: 66 in Weight: 136 lb HR: 91 BP: 94/50 mmHg ______________________________________________________________________________ Procedure Complete Portable Echo Adult. ______________________________________________________________________________ Interpretation Summary  1. The left ventricle is normal in size. Left ventricular systolic performance  is hyperdynamic with a visually estimated ejection fraction of 75%. 2. No significant valvular heart disease is identified on this study. 3. Normal right ventricular size and systolic performance. ______________________________________________________________________________ Left ventricle: The left ventricle is normal in size. Left ventricular systolic performance is hyperdynamic with a visually estimated ejection fraction of 75%. There is normal regional wall motion. Left ventricular wall thickness is normal.  Assessment of LV Diastolic Function: The cumulative findings suggest normal diastolic filling [The septal e' velocity is > 7 cm/s & lateral e' velocity is > 10 cm/s. The average E/e' is < 14. The TR velocity cannot be determined due to insufficient tricuspid insufficiency signal. Left atrial volume index is less than 34 mL/mÂ ].  Right ventricle: Normal right ventricular size and systolic performance.  Left atrium: The left atrium is of normal size.  Right atrium: The right atrium is of normal size.  IVC: The IVC is not well-visualized.  Aortic valve: The aortic valve is comprised of three cusps. There is mild flow acceleration through the aortic valve undoubtedly related to the hyperdynamic state of the ventricle. No significant aortic stenosis is suspected.. There is no significant aortic insufficiency.  Mitral valve: The mitral valve appears morphologically normal. There is trace mitral insufficiency.  Tricuspid valve: The tricuspid valve is grossly morphologically normal. There is trace tricuspid insufficiency.  Pulmonic valve: The pulmonic valve is grossly morphologically normal.  Thoracic aorta: The aortic root and proximal ascending aorta are of normal dimension.  Pericardium: There is no significant pericardial effusion. ______________________________________________________________________________ ______________________________________________________________________________ MMode/2D Measurements &  Calculations IVSd: 0.88 cm LVIDd: 4.4 cm LVIDs: 2.1 cm LVPWd: 0.94 cm FS: 51.8 % LV mass(C)d: 128.8 grams LV mass(C)dI: 75.8 grams/m2 Ao root diam: 3.1 cm LA dimension: 2.5 cm asc Aorta Diam: 3.3 cm LA/Ao: 0.78 LVOT diam: 1.9 cm LVOT area: 3.0 cm2 LA Volume Indexed (AL/bp): 17.2 ml/m2  RV Base: 4.5 cm RWT: 0.43 TAPSE: 3.0 cm  Time Measurements MM HR: 91.0 BPM  Doppler Measurements & Calculations MV E max sekou: 83.9 cm/sec MV A max sekou: 71.0 cm/sec MV E/A: 1.2 MV dec slope: 506.9 cm/sec2 MV dec time: 0.17 sec Ao V2 max: 258.1 cm/sec Ao max P.0 mmHg Ao V2 mean: 157.9 cm/sec Ao mean P.5 mmHg Ao V2 VTI: 38.9 cm KASSIE(I,D): 2.2 cm2 KASSIE(V,D): 2.3 cm2 LV V1 max P.4 mmHg LV V1 max: 202.7 cm/sec LV V1 VTI: 28.5 cm SV(LVOT): 84.9 ml SI(LVOT): 50.0 ml/m2 PA acc time: 0.13 sec AV Sekou Ratio (DI): 0.79 KASSIE Index (cm2/m2): 1.3  E/E': 5.2 E/E' av.5 Lateral E/e': 5.2 Medial E/e': 7.7 Peak E' Sekou: 16.0 cm/sec RV S Sekou: 23.2 cm/sec  ______________________________________________________________________________ Report approved by: Bob Basurto 2023 11:42 AM       XR Chest 2 Views    Result Date: 4/3/2023  EXAM: XR CHEST 2 VIEWS LOCATION: [facility] DATE/TIME: 4/3/2023 9:13 AM INDICATION: Dyspnea. COMPARISON: Chest x-rays dated 2023 and CT chest dated 2023. FINDINGS: Lungs are persistently hyperaerated but are otherwise grossly clear without infiltrate, nodule, mass, effusion, pneumothorax, or acute osseous fracture. Heart size is grossly within normal limits. Cervical spine fusion hardware is partially imaged and there is some lucency in the region of the pedicle screws at approximately T2 indicating possible loosening.     IMPRESSION: 1. Mild hyperaeration. 2. No other evidence of acute cardiopulmonary disease is identified on this study. No significant change since the prior study dated 2023. 3. Questionable loosening of the pedicle screws at T2 (portion of  the posterior fusion hardware for the lower cervical and upper thoracic spine). Recommend clinical correlation.    CT Abdomen Pelvis w Contrast    Result Date: 3/30/2023  EXAM: CT ABDOMEN PELVIS W CONTRAST LOCATION: [facility] DATE/TIME: 3/30/2023 3:18 PM INDICATION: abdominal pain, elevated lactic acid, eval for pathology COMPARISON: 03/01/2023 TECHNIQUE: CT scan of the abdomen and pelvis was performed following injection of IV contrast. Multiplanar reformats were obtained. Dose reduction techniques were used. CONTRAST: Isovue 370 75mL FINDINGS: LOWER CHEST: Bibasilar bronchial wall thickening and filling defects compatible with bronchitis. HEPATOBILIARY: Cholelithiasis without CT evidence of cholecystitis. Liver is mildly enlarged extending into the pelvis, unchanged. PANCREAS: Small calcification in the pancreatic head which may be related to prior pancreatitis. No evidence of acute pancreatitis. No ductal dilatation. SPLEEN: Normal size ADRENAL GLANDS: Unremarkable KIDNEYS/BLADDER: Small probable renal cysts. No hydronephrosis. Bladder is normal in contour. BOWEL: No bowel obstruction. No pneumatosis. LYMPH NODES: No significant retroperitoneal adenopathy VASCULATURE: Moderate atherosclerotic calcification. Patent portal vein. PELVIC ORGANS: No free fluid MUSCULOSKELETAL: No acute bony abnormalities. Lumbar scoliosis, apex to the left. Multilevel spondylosis.     IMPRESSION: 1.  Cholelithiasis without cholecystitis. 2.  No evidence of bowel obstruction or pneumatosis.    CT Chest Pulmonary Embolism w Contrast    Result Date: 3/30/2023  EXAM: CT CHEST PULMONARY EMBOLISM W CONTRAST LOCATION: [facility] DATE/TIME: 3/30/2023 3:18 PM INDICATION: cough, shortness of breath, productive cough, xray unrevealing, eval for COMPARISON: Chest x-ray dated 03/30/2023, 01/22/2023 chest CT TECHNIQUE: CT chest pulmonary angiogram during arterial phase injection of IV contrast.  Multiplanar reformats and MIP reconstructions were performed. Dose reduction techniques were used. CONTRAST: Isovue 370 75mL FINDINGS: ANGIOGRAM CHEST: Pulmonary arteries are normal caliber and negative for pulmonary emboli. Thoracic aorta is negative for dissection. No CT evidence of right heart strain. LUNGS AND PLEURA: Moderate changes of centrilobular emphysema. Worsening bilateral bronchial wall thickening, greatest in both lower lobes. Scattered bronchial filling defects compatible with retained secretions noted, primarily in the left lower lobe. No pneumothorax. No pleural effusions. 2 mm left lower lobe pulmonary nodule, image 189 series 6, unchanged. MEDIASTINUM/AXILLAE: No significant mediastinal or hilar adenopathy. Small hiatal hernia. CORONARY ARTERY CALCIFICATION: None. UPPER ABDOMEN: No acute abnormalities. MUSCULOSKELETAL: Old left rib fractures. Postoperative changes in the cervical spine.     IMPRESSION: 1.  No evidence of pulmonary embolus. 2.  Worsening bronchial wall thickening, with associated endobronchial retained secretions compatible with bronchitis. 3.  Additional chronic findings as above.    Chest XR,  PA & LAT    Result Date: 3/30/2023  EXAM: XR CHEST 2 VIEWS LOCATION: [facility] DATE/TIME: 3/30/2023 12:45 PM INDICATION: shortness of breath, cough, eval for pneumonia COMPARISON: 01/22/2023     IMPRESSION: Heart size is normal. No CHF, lobar consolidation, or pleural effusions. Minimal basilar scarring. Mediastinum and bony structures are stable in appearance.      Valentina Albarado, [place]  Acute Care Pain Management Program  [facility] ([place], [place], [place])   With questions call [phone]  Preference if for [facility] Paging - Rueg  Click HERE to page Jodi

## 2023-04-28 NOTE — PROGRESS NOTES
Pt on RA with O2 sats low to mid 90's. BS clear. Pt given Duoneb this morning, but refused 1400 treatment with Flutter Valve. Flutter Valve scheduled qid. Rt will continue to follow.    Vinnie Norman, RT

## 2023-04-28 NOTE — ED NOTES
Patient's VS remained stable this shift, on RA with SpO2 > 90%. Denied pain and shortness of breath overnight.

## 2023-04-28 NOTE — PROGRESS NOTES
"[facility]    Medicine Progress Note - Hospitalist Service    Date of Admission:  4/27/2023    Assessment & Plan      Renetta Farooq is a 67 year old female admitted on 4/27/2023. She has a hx of adrenal insufficiency, chronic hepatitis C, Bipolar disorder with psychotic behavior, ADHD, KARLENE, MDD, COPD, chronic opioid dependence on Suboxone, and homeless presents with unable to care for herself with UTI and possible mild acute COPD exacerbation not requiring oxygen. Remains off of oxygen. Medically stable. Adjusted clonidine timing as per patient request.     Acute COPD Exacerbation  -Prednisone 40 mg every day   -Duonebs ordered scheduled initially and then transition/wean as per RT as per protocol pending clinical improvement.  -Cefdinir ordered for UTI treatment   -Additional nebulizer as needed.   -Oxygen O2 supplementation to maintain O2 >88% - 93%. Wean O2 as tolerated.  -RCAT as needed.  -If coughing symptoms are severe, would utilize benzonatate tessalon pearls, dextromethorphan as needed for suppressing cough symptoms; consider additional as needed and judicious use of codeine and/or guaifenesin as appropriate.     Acute Cystitis - Urinary Tract Infection  -Presented with urinary symptoms.  -Urinalysis with reflex culture ordered. Follow-up results.   -Continue cefdinir and follow-up urine culture speciation/sensitivities to tailor antibiotics.   -Delirium precautions.   -If becomes acutely agitated or develops acute delirium, utilize verbal redirection first, and involve patient's family/contacts if available. Pharmacologic as-needed interventions as second-line, and would only judiciously consider 1:1 sitter and/or restraints if patient becomes a physical danger to self and/or to others/staff or if previous interventions unsuccessful or not effective.     Adrenal Insufficiency  -Patient stopped taking her home hydrocortisone cold turkey because it \"made me sweat\"  -Reorder with " "med rec; however, on prednisone burst first for possible COPDe    Bipolar disorder with psychotic behavior  ADHD  KARLENE  MDD  Chronic opioid dependence on Suboxone  -Acute pain management team with Suboxone needs  -Case management, SW consults  -Order home medications    HTN  -Home meds pending med rec, and add hold parameters         Diet: Combination Diet Regular Diet Adult    DVT Prophylaxis: Enoxaparin (Lovenox) SQ, Pneumatic Compression Devices and Ambulate every shift  Burroughs Catheter: Not present  Lines: None     Cardiac Monitoring: None  Code Status: No CPR- Do NOT Intubate      Clinically Significant Risk Factors Present on Admission                  # Hypertension: home medication list includes antihypertensive(s)              Disposition Plan      Expected Discharge Date: 04/28/2023      Destination: nursing home            Mark Thomas MD  Hospitalist Service  [facility]  Securely message with [employer] (more info)  Text page via [employer] Paging/Directory   ______________________________________________________________________    Interval History   No acute events overnight.    No report of chest pain, shortness of breath, or other new complaints. Discussed plan of care with patient. Her biggest complaint this morning was that her clonidine was given \"late\" - she received it at 8 PM last night and thus it was protocolized to 8 AM and 8 PM for BID (q12h) dosing; she prefers her morning clonidine earlier; I let her know this will not be an issue to adjust and have done so already. Answered all questions to patient's verbalized understanding and satisfaction.    Physical Exam   Vital Signs: Temp: 97.6  F (36.4  C) Temp src: Oral BP: 97/71 Pulse: 84   Resp: 18 SpO2: 94 % O2 Device: None (Room air)    Weight: 140 lbs 0 oz    GENERAL:  Alert, appears comfortable, in no acute distress, appears stated age   HEAD:  Normocephalic, without obvious abnormality, atraumatic   EYES:  PERRL, " conjunctiva/corneas clear, no scleral icterus, EOM's intact   NOSE: Nares normal, septum midline, mucosa normal, no drainage   THROAT: Lips, mucosa, and tongue normal; teeth and gums normal, mouth moist   NECK: Supple, symmetrical, trachea midline   BACK:   Symmetric, no curvature, ROM normal   LUNGS:   Clear to auscultation bilaterally, no rales, rhonchi, or wheezing, symmetric chest rise on inhalation, respirations unlabored   CHEST WALL:  No tenderness or deformity   HEART:  Regular rate and rhythm, S1 and S2 normal, no murmur, rub, or gallop    ABDOMEN:   Soft, non-tender, bowel sounds active all four quadrants, no masses, no organomegaly, no rebound or guarding   EXTREMITIES: Extremities normal, atraumatic, no cyanosis or edema    SKIN: Dry to touch, no exanthems in the visualized areas   NEURO: Alert, oriented x 4, moves all four extremities freely/spontaneously   PSYCH: Cooperative, behavior is appropriate      Medical Decision Making     36 MINUTES SPENT BY ME on the date of service doing chart review, history, exam, documentation & further activities per the note.      Data     I have personally reviewed the following data over the past 24 hrs:    6.5  \   10.9 (L)   / 203     135 (L) 101 11 /  132 (H)   4.8 26 0.82 \       Trop: N/A BNP: 11       Imaging results reviewed over the past 24 hrs:   Recent Results (from the past 24 hour(s))   Chest XR,  PA & LAT    Narrative    EXAM: XR CHEST 2 VIEWS  LOCATION: [facility]  DATE/TIME: 4/27/2023 11:52 AM CDT    INDICATION: eval for pneumonia  COMPARISON: 4/9/2023 and 4/3/2023      Impression    IMPRESSION: A few thin strands of fibrosis and/or linear atelectasis in the mid and lower lungs unchanged. Lungs are otherwise clear. No pleural effusion. Heart size and pulmonary vascularity within normal limits. Postop change cervical spine with   instrumentation. No significant change.

## 2023-04-29 ENCOUNTER — APPOINTMENT (OUTPATIENT)
Dept: PHYSICAL THERAPY | Facility: CLINIC | Age: 68
End: 2023-04-29
Payer: COMMERCIAL

## 2023-04-29 PROCEDURE — 250N000012 HC RX MED GY IP 250 OP 636 PS 637: Performed by: HOSPITALIST

## 2023-04-29 PROCEDURE — 99207 PR CDG-CUT & PASTE-POTENTIAL IMPACT ON LEVEL: CPT | Performed by: HOSPITALIST

## 2023-04-29 PROCEDURE — G0378 HOSPITAL OBSERVATION PER HR: HCPCS

## 2023-04-29 PROCEDURE — 97116 GAIT TRAINING THERAPY: CPT | Mod: GP

## 2023-04-29 PROCEDURE — 97110 THERAPEUTIC EXERCISES: CPT | Mod: GP

## 2023-04-29 PROCEDURE — 250N000013 HC RX MED GY IP 250 OP 250 PS 637: Performed by: HOSPITALIST

## 2023-04-29 PROCEDURE — 99232 SBSQ HOSP IP/OBS MODERATE 35: CPT | Performed by: HOSPITALIST

## 2023-04-29 PROCEDURE — 250N000009 HC RX 250: Performed by: HOSPITALIST

## 2023-04-29 RX ADMIN — POLYETHYLENE GLYCOL 3350 17 G: 17 POWDER, FOR SOLUTION ORAL at 14:35

## 2023-04-29 RX ADMIN — BUPRENORPHINE AND NALOXONE 1 FILM: 4; 1 FILM, SOLUBLE BUCCAL; SUBLINGUAL at 10:59

## 2023-04-29 RX ADMIN — METHOCARBAMOL TABLETS 500 MG: 500 TABLET, COATED ORAL at 06:00

## 2023-04-29 RX ADMIN — FLUTICASONE FUROATE AND VILANTEROL TRIFENATATE 1 PUFF: 100; 25 POWDER RESPIRATORY (INHALATION) at 08:34

## 2023-04-29 RX ADMIN — CALCIUM CARBONATE (ANTACID) CHEW TAB 500 MG 500 MG: 500 CHEW TAB at 08:29

## 2023-04-29 RX ADMIN — HYDROXYZINE HYDROCHLORIDE 50 MG: 25 TABLET ORAL at 14:30

## 2023-04-29 RX ADMIN — CEFDINIR 300 MG: 300 CAPSULE ORAL at 08:33

## 2023-04-29 RX ADMIN — QUETIAPINE 100 MG: 100 TABLET ORAL at 08:31

## 2023-04-29 RX ADMIN — CALCIUM CARBONATE (ANTACID) CHEW TAB 500 MG 500 MG: 500 CHEW TAB at 20:26

## 2023-04-29 RX ADMIN — LIDOCAINE HYDROCHLORIDE 5 ML: 20 SOLUTION TOPICAL at 17:00

## 2023-04-29 RX ADMIN — PREDNISONE 40 MG: 20 TABLET ORAL at 08:32

## 2023-04-29 RX ADMIN — CEFDINIR 300 MG: 300 CAPSULE ORAL at 22:23

## 2023-04-29 RX ADMIN — FLUOXETINE 40 MG: 20 CAPSULE ORAL at 08:29

## 2023-04-29 RX ADMIN — GABAPENTIN 900 MG: 300 CAPSULE ORAL at 13:03

## 2023-04-29 RX ADMIN — CLONIDINE HYDROCHLORIDE 0.1 MG: 0.1 TABLET ORAL at 06:01

## 2023-04-29 RX ADMIN — POTASSIUM CHLORIDE 20 MEQ: 1500 TABLET, EXTENDED RELEASE ORAL at 08:31

## 2023-04-29 RX ADMIN — BUPRENORPHINE AND NALOXONE 1 FILM: 4; 1 FILM, SOLUBLE BUCCAL; SUBLINGUAL at 20:26

## 2023-04-29 RX ADMIN — METHOCARBAMOL TABLETS 500 MG: 500 TABLET, COATED ORAL at 14:30

## 2023-04-29 RX ADMIN — HYDROXYZINE HYDROCHLORIDE 50 MG: 25 TABLET ORAL at 06:00

## 2023-04-29 RX ADMIN — NICOTINE 1 PATCH: 14 PATCH, EXTENDED RELEASE TRANSDERMAL at 16:51

## 2023-04-29 RX ADMIN — QUETIAPINE FUMARATE 300 MG: 300 TABLET ORAL at 22:23

## 2023-04-29 RX ADMIN — GABAPENTIN 900 MG: 300 CAPSULE ORAL at 20:25

## 2023-04-29 RX ADMIN — DOCUSATE SODIUM 100 MG: 100 CAPSULE, LIQUID FILLED ORAL at 20:25

## 2023-04-29 RX ADMIN — DOCUSATE SODIUM 100 MG: 100 CAPSULE, LIQUID FILLED ORAL at 08:30

## 2023-04-29 RX ADMIN — CLONIDINE HYDROCHLORIDE 0.1 MG: 0.1 TABLET ORAL at 18:07

## 2023-04-29 RX ADMIN — CHOLECALCIFEROL TAB 25 MCG (1000 UNIT) 25 MCG: 25 TAB at 08:33

## 2023-04-29 RX ADMIN — GABAPENTIN 900 MG: 300 CAPSULE ORAL at 08:30

## 2023-04-29 RX ADMIN — Medication 400 MG: at 08:32

## 2023-04-29 ASSESSMENT — ACTIVITIES OF DAILY LIVING (ADL)
ADLS_ACUITY_SCORE: 37
ADLS_ACUITY_SCORE: 32
ADLS_ACUITY_SCORE: 37
ADLS_ACUITY_SCORE: 37
ADLS_ACUITY_SCORE: 32
ADLS_ACUITY_SCORE: 32
ADLS_ACUITY_SCORE: 37
ADLS_ACUITY_SCORE: 37
ADLS_ACUITY_SCORE: 32
ADLS_ACUITY_SCORE: 37

## 2023-04-29 NOTE — PROGRESS NOTES
"Vss. A&O. Pain rated between 8-10 on 0-10 pain scale r/t mouth pain/sores. PRN lidocaine solution given to address. Currently homeless and actively looking for apartment. Will continue to monitor.     PRIMARY DIAGNOSIS: \"GENERIC\" NURSING  OUTPATIENT/OBSERVATION GOALS TO BE MET BEFORE DISCHARGE:  1. ADLs back to baseline: Yes    2. Activity and level of assistance: Ambulating independently.    3. Pain status: Improved-controlled with oral pain medications.    4. Return to near baseline physical activity: Yes     Discharge Planner Nurse   Safe discharge environment identified: No  Barriers to discharge: Yes       Entered by: Eusebia Jones RN 04/28/2023 7:11 PM     Please review provider order for any additional goals.   Nurse to notify provider when observation goals have been met and patient is ready for discharge.  "

## 2023-04-29 NOTE — PROGRESS NOTES
[facility]    Medicine Progress Note - Hospitalist Service    Date of Admission:  4/27/2023    Assessment & Plan      Renetta Farooq is a 67 year old female admitted on 4/27/2023. She has a hx of adrenal insufficiency, chronic hepatitis C, Bipolar disorder with psychotic behavior, ADHD, KARLENE, MDD, COPD, chronic opioid dependence on Suboxone, and homeless presents with unable to care for herself with UTI and possible mild acute COPD exacerbation not requiring oxygen. Remains off of oxygen. Medically stable. Adjusted clonidine timing as per patient request.     Remains medically stable but without a safe disposition; social work and case management consulted and following already; appreciate their guidance and assistance. She does not have a place to go to right now and is homeless and unsafe given her medical conditions and medication requirements.     Acute COPD Exacerbation  -Prednisone 40 mg every day   -Duonebs ordered scheduled initially and then transition/wean as per RT as per protocol pending clinical improvement.  -Cefdinir ordered for UTI treatment   -Additional nebulizer as needed.   -Oxygen O2 supplementation to maintain O2 >88% - 93%. Wean O2 as tolerated.  -RCAT as needed.  -If coughing symptoms are severe, would utilize benzonatate tessalon pearls, dextromethorphan as needed for suppressing cough symptoms; consider additional as needed and judicious use of codeine and/or guaifenesin as appropriate.     Acute Cystitis - Urinary Tract Infection  -Presented with urinary symptoms.  -Urinalysis with reflex culture ordered. Follow-up results.   -Continue cefdinir and follow-up urine culture speciation/sensitivities to tailor antibiotics.   -Delirium precautions.   -If becomes acutely agitated or develops acute delirium, utilize verbal redirection first, and involve patient's family/contacts if available. Pharmacologic as-needed interventions as second-line, and would only  "judiciously consider 1:1 sitter and/or restraints if patient becomes a physical danger to self and/or to others/staff or if previous interventions unsuccessful or not effective.     Adrenal Insufficiency  -Patient stopped taking her home hydrocortisone cold turkey because it \"made me sweat\"  -Reorder with med rec; however, on prednisone burst first for possible COPDe    Bipolar disorder with psychotic behavior  ADHD  KARLENE  MDD  Chronic opioid dependence on Suboxone  -Acute pain management team with Suboxone needs  -Case management, SW consults  -Order home medications    HTN  -Home meds pending med rec, and add hold parameters         Diet: Combination Diet Regular Diet Adult    DVT Prophylaxis: Enoxaparin (Lovenox) SQ, Pneumatic Compression Devices and Ambulate every shift  Burroughs Catheter: Not present  Lines: None     Cardiac Monitoring: None  Code Status: No CPR- Do NOT Intubate      Clinically Significant Risk Factors Present on Admission                  # Hypertension: home medication list includes antihypertensive(s)              Disposition Plan          Mark Thomas MD  Hospitalist Service  [facility]  Securely message with [employer] (more info)  Text page via [employer] Paging/Directory   ______________________________________________________________________    Interval History   No acute events overnight.    No report of chest pain, shortness of breath, or other new complaints. Discussed disposition challenges at great length. She does not have a place to go to right now and is homeless and unsafe given her medical conditions and medication requirements. Answered all of her questions.     Physical Exam   Vital Signs: Temp: 98.2  F (36.8  C) Temp src: Oral BP: 123/79 Pulse: 83   Resp: 18 SpO2: 93 % O2 Device: None (Room air)    Weight: 140 lbs 0 oz    GENERAL:  Alert, appears comfortable, in no acute distress, appears stated age   HEAD:  Normocephalic, without obvious abnormality, " atraumatic   EYES:  PERRL, conjunctiva/corneas clear, no scleral icterus, EOM's intact   NOSE: Nares normal, septum midline, mucosa normal, no drainage   THROAT: Lips, mucosa, and tongue normal; teeth and gums normal, mouth moist   NECK: Supple, symmetrical, trachea midline   BACK:   Symmetric, no curvature, ROM normal   LUNGS:   Clear to auscultation bilaterally, no rales, rhonchi, or wheezing, symmetric chest rise on inhalation, respirations unlabored   CHEST WALL:  No tenderness or deformity   HEART:  Regular rate and rhythm, S1 and S2 normal, no murmur, rub, or gallop    ABDOMEN:   Soft, non-tender, bowel sounds active all four quadrants, no masses, no organomegaly, no rebound or guarding   EXTREMITIES: Extremities normal, atraumatic, no cyanosis or edema    SKIN: Dry to touch, no exanthems in the visualized areas   NEURO: Alert, oriented x 4, moves all four extremities freely/spontaneously   PSYCH: Cooperative, behavior is appropriate      Medical Decision Making     37 MINUTES SPENT BY ME on the date of service doing chart review, history, exam, documentation & further activities per the note.        Data         Imaging results reviewed over the past 24 hrs:   No results found for this or any previous visit (from the past 24 hour(s)).

## 2023-04-29 NOTE — UTILIZATION REVIEW
Concurrent stay review; Secondary Review Determination - [facility]        Under the authority of the Utilization Management Committee, the utilization review process indicated a secondary review on the above patient.  The review outcome is based on review of the medical records, discussions with staff, and applying clinical experience noted on the date of the review.        (x) Observation/outpatient Status Appropriate - Concurrent stay review       RATIONALE FOR DETERMINATION:   67-year-old female brought to the ED by ambulance on 4/27/2023 for evaluation of shortness of breath.  Past medical history of COPD, bipolar disorder, alcohol dependence, methamphetamine dependence, chronic hepatitis C, ADHD, anxiety, pancytopenia, homelessness, opioid use disorder, heart failure with preserved ejection fraction, degenerative disc disease cervical spine, RA, SDH, adrenal insufficiency.  Laboratory work-up remarkable for potassium 3.4, hemoglobin 10.7.  Chest x-ray showed a few thin strands of fibrosis and or linear atelectasis in the mid and lower lungs unchanged.  Treatment with prednisone for mild COPD exacerbation.  Stable for discharge, pending safe disposition.    Patient delayed discharge is related to disposition, there is no medical necessity for inpatient admission at the time of this review. If there is a change in patient status, please resend for review.    The information on this document is developed by the utilization review team in order for the business office to ensure compliance.  This only denotes the appropriateness of proper admission status and does not reflect the quality of care rendered.       The definitions of Inpatient Status and Observation Status used in making the determination above are those provided in the CMS Coverage Manual, Chapter 1 and Chapter 6, section 70.4.       Sincerely,    Chad Valerio MD

## 2023-04-29 NOTE — PROGRESS NOTES
"BP (!) 152/76 (BP Location: Left arm, Patient Position: Semi-Jerry's, Cuff Size: Adult Regular)   Pulse 87   Temp 97.7  F (36.5  C) (Oral)   Resp 18   Ht 1.676 m (5' 6\")   Wt 63.5 kg (140 lb)   SpO2 94%   BMI 22.60 kg/m        The PT declined her 2000 Tx's.  "

## 2023-04-29 NOTE — PROGRESS NOTES
[facility] ACUTE PAIN SERVICE  Chart Check    Renetta Farooq is a 67 year old female who was admitted on 4/27/2023.  I was asked to see the patient for chronic pain. Admitted for COPD exacerbation, generalized muscle weakness, myoclonic jerking, homelessness. History of adrenal insufficiency, chronic hepatitis C, Bipolar disorder with psychotic behavior, ADHD, KARLENE, MDD, COPD, chronic opioid dependence on Suboxone, and homeless.    Chart reviewed, pain management stable. Lidocaine helpful for mouth sores, can utilize at discharge if she desires.   PMT will follow peripherally while in hosptial; would not continue the flexeril at discharge as benefit:risk is limited based on our lengthy discussion yesterday.    PLAN:   1) Pain is consistent with chronic pain, RA? (was on disease modifying agents previously but reports she can no longer access them). Muscle spasms in R leg/hip. Mag and Potassium normal today. The patient's home MME was Suboxone 4-1mg BID mg daily. She reports this does not help with pain chronically. She reports she is on this for pain only, not opioid dependence history. .   2)Multimodal Medication Therapy  Topical: diclofenac gel prn - new start home med  Trial viscous lidocaine for mouth sores  NSAID'S: crcl = 65.8 ml/m ibuprofen 600 mg po q8h prn  Muscle Relaxants: flexeril 5 mg po q8h prn - new start home med  Adjuvants: gabapentin 900 mg po TID, APAP prn, hydroxyzine 25-50 mg po q6h prn - new start home med  Antidepressants/anxiolytics: home prozac, home seroquel  Opioids: suboxone 4-1 SL BID  IV Pain medication: none  3)Non-medication interventions  Acupuncture consult- will offer once she gets a room    4)Constipation Prophylaxis  None : says currently not constipated and she doesn't usually take, will monitor  5) Follow up   -Discharge Recommendations - We recommend prescribing the following at the time of discharge: no scripts, continue home suboxone, I would recommend not continuing  the flexeril at discharge. See above for details.       [place]  pulled from system on 04/28/23. Last refill on 4/16/23 for gabapentin 300 mg #120 (13 day supply) from Micheal Henley, on 4/26/23 for suboxone 4-1 #28 (14 day supply) from Ruthann Zabala PA-C. This indicated ongoing use of suboxone and gabapentin. Per fill history last increase in suboxone was 1/30/23 and on 4/15/23 for gabapentin from 900 mg/day to 2700 mg/day    Home pain/psych meds include: suboxone 4-1 SL BID - last filled 4/19/23 from Ruthann Zabala PAC - fills regularly - last increase per fill history was 1/23, flexeril 5 mg po q8h prn - filled x 1 rx on 4/15 #30 (10 day supply), voltaren gel 4g po qid prn-- filled x 1 rx on 4/15/23, prozac 40 mg po daily - takes regularly, gabapentin 900 mg po TID- last filled 4/15/23, atarax 25-50 mg po q6h prn -filled x 1 rx on 4/15/23, ibuprofen 600 mg po q8h prn, seroquel 100 mg po qam and 300 mg po at bedtime - last filled 4/6/23      Valentina Albarado, PharmNELSON  Acute Care Pain Management Program  [facility] ([place], [place], [place])   With questions call [phone]  Preference if for [employer] Paging - [place]  Click HERE to nancy Zapata

## 2023-04-29 NOTE — PROGRESS NOTES
"Vss. A&O. Pain rated between 3 on 0-10 pain scale r/t mouth pain/sores. PRN lidocaine solution given to address. Currently homeless and actively looking for apartment. Pt transferring up to 3S. Nurse to nurse handoff completed.     PRIMARY DIAGNOSIS: \"GENERIC\" NURSING  OUTPATIENT/OBSERVATION GOALS TO BE MET BEFORE DISCHARGE:  1. ADLs back to baseline: Yes    2. Activity and level of assistance: Ambulating independently.    3. Pain status: Improved-controlled with oral pain medications.    4. Return to near baseline physical activity: Yes     Discharge Planner Nurse   Safe discharge environment identified: No  Barriers to discharge: Yes (safe discharge environment)        Entered by: Eusebia Jones RN 04/29/2023 11:44 AM     Please review provider order for any additional goals.   Nurse to notify provider when observation goals have been met and patient is ready for discharge.  "

## 2023-04-29 NOTE — PROGRESS NOTES
PRIMARY DIAGNOSIS: ACUTE COPD EXACERBATION AND UTI.  OUTPATIENT/OBSERVATION GOALS TO BE MET BEFORE DISCHARGE:  1. ADLs back to baseline: Yes    2. Activity and level of assistance: Ambulating independently.    3. Pain status: Improved with use of alternative comfort measures i.e.:rest    4. Return to near baseline physical activity: Yes     Discharge Planner Nurse   Safe discharge environment identified: No  Barriers to discharge: Yes       Entered by: Aarti Branch RN 04/29/2023 2:25 PM     Please review provider order for any additional goals.   Nurse to notify provider when observation goals have been met and patient is ready for discharge.

## 2023-04-30 ENCOUNTER — APPOINTMENT (OUTPATIENT)
Dept: OCCUPATIONAL THERAPY | Facility: CLINIC | Age: 68
End: 2023-04-30
Payer: COMMERCIAL

## 2023-04-30 VITALS
HEIGHT: 66 IN | HEART RATE: 77 BPM | WEIGHT: 137.7 LBS | DIASTOLIC BLOOD PRESSURE: 67 MMHG | BODY MASS INDEX: 22.13 KG/M2 | TEMPERATURE: 98.2 F | SYSTOLIC BLOOD PRESSURE: 111 MMHG | OXYGEN SATURATION: 92 % | RESPIRATION RATE: 16 BRPM

## 2023-04-30 LAB
CREAT SERPL-MCNC: 0.84 MG/DL (ref 0.6–1.1)
GFR SERPL CREATININE-BSD FRML MDRD: 75 ML/MIN/1.73M2
PLATELET # BLD AUTO: 177 10E3/UL (ref 150–450)

## 2023-04-30 PROCEDURE — 99239 HOSP IP/OBS DSCHRG MGMT >30: CPT | Performed by: HOSPITALIST

## 2023-04-30 PROCEDURE — 82565 ASSAY OF CREATININE: CPT | Performed by: HOSPITALIST

## 2023-04-30 PROCEDURE — 85049 AUTOMATED PLATELET COUNT: CPT | Performed by: HOSPITALIST

## 2023-04-30 PROCEDURE — 250N000013 HC RX MED GY IP 250 OP 250 PS 637: Performed by: HOSPITALIST

## 2023-04-30 PROCEDURE — 97535 SELF CARE MNGMENT TRAINING: CPT | Mod: GO

## 2023-04-30 PROCEDURE — 97110 THERAPEUTIC EXERCISES: CPT | Mod: GO

## 2023-04-30 PROCEDURE — G0378 HOSPITAL OBSERVATION PER HR: HCPCS

## 2023-04-30 PROCEDURE — 36415 COLL VENOUS BLD VENIPUNCTURE: CPT | Performed by: HOSPITALIST

## 2023-04-30 PROCEDURE — 250N000012 HC RX MED GY IP 250 OP 636 PS 637: Performed by: HOSPITALIST

## 2023-04-30 RX ORDER — QUETIAPINE FUMARATE 100 MG/1
100 TABLET, FILM COATED ORAL EVERY MORNING
Qty: 30 TABLET | Refills: 0 | Status: SHIPPED | OUTPATIENT
Start: 2023-04-30

## 2023-04-30 RX ORDER — HYDROCORTISONE 10 MG/1
TABLET ORAL
Qty: 90 TABLET | Refills: 0 | Status: SHIPPED | OUTPATIENT
Start: 2023-04-30

## 2023-04-30 RX ORDER — IPRATROPIUM BROMIDE AND ALBUTEROL SULFATE 2.5; .5 MG/3ML; MG/3ML
3 SOLUTION RESPIRATORY (INHALATION) EVERY 4 HOURS PRN
Status: DISCONTINUED | OUTPATIENT
Start: 2023-04-30 | End: 2023-04-30 | Stop reason: HOSPADM

## 2023-04-30 RX ORDER — HYDROCORTISONE 10 MG/1
50 TABLET ORAL DAILY
Qty: 90 TABLET | Refills: 0 | Status: SHIPPED | OUTPATIENT
Start: 2023-05-03 | End: 2023-04-30

## 2023-04-30 RX ORDER — CEFDINIR 300 MG/1
300 CAPSULE ORAL 2 TIMES DAILY
Qty: 4 CAPSULE | Refills: 0 | Status: SHIPPED | OUTPATIENT
Start: 2023-04-30 | End: 2023-05-02

## 2023-04-30 RX ORDER — CLONIDINE HYDROCHLORIDE 0.1 MG/1
0.2 TABLET ORAL 2 TIMES DAILY
Qty: 15 TABLET | Refills: 0 | Status: ON HOLD | OUTPATIENT
Start: 2023-04-30 | End: 2023-06-13

## 2023-04-30 RX ORDER — QUETIAPINE FUMARATE 300 MG/1
300 TABLET, FILM COATED ORAL AT BEDTIME
Qty: 30 TABLET | Refills: 0 | Status: SHIPPED | OUTPATIENT
Start: 2023-04-30

## 2023-04-30 RX ORDER — PREDNISONE 20 MG/1
40 TABLET ORAL DAILY
Qty: 4 TABLET | Refills: 0 | Status: SHIPPED | OUTPATIENT
Start: 2023-04-30 | End: 2023-05-02

## 2023-04-30 RX ADMIN — GABAPENTIN 900 MG: 300 CAPSULE ORAL at 08:30

## 2023-04-30 RX ADMIN — Medication 400 MG: at 08:30

## 2023-04-30 RX ADMIN — CEFDINIR 300 MG: 300 CAPSULE ORAL at 08:30

## 2023-04-30 RX ADMIN — BUPRENORPHINE AND NALOXONE 1 FILM: 4; 1 FILM, SOLUBLE BUCCAL; SUBLINGUAL at 08:31

## 2023-04-30 RX ADMIN — FLUOXETINE 40 MG: 20 CAPSULE ORAL at 08:31

## 2023-04-30 RX ADMIN — DOCUSATE SODIUM 100 MG: 100 CAPSULE, LIQUID FILLED ORAL at 08:30

## 2023-04-30 RX ADMIN — PREDNISONE 40 MG: 20 TABLET ORAL at 08:31

## 2023-04-30 RX ADMIN — HYDROXYZINE HYDROCHLORIDE 50 MG: 25 TABLET ORAL at 08:30

## 2023-04-30 RX ADMIN — FLUTICASONE FUROATE AND VILANTEROL TRIFENATATE 1 PUFF: 100; 25 POWDER RESPIRATORY (INHALATION) at 08:31

## 2023-04-30 RX ADMIN — QUETIAPINE 100 MG: 100 TABLET ORAL at 08:30

## 2023-04-30 RX ADMIN — METHOCARBAMOL TABLETS 500 MG: 500 TABLET, COATED ORAL at 08:30

## 2023-04-30 RX ADMIN — CHOLECALCIFEROL TAB 25 MCG (1000 UNIT) 25 MCG: 25 TAB at 08:30

## 2023-04-30 RX ADMIN — METHOCARBAMOL TABLETS 500 MG: 500 TABLET, COATED ORAL at 00:35

## 2023-04-30 RX ADMIN — CALCIUM CARBONATE (ANTACID) CHEW TAB 500 MG 500 MG: 500 CHEW TAB at 08:31

## 2023-04-30 RX ADMIN — POTASSIUM CHLORIDE 20 MEQ: 1500 TABLET, EXTENDED RELEASE ORAL at 08:30

## 2023-04-30 RX ADMIN — HYDROXYZINE HYDROCHLORIDE 50 MG: 25 TABLET ORAL at 00:35

## 2023-04-30 RX ADMIN — CLONIDINE HYDROCHLORIDE 0.1 MG: 0.1 TABLET ORAL at 08:40

## 2023-04-30 ASSESSMENT — ACTIVITIES OF DAILY LIVING (ADL)
ADLS_ACUITY_SCORE: 32

## 2023-04-30 NOTE — PLAN OF CARE
Physical Therapy Discharge Summary    Reason for therapy discharge:    Discharged to home.    Progress towards therapy goal(s). See goals on Care Plan in [facility] electronic health record for goal details.  Goals partially met.  Barriers to achieving goals:   R ankle weakness/footdrop.    Therapy recommendation(s):    Continued therapy is recommended.  Rationale/Recommendations:  candidate for AFO/gait training.

## 2023-04-30 NOTE — PLAN OF CARE
"PRIMARY DIAGNOSIS: \"GENERIC\" NURSING  OUTPATIENT/OBSERVATION GOALS TO BE MET BEFORE DISCHARGE:  ADLs back to baseline: Yes    Activity and level of assistance: Ambulating independently.    Pain status: Pain free.    Return to near baseline physical activity: Yes     Discharge Planner Nurse   Safe discharge environment identified:  Patient reports unable to arrange a living situation until Monday.   Case management following.  Barriers to discharge:  See SW and CM notes.  Patient instructed that she needs to remain on floor while a patient at the hospital, patient verbalized understanding.         Entered by: Rosita Contreras RN 04/29/2023 10:47 PM     Please review provider order for any additional goals.   Nurse to notify provider when observation goals have been met and patient is ready for discharge.                        "

## 2023-04-30 NOTE — PROGRESS NOTES
Care Management Discharge Note    Discharge Date: 04/30/2023       Discharge Disposition: with son at friends house or homeless shelter    Discharge Services: None    Discharge DME: None    Discharge Transportation: cab    Private pay costs discussed: Not applicable    Education Provided on the Discharge Plan: Yes (Pt has been informed of the assessment/placement process and was encouraged to express any concerns.)  Persons Notified of Discharge Plans: pt  Patient/Family in Agreement with the Plan: unable to assess    Handoff Referral Completed: Yes    Additional Information:  1:13 PM  SW met with patient and gave resources for [facility] hotline and [facility]. Pt declined any further resources.  Cab will transport pt.        BRYANT Vasquez

## 2023-04-30 NOTE — PLAN OF CARE
"PRIMARY DIAGNOSIS: \"GENERIC\" NURSING  OUTPATIENT/OBSERVATION GOALS TO BE MET BEFORE DISCHARGE:  ADLs back to baseline: Yes    Activity and level of assistance: Ambulating independently.    Pain status: Reported pain 10 in mouth by sores, given prn lidocaine swish and spit with relief.  Patient now denies pain.    Return to near baseline physical activity: Yes     Discharge Planner Nurse   Safe discharge environment identified:  No, Case management following.  Patient looking for an apartment per  notes.  Barriers to discharge: Yes, no discharge place currently.       Entered by: Rosita Contreras RN 04/29/2023 9:34 PM     Please review provider order for any additional goals.   Nurse to notify provider when observation goals have been met and patient is ready for discharge.                        "

## 2023-04-30 NOTE — PROGRESS NOTES
Will not see today:  PAIN MANAGEMENT SERVICE CHART CHECK  This patient's chart has been reviewed by the Pain Service. It has been determined that no change is necessary to the pain management regimen at this time. The chart will be reviewed regularly and the patient will be seen if necessary. If you would like the patient to be seen, please [employer] page writer or [employer] message via texting karen.  Thank you!    Raquel Albarado PharmD  Acute Care Pain Management Program   Hours of pain coverage 7a-1700- after 1700 please call the house officer   [facility] ([place], [place], [place], [place])

## 2023-04-30 NOTE — DISCHARGE SUMMARY
[facility]  Hospitalist Discharge Summary      Date of Admission:  4/27/2023  Date of Discharge:  4/30/2023  1:13 PM  Discharging Provider: Mark Thomas MD  Discharge Service: Hospitalist Service    Discharge Diagnoses   Acute COPD Exacerbation  Acute Cystitis - Urinary Tract Infection  Adrenal Insufficiency  Bipolar disorder with psychotic behavior  ADHD  KARLENE  MDD  Chronic opioid dependence on Suboxone  HTN  Homelessness      Follow-ups Needed After Discharge   Follow-up Appointments     Follow-up and recommended labs and tests      Follow up with primary care provider, Jamila Connelly, within 7 days for   hospital follow- up.           Unresulted Labs Ordered in the Past 30 Days of this Admission     No orders found from 3/28/2023 to 4/28/2023.      These results will be followed up by NA    Discharge Disposition   Discharged to homeless shelter with resources and transportation provided/offered  Condition at discharge: Good      Hospital Course   Renetta Farooq is a 67 year old female admitted on 4/27/2023. She has a hx of adrenal insufficiency, chronic hepatitis C, Bipolar disorder with psychotic behavior, ADHD, KARLENE, MDD, COPD, chronic opioid dependence on Suboxone, and homeless presented unable to care for herself with UTI and probable mild acute COPD exacerbation not requiring oxygen. Remained off of oxygen throughout and started on oral medication treatment regimen at admission. Medically stable. Case management and social work consulted to assist patient; homeless shelters and medical aide and other resources were thoroughly provided and discussed. A safe discharge plan was extensively discussed and formulated with patient, , , and patient agreed to: she will go to the nearest homeless shelter of her preference with provided transportation, several essential medication refills, and tomorrow/next day she will go to a [facility] that her outpatient  social support services have helped arranged and schedule for her. PCP follow-up.     Consultations This Hospital Stay   IP RESPIRATORY CARE CHRONIC PULMONARY DISEASE SPECIALIST  CARE MANAGEMENT / SOCIAL WORK IP CONSULT  PAIN MANAGEMENT ADULT IP CONSULT  OCCUPATIONAL THERAPY ADULT IP CONSULT  PHYSICAL THERAPY ADULT IP CONSULT  IP RESPIRATORY CARE CHRONIC PULMONARY DISEASE SPECIALIST    Code Status   Prior    Time Spent on this Encounter   I, Mark Thomas MD, personally saw the patient today and spent greater than 30 minutes discharging this patient.       Mark Thomas MD  [address] [address]  Phone: [phone]  Fax: [phone]  ______________________________________________________________________    Physical Exam   Vital Signs: Temp: 98.2  F (36.8  C) Temp src: Oral BP: 111/67 Pulse: 77   Resp: 16 SpO2: 92 % O2 Device: None (Room air)    Weight: 137 lbs 11.2 ozWeight: 137 lbs 11.2 oz  GENERAL:  Alert, appears comfortable, in no acute distress, appears stated age   HEAD:  Normocephalic, without obvious abnormality, atraumatic   EYES:  PERRL, conjunctiva/corneas clear, no scleral icterus, EOM's intact   NOSE: Nares normal, septum midline, mucosa normal, no drainage   THROAT: Lips, mucosa, and tongue normal; teeth and gums normal, mouth moist   NECK: Supple, symmetrical, trachea midline   BACK:   Symmetric, no curvature, ROM normal   LUNGS:   Clear to auscultation bilaterally, no rales, rhonchi, or wheezing, symmetric chest rise on inhalation, respirations unlabored   CHEST WALL:  No tenderness or deformity   HEART:  Regular rate and rhythm, S1 and S2 normal, no murmur, rub, or gallop    ABDOMEN:   Soft, non-tender, bowel sounds active all four quadrants, no masses, no organomegaly, no rebound or guarding   EXTREMITIES: Extremities normal, atraumatic, no cyanosis or edema    SKIN: Dry to touch, no exanthems in the visualized areas    NEURO: Alert, oriented x 4, moves all four extremities freely/spontaneously   PSYCH: Cooperative, behavior is appropriate         Primary Care Physician   Jamila Connelly    Discharge Orders      Primary Care - Care Coordination Referral      Reason for your hospital stay    COPD acute exacerbation, medication refills, .     Follow-up and recommended labs and tests    Follow up with primary care provider, Jamila Connelly, within 7 days for hospital follow- up.     Activity    Your activity upon discharge: activity as tolerated and no driving for today     Diet    Follow this diet upon discharge: Orders Placed This Encounter      Combination Diet Regular Diet Adult       Significant Results and Procedures   Most Recent 3 CBC's:Recent Labs   Lab Test 04/30/23  0623 04/28/23  0535 04/27/23  1045 04/19/23  2112   WBC  --  6.5 4.8 6.3   HGB  --  10.9* 10.7* 9.1*   MCV  --  96 94 93    203 209 173     Most Recent 3 BMP's:Recent Labs   Lab Test 04/30/23  0623 04/28/23  0534 04/27/23  1655 04/27/23  1045 04/19/23 2004   NA  --  135*  --  137 134*   POTASSIUM  --  4.8  --  3.4* 5.0   CHLORIDE  --  101  --  99 102   CO2  --  26  --  25 23   BUN  --  11  --  11 18   CR 0.84 0.82 0.95 0.92 0.80   ANIONGAP  --  8  --  13 9   KARRIE  --  9.3  --  8.6 9.0   GLC  --  132*  --  92 102     Most Recent 2 LFT's:Recent Labs   Lab Test 03/30/23  1037 03/01/23  1933   AST 17 14   ALT 10 <9   ALKPHOS 81 80   BILITOTAL 0.6 0.4   ,   Results for orders placed or performed during the hospital encounter of 04/27/23   Chest XR,  PA & LAT    Narrative    EXAM: XR CHEST 2 VIEWS  LOCATION: [facility]  DATE/TIME: 4/27/2023 11:52 AM CDT    INDICATION: eval for pneumonia  COMPARISON: 4/9/2023 and 4/3/2023      Impression    IMPRESSION: A few thin strands of fibrosis and/or linear atelectasis in the mid and lower lungs unchanged. Lungs are otherwise clear. No pleural effusion. Heart size and pulmonary  vascularity within normal limits. Postop change cervical spine with   instrumentation. No significant change.       Discharge Medications   Discharge Medication List as of 4/30/2023 12:45 PM      START taking these medications    Details   cefdinir (OMNICEF) 300 MG capsule Take 1 capsule (300 mg) by mouth 2 times daily for 2 days, Disp-4 capsule, R-0, Local Print      predniSONE (DELTASONE) 20 MG tablet Take 2 tablets (40 mg) by mouth daily for 2 days, Disp-4 tablet, R-0, Local Print         CONTINUE these medications which have CHANGED    Details   cloNIDine (CATAPRES) 0.1 MG tablet Take 2 tablets (0.2 mg) by mouth 2 times daily, Disp-15 tablet, R-0, Local Print      hydrocortisone (CORTEF) 10 MG tablet Take 20 mg (2 tablets) in the morning and then take 10 mg (1 tablet) in the afternoon.   Follow-up with Endocrinology or your Primary Care Physician for further changes., Disp-90 tablet, R-0, Local Print      !! QUEtiapine (SEROQUEL) 100 MG tablet Take 1 tablet (100 mg) by mouth every morning, Disp-30 tablet, R-0, Local Print      !! QUEtiapine (SEROQUEL) 300 MG tablet Take 1 tablet (300 mg) by mouth At Bedtime, Disp-30 tablet, R-0, Local Print       !! - Potential duplicate medications found. Please discuss with provider.      CONTINUE these medications which have NOT CHANGED    Details   albuterol (PROAIR HFA;PROVENTIL HFA;VENTOLIN HFA) 90 mcg/actuation inhaler [ALBUTEROL (PROAIR HFA;PROVENTIL HFA;VENTOLIN HFA) 90 MCG/ACTUATION INHALER] Inhale 2 puffs every 6 (six) hours as needed for wheezing., Disp-1 each, R-0, Normal      buprenorphine HCl-naloxone HCl (SUBOXONE) 4-1 MG per film Place 1 Film under the tongue 2 times daily, Historical      diclofenac (VOLTAREN) 1 % topical gel Apply 4 g topically 4 times daily as needed for moderate pain, Disp-350 g, R-0, E-Prescribe      docusate sodium (COLACE) 100 MG capsule [DOCUSATE SODIUM (COLACE) 100 MG CAPSULE] Take 1 capsule (100 mg total) by mouth 2 (two) times a day.,  Disp-60 capsule, R-0, Normal      FLUoxetine (PROZAC) 40 MG capsule Take 1 capsule (40 mg) by mouth daily for 30 days, Disp-30 capsule, R-0, E-Prescribe      fluticasone-salmeterol (ADVAIR) 250-50 MCG/ACT inhaler Inhale 1 puff into the lungs daily, Historical      furosemide (LASIX) 20 MG tablet Take 20 mg by mouth daily X 5 days, Historical      ibuprofen (ADVIL/MOTRIN) 200 MG tablet Take 600 mg by mouth every 8 hours as needed for pain, Historical      ipratropium - albuterol 0.5 mg/2.5 mg/3 mL (DUONEB) 0.5-2.5 (3) MG/3ML neb solution Take 1 vial (3 mLs) by nebulization 3 times daily For 1 week then every 6 hours as needed, Disp-120 mL, R-1, E-Prescribe      magnesium oxide (MAG-OX) 400 MG tablet Take 1 tablet (400 mg) by mouth daily, Disp-30 tablet, R-0, E-Prescribe      nicotine (NICODERM CQ) 14 MG/24HR 24 hr patch Place 1 patch onto the skin daily, Disp-14 patch, R-1, E-Prescribe      nitroFURantoin macrocrystal-monohydrate (MACROBID) 100 MG capsule Take 1 capsule (100 mg) by mouth 2 times daily, Disp-14 capsule, R-0, Local Print      NYSTOP 999392 UNIT/GM powder Apply topically 3 times daily as needed, NEVILLE, Historical      omeprazole (PRILOSEC) 20 MG DR capsule Take 20 mg by mouth daily as needed, Historical      potassium chloride ER (KLOR-CON M) 20 MEQ CR tablet Take 20 mEq by mouth daily, Historical      Vitamin D3 (CHOLECALCIFEROL) 25 mcg (1000 units) tablet Take 1 tablet (25 mcg) by mouth daily, Disp-30 tablet, R-0, E-Prescribe      calcium carbonate (TUMS) 500 MG chewable tablet Take 1 tablet (500 mg) by mouth 2 times daily, Disp-60 tablet, R-0, E-Prescribe      cyclobenzaprine (FLEXERIL) 5 MG tablet Take 1 tablet (5 mg) by mouth every 8 hours as needed for muscle spasms (Use if voltaren ineffective), Disp-30 tablet, R-0, E-Prescribe      gabapentin (NEURONTIN) 300 MG capsule Take 3 capsules (900 mg) by mouth 3 times daily, Disp-120 capsule, R-0, E-Prescribe      hydrOXYzine (ATARAX) 25 MG tablet Take  1-2 tablets (25-50 mg) by mouth every 6 hours as needed for other or anxiety (adjuvant pain), Disp-90 tablet, R-0, E-Prescribe           Allergies   Allergies   Allergen Reactions     Amoxicillin Diarrhea     Droperidol Angioedema

## 2023-04-30 NOTE — PROGRESS NOTES
Pt discharging via cab. After visit summary reviewed and questions answered. Belongings returned.

## 2023-04-30 NOTE — PROGRESS NOTES
Occupational Therapy Discharge Summary    Reason for therapy discharge:    Discharged to home OT recommended but pt still working on housing.    Progress towards therapy goal(s). See goals on Care Plan in [facility] electronic health record for goal details.  Goals partially met.  Barriers to achieving goals:   discharge from facility.    Therapy recommendation(s):    Continued therapy is recommended.  Rationale/Recommendations:  Pt has had recent falls and would benefit from OT to promote safety.

## 2023-04-30 NOTE — PROGRESS NOTES
"/63 (BP Location: Left arm)   Pulse 77   Temp 97.9  F (36.6  C) (Oral)   Resp 18   Ht 1.676 m (5' 6\")   Wt 63.5 kg (140 lb)   SpO2 90%   BMI 22.60 kg/m        The PT declined her 2000 Tx. Rt will follow as directed.  "

## 2023-04-30 NOTE — PROGRESS NOTES
"PRIMARY DIAGNOSIS: \"GENERIC\" NURSING  OUTPATIENT/OBSERVATION GOALS TO BE MET BEFORE DISCHARGE:  1. ADLs back to baseline: Yes    2. Activity and level of assistance: Ambulating independently.    3. Pain status: Improved with use of oral meds  4. Return to near baseline physical activity: Yes     Discharge Planner Nurse   Safe discharge environment identified: No  Barriers to discharge: Yes       Entered by: Julianne Earl RN 04/30/2023 8:25 AM     Please review provider order for any additional goals.   Nurse to notify provider when observation goals have been met and patient is ready for discharge.  "

## 2023-05-01 RX ORDER — CYCLOBENZAPRINE HCL 5 MG
5 TABLET ORAL EVERY 8 HOURS PRN
Qty: 30 TABLET | Refills: 0 | Status: ON HOLD | OUTPATIENT
Start: 2023-05-01 | End: 2023-06-13

## 2023-05-01 RX ORDER — GABAPENTIN 300 MG/1
900 CAPSULE ORAL 3 TIMES DAILY
Qty: 120 CAPSULE | Refills: 0 | Status: SHIPPED | OUTPATIENT
Start: 2023-05-01

## 2023-05-01 RX ORDER — HYDROXYZINE HYDROCHLORIDE 25 MG/1
25-50 TABLET, FILM COATED ORAL EVERY 6 HOURS PRN
Qty: 90 TABLET | Refills: 0 | Status: SHIPPED | OUTPATIENT
Start: 2023-05-01

## 2023-05-29 ENCOUNTER — HOSPITAL ENCOUNTER (EMERGENCY)
Facility: CLINIC | Age: 68
Discharge: HOME | End: 2023-05-29
Attending: EMERGENCY MEDICINE | Admitting: EMERGENCY MEDICINE
Payer: COMMERCIAL

## 2023-05-29 ENCOUNTER — APPOINTMENT (OUTPATIENT)
Dept: CT IMAGING | Facility: CLINIC | Age: 68
End: 2023-05-29
Attending: EMERGENCY MEDICINE
Payer: COMMERCIAL

## 2023-05-29 VITALS
RESPIRATION RATE: 25 BRPM | DIASTOLIC BLOOD PRESSURE: 103 MMHG | SYSTOLIC BLOOD PRESSURE: 155 MMHG | OXYGEN SATURATION: 98 % | HEART RATE: 55 BPM | TEMPERATURE: 97.1 F

## 2023-05-29 DIAGNOSIS — E04.1 THYROID NODULE: ICD-10-CM

## 2023-05-29 DIAGNOSIS — R06.02 SOB (SHORTNESS OF BREATH): ICD-10-CM

## 2023-05-29 DIAGNOSIS — E87.1 HYPONATREMIA: ICD-10-CM

## 2023-05-29 LAB
ALBUMIN SERPL-MCNC: 4.4 G/DL (ref 3.5–5)
ALBUMIN UR-MCNC: 30 MG/DL
ALP SERPL-CCNC: 84 U/L (ref 45–120)
ALT SERPL W P-5'-P-CCNC: <9 U/L (ref 0–45)
AMPHETAMINES UR QL SCN: ABNORMAL
ANION GAP SERPL CALCULATED.3IONS-SCNC: 11 MMOL/L (ref 5–18)
APPEARANCE UR: CLEAR
AST SERPL W P-5'-P-CCNC: 16 U/L (ref 0–40)
BARBITURATES UR QL: ABNORMAL
BASOPHILS # BLD AUTO: 0 10E3/UL (ref 0–0.2)
BASOPHILS NFR BLD AUTO: 0 %
BENZODIAZ UR QL: ABNORMAL
BILIRUB DIRECT SERPL-MCNC: 0.4 MG/DL
BILIRUB SERPL-MCNC: 1.4 MG/DL (ref 0–1)
BILIRUB UR QL STRIP: NEGATIVE
BNP SERPL-MCNC: 41 PG/ML (ref 0–117)
BUN SERPL-MCNC: 11 MG/DL (ref 8–22)
CALCIUM SERPL-MCNC: 9.9 MG/DL (ref 8.5–10.5)
CANNABINOIDS UR QL SCN: ABNORMAL
CHLORIDE BLD-SCNC: 102 MMOL/L (ref 98–107)
CO2 SERPL-SCNC: 18 MMOL/L (ref 22–31)
COCAINE UR QL: ABNORMAL
COLOR UR AUTO: YELLOW
CREAT SERPL-MCNC: 1.04 MG/DL (ref 0.6–1.1)
CREAT UR-MCNC: 180 MG/DL
D DIMER PPP FEU-MCNC: 0.57 UG/ML FEU (ref 0–0.5)
EOSINOPHIL # BLD AUTO: 0 10E3/UL (ref 0–0.7)
EOSINOPHIL NFR BLD AUTO: 0 %
ERYTHROCYTE [DISTWIDTH] IN BLOOD BY AUTOMATED COUNT: 12.2 % (ref 10–15)
ETHANOL SERPL-MCNC: <10 MG/DL
FLUAV RNA SPEC QL NAA+PROBE: NEGATIVE
FLUBV RNA RESP QL NAA+PROBE: NEGATIVE
GFR SERPL CREATININE-BSD FRML MDRD: 58 ML/MIN/1.73M2
GLUCOSE BLD-MCNC: 95 MG/DL (ref 70–125)
GLUCOSE UR STRIP-MCNC: NEGATIVE MG/DL
HCT VFR BLD AUTO: 38.5 % (ref 35–47)
HGB BLD-MCNC: 13.2 G/DL (ref 11.7–15.7)
HGB UR QL STRIP: ABNORMAL
HOLD SPECIMEN: NORMAL
HOLD SPECIMEN: NORMAL
IMM GRANULOCYTES # BLD: 0.1 10E3/UL
IMM GRANULOCYTES NFR BLD: 1 %
KETONES UR STRIP-MCNC: 20 MG/DL
LACTATE SERPL-SCNC: 0.9 MMOL/L (ref 0.7–2)
LACTATE SERPL-SCNC: 2.5 MMOL/L (ref 0.7–2)
LEUKOCYTE ESTERASE UR QL STRIP: NEGATIVE
LIPASE SERPL-CCNC: <9 U/L (ref 0–52)
LYMPHOCYTES # BLD AUTO: 1.4 10E3/UL (ref 0.8–5.3)
LYMPHOCYTES NFR BLD AUTO: 17 %
MAGNESIUM SERPL-MCNC: 1.6 MG/DL (ref 1.8–2.6)
MCH RBC QN AUTO: 30.5 PG (ref 26.5–33)
MCHC RBC AUTO-ENTMCNC: 34.3 G/DL (ref 31.5–36.5)
MCV RBC AUTO: 89 FL (ref 78–100)
MONOCYTES # BLD AUTO: 0.4 10E3/UL (ref 0–1.3)
MONOCYTES NFR BLD AUTO: 5 %
MUCOUS THREADS #/AREA URNS LPF: PRESENT /LPF
NEUTROPHILS # BLD AUTO: 6.5 10E3/UL (ref 1.6–8.3)
NEUTROPHILS NFR BLD AUTO: 77 %
NITRATE UR QL: NEGATIVE
NRBC # BLD AUTO: 0 10E3/UL
NRBC BLD AUTO-RTO: 0 /100
OPIATES UR QL SCN: ABNORMAL
OXYCODONE UR QL: ABNORMAL
PCP UR QL SCN: ABNORMAL
PH UR STRIP: 7 [PH] (ref 5–7)
PLATELET # BLD AUTO: 199 10E3/UL (ref 150–450)
POTASSIUM BLD-SCNC: 4.3 MMOL/L (ref 3.5–5)
PROCALCITONIN SERPL-MCNC: <0.02 NG/ML (ref 0–0.49)
PROT SERPL-MCNC: 7.5 G/DL (ref 6–8)
RBC # BLD AUTO: 4.33 10E6/UL (ref 3.8–5.2)
RBC URINE: 7 /HPF
RSV RNA SPEC NAA+PROBE: NEGATIVE
SARS-COV-2 RNA RESP QL NAA+PROBE: NEGATIVE
SODIUM SERPL-SCNC: 131 MMOL/L (ref 136–145)
SP GR UR STRIP: 1.03 (ref 1–1.03)
TROPONIN I SERPL-MCNC: <0.01 NG/ML (ref 0–0.29)
UROBILINOGEN UR STRIP-MCNC: <2 MG/DL
WBC # BLD AUTO: 8.5 10E3/UL (ref 4–11)
WBC URINE: 0 /HPF

## 2023-05-29 PROCEDURE — 36415 COLL VENOUS BLD VENIPUNCTURE: CPT | Performed by: EMERGENCY MEDICINE

## 2023-05-29 PROCEDURE — 85025 COMPLETE CBC W/AUTO DIFF WBC: CPT | Performed by: EMERGENCY MEDICINE

## 2023-05-29 PROCEDURE — 83735 ASSAY OF MAGNESIUM: CPT | Performed by: EMERGENCY MEDICINE

## 2023-05-29 PROCEDURE — 250N000011 HC RX IP 250 OP 636: Performed by: EMERGENCY MEDICINE

## 2023-05-29 PROCEDURE — 80307 DRUG TEST PRSMV CHEM ANLYZR: CPT | Performed by: EMERGENCY MEDICINE

## 2023-05-29 PROCEDURE — 96375 TX/PRO/DX INJ NEW DRUG ADDON: CPT | Mod: 59

## 2023-05-29 PROCEDURE — 83880 ASSAY OF NATRIURETIC PEPTIDE: CPT | Performed by: EMERGENCY MEDICINE

## 2023-05-29 PROCEDURE — 87637 SARSCOV2&INF A&B&RSV AMP PRB: CPT | Performed by: EMERGENCY MEDICINE

## 2023-05-29 PROCEDURE — 85379 FIBRIN DEGRADATION QUANT: CPT | Performed by: EMERGENCY MEDICINE

## 2023-05-29 PROCEDURE — 96365 THER/PROPH/DIAG IV INF INIT: CPT | Mod: 59

## 2023-05-29 PROCEDURE — 87077 CULTURE AEROBIC IDENTIFY: CPT | Performed by: EMERGENCY MEDICINE

## 2023-05-29 PROCEDURE — 82248 BILIRUBIN DIRECT: CPT | Performed by: EMERGENCY MEDICINE

## 2023-05-29 PROCEDURE — 84145 PROCALCITONIN (PCT): CPT | Performed by: EMERGENCY MEDICINE

## 2023-05-29 PROCEDURE — 84484 ASSAY OF TROPONIN QUANT: CPT | Performed by: EMERGENCY MEDICINE

## 2023-05-29 PROCEDURE — 258N000003 HC RX IP 258 OP 636: Performed by: EMERGENCY MEDICINE

## 2023-05-29 PROCEDURE — 83690 ASSAY OF LIPASE: CPT | Performed by: EMERGENCY MEDICINE

## 2023-05-29 PROCEDURE — 96366 THER/PROPH/DIAG IV INF ADDON: CPT

## 2023-05-29 PROCEDURE — 82077 ASSAY SPEC XCP UR&BREATH IA: CPT | Performed by: EMERGENCY MEDICINE

## 2023-05-29 PROCEDURE — 71275 CT ANGIOGRAPHY CHEST: CPT

## 2023-05-29 PROCEDURE — 81001 URINALYSIS AUTO W/SCOPE: CPT | Performed by: EMERGENCY MEDICINE

## 2023-05-29 PROCEDURE — 87149 DNA/RNA DIRECT PROBE: CPT | Mod: XU | Performed by: EMERGENCY MEDICINE

## 2023-05-29 PROCEDURE — 80053 COMPREHEN METABOLIC PANEL: CPT | Performed by: EMERGENCY MEDICINE

## 2023-05-29 PROCEDURE — 99285 EMERGENCY DEPT VISIT HI MDM: CPT | Mod: 25

## 2023-05-29 PROCEDURE — 250N000013 HC RX MED GY IP 250 OP 250 PS 637: Performed by: EMERGENCY MEDICINE

## 2023-05-29 PROCEDURE — 74177 CT ABD & PELVIS W/CONTRAST: CPT

## 2023-05-29 PROCEDURE — 93005 ELECTROCARDIOGRAM TRACING: CPT | Performed by: EMERGENCY MEDICINE

## 2023-05-29 PROCEDURE — 83605 ASSAY OF LACTIC ACID: CPT | Mod: 91 | Performed by: EMERGENCY MEDICINE

## 2023-05-29 RX ORDER — CLONIDINE HYDROCHLORIDE 0.1 MG/1
0.2 TABLET ORAL 2 TIMES DAILY
Qty: 10 TABLET | Refills: 0 | Status: SHIPPED | OUTPATIENT
Start: 2023-05-29 | End: 2023-06-08

## 2023-05-29 RX ORDER — HYDROXYZINE HYDROCHLORIDE 25 MG/1
25 TABLET, FILM COATED ORAL EVERY 6 HOURS PRN
Qty: 10 TABLET | Refills: 0 | Status: SHIPPED | OUTPATIENT
Start: 2023-05-29 | End: 2023-06-08

## 2023-05-29 RX ORDER — CLONIDINE HYDROCHLORIDE 0.1 MG/1
0.1 TABLET ORAL ONCE
Status: COMPLETED | OUTPATIENT
Start: 2023-05-29 | End: 2023-05-29

## 2023-05-29 RX ORDER — AMLODIPINE BESYLATE 5 MG/1
5 TABLET ORAL DAILY
Status: ON HOLD | COMMUNITY
End: 2023-06-13

## 2023-05-29 RX ORDER — IOPAMIDOL 755 MG/ML
100 INJECTION, SOLUTION INTRAVASCULAR ONCE
Status: COMPLETED | OUTPATIENT
Start: 2023-05-29 | End: 2023-05-29

## 2023-05-29 RX ORDER — MAGNESIUM SULFATE HEPTAHYDRATE 40 MG/ML
2 INJECTION, SOLUTION INTRAVENOUS ONCE
Status: COMPLETED | OUTPATIENT
Start: 2023-05-29 | End: 2023-05-29

## 2023-05-29 RX ORDER — BUPRENORPHINE AND NALOXONE 4; 1 MG/1; MG/1
1 FILM, SOLUBLE BUCCAL; SUBLINGUAL ONCE
Status: COMPLETED | OUTPATIENT
Start: 2023-05-29 | End: 2023-05-29

## 2023-05-29 RX ORDER — LORAZEPAM 2 MG/ML
0.5 INJECTION INTRAMUSCULAR ONCE
Status: COMPLETED | OUTPATIENT
Start: 2023-05-29 | End: 2023-05-29

## 2023-05-29 RX ORDER — HYDROXYZINE HYDROCHLORIDE 25 MG/1
50 TABLET, FILM COATED ORAL ONCE
Status: COMPLETED | OUTPATIENT
Start: 2023-05-29 | End: 2023-05-29

## 2023-05-29 RX ADMIN — SODIUM CHLORIDE 1000 ML: 9 INJECTION, SOLUTION INTRAVENOUS at 19:14

## 2023-05-29 RX ADMIN — BUPRENORPHINE AND NALOXONE 1 FILM: 4; 1 FILM, SOLUBLE BUCCAL; SUBLINGUAL at 21:58

## 2023-05-29 RX ADMIN — MAGNESIUM SULFATE HEPTAHYDRATE 2 G: 40 INJECTION, SOLUTION INTRAVENOUS at 19:15

## 2023-05-29 RX ADMIN — IOPAMIDOL 100 ML: 755 INJECTION, SOLUTION INTRAVENOUS at 19:00

## 2023-05-29 RX ADMIN — HYDROXYZINE HYDROCHLORIDE 50 MG: 25 TABLET ORAL at 17:27

## 2023-05-29 RX ADMIN — LORAZEPAM 0.5 MG: 2 INJECTION INTRAMUSCULAR; INTRAVENOUS at 19:26

## 2023-05-29 RX ADMIN — CLONIDINE HYDROCHLORIDE 0.1 MG: 0.1 TABLET ORAL at 17:30

## 2023-05-29 ASSESSMENT — ENCOUNTER SYMPTOMS
DYSURIA: 0
SHORTNESS OF BREATH: 1
ABDOMINAL PAIN: 0
FEVER: 1
DIARRHEA: 0
TROUBLE SWALLOWING: 0
VOMITING: 0
COUGH: 1
NAUSEA: 0

## 2023-05-29 ASSESSMENT — ACTIVITIES OF DAILY LIVING (ADL)
ADLS_ACUITY_SCORE: 35

## 2023-05-29 NOTE — ED TRIAGE NOTES
Pt arrives to ED with c/o UTI symptoms (dysuria, urinary frequency and urgency, urine odor, and weakness) for the past two days. Shortness of breath started prior to arrival. Pt is hyperventilating. Son states pt has UTIs frequently and when she comes in she ends up being low on magnesium and potassium. Pt takes suboxone.      Triage Assessment     Row Name 05/29/23 1631       Triage Assessment (Adult)    Airway WDL WDL       Respiratory WDL    Respiratory WDL X;rhythm/pattern    Rhythm/Pattern, Respiratory shortness of breath       Skin Circulation/Temperature WDL    Skin Circulation/Temperature WDL WDL       Cardiac WDL    Cardiac WDL WDL       Peripheral/Neurovascular WDL    Peripheral Neurovascular WDL WDL       Cognitive/Neuro/Behavioral WDL    Cognitive/Neuro/Behavioral WDL WDL

## 2023-05-29 NOTE — ED NOTES
Pt c/o alternating feeling hot then cold; aux temp is 97.5, does not appear febrile or hot to the touch. Will let MD know and continue to monitor

## 2023-05-29 NOTE — ED PROVIDER NOTES
EMERGENCY DEPARTMENT ENCOUNTER      NAME: Renetta Farooq  AGE: 68 year old female  YOB: 1955  MRN: [id number]  EVALUATION DATE & TIME: 5/29/2023  4:32 PM    PCP: Jamila Connelly    ED PROVIDER: Lizzie Torres M.D.        Chief Complaint   Patient presents with     Shortness of Breath     Dysuria     Urinary Frequency         FINAL IMPRESSION:    1. SOB (shortness of breath)    2. Thyroid nodule    3. Hyponatremia            MEDICAL DECISION MAKING:    Renetta Farooq is a 68 year old female with history of substance abuse, COPD, Suboxone dependence, bipolar, alcohol abuse, anxiety, hyponatremia, who presents to the ER with complaints of shortness of breath, fevers, urinary symptoms.  Urinalysis unremarkable.  Imaging through the chest and abdomen looks good.  Incidental thyroid nodule has been seen and patient states that this is not new for her but she will mention this to primary care.  Overall feels better after getting her hydroxyzine and clonidine doses here.  She states that she ran out.  She is appointment to see her family doctor for next week per her report.  Nothing at this time to suggest pneumonia.  She is afebrile.  Not requiring any oxygen.  Overall feeling much better and would like to go home which I think is reasonable.  She is been having symptoms per her report now for greater than 24 hours and therefore a single negative troponin is sufficient in this case.  No signs for PE.  Initial lactic acid very slightly elevated but repeat looks great.  I do not think this represents true sepsis.  Plan at this time is discharge home with a small refill of her clonidine and hydroxyzine.  She was given her evening dose of Suboxone, dose verified with pharmacist.    Patient understands what to watch for and when to return to the ER and all of her questions have been answered.      ED COURSE:  4:36 PM  I met with the patient to gather history and perform my exam. ED course and treatment  discussed.    9:30 PM  She is feeling much better and hopeful to go home.  Nothing concerning found on imaging at this time.  Nothing to suggest true pneumonia, UTI, ACS, PE, etc.  She is feeling better and I think is appropriate to discharge home at this time.  Short refill of her clonidine and hydroxyzine have been provided but she was encouraged to follow-up with family doctor as scheduled to get her full refills of all of her medications as needed.  She understands what to watch for and when to return to ER and all of her questions have been answered.    I do not think this represents true sepsis at this time.    I do not think that this represents rib fractures, allergic reaction, COPD exacerbation, asthma exacerbation, pneumonia, CHF, myocarditis, pericarditis, endocarditis, ACS, PE, ruptured AAA, pneumothorax, aortic dissection, bowel obstruction, bowel ischemia, cholecystitis, pancreatitis, appendicitis, diverticulitis, kidney stone, pyelonephritis, incarcerated or strangulated hernia, ovarian torsion, viscus perforation, perforated GI ulcer, or other such etiologies at this time.    At the conclusion of the encounter I discussed the results of all of the tests and the disposition. Their questions were answered. The patient (and any family present) acknowledged understanding and were agreeable with the care plan.      CONSULTANTS:  none        MEDICATIONS GIVEN IN THE EMERGENCY:  Medications   cloNIDine (CATAPRES) tablet 0.1 mg (0.1 mg Oral $Given 5/29/23 1730)   hydrOXYzine (ATARAX) tablet 50 mg (50 mg Oral $Given 5/29/23 1727)   magnesium sulfate 2 g in 50 mL sterile water intermittent infusion (0 g Intravenous Stopped 5/29/23 2159)   0.9% sodium chloride BOLUS (0 mLs Intravenous Stopped 5/29/23 2159)   iopamidol (ISOVUE-370) solution 100 mL (100 mLs Intravenous $Given 5/29/23 1900)   LORazepam (ATIVAN) injection 0.5 mg (0.5 mg Intravenous $Given 5/29/23 1926)   buprenorphine HCl-naloxone HCl (SUBOXONE)  4-1 MG per film 1 Film (1 Film Sublingual $Given 5/29/23 9445)           NEW PRESCRIPTIONS STARTED AT TODAY'S ER VISIT     Medication List      Modified    * cloNIDine 0.1 MG tablet  Commonly known as: CATAPRES  0.2 mg, Oral, 2 TIMES DAILY  What changed: Another medication with the same name was added. Make sure you understand how and when to take each.     * cloNIDine 0.1 MG tablet  Commonly known as: CATAPRES  0.2 mg, Oral, 2 TIMES DAILY  What changed: You were already taking a medication with the same name, and this prescription was added. Make sure you understand how and when to take each.     * hydrOXYzine 25 MG tablet  Commonly known as: ATARAX  25-50 mg, Oral, EVERY 6 HOURS PRN  What changed:     how much to take    when to take this     * hydrOXYzine 25 MG tablet  Commonly known as: ATARAX  25 mg, Oral, EVERY 6 HOURS PRN  What changed: You were already taking a medication with the same name, and this prescription was added. Make sure you understand how and when to take each.         * This list has 4 medication(s) that are the same as other medications prescribed for you. Read the directions carefully, and ask your doctor or other care provider to review them with you.                    CONDITION:  Stable, improved        DISPOSITION:  discharge home         =================================================================  =================================================================  TRIAGE ASSESSMENT:   Pt arrives to ED with c/o UTI symptoms (dysuria, urinary frequency and urgency, urine odor, and weakness) for the past two days. Shortness of breath started prior to arrival. Pt is hyperventilating. Son states pt has UTIs frequently and when she comes in she ends up being low on magnesium and potassium. Pt takes suboxone.      Triage Assessment     Row Name 05/29/23 0757       Triage Assessment (Adult)    Airway WDL WDL       Respiratory WDL    Respiratory WDL X;rhythm/pattern    Rhythm/Pattern,  Respiratory shortness of breath       Skin Circulation/Temperature WDL    Skin Circulation/Temperature WDL WDL       Cardiac WDL    Cardiac WDL WDL       Peripheral/Neurovascular WDL    Peripheral Neurovascular WDL WDL       Cognitive/Neuro/Behavioral WDL    Cognitive/Neuro/Behavioral WDL WDL                   ED Triage Vitals [05/29/23 1630]   Enc Vitals Group      BP (!) 162/101      Pulse 71      Resp 28      Temp 97.1  F (36.2  C)      Temp src Temporal      SpO2 98 %          ================================================================  ================================================================    HPI    Patient information was obtained from: patient    Use of Intrepreter: N/A      Renetta Farooq is a 68 year old female with history of substance abuse, COPD, Suboxone dependence, bipolar, alcohol abuse, anxiety, hyponatremia, who presents to the ER with complaints of shortness of breath, fevers, urinary symptoms.    She states that over the last couple days she has had fevers to 102 at home, pain with urination, shortness of breath, chest pain, and concern that her electrolytes are low.    She states she also ran out of her hydroxyzine and clonidine and has not had any those doses today.  She is requesting doses of these medications at this time.    Otherwise denies any vomiting, abd pain, diarrhea.      REVIEW OF SYSTEMS  Review of Systems   Constitutional: Positive for fever.   HENT: Negative for trouble swallowing.    Respiratory: Positive for cough and shortness of breath.    Cardiovascular: Positive for chest pain.   Gastrointestinal: Negative for abdominal pain, diarrhea, nausea and vomiting.   Genitourinary: Negative for dysuria.   All other systems reviewed and are negative.        PAST MEDICAL HISTORY:  Past Medical History:   Diagnosis Date     Acute psychosis (H) 10/14/2018     Alcohol dependence (H) 4/10/2015     Bipolar affective disorder, manic, severe, with psychotic behavior (H)  10/15/2018     Candida infection 10/19/2018     Chronic hepatitis C (H) 4/10/2015     Chronic neck pain      Closed traumatic brain injury, without loss of consciousness, sequela (H)      Dental abscess      Episodic tension-type headache, not intractable      Methamphetamine dependence (H) 10/15/2018     Pancytopenia (H) 4/10/2015     Weakness of right arm 4/10/2015         PAST SURGICAL HISTORY:  Past Surgical History:   Procedure Laterality Date     ARTHROSCOPY SHOULDER ROTATOR CUFF REPAIR Bilateral      CERVICAL FUSION      twice     COSMETIC SURGERY       RHINOPLASTY       TONSILLECTOMY           CURRENT MEDICATIONS:    Prior to Admission medications    Medication Sig Start Date End Date Taking? Authorizing Provider   albuterol (PROAIR HFA;PROVENTIL HFA;VENTOLIN HFA) 90 mcg/actuation inhaler [ALBUTEROL (PROAIR HFA;PROVENTIL HFA;VENTOLIN HFA) 90 MCG/ACTUATION INHALER] Inhale 2 puffs every 6 (six) hours as needed for wheezing. 11/20/18   Leora Hogan MD   buprenorphine HCl-naloxone HCl (SUBOXONE) 4-1 MG per film Place 1 Film under the tongue 2 times daily    Unknown, Entered By History   calcium carbonate (TUMS) 500 MG chewable tablet Take 1 tablet (500 mg) by mouth 2 times daily 4/6/23   Ray Corona DO   cloNIDine (CATAPRES) 0.1 MG tablet Take 2 tablets (0.2 mg) by mouth 2 times daily 4/30/23   Mark Thomas MD   cyclobenzaprine (FLEXERIL) 5 MG tablet Take 1 tablet (5 mg) by mouth every 8 hours as needed for muscle spasms (Use if voltaren ineffective) 5/1/23   Mark Thomas MD   diclofenac (VOLTAREN) 1 % topical gel Apply 4 g topically 4 times daily as needed for moderate pain 4/15/23   Micheal Henley MD   docusate sodium (COLACE) 100 MG capsule [DOCUSATE SODIUM (COLACE) 100 MG CAPSULE] Take 1 capsule (100 mg total) by mouth 2 (two) times a day. 11/20/18   Leora Hogan MD   FLUoxetine (PROZAC) 40 MG capsule Take 1 capsule (40 mg) by mouth daily for 30 days 4/6/23 5/6/23  Chloe  DO Ray   fluticasone-salmeterol (ADVAIR) 250-50 MCG/ACT inhaler Inhale 1 puff into the lungs daily    Unknown, Entered By History   furosemide (LASIX) 20 MG tablet Take 20 mg by mouth daily X 5 days    Unknown, Entered By History   gabapentin (NEURONTIN) 300 MG capsule Take 3 capsules (900 mg) by mouth 3 times daily 5/1/23   Mark Thomas MD   hydrocortisone (CORTEF) 10 MG tablet Take 20 mg (2 tablets) in the morning and then take 10 mg (1 tablet) in the afternoon.   Follow-up with Endocrinology or your Primary Care Physician for further changes. 4/30/23   Mark Thomas MD   hydrOXYzine (ATARAX) 25 MG tablet Take 1-2 tablets (25-50 mg) by mouth every 6 hours as needed for other or anxiety (adjuvant pain) 5/1/23   Mark Thomas MD   ibuprofen (ADVIL/MOTRIN) 200 MG tablet Take 600 mg by mouth every 8 hours as needed for pain    Unknown, Entered By History   ipratropium - albuterol 0.5 mg/2.5 mg/3 mL (DUONEB) 0.5-2.5 (3) MG/3ML neb solution Take 1 vial (3 mLs) by nebulization 3 times daily For 1 week then every 6 hours as needed 4/6/23   Ray Corona DO   magnesium oxide (MAG-OX) 400 MG tablet Take 1 tablet (400 mg) by mouth daily 4/6/23   Ray Corona DO   nicotine (NICODERM CQ) 14 MG/24HR 24 hr patch Place 1 patch onto the skin daily 4/1/23   Laila Grayson MD   nitroFURantoin macrocrystal-monohydrate (MACROBID) 100 MG capsule Take 1 capsule (100 mg) by mouth 2 times daily 4/19/23   Lorena Mejia MD   NYSTOP [id number] UNIT/GM powder Apply topically 3 times daily as needed 3/27/21   Reported, Patient   omeprazole (PRILOSEC) 20 MG DR capsule Take 20 mg by mouth daily as needed    Reported, Patient   potassium chloride ER (KLOR-CON M) 20 MEQ CR tablet Take 20 mEq by mouth daily    Unknown, Entered By History   QUEtiapine (SEROQUEL) 100 MG tablet Take 1 tablet (100 mg) by mouth every morning 4/30/23   Mrak Thomas MD   QUEtiapine (SEROQUEL) 300 MG tablet Take 1 tablet (300 mg) by mouth  "At Bedtime 4/30/23   Mark Thomas MD   Vitamin D3 (CHOLECALCIFEROL) 25 mcg (1000 units) tablet Take 1 tablet (25 mcg) by mouth daily 4/6/23   Ray Corona DO         ALLERGIES:  Allergies   Allergen Reactions     Amoxicillin Diarrhea     Droperidol Angioedema         FAMILY HISTORY:  Family History   Problem Relation Age of Onset     Narcolepsy Mother      Acute myelogenous leukemia Mother      Cancer Father          SOCIAL HISTORY:  Social History     Socioeconomic History     Marital status:    Tobacco Use     Smoking status: Every Day     Packs/day: 1.00     Years: 54.00     Pack years: 54.00     Types: Cigarettes     Start date: 1968     Smokeless tobacco: Never     Tobacco comments:     Seen IP by CTTS on 3/31/2023   Substance and Sexual Activity     Alcohol use: Yes     Comment: Alcoholic Drinks/day: \"A lot.\"  (Couldn't quantify except to repeat \"A lot\" of wine, liquor and beer)     Drug use: No   Social History Narrative    Lives alone in independent housing with psychiatric case assistance         VITALS:  Patient Vitals for the past 24 hrs:   BP Temp Temp src Pulse Resp SpO2   05/29/23 2000 (!) 155/103 -- -- 55 25 98 %   05/29/23 1915 (!) 175/79 -- -- 58 26 99 %   05/29/23 1812 (!) 155/95 -- -- 59 25 99 %   05/29/23 1757 (!) 151/93 -- -- 64 25 99 %   05/29/23 1742 (!) 149/89 -- -- 63 23 99 %   05/29/23 1712 (!) 143/73 -- -- 70 (!) 32 100 %   05/29/23 1645 (!) 170/81 -- -- 66 (!) 31 100 %   05/29/23 1630 (!) 162/101 97.1  F (36.2  C) Temporal 71 28 98 %       Wt Readings from Last 3 Encounters:   04/30/23 62.5 kg (137 lb 11.2 oz)   04/19/23 60.8 kg (134 lb)   04/15/23 61.6 kg (135 lb 14.4 oz)       Estimated Creatinine Clearance: 50.4 mL/min (based on SCr of 1.04 mg/dL).    PHYSICAL EXAM    Constitutional:  Well developed, Well nourished, NAD, GCS 15  HENT:  Normocephalic, Atraumatic, Bilateral external ears normal, Nose normal. Neck- Supple, No stridor.   Eyes:  PERRL, EOMI, Conjunctiva " normal, No discharge.  Respiratory:  Normal breath sounds, No respiratory distress, No wheezing, Speaks full sentences easily. No cough.   Cardiovascular:  Normal heart rate, Regular rhythm, No rubs, No gallops. Chest wall nontender.   GI:  No excessive obesity.  Bowel sounds normal, Soft, No tenderness, No masses, No flank tenderness. No rebound or guarding.   : deferred  Musculoskeletal: 2+ DP pulses. No pitting edema.  No cyanosis, No clubbing. Good range of motion in all major joints. No major deformities noted.   Integument:  Warm, Dry, No erythema, No rash.  No petechiae.   Neurologic:  Alert & oriented x 3  Psychiatric:  Affect normal, Cooperative         LAB:  All pertinent labs reviewed and interpreted.  Recent Results (from the past 24 hour(s))   Procalcitonin    Collection Time: 05/29/23  5:04 PM   Result Value Ref Range    Procalcitonin <0.02 0.00 - 0.49 ng/mL   Troponin I (now)    Collection Time: 05/29/23  5:04 PM   Result Value Ref Range    Troponin I <0.01 0.00 - 0.29 ng/mL   Lactic acid whole blood    Collection Time: 05/29/23  5:04 PM   Result Value Ref Range    Lactic Acid 2.5 (H) 0.7 - 2.0 mmol/L   Basic metabolic panel    Collection Time: 05/29/23  5:04 PM   Result Value Ref Range    Sodium 131 (L) 136 - 145 mmol/L    Potassium 4.3 3.5 - 5.0 mmol/L    Chloride 102 98 - 107 mmol/L    Carbon Dioxide (CO2) 18 (L) 22 - 31 mmol/L    Anion Gap 11 5 - 18 mmol/L    Urea Nitrogen 11 8 - 22 mg/dL    Creatinine 1.04 0.60 - 1.10 mg/dL    Calcium 9.9 8.5 - 10.5 mg/dL    Glucose 95 70 - 125 mg/dL    GFR Estimate 58 (L) >60 mL/min/1.73m2   Magnesium    Collection Time: 05/29/23  5:04 PM   Result Value Ref Range    Magnesium 1.6 (L) 1.8 - 2.6 mg/dL   Hepatic function panel    Collection Time: 05/29/23  5:04 PM   Result Value Ref Range    Bilirubin Total 1.4 (H) 0.0 - 1.0 mg/dL    Bilirubin Direct 0.4 <=0.5 mg/dL    Protein Total 7.5 6.0 - 8.0 g/dL    Albumin 4.4 3.5 - 5.0 g/dL    Alkaline Phosphatase 84 45 -  120 U/L    AST 16 0 - 40 U/L    ALT <9 0 - 45 U/L   Lipase    Collection Time: 05/29/23  5:04 PM   Result Value Ref Range    Lipase <9 0 - 52 U/L   Alcohol level blood    Collection Time: 05/29/23  5:04 PM   Result Value Ref Range    Alcohol, Blood <10 None detected mg/dL   B-Type Natriuretic Peptide ([facility] Only)    Collection Time: 05/29/23  5:04 PM   Result Value Ref Range    BNP 41 0 - 117 pg/mL   D dimer quantitative    Collection Time: 05/29/23  5:04 PM   Result Value Ref Range    D-Dimer Quantitative 0.57 (H) 0.00 - 0.50 ug/mL FEU   Extra Green Top (Lithium Heparin) Tube    Collection Time: 05/29/23  5:04 PM   Result Value Ref Range    Hold Specimen JIC    Extra Purple Top Tube    Collection Time: 05/29/23  5:04 PM   Result Value Ref Range    Hold Specimen JIC    CBC with platelets and differential    Collection Time: 05/29/23  5:04 PM   Result Value Ref Range    WBC Count 8.5 4.0 - 11.0 10e3/uL    RBC Count 4.33 3.80 - 5.20 10e6/uL    Hemoglobin 13.2 11.7 - 15.7 g/dL    Hematocrit 38.5 35.0 - 47.0 %    MCV 89 78 - 100 fL    MCH 30.5 26.5 - 33.0 pg    MCHC 34.3 31.5 - 36.5 g/dL    RDW 12.2 10.0 - 15.0 %    Platelet Count 199 150 - 450 10e3/uL    % Neutrophils 77 %    % Lymphocytes 17 %    % Monocytes 5 %    % Eosinophils 0 %    % Basophils 0 %    % Immature Granulocytes 1 %    NRBCs per 100 WBC 0 <1 /100    Absolute Neutrophils 6.5 1.6 - 8.3 10e3/uL    Absolute Lymphocytes 1.4 0.8 - 5.3 10e3/uL    Absolute Monocytes 0.4 0.0 - 1.3 10e3/uL    Absolute Eosinophils 0.0 0.0 - 0.7 10e3/uL    Absolute Basophils 0.0 0.0 - 0.2 10e3/uL    Absolute Immature Granulocytes 0.1 <=0.4 10e3/uL    Absolute NRBCs 0.0 10e3/uL   UA with Microscopic reflex to Culture    Collection Time: 05/29/23  5:12 PM    Specimen: Urine, Catheter   Result Value Ref Range    Color Urine Yellow Colorless, Straw, Light Yellow, Yellow    Appearance Urine Clear Clear    Glucose Urine Negative Negative mg/dL    Bilirubin Urine Negative Negative     Ketones Urine 20 (A) Negative mg/dL    Specific Gravity Urine 1.028 1.001 - 1.030    Blood Urine 0.03 mg/dL (A) Negative    pH Urine 7.0 5.0 - 7.0    Protein Albumin Urine 30 (A) Negative mg/dL    Urobilinogen Urine <2.0 <2.0 mg/dL    Nitrite Urine Negative Negative    Leukocyte Esterase Urine Negative Negative    Mucus Urine Present (A) None Seen /LPF    RBC Urine 7 (H) <=2 /HPF    WBC Urine 0 <=5 /HPF   Drugs of Abuse 1 Panel, Urine ([facility] Only)    Collection Time: 05/29/23  5:12 PM   Result Value Ref Range    Amphetamines Urine Screen Positive (A) Screen Negative    Benzodiazepines Urine Screen Negative Screen Negative    Opiates Urine Screen Negative Screen Negative    PCP Urine Screen Negative Screen Negative    Cannabinoids Urine Screen Negative Screen Negative    Barbiturates Urine Screen Negative Screen Negative    Cocaine Urine Screen Negative Screen Negative    Oxycodone Urine Screen Negative Screen Negative    Creatinine Urine mg/dL 180 mg/dL   Symptomatic Influenza A/B, RSV, & SARS-CoV2 PCR (COVID-19) Nasopharyngeal    Collection Time: 05/29/23  6:32 PM    Specimen: Nasopharyngeal; Swab   Result Value Ref Range    Influenza A PCR Negative Negative    Influenza B PCR Negative Negative    RSV PCR Negative Negative    SARS CoV2 PCR Negative Negative   Lactic acid whole blood    Collection Time: 05/29/23  9:43 PM   Result Value Ref Range    Lactic Acid 0.9 0.7 - 2.0 mmol/L       Lab Results   Component Value Date    ABORH A POS 04/19/2023           RADIOLOGY:  Reviewed all pertinent imaging. Please see official radiology report.    CT Abdomen Pelvis w Contrast   Final Result   IMPRESSION:    1.  No visualized explanation for patient's abdominal pain.      CT Chest Pulmonary Embolism w Contrast   Final Result   IMPRESSION:   1.  Limited exam due to respiratory motion. No central or definite peripheral pulmonary artery embolism.   2.  Moderate emphysema and bronchiolitis. No pneumonic consolidation or  pleural effusion.   3.  Lower right thyroid lobe 10 mm nodule. No follow-up is indicated per guidelines below.         REFERENCE:   Tripp IGLESIASK et al. Managing Incidental Thyroid Nodules Detected on Imaging: White Paper of the ACR Incidental Thyroid Findings Committee. JACR 2015; 12:143-150.      Incidental thyroid nodule detected on CT or MRI without suspicious findings. Applies to general population without limited life expectancy or significant comorbidities.      Age greater than or equal to 35 years   Less than 1.5 cm: No further evaluation.   Greater than or equal to 1.5 cm: Evaluate with thyroid ultrasound.            EKG:    Indication: Chest pain    Performed at: 16:40p  Impression: Sinus rhythm at 67 bpm.  Flipped T waves noted in aVR and V1.  FL interval 152 ms, QRS 90 ms, QTc 469 ms.  Nonspecific ST changes compared to April 27, 2023.  No signs for Brugada or delta waves.      I have independently reviewed and interpreted the EKG(s) documented above.        PROCEDURES:  none    Medical Decision Making    History:    Supplemental history from: Documented in chart, if applicable    External Record(s) reviewed: Documented in chart, if applicable.    Work Up:    Chart documentation includes differential considered and any EKGs or imaging independently interpreted by provider, where specified.    In additional to work up documented, I considered the following work up: Documented in chart, if applicable.    External consultation:    Discussion of management with another provider: Documented in chart, if applicable    Complicating factors:    Care impacted by chronic illness: N/A    Care affected by social determinants of health: N/A    Disposition considerations: Discharge. I prescribed additional prescription strength medication(s) as charted. I considered admission, but ultimately discharged patient As she is feeling much better and imaging and laboratories reassuring..        Lizzie Torres M.D.  FACEP  Emergency Medicine and Medical Toxicology  Formerly [facility] EMERGENCY ROOM  [address] [id number] [phone]  Dept: [phone]           Lizzie Torres MD  05/30/23 0033

## 2023-05-29 NOTE — LETTER
June 2, 2023        Renetta Farooq  [address] [facility] [address]          Dear Renetta Farooq:    You were seen in the [facility] Emergency Department at [facility] EMERGENCY ROOM on 5/29/2023.  We are unable to reach you by phone, so we are sending you this letter.     It is important that you call [facility] to discuss your blood culture results. Please call [phone] when you receive this letter.      Sincerely,     [facility] EMERGENCY ROOM  [address] [address]  [phone]  Dept: [phone]

## 2023-05-30 LAB
ATRIAL RATE - MUSE: 67 BPM
DIASTOLIC BLOOD PRESSURE - MUSE: 98 MMHG
ENTEROCOCCUS FAECALIS: NOT DETECTED
ENTEROCOCCUS FAECALIS: NOT DETECTED
ENTEROCOCCUS FAECIUM: NOT DETECTED
ENTEROCOCCUS FAECIUM: NOT DETECTED
INTERPRETATION ECG - MUSE: NORMAL
LISTERIA SPECIES (DETECTED/NOT DETECTED): NOT DETECTED
LISTERIA SPECIES (DETECTED/NOT DETECTED): NOT DETECTED
P AXIS - MUSE: 51 DEGREES
PR INTERVAL - MUSE: 152 MS
QRS DURATION - MUSE: 90 MS
QT - MUSE: 444 MS
QTC - MUSE: 469 MS
R AXIS - MUSE: 31 DEGREES
STAPHYLOCOCCUS AUREUS: NOT DETECTED
STAPHYLOCOCCUS AUREUS: NOT DETECTED
STAPHYLOCOCCUS EPIDERMIDIS: NOT DETECTED
STAPHYLOCOCCUS EPIDERMIDIS: NOT DETECTED
STAPHYLOCOCCUS LUGDUNENSIS: NOT DETECTED
STAPHYLOCOCCUS LUGDUNENSIS: NOT DETECTED
STAPHYLOCOCCUS SPECIES: NOT DETECTED
STAPHYLOCOCCUS SPECIES: NOT DETECTED
STREPTOCOCCUS AGALACTIAE: NOT DETECTED
STREPTOCOCCUS AGALACTIAE: NOT DETECTED
STREPTOCOCCUS ANGINOSUS GROUP: NOT DETECTED
STREPTOCOCCUS ANGINOSUS GROUP: NOT DETECTED
STREPTOCOCCUS PNEUMONIAE: NOT DETECTED
STREPTOCOCCUS PNEUMONIAE: NOT DETECTED
STREPTOCOCCUS PYOGENES: NOT DETECTED
STREPTOCOCCUS PYOGENES: NOT DETECTED
STREPTOCOCCUS SPECIES: DETECTED
STREPTOCOCCUS SPECIES: NOT DETECTED
SYSTOLIC BLOOD PRESSURE - MUSE: 188 MMHG
T AXIS - MUSE: 61 DEGREES
VENTRICULAR RATE- MUSE: 67 BPM

## 2023-05-30 NOTE — ED NOTES
"Pt with increasing agitation and elevated BP; she states that she has not taken her suboxone yet today (8mg every day per pt report) and in discussion with MD she reported that she HAS taken it. MD to have pharmacy consult and will try some ativan in the meantime.     When offered the ativan, pt was very appreciative and states that \"that helps a lot usually.\" IV fluids infusing well.  "

## 2023-05-30 NOTE — PHARMACY-ADMISSION MEDICATION HISTORY
Pharmacist Admission Medication History    Admission medication history is complete. The information provided in this note is only as accurate as the sources available at the time of the update.    Medication reconciliation/reorder completed by provider prior to medication history? No    Information Source(s): Patient and CareEverywhere/SureScripts via in-person    Pertinent Information: patient reports taking both cyclobenzaprine and hydroxyzine together 4 times daily.     Changes made to PTA medication list:    Added: amlodipine    Deleted: nitrofurantoin     Changed: cyclobenzaprine prn to qid, furosemide scheduled to prn, gabapentin tid to qid, hydroxyzine prn to qid, magnesium 400mg to 800mg    Medication Affordability:  Not including over the counter (OTC) medications, was there a time in the past 3 months when you did not take your medications as prescribed because of cost?: No    Allergies reviewed with patient and updates made in EHR: yes    Medication History Completed By: Aries Brooke RPH 2023 9:05 PM    Prior to Admission medications    Medication Sig Last Dose Taking? Auth Provider Long Term End Date   albuterol (PROAIR HFA;PROVENTIL HFA;VENTOLIN HFA) 90 mcg/actuation inhaler [ALBUTEROL (PROAIR HFA;PROVENTIL HFA;VENTOLIN HFA) 90 MCG/ACTUATION INHALER] Inhale 2 puffs every 6 (six) hours as needed for wheezing. 2023 at AM; has  one Yes Leora Hogan MD Yes    amLODIPine (NORVASC) 5 MG tablet Take 5 mg by mouth daily 2023 at AM Yes Unknown, Entered By History No    buprenorphine HCl-naloxone HCl (SUBOXONE) 4-1 MG per film Place 1 Film under the tongue 2 times daily 2023 Yes Unknown, Entered By History     calcium carbonate (TUMS) 500 MG chewable tablet Take 1 tablet (500 mg) by mouth 2 times daily 2023 at AM Yes Ray Corona DO     cloNIDine (CATAPRES) 0.1 MG tablet Take 2 tablets (0.2 mg) by mouth 2 times daily 2023 at AM Yes Mark Thomas MD Yes     cyclobenzaprine (FLEXERIL) 5 MG tablet Take 1 tablet (5 mg) by mouth every 8 hours as needed for muscle spasms (Use if voltaren ineffective)  Patient taking differently: Take 5 mg by mouth 4 times daily 5/29/2023 at x2 Yes Mark Thomas MD     diclofenac (VOLTAREN) 1 % topical gel Apply 4 g topically 4 times daily as needed for moderate pain Unknown at has with Yes Micheal Henley MD     docusate sodium (COLACE) 100 MG capsule [DOCUSATE SODIUM (COLACE) 100 MG CAPSULE] Take 1 capsule (100 mg total) by mouth 2 (two) times a day. 5/29/2023 at AM Yes Leora Hogan MD     FLUoxetine (PROZAC) 40 MG capsule Take 1 capsule (40 mg) by mouth daily for 30 days 5/29/2023 at AM Yes Ray Corona DO Yes 5/29/23   fluticasone-salmeterol (ADVAIR) 250-50 MCG/ACT inhaler Inhale 1 puff into the lungs daily 5/29/2023 at AM; not with but can bring in Yes Unknown, Entered By History No    furosemide (LASIX) 20 MG tablet Take 20 mg by mouth daily as needed Take if gained 5 lbs Past Week at prn Yes Unknown, Entered By History No    gabapentin (NEURONTIN) 300 MG capsule Take 3 capsules (900 mg) by mouth 3 times daily  Patient taking differently: Take 900 mg by mouth 4 times daily 5/28/2023 at run out of Yes Mark Thomas MD Yes    hydrocortisone (CORTEF) 10 MG tablet Take 20 mg (2 tablets) in the morning and then take 10 mg (1 tablet) in the afternoon.   Follow-up with Endocrinology or your Primary Care Physician for further changes. 5/29/2023 at done for today Yes Mark Thomas MD Yes    hydrOXYzine (ATARAX) 25 MG tablet Take 1-2 tablets (25-50 mg) by mouth every 6 hours as needed for other or anxiety (adjuvant pain)  Patient taking differently: Take 25 mg by mouth 4 times daily 5/29/2023 at x2 Yes Mark Thomas MD     ibuprofen (ADVIL/MOTRIN) 200 MG tablet Take 600 mg by mouth every 8 hours as needed for pain 5/28/2023 at prn Yes Unknown, Entered By History     ipratropium - albuterol 0.5 mg/2.5 mg/3 mL (DUONEB)  0.5-2.5 (3) MG/3ML neb solution Take 1 vial (3 mLs) by nebulization 3 times daily For 1 week then every 6 hours as needed Unknown at prn Yes Ray Corona DO Yes    magnesium oxide (MAG-OX) 400 MG tablet Take 1 tablet (400 mg) by mouth daily  Patient taking differently: Take 800 mg by mouth daily 5/29/2023 at AM Yes Ray Corona DO     nicotine (NICODERM CQ) 14 MG/24HR 24 hr patch Place 1 patch onto the skin daily 5/28/2023 at currently not on Yes Laila Grayson MD     NYSTOP [id number] UNIT/GM powder Apply topically 3 times daily as needed Unknown at prn Yes Reported, Patient     omeprazole (PRILOSEC) 20 MG DR capsule Take 20 mg by mouth daily as needed 5/29/2023 at AM Yes Reported, Patient     potassium chloride ER (KLOR-CON M) 20 MEQ CR tablet Take 20 mEq by mouth daily 5/29/2023 at AM Yes Unknown, Entered By History No    QUEtiapine (SEROQUEL) 100 MG tablet Take 1 tablet (100 mg) by mouth every morning 5/29/2023 at AM Yes Mark Thomas MD Yes    QUEtiapine (SEROQUEL) 300 MG tablet Take 1 tablet (300 mg) by mouth At Bedtime 5/28/2023 at HS Yes Mark Thomas MD Yes    Vitamin D3 (CHOLECALCIFEROL) 25 mcg (1000 units) tablet Take 1 tablet (25 mcg) by mouth daily 5/29/2023 at AM Yes Ray Corona DO

## 2023-05-30 NOTE — DISCHARGE INSTRUCTIONS
Overall your tests look very good.  You do have a spot on your thyroid.  If you have not already had one you should have an ultrasound to look at your thyroid and your family doctor can help you arrange this.    Your blood test do show that your sodium level is dropping and this should be monitored by your family doctor.  Be sure that you are eating a well-balanced diet.    Continue to take all your usual home medications.  Make an appointment to see your family doctor for this week for medication refills and reevaluation.    Return the emergency department with worsening shortness of breath, fever, or any other concerns.    Thank you for choosing [facility] Emergency Department.  It has been my pleasure caring for you today.     ~Dr. Melissa MD

## 2023-06-01 LAB
BACTERIA BLD CULT: ABNORMAL
BACTERIA BLD CULT: ABNORMAL

## 2023-06-03 LAB
BACTERIA BLD CULT: ABNORMAL
BACTERIA BLD CULT: ABNORMAL

## 2023-06-08 ENCOUNTER — HOSPITAL ENCOUNTER (INPATIENT)
Facility: CLINIC | Age: 68
LOS: 5 days | Discharge: HOME | DRG: 158 | End: 2023-06-13
Attending: STUDENT IN AN ORGANIZED HEALTH CARE EDUCATION/TRAINING PROGRAM | Admitting: INTERNAL MEDICINE
Payer: COMMERCIAL

## 2023-06-08 ENCOUNTER — APPOINTMENT (OUTPATIENT)
Dept: RADIOLOGY | Facility: CLINIC | Age: 68
DRG: 158 | End: 2023-06-08
Attending: STUDENT IN AN ORGANIZED HEALTH CARE EDUCATION/TRAINING PROGRAM
Payer: COMMERCIAL

## 2023-06-08 DIAGNOSIS — F41.0 PANIC ATTACK: ICD-10-CM

## 2023-06-08 DIAGNOSIS — A40.9 SEPSIS DUE TO STREPTOCOCCUS SPECIES, UNSPECIFIED WHETHER ACUTE ORGAN DYSFUNCTION PRESENT (H): ICD-10-CM

## 2023-06-08 DIAGNOSIS — M54.2 CHRONIC NECK PAIN: ICD-10-CM

## 2023-06-08 DIAGNOSIS — I10 BENIGN ESSENTIAL HYPERTENSION: Primary | ICD-10-CM

## 2023-06-08 DIAGNOSIS — G89.29 CHRONIC NECK PAIN: ICD-10-CM

## 2023-06-08 DIAGNOSIS — R78.81 POSITIVE BLOOD CULTURES: ICD-10-CM

## 2023-06-08 LAB
ALBUMIN SERPL-MCNC: 4.3 G/DL (ref 3.5–5)
ALBUMIN UR-MCNC: NEGATIVE MG/DL
ALP SERPL-CCNC: 73 U/L (ref 45–120)
ALT SERPL W P-5'-P-CCNC: <9 U/L (ref 0–45)
ANION GAP SERPL CALCULATED.3IONS-SCNC: 9 MMOL/L (ref 5–18)
APPEARANCE UR: CLEAR
AST SERPL W P-5'-P-CCNC: 15 U/L (ref 0–40)
ATRIAL RATE - MUSE: 82 BPM
BASE EXCESS BLDV CALC-SCNC: 1.5 MMOL/L
BASOPHILS # BLD AUTO: 0 10E3/UL (ref 0–0.2)
BASOPHILS NFR BLD AUTO: 0 %
BILIRUB SERPL-MCNC: 0.8 MG/DL (ref 0–1)
BILIRUB UR QL STRIP: NEGATIVE
BUN SERPL-MCNC: 8 MG/DL (ref 8–22)
C REACTIVE PROTEIN LHE: <0.1 MG/DL (ref 0–?)
CALCIUM SERPL-MCNC: 9.5 MG/DL (ref 8.5–10.5)
CHLORIDE BLD-SCNC: 96 MMOL/L (ref 98–107)
CO2 SERPL-SCNC: 26 MMOL/L (ref 22–31)
COLOR UR AUTO: COLORLESS
CREAT SERPL-MCNC: 0.88 MG/DL (ref 0.6–1.1)
DIASTOLIC BLOOD PRESSURE - MUSE: 79 MMHG
EOSINOPHIL # BLD AUTO: 0.1 10E3/UL (ref 0–0.7)
EOSINOPHIL NFR BLD AUTO: 1 %
ERYTHROCYTE [DISTWIDTH] IN BLOOD BY AUTOMATED COUNT: 12.1 % (ref 10–15)
GFR SERPL CREATININE-BSD FRML MDRD: 71 ML/MIN/1.73M2
GLUCOSE BLD-MCNC: 92 MG/DL (ref 70–125)
GLUCOSE UR STRIP-MCNC: NEGATIVE MG/DL
HCO3 BLDV-SCNC: 27 MMOL/L (ref 24–30)
HCT VFR BLD AUTO: 37.9 % (ref 35–47)
HGB BLD-MCNC: 12.7 G/DL (ref 11.7–15.7)
HGB UR QL STRIP: NEGATIVE
IMM GRANULOCYTES # BLD: 0 10E3/UL
IMM GRANULOCYTES NFR BLD: 1 %
INTERPRETATION ECG - MUSE: NORMAL
KETONES UR STRIP-MCNC: NEGATIVE MG/DL
LACTATE SERPL-SCNC: 1.1 MMOL/L (ref 0.7–2)
LEUKOCYTE ESTERASE UR QL STRIP: ABNORMAL
LYMPHOCYTES # BLD AUTO: 1.1 10E3/UL (ref 0.8–5.3)
LYMPHOCYTES NFR BLD AUTO: 18 %
MAGNESIUM SERPL-MCNC: 1.8 MG/DL (ref 1.8–2.6)
MCH RBC QN AUTO: 29.6 PG (ref 26.5–33)
MCHC RBC AUTO-ENTMCNC: 33.5 G/DL (ref 31.5–36.5)
MCV RBC AUTO: 88 FL (ref 78–100)
MONOCYTES # BLD AUTO: 0.3 10E3/UL (ref 0–1.3)
MONOCYTES NFR BLD AUTO: 6 %
NEUTROPHILS # BLD AUTO: 4.4 10E3/UL (ref 1.6–8.3)
NEUTROPHILS NFR BLD AUTO: 74 %
NITRATE UR QL: NEGATIVE
NRBC # BLD AUTO: 0 10E3/UL
NRBC BLD AUTO-RTO: 0 /100
OXYHGB MFR BLDV: 57.1 % (ref 70–75)
P AXIS - MUSE: 58 DEGREES
PCO2 BLDV: 45 MM HG (ref 35–50)
PH BLDV: 7.38 [PH] (ref 7.35–7.45)
PH UR STRIP: 7 [PH] (ref 5–7)
PLATELET # BLD AUTO: 168 10E3/UL (ref 150–450)
PO2 BLDV: 28 MM HG (ref 25–47)
POTASSIUM BLD-SCNC: 4.3 MMOL/L (ref 3.5–5)
PR INTERVAL - MUSE: 168 MS
PROCALCITONIN SERPL-MCNC: 0.05 NG/ML (ref 0–0.49)
PROT SERPL-MCNC: 7 G/DL (ref 6–8)
QRS DURATION - MUSE: 92 MS
QT - MUSE: 402 MS
QTC - MUSE: 469 MS
R AXIS - MUSE: 38 DEGREES
RBC # BLD AUTO: 4.29 10E6/UL (ref 3.8–5.2)
RBC URINE: 2 /HPF
SAO2 % BLDV: 60.2 % (ref 70–75)
SODIUM SERPL-SCNC: 131 MMOL/L (ref 136–145)
SP GR UR STRIP: 1.01 (ref 1–1.03)
SQUAMOUS EPITHELIAL: 3 /HPF
SYSTOLIC BLOOD PRESSURE - MUSE: 115 MMHG
T AXIS - MUSE: 69 DEGREES
UROBILINOGEN UR STRIP-MCNC: <2 MG/DL
VENTRICULAR RATE- MUSE: 82 BPM
WBC # BLD AUTO: 5.9 10E3/UL (ref 4–11)
WBC URINE: 7 /HPF

## 2023-06-08 PROCEDURE — 250N000013 HC RX MED GY IP 250 OP 250 PS 637: Performed by: STUDENT IN AN ORGANIZED HEALTH CARE EDUCATION/TRAINING PROGRAM

## 2023-06-08 PROCEDURE — 96365 THER/PROPH/DIAG IV INF INIT: CPT

## 2023-06-08 PROCEDURE — 99291 CRITICAL CARE FIRST HOUR: CPT | Mod: 25

## 2023-06-08 PROCEDURE — 84145 PROCALCITONIN (PCT): CPT | Performed by: STUDENT IN AN ORGANIZED HEALTH CARE EDUCATION/TRAINING PROGRAM

## 2023-06-08 PROCEDURE — 85025 COMPLETE CBC W/AUTO DIFF WBC: CPT | Performed by: STUDENT IN AN ORGANIZED HEALTH CARE EDUCATION/TRAINING PROGRAM

## 2023-06-08 PROCEDURE — 96368 THER/DIAG CONCURRENT INF: CPT

## 2023-06-08 PROCEDURE — 93005 ELECTROCARDIOGRAM TRACING: CPT | Performed by: INTERNAL MEDICINE

## 2023-06-08 PROCEDURE — 250N000011 HC RX IP 250 OP 636: Performed by: INTERNAL MEDICINE

## 2023-06-08 PROCEDURE — 80053 COMPREHEN METABOLIC PANEL: CPT | Performed by: STUDENT IN AN ORGANIZED HEALTH CARE EDUCATION/TRAINING PROGRAM

## 2023-06-08 PROCEDURE — 250N000013 HC RX MED GY IP 250 OP 250 PS 637: Performed by: INTERNAL MEDICINE

## 2023-06-08 PROCEDURE — 83735 ASSAY OF MAGNESIUM: CPT | Performed by: INTERNAL MEDICINE

## 2023-06-08 PROCEDURE — 99223 1ST HOSP IP/OBS HIGH 75: CPT | Performed by: INTERNAL MEDICINE

## 2023-06-08 PROCEDURE — 87040 BLOOD CULTURE FOR BACTERIA: CPT | Performed by: STUDENT IN AN ORGANIZED HEALTH CARE EDUCATION/TRAINING PROGRAM

## 2023-06-08 PROCEDURE — 83605 ASSAY OF LACTIC ACID: CPT | Performed by: STUDENT IN AN ORGANIZED HEALTH CARE EDUCATION/TRAINING PROGRAM

## 2023-06-08 PROCEDURE — 258N000003 HC RX IP 258 OP 636: Performed by: INTERNAL MEDICINE

## 2023-06-08 PROCEDURE — 258N000003 HC RX IP 258 OP 636: Performed by: STUDENT IN AN ORGANIZED HEALTH CARE EDUCATION/TRAINING PROGRAM

## 2023-06-08 PROCEDURE — 120N000001 HC R&B MED SURG/OB

## 2023-06-08 PROCEDURE — 71046 X-RAY EXAM CHEST 2 VIEWS: CPT

## 2023-06-08 PROCEDURE — 86140 C-REACTIVE PROTEIN: CPT | Performed by: STUDENT IN AN ORGANIZED HEALTH CARE EDUCATION/TRAINING PROGRAM

## 2023-06-08 PROCEDURE — 36415 COLL VENOUS BLD VENIPUNCTURE: CPT | Performed by: STUDENT IN AN ORGANIZED HEALTH CARE EDUCATION/TRAINING PROGRAM

## 2023-06-08 PROCEDURE — 81001 URINALYSIS AUTO W/SCOPE: CPT | Performed by: STUDENT IN AN ORGANIZED HEALTH CARE EDUCATION/TRAINING PROGRAM

## 2023-06-08 PROCEDURE — 82805 BLOOD GASES W/O2 SATURATION: CPT | Performed by: STUDENT IN AN ORGANIZED HEALTH CARE EDUCATION/TRAINING PROGRAM

## 2023-06-08 PROCEDURE — 250N000011 HC RX IP 250 OP 636: Performed by: STUDENT IN AN ORGANIZED HEALTH CARE EDUCATION/TRAINING PROGRAM

## 2023-06-08 RX ORDER — PIPERACILLIN SODIUM, TAZOBACTAM SODIUM 3; .375 G/15ML; G/15ML
3.38 INJECTION, POWDER, LYOPHILIZED, FOR SOLUTION INTRAVENOUS EVERY 8 HOURS
Status: DISCONTINUED | OUTPATIENT
Start: 2023-06-08 | End: 2023-06-08

## 2023-06-08 RX ORDER — DOCUSATE SODIUM 100 MG/1
100 CAPSULE, LIQUID FILLED ORAL DAILY
COMMUNITY

## 2023-06-08 RX ORDER — ALBUTEROL SULFATE 90 UG/1
2 AEROSOL, METERED RESPIRATORY (INHALATION) EVERY 6 HOURS PRN
Status: DISCONTINUED | OUTPATIENT
Start: 2023-06-08 | End: 2023-06-13 | Stop reason: HOSPADM

## 2023-06-08 RX ORDER — ENOXAPARIN SODIUM 100 MG/ML
40 INJECTION SUBCUTANEOUS EVERY 24 HOURS
Status: DISCONTINUED | OUTPATIENT
Start: 2023-06-08 | End: 2023-06-13 | Stop reason: HOSPADM

## 2023-06-08 RX ORDER — CLONIDINE HYDROCHLORIDE 0.1 MG/1
0.2 TABLET ORAL 2 TIMES DAILY
Status: DISCONTINUED | OUTPATIENT
Start: 2023-06-08 | End: 2023-06-13 | Stop reason: HOSPADM

## 2023-06-08 RX ORDER — QUETIAPINE FUMARATE 100 MG/1
300 TABLET, FILM COATED ORAL AT BEDTIME
Status: DISCONTINUED | OUTPATIENT
Start: 2023-06-08 | End: 2023-06-13 | Stop reason: HOSPADM

## 2023-06-08 RX ORDER — PANTOPRAZOLE SODIUM 40 MG/1
40 TABLET, DELAYED RELEASE ORAL DAILY PRN
Status: DISCONTINUED | OUTPATIENT
Start: 2023-06-08 | End: 2023-06-13 | Stop reason: HOSPADM

## 2023-06-08 RX ORDER — SODIUM CHLORIDE 9 MG/ML
INJECTION, SOLUTION INTRAVENOUS CONTINUOUS
Status: DISCONTINUED | OUTPATIENT
Start: 2023-06-08 | End: 2023-06-13

## 2023-06-08 RX ORDER — NICOTINE 21 MG/24HR
1 PATCH, TRANSDERMAL 24 HOURS TRANSDERMAL DAILY
Status: DISCONTINUED | OUTPATIENT
Start: 2023-06-09 | End: 2023-06-13 | Stop reason: HOSPADM

## 2023-06-08 RX ORDER — HYDROXYZINE HYDROCHLORIDE 25 MG/1
25 TABLET, FILM COATED ORAL EVERY 6 HOURS PRN
Status: DISCONTINUED | OUTPATIENT
Start: 2023-06-08 | End: 2023-06-12

## 2023-06-08 RX ORDER — DOCUSATE SODIUM 100 MG/1
100 CAPSULE, LIQUID FILLED ORAL DAILY
Status: DISCONTINUED | OUTPATIENT
Start: 2023-06-09 | End: 2023-06-13 | Stop reason: HOSPADM

## 2023-06-08 RX ORDER — GABAPENTIN 300 MG/1
900 CAPSULE ORAL 3 TIMES DAILY
Status: DISCONTINUED | OUTPATIENT
Start: 2023-06-08 | End: 2023-06-13 | Stop reason: HOSPADM

## 2023-06-08 RX ORDER — IPRATROPIUM BROMIDE AND ALBUTEROL SULFATE 2.5; .5 MG/3ML; MG/3ML
1 SOLUTION RESPIRATORY (INHALATION) EVERY 6 HOURS PRN
COMMUNITY

## 2023-06-08 RX ORDER — HYDROCORTISONE 20 MG/1
20 TABLET ORAL ONCE
Status: COMPLETED | OUTPATIENT
Start: 2023-06-08 | End: 2023-06-08

## 2023-06-08 RX ORDER — IOPAMIDOL 755 MG/ML
100 INJECTION, SOLUTION INTRAVASCULAR ONCE
Status: DISCONTINUED | OUTPATIENT
Start: 2023-06-08 | End: 2023-06-13 | Stop reason: HOSPADM

## 2023-06-08 RX ORDER — MAGNESIUM OXIDE 400 MG/1
800 TABLET ORAL DAILY
COMMUNITY

## 2023-06-08 RX ORDER — AMLODIPINE BESYLATE 5 MG/1
5 TABLET ORAL DAILY
Status: DISCONTINUED | OUTPATIENT
Start: 2023-06-08 | End: 2023-06-13 | Stop reason: HOSPADM

## 2023-06-08 RX ORDER — PIPERACILLIN SODIUM, TAZOBACTAM SODIUM 3; .375 G/15ML; G/15ML
3.38 INJECTION, POWDER, LYOPHILIZED, FOR SOLUTION INTRAVENOUS ONCE
Status: COMPLETED | OUTPATIENT
Start: 2023-06-08 | End: 2023-06-08

## 2023-06-08 RX ORDER — IPRATROPIUM BROMIDE AND ALBUTEROL SULFATE 2.5; .5 MG/3ML; MG/3ML
1 SOLUTION RESPIRATORY (INHALATION) EVERY 6 HOURS PRN
Status: DISCONTINUED | OUTPATIENT
Start: 2023-06-08 | End: 2023-06-13 | Stop reason: HOSPADM

## 2023-06-08 RX ORDER — BUPRENORPHINE AND NALOXONE 2; .5 MG/1; MG/1
2 FILM, SOLUBLE BUCCAL; SUBLINGUAL 2 TIMES DAILY
Status: DISCONTINUED | OUTPATIENT
Start: 2023-06-08 | End: 2023-06-13 | Stop reason: HOSPADM

## 2023-06-08 RX ORDER — QUETIAPINE FUMARATE 100 MG/1
100 TABLET, FILM COATED ORAL EVERY MORNING
Status: DISCONTINUED | OUTPATIENT
Start: 2023-06-09 | End: 2023-06-13 | Stop reason: HOSPADM

## 2023-06-08 RX ORDER — HYDROCORTISONE 20 MG/1
20 TABLET ORAL DAILY
Status: DISCONTINUED | OUTPATIENT
Start: 2023-06-09 | End: 2023-06-13 | Stop reason: HOSPADM

## 2023-06-08 RX ORDER — HYDROXYZINE HYDROCHLORIDE 25 MG/1
50 TABLET, FILM COATED ORAL EVERY 6 HOURS PRN
Status: DISCONTINUED | OUTPATIENT
Start: 2023-06-08 | End: 2023-06-12

## 2023-06-08 RX ORDER — HYDROCORTISONE 10 MG/1
10 TABLET ORAL
Status: DISCONTINUED | OUTPATIENT
Start: 2023-06-08 | End: 2023-06-13 | Stop reason: HOSPADM

## 2023-06-08 RX ORDER — FLUTICASONE FUROATE AND VILANTEROL 100; 25 UG/1; UG/1
1 POWDER RESPIRATORY (INHALATION) DAILY
Status: DISCONTINUED | OUTPATIENT
Start: 2023-06-09 | End: 2023-06-13 | Stop reason: HOSPADM

## 2023-06-08 RX ORDER — PIPERACILLIN SODIUM, TAZOBACTAM SODIUM 3; .375 G/15ML; G/15ML
3.38 INJECTION, POWDER, LYOPHILIZED, FOR SOLUTION INTRAVENOUS EVERY 8 HOURS
Status: DISCONTINUED | OUTPATIENT
Start: 2023-06-08 | End: 2023-06-09

## 2023-06-08 RX ORDER — CLONIDINE HYDROCHLORIDE 0.1 MG/1
0.2 TABLET ORAL ONCE
Status: COMPLETED | OUTPATIENT
Start: 2023-06-08 | End: 2023-06-08

## 2023-06-08 RX ORDER — HYDROXYZINE HYDROCHLORIDE 25 MG/1
25-50 TABLET, FILM COATED ORAL EVERY 6 HOURS PRN
Status: DISCONTINUED | OUTPATIENT
Start: 2023-06-08 | End: 2023-06-13 | Stop reason: HOSPADM

## 2023-06-08 RX ORDER — CYCLOBENZAPRINE HCL 5 MG
5 TABLET ORAL EVERY 8 HOURS PRN
Status: DISCONTINUED | OUTPATIENT
Start: 2023-06-08 | End: 2023-06-13 | Stop reason: HOSPADM

## 2023-06-08 RX ADMIN — CLONIDINE HYDROCHLORIDE 0.2 MG: 0.1 TABLET ORAL at 13:22

## 2023-06-08 RX ADMIN — CYCLOBENZAPRINE HYDROCHLORIDE 5 MG: 5 TABLET, FILM COATED ORAL at 21:45

## 2023-06-08 RX ADMIN — HYDROCORTISONE 20 MG: 20 TABLET ORAL at 13:57

## 2023-06-08 RX ADMIN — GABAPENTIN 900 MG: 300 CAPSULE ORAL at 19:34

## 2023-06-08 RX ADMIN — QUETIAPINE FUMARATE 300 MG: 300 TABLET ORAL at 21:48

## 2023-06-08 RX ADMIN — PIPERACILLIN AND TAZOBACTAM 3.38 G: 3; .375 INJECTION, POWDER, LYOPHILIZED, FOR SOLUTION INTRAVENOUS at 19:30

## 2023-06-08 RX ADMIN — PIPERACILLIN AND TAZOBACTAM 3.38 G: 3; .375 INJECTION, POWDER, LYOPHILIZED, FOR SOLUTION INTRAVENOUS at 13:43

## 2023-06-08 RX ADMIN — BUPRENORPHINE AND NALOXONE 2 FILM: 2; .5 FILM BUCCAL; SUBLINGUAL at 19:45

## 2023-06-08 RX ADMIN — SODIUM CHLORIDE 1000 ML: 9 INJECTION, SOLUTION INTRAVENOUS at 13:59

## 2023-06-08 RX ADMIN — ENOXAPARIN SODIUM 40 MG: 100 INJECTION SUBCUTANEOUS at 18:12

## 2023-06-08 RX ADMIN — HYDROCORTISONE 10 MG: 10 TABLET ORAL at 16:52

## 2023-06-08 RX ADMIN — AMLODIPINE BESYLATE 5 MG: 5 TABLET ORAL at 18:11

## 2023-06-08 RX ADMIN — VANCOMYCIN HYDROCHLORIDE 1250 MG: 5 INJECTION, POWDER, LYOPHILIZED, FOR SOLUTION INTRAVENOUS at 14:00

## 2023-06-08 RX ADMIN — CLONIDINE HYDROCHLORIDE 0.2 MG: 0.1 TABLET ORAL at 19:34

## 2023-06-08 RX ADMIN — HYDROXYZINE HYDROCHLORIDE 50 MG: 25 TABLET ORAL at 21:45

## 2023-06-08 RX ADMIN — SODIUM CHLORIDE: 9 INJECTION, SOLUTION INTRAVENOUS at 19:47

## 2023-06-08 ASSESSMENT — ACTIVITIES OF DAILY LIVING (ADL)
WEAR_GLASSES_OR_BLIND: YES
TOILETING_ISSUES: NO
DOING_ERRANDS_INDEPENDENTLY_DIFFICULTY: NO
ADLS_ACUITY_SCORE: 35
ADLS_ACUITY_SCORE: 31
ADLS_ACUITY_SCORE: 35
WALKING_OR_CLIMBING_STAIRS_DIFFICULTY: NO
DEPENDENT_IADLS:: INDEPENDENT
FALL_HISTORY_WITHIN_LAST_SIX_MONTHS: NO
DIFFICULTY_EATING/SWALLOWING: NO
DRESSING/BATHING_DIFFICULTY: NO
ADLS_ACUITY_SCORE: 35
CHANGE_IN_FUNCTIONAL_STATUS_SINCE_ONSET_OF_CURRENT_ILLNESS/INJURY: NO
HEARING_DIFFICULTY_OR_DEAF: NO
ADLS_ACUITY_SCORE: 35
DIFFICULTY_COMMUNICATING: NO
VISION_MANAGEMENT: GLASSES
EQUIPMENT_CURRENTLY_USED_AT_HOME: WALKER, ROLLING;CANE, QUAD
CONCENTRATING,_REMEMBERING_OR_MAKING_DECISIONS_DIFFICULTY: NO
ADLS_ACUITY_SCORE: 35

## 2023-06-08 NOTE — H&P
Admission History & Physical  Renetta Farooq, 1955, [id number]    [facility]  Jamila Connelly, [phone]    Assessment and Plan:  68 year old female possible history significant for COPD, hepatitis C, bipolar disorder, anxiety depression, methamphetamine dependence on Suboxone alcohol dependence in remission, recently admitted to [facility] 4/27 - 4/29 for ESBL E. coli UTI, COPD exacerbation, discharged on nitrofurantoin, was seen in the ED on 5/29 for shortness of breath, urinary frequency, fevers, dental pain, likely infection her work-up was unremarkable was sent out from ED, was told to come to the ED if for positive blood cultures from 5/29.  Likely sources dental infection which was recent and she had spontaneous loss of teeth.     Positive blood cultures 5/29  Recent dental infection  She had blood cultures positive for strep mitis, Rothia mucilaginosa.    Both of these bacteria can be oral sunitha and it can cause dental infections  Repeat blood cultures x2 done today  No leukocytosis, CRP is normal  Empiric Zosyn  Follow-up on repeat blood cultures  If persistent bacteremia would consider echo  Check CT facial bones to rule out any dental abscess    Fever, cough  Chest x-ray no acute changes  Could be bronchitis  Check sputum cultures  Continue Zosyn  Urinalysis pending    History of COPD  No sign of exacerbation  Continue home inhalers, nebulizers  She is on room air    Mild hyponatremia  She received 1 L normal saline bolus.  Gentle fluids    History of methamphetamine dependence  Continue home Suboxone  History of hepatitis C status posttreatment  History of adrenal insufficiency  Has not started taking hydrocortisone prescribed during her previous admission   Ordered.  She is hemodynamically stable.  Homelessnes  Anxiety depression  Continue home Seroquel, gabapentin  DVT prophylaxis: lovenox    Expected length of stay : anticipate hospitalization more than 2 days.      Chief Complaint: Positive blood culture from 5/29    HPI:     Renetta Farooq is a 68 year old female possible history significant for COPD, hepatitis C, bipolar disorder, anxiety depression, methamphetamine dependence on Suboxone alcohol dependence in remission, recently admitted to [facility] 4/27 - 4/29 for ESBL E. coli UTI, COPD exacerbation, discharged on nitrofurantoin, was seen in the ED on 5/29 for shortness of breath, urinary frequency, fevers her work-up for infection was unremarkable was sent out from ED, presented again today for positive blood cultures from 5/29.  She had 2 blood cultures 1 bottle out of each set grew strep mitis, Rothia mucilaginosa.  She did not check her messages until this morning.  Subsequently she presented to the ED.  She reports 5 days history of fevers as high as 102, chills, productive cough with yellow-colored phlegm, no other chest pain, worsening shortness of breath.  No urinary symptoms.  She has diarrhea last few days.  She recently had dental pain and thinks she might have had an abscess when she presented to the ED on 5/29.  Subsequently she lost her to the following that visit to the ED.  Has not seen a dentist since then.  She denies any other IV drug use.     Work-up here today vitally she is stable, afebrile, lab work-up essentially negative except mild hyponatremia 131.  Chest x-ray no acute changes.  Urinalysis pending.    Medical History  Past Medical History:   Diagnosis Date     Acute psychosis (H) 10/14/2018     Alcohol dependence (H) 4/10/2015     Bipolar affective disorder, manic, severe, with psychotic behavior (H) 10/15/2018     Candida infection 10/19/2018     Chronic hepatitis C (H) 4/10/2015     Chronic neck pain      Closed traumatic brain injury, without loss of consciousness, sequela (H)      Dental abscess      Episodic tension-type headache, not intractable      Methamphetamine dependence (H) 10/15/2018     Pancytopenia (H) 4/10/2015      Weakness of right arm 4/10/2015      Patient Active Problem List    Diagnosis Date Noted     Positive blood cultures 06/08/2023     Priority: Medium     Sepsis due to Streptococcus species, unspecified whether acute organ dysfunction present (H) 06/08/2023     Priority: Medium     Unable to care for self 04/27/2023     Priority: Medium     Hypotension, unspecified hypotension type 04/11/2023     Priority: Medium     Hypokalemia 04/03/2023     Priority: Medium     Hypomagnesemia 04/03/2023     Priority: Medium     Dyspnea, unspecified type 04/03/2023     Priority: Medium     COPD exacerbation (H) 03/30/2023     Priority: Medium     Misuse of medication 03/10/2023     Priority: Medium     Acute diastolic heart failure (H) 02/11/2023     Priority: Medium     Other chest pain 01/22/2023     Priority: Medium     Palpitations 12/22/2022     Priority: Medium     Concussion with < 1 hr loss of consciousness 06/03/2022     Priority: Medium     Fall 06/03/2022     Priority: Medium     Injury of head 06/03/2022     Priority: Medium     Scalp laceration 06/03/2022     Priority: Medium     Adult failure to thrive 05/28/2022     Priority: Medium     Chronic bronchitis (H) 01/08/2022     Priority: Medium     Pneumonia due to COVID-19 virus 01/08/2022     Priority: Medium     HAMILTON (acute kidney injury) (H) 08/14/2020     Priority: Medium     Hyponatremia 11/14/2019     Priority: Medium     SDH (subdural hematoma) (H) 11/14/2019     Priority: Medium     Closed traumatic brain injury, without loss of consciousness, sequela (H)      Priority: Medium     Dental abscess      Priority: Medium     Episodic tension-type headache, not intractable      Priority: Medium     Alcohol use disorder, severe, dependence (H) 10/31/2018     Priority: Medium     Opioid use disorder, severe, in sustained remission, on maintenance therapy (H)      Priority: Medium     Chronic neck pain      Priority: Medium     Candida infection 10/19/2018     Priority:  Medium     Bipolar affective disorder, manic, severe, with psychotic behavior (H) 10/15/2018     Priority: Medium     Methamphetamine dependence (H) 10/15/2018     Priority: Medium     Acute psychosis (H) 10/14/2018     Priority: Medium     Platelets decreased (H) 05/16/2017     Priority: Medium     Vitamin D deficiency 05/16/2017     Priority: Medium     Lung nodules 10/03/2016     Priority: Medium     Formatting of this note might be different from the original.  Lung nodules up to 5 mm, CT due 2/17       Bipolar I disorder, current or most recent episode manic, severe (H) 08/28/2016     Priority: Medium     Degenerative disc disease, cervical 07/30/2016     Priority: Medium     Ingestion of substance 07/13/2016     Priority: Medium     Formatting of this note might be different from the original.  Inadvertent overdose of suboxone after dose change upon d/c from facility       Cocaine use disorder, severe, in controlled environment (H) 06/12/2016     Priority: Medium     Alcohol withdrawal (H) 04/11/2015     Priority: Medium     Pancytopenia (H) 04/10/2015     Priority: Medium     Weakness of right arm 04/10/2015     Priority: Medium     Chronic hepatitis C (H) 04/10/2015     Priority: Medium     Alcohol dependence (H) 04/10/2015     Priority: Medium     Encounter for long-term (current) use of medications 11/11/2013     Priority: Medium     Formatting of this note might be different from the original.  Encounter for long-term (current) use of other medications       Major depressive disorder, recurrent episode, in partial remission (H) 05/09/2013     Priority: Medium     Formatting of this note might be different from the original.  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  MRSA POSITIVE 6/18/10 NOSE       Attention deficit hyperactivity disorder (ADHD) 11/20/2008     Priority: Medium     Formatting of this note might be different from the original.  Attention Deficit Dis w Hyperactivity       Dysthymic disorder 11/20/2008     Priority: Medium     Formatting of this note might be different from the original.  Dysthymia       Drug dependence (H) 11/20/2008     Priority: Medium     Formatting of this note might be different from the original.  LW Modifier:  IV Heroin use, remission  ; Drug Dependence  NOS       Osteoarthrosis 11/20/2008     Priority: Medium     Formatting of this note might be different from the original.  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  Gambling Pathological       Anxiety state 02/27/2007     Priority: Medium     Moderate major depression (H) 11/20/2006     Priority: Medium     Formatting of this note might be different from the original.  Sees Psychiatry at [facility]       Rheumatoid arthritis (H) 10/13/2006     Priority: Medium     Formatting of this note might be different from the original.  Rheumatoid arthritis(714.0) ([facility])          Surgical History  She  has a past surgical history that includes Rhinoplasty; Cosmetic surgery; Tonsillectomy; Cervical Fusion; and Arthroscopy shoulder rotator cuff repair (Bilateral).     Past Surgical History:   Procedure Laterality Date     ARTHROSCOPY SHOULDER ROTATOR CUFF REPAIR Bilateral      CERVICAL FUSION      twice     COSMETIC SURGERY       RHINOPLASTY       TONSILLECTOMY         Allergies  Allergies   Allergen Reactions     Amoxicillin Diarrhea     Droperidol Angioedema       Prior to Admission Medications   (Not in a hospital admission)      Social History  Reviewed,  "and she  reports that she has been smoking cigarettes. She started smoking about 55 years ago. She has a 54.00 pack-year smoking history. She has never used smokeless tobacco. She reports current alcohol use. She reports that she does not use drugs.  Social History     Tobacco Use     Smoking status: Every Day     Packs/day: 1.00     Years: 54.00     Pack years: 54.00     Types: Cigarettes     Start date: 1968     Smokeless tobacco: Never     Tobacco comments:     Seen IP by [facility] on 3/31/2023   Vaping Use     Vaping status: Not on file   Substance Use Topics     Alcohol use: Yes     Comment: Alcoholic Drinks/day: \"A lot.\"  (Couldn't quantify except to repeat \"A lot\" of wine, liquor and beer)       Family History  Reviewed, and I have reviewed this patient's family history and updated it with pertinent information if needed.  Family History   Problem Relation Age of Onset     Narcolepsy Mother      Acute myelogenous leukemia Mother      Cancer Father           Review Of Systems  Complete As per admission HPI, all other systems reviewed and negative.     Physical Exam:  Vital signs:  Temp: 98.6  F (37  C) Temp src: Oral BP: (!) 141/93 Pulse: 77   Resp: 14 SpO2: 95 % O2 Device: None (Room air)   Height: 167.6 cm (5' 6\") Weight: 62.5 kg (137 lb 12.6 oz)  Estimated body mass index is 22.24 kg/m  as calculated from the following:    Height as of this encounter: 1.676 m (5' 6\").    Weight as of this encounter: 62.5 kg (137 lb 12.6 oz).    General Appearance:  Awake Alert, orientedx3, not in any apparent distress   Head:  Normocephalic, without obvious abnormality   Eyes:  PERRL, conjunctiva/corneas clear   Throat: Oral mucosa moist, essentially edentulous    Neck: Supple,  no JVD   Lungs:   Clear to auscultation bilaterally, respirations unlabored   Chest Wall:  No tenderness   Heart:  Regular rate and rhythm, S1, S2 normal,no murmur   Abdomen:   Soft, non-tender, bowel sounds present,  no guarding, rigidity  "   Extremities:  no edema, no joint swelling   Skin: Skin color, texture, turgor normal, no rashes or lesions   Neurologic: Alert and oriented X 3, no focal deficits     Results:  Labs Ordered and Resulted from Time of ED Arrival to Time of ED Departure   COMPREHENSIVE METABOLIC PANEL - Abnormal       Result Value    Sodium 131 (*)     Potassium 4.3      Chloride 96 (*)     Carbon Dioxide (CO2) 26      Anion Gap 9      Urea Nitrogen 8      Creatinine 0.88      Calcium 9.5      Glucose 92      Alkaline Phosphatase 73      AST 15      ALT <9      Protein Total 7.0      Albumin 4.3      Bilirubin Total 0.8      GFR Estimate 71     BLOOD GAS VENOUS - Abnormal    pH Venous 7.38      pCO2 Venous 45      pO2 Venous 28      Bicarbonate Venous 27      Base Excess/Deficit (+/-) 1.5      Oxyhemoglobin Venous 57.1 (*)     O2 Sat, Venous 60.2 (*)    LACTIC ACID WHOLE BLOOD - Normal    Lactic Acid 1.1     CRP INFLAMMATION - Normal    CRP <0.1     CBC WITH PLATELETS AND DIFFERENTIAL    WBC Count 5.9      RBC Count 4.29      Hemoglobin 12.7      Hematocrit 37.9      MCV 88      MCH 29.6      MCHC 33.5      RDW 12.1      Platelet Count 168      % Neutrophils 74      % Lymphocytes 18      % Monocytes 6      % Eosinophils 1      % Basophils 0      % Immature Granulocytes 1      NRBCs per 100 WBC 0      Absolute Neutrophils 4.4      Absolute Lymphocytes 1.1      Absolute Monocytes 0.3      Absolute Eosinophils 0.1      Absolute Basophils 0.0      Absolute Immature Granulocytes 0.0      Absolute NRBCs 0.0     ROUTINE UA WITH MICROSCOPIC REFLEX TO CULTURE   PROCALCITONIN   BLOOD CULTURE   BLOOD CULTURE      EKG:  EKG: pending    Imaging:   CT Chest Pulmonary Embolism w Contrast    Result Date: 5/29/2023  EXAM: CT CHEST PULMONARY EMBOLISM W CONTRAST LOCATION: [facility] DATE/TIME: 5/29/2023 7:18 PM CDT INDICATION: Shortness of breath COMPARISON: CT chest 03/20/2023 TECHNIQUE: CT chest pulmonary angiogram during  arterial phase injection of IV contrast. Multiplanar reformats and MIP reconstructions were performed. Dose reduction techniques were used. CONTRAST: 100 mL Isovue-370 FINDINGS: ANGIOGRAM CHEST: Limited exam due to streak artifact and respiratory motion. No central or definite peripheral pulmonary artery embolism. Normal caliber thoracic aorta. Suboptimal timing of the contrast bolus to evaluate for a thoracic aortic dissection.  No grossly evident thoracic aortic dissection. No CT evidence of right heart strain. LUNGS AND PLEURA: Mild tracheal secretions. Mild to moderate bronchial wall thickening with multifocal endobronchial mucoid impaction. Moderate emphysema. Stable biapical scarring. Similar right lower lobe tree-in-bud opacities. Stable incidental right minor fissural intrapulmonary lymph node. No focal consolidation or pleural effusion. MEDIASTINUM/AXILLAE: Right thyroid lobe 10 mm nodule. No thoracic adenopathy. Normal heart size and no pericardial effusion. CORONARY ARTERY CALCIFICATION: None. UPPER ABDOMEN: Normal. MUSCULOSKELETAL: Normal.     IMPRESSION: 1.  Limited exam due to respiratory motion. No central or definite peripheral pulmonary artery embolism. 2.  Moderate emphysema and bronchiolitis. No pneumonic consolidation or pleural effusion. 3.  Lower right thyroid lobe 10 mm nodule. No follow-up is indicated per guidelines below. REFERENCE: Tripp GOMEZ et al. Managing Incidental Thyroid Nodules Detected on Imaging: White Paper of the ACR Incidental Thyroid Findings Committee. JACR 2015; 12:143-150. Incidental thyroid nodule detected on CT or MRI without suspicious findings. Applies to general population without limited life expectancy or significant comorbidities. Age greater than or equal to 35 years Less than 1.5 cm: No further evaluation. Greater than or equal to 1.5 cm: Evaluate with thyroid ultrasound.    CT Abdomen Pelvis w Contrast    Result Date: 5/29/2023  EXAM: CT ABDOMEN PELVIS W CONTRAST  LOCATION: [facility] DATE/TIME: 5/29/2023 7:19 PM CDT INDICATION: abd pain COMPARISON: CT 03/30/2023 TECHNIQUE: CT scan of the abdomen and pelvis was performed following injection of IV contrast. Multiplanar reformats were obtained. Dose reduction techniques were used. CONTRAST: 100 mL Isovue 370 FINDINGS: LOWER CHEST: Please see concurrent chest CT report for findings above the diaphragm. HEPATOBILIARY: Normal. PANCREAS: Atrophic but otherwise unremarkable. SPLEEN: Normal. ADRENAL GLANDS: Normal. KIDNEYS/BLADDER: No hydronephrosis. Likely bilateral renal cysts, no specific follow-up recommended. Urinary bladder is unremarkable. BOWEL: No obstruction or inflammatory change. Extensive motion artifact noted throughout the abdomen and pelvis. LYMPH NODES: No suspicious lymphadenopathy. VASCULATURE: Moderate calcified atherosclerosis. PELVIC ORGANS: Normal. MUSCULOSKELETAL: No acute bony abnormality.     IMPRESSION: 1.  No visualized explanation for patient's abdominal pain.        > 75 MINUTES SPENT BY ME on the date of service doing chart review, history, exam, documentation & further activities per the note.      Peyton Campbell M.D  [facility] Service  Internal Medicine    6/8/2023  3:15 PM

## 2023-06-08 NOTE — ED PROVIDER NOTES
EMERGENCY DEPARTMENT ENCOUNTER       ED Course & Medical Decision Making     12:37 PM I met with the patient, obtained history, performed an initial exam, and discussed options and plan for diagnostics and treatment here in the ED.   2:49 PM Spoke with Dr Campbell specialist regarding admission, will add on UA, chest x-ray, procalcitonin, CRP    Final Impression  68 year old female presents for positive blood cultures that were drawn on 5/29/2023.  Patient seen in this ED on 5/29 for shortness of breath, dysuria, urinary frequency, had blood cultures drawn that ultimately returned positive for Rothia mucilaginosa and Streptococcus mitis group, was contacted for follow-up though patient was unable to follow-up until today, came in for reevaluation.  States that she still has been feeling unwell reporting systemic symptoms of presumed sepsis including intermittent fevers, some palpitations a little bit of shortness of breath that is present at rest though worse with exertion.  Endorses nausea, vomiting.  Denies any chest pain.  Patient also states that she has not taken the hydroxyzine, clonidine, or hydrocortisone (prescribed for adrenal insufficiency at last discharge) yet today and is requesting doses of these.  Doses of those given in addition to broad-spectrum antibiotics, will reobtain blood cultures.  White count and lactic normal today, however with positive blood cultures and patient still feeling systemically ill will admit for presumed sepsis.    Prior to making a final disposition on this patient the results of patient's tests and other diagnostic studies were discussed with the patient. All questions were answered. Patient expressed understanding of the plan and was amenable to it.    Disposition considerations: Admit.    Medications   sodium chloride (PF) 0.9% PF flush 3 mL (has no administration in time range)   sodium chloride (PF) 0.9% PF flush 3 mL (3 mLs Intracatheter $Given 6/8/23 8241)   vancomycin  (VANCOCIN) 1,250 mg in 0.9% NaCl 250 mL intermittent infusion (1,250 mg Intravenous $New Bag 6/8/23 1400)   cloNIDine (CATAPRES) tablet 0.2 mg (0.2 mg Oral $Given 6/8/23 1322)   0.9% sodium chloride BOLUS (1,000 mLs Intravenous $New Bag 6/8/23 1359)   piperacillin-tazobactam (ZOSYN) 3.375 g vial to attach to  mL bag (3.375 g Intravenous $New Bag 6/8/23 1343)   hydrocortisone (CORTEF) tablet 20 mg (20 mg Oral $Given 6/8/23 1357)         Final Impression     1. Sepsis due to Streptococcus species, unspecified whether acute organ dysfunction present (H)    2. Positive blood cultures        Critical Care     Performed by: Nir Edgar MD  Authorized by: Nir Edgar MD                   Total critical care time: 30 minutes  Critical care was necessary to treat or prevent imminent or life-threatening deterioration of the following conditions: Sepsis as evidenced by positive blood cultures  Critical care was time spent personally by me on the following activities: development of treatment plan with patient or surrogate, discussions with consultants, examination of patient, evaluation of patient's response to treatment, obtaining history from patient or surrogate, ordering and performing treatments and interventions, ordering and review of laboratory studies, ordering and review of radiographic studies, re-evaluation of patient's condition and monitoring for potential decompensation.  Critical care time was exclusive of separately billable procedures and treating other patients.    Chief Complaint     Chief Complaint   Patient presents with     Blood Culture Positive     Medication Refill       PT found out she had positive blood cultures today from [facility] from 5/29/23. Reporting subjective fevers and chills and general malaise. Also reports she is out of her Clonidine and Hydroxyzine       HPI     Renetta Farooq is a 68 year old female who presents for evaluation of a positive blood culture. The patient  states that she spoke with her PCP this morning, who notified her of abnormal lab results including a positive blood culture. The patient notes that she has also run out of her clonidine and hydroxyzine. She reports having a fever up to 102 degrees and chills since 05/29 that have not improved. The patient says that she also has PVCs, resulting in chest pain. Since the 05/29, she has experienced shortness of breath for which she uses a nebulizer four times a day. Exertion exacerbates her shortness of breath. She states that she has been nauseous and fatigued and has vomited for the last five days, especially after eating. Consequently, she has not been eating much. She denies having stomach pain, dysuria or hematuria. She reports that she was seen on 05/29 because her urine smelled foul.     Per chart review, the patient was seen on 05/29/23 at [facility] for evaluation of shortness of breath, dysuria and urinary frequency. Imaging through the chest and abdomen looked good. CT abdomen pelvis with contrast showed no visualized explanation for patient's abdominal pain. CT chest pulmonary embolism with contrast was a limited exam due to respiratory motion-no central or definite peripheral pulmonary artery embolism, moderate emphysema and bronchiolitis, no pneumonic consolidation or pleural effusion, lower right thyroid lobe 10 mm nodule.Initial lactic acid was very slightly elevated but repeat looked good. She was given her evening dose of Suboxone, dose verified with pharmacist. The plan was to discharge the patient with a small refill of her clonidine and hydroxyzine.    I, Itzel Vazquez am serving as a scribe to document services personally performed by Dr. Nir Edgar MD, based on my observation and the provider's statements to me. I, Dr. Nir Edgar MD attest that Itzel Vazquez is acting in a scribe capacity, has observed my performance of the services and has  "documented them in accordance with my direction.    Physical Exam     BP (!) 141/93   Pulse 77   Temp 98.6  F (37  C) (Oral)   Resp 14   Ht 1.676 m (5' 6\")   Wt 62.5 kg (137 lb 12.6 oz)   SpO2 95%   BMI 22.24 kg/m    Constitutional: Awake, alert, in no acute distress.  Head: Normocephalic, atraumatic.  ENT: Mucous membranes moist.  Eyes: Conjunctiva normal.  Respiratory: Respirations even, unlabored, in no acute respiratory distress.  Cardiovascular: Regular rate and rhythm. Good peripheral perfusion.  GI: Abdomen soft, non-tender.  Musculoskeletal: Moves all 4 extremities equally.  Integument: Warm, dry.  Neurologic: Alert & oriented x 3. Normal speech. Grossly normal motor and sensory function. No focal deficits noted.  Psychiatric: Normal mood    Labs & Imaging     Results for orders placed or performed during the hospital encounter of 06/08/23   Comprehensive metabolic panel   Result Value Ref Range    Sodium 131 (L) 136 - 145 mmol/L    Potassium 4.3 3.5 - 5.0 mmol/L    Chloride 96 (L) 98 - 107 mmol/L    Carbon Dioxide (CO2) 26 22 - 31 mmol/L    Anion Gap 9 5 - 18 mmol/L    Urea Nitrogen 8 8 - 22 mg/dL    Creatinine 0.88 0.60 - 1.10 mg/dL    Calcium 9.5 8.5 - 10.5 mg/dL    Glucose 92 70 - 125 mg/dL    Alkaline Phosphatase 73 45 - 120 U/L    AST 15 0 - 40 U/L    ALT <9 0 - 45 U/L    Protein Total 7.0 6.0 - 8.0 g/dL    Albumin 4.3 3.5 - 5.0 g/dL    Bilirubin Total 0.8 0.0 - 1.0 mg/dL    GFR Estimate 71 >60 mL/min/1.73m2   Lactic acid whole blood   Result Value Ref Range    Lactic Acid 1.1 0.7 - 2.0 mmol/L   Blood gas venous   Result Value Ref Range    pH Venous 7.38 7.35 - 7.45    pCO2 Venous 45 35 - 50 mm Hg    pO2 Venous 28 25 - 47 mm Hg    Bicarbonate Venous 27 24 - 30 mmol/L    Base Excess/Deficit (+/-) 1.5   mmol/L    Oxyhemoglobin Venous 57.1 (L) 70.0 - 75.0 %    O2 Sat, Venous 60.2 (L) 70.0 - 75.0 %   CBC with platelets and differential   Result Value Ref Range    WBC Count 5.9 4.0 - 11.0 10e3/uL    " RBC Count 4.29 3.80 - 5.20 10e6/uL    Hemoglobin 12.7 11.7 - 15.7 g/dL    Hematocrit 37.9 35.0 - 47.0 %    MCV 88 78 - 100 fL    MCH 29.6 26.5 - 33.0 pg    MCHC 33.5 31.5 - 36.5 g/dL    RDW 12.1 10.0 - 15.0 %    Platelet Count 168 150 - 450 10e3/uL    % Neutrophils 74 %    % Lymphocytes 18 %    % Monocytes 6 %    % Eosinophils 1 %    % Basophils 0 %    % Immature Granulocytes 1 %    NRBCs per 100 WBC 0 <1 /100    Absolute Neutrophils 4.4 1.6 - 8.3 10e3/uL    Absolute Lymphocytes 1.1 0.8 - 5.3 10e3/uL    Absolute Monocytes 0.3 0.0 - 1.3 10e3/uL    Absolute Eosinophils 0.1 0.0 - 0.7 10e3/uL    Absolute Basophils 0.0 0.0 - 0.2 10e3/uL    Absolute Immature Granulocytes 0.0 <=0.4 10e3/uL    Absolute NRBCs 0.0 10e3/uL          Nir Edgar MD  06/08/23 7885

## 2023-06-08 NOTE — PHARMACY-VANCOMYCIN DOSING SERVICE
Pharmacy Vancomycin Initial Note  Date of Service 2023  Patient's  1955  68 year old, female    Indication: Sepsis    Current estimated CrCl = Estimated Creatinine Clearance: 50.4 mL/min (based on SCr of 1.04 mg/dL).    Creatinine for last 3 days  No results found for requested labs within last 3 days.    Recent Vancomycin Level(s) for last 3 days  No results found for requested labs within last 3 days.      Vancomycin IV Administrations (past 72 hours)      No vancomycin orders with administrations in past 72 hours.                Nephrotoxins and other renal medications (From now, onward)    Start     Dose/Rate Route Frequency Ordered Stop    23 1330  vancomycin (VANCOCIN) 1,250 mg in 0.9% NaCl 250 mL intermittent infusion         1,250 mg  over 90 Minutes Intravenous ONCE 23 1301      23 1300  piperacillin-tazobactam (ZOSYN) 3.375 g vial to attach to  mL bag         3.375 g  over 30 Minutes Intravenous ONCE 23 1257            Contrast Orders - past 72 hours (72h ago, onward)    None          Plan:  1. Start vancomycin  1,250 mg IV once.   2. If patient admitted and vancomycin therapy to continue, please re-consult pharmacy to dose vancomycin.     Thank you for the consult.   Eusebia Beck, PharmD, BCPS

## 2023-06-08 NOTE — CONSULTS
Care Management Initial Consult      General Information  Assessment completed with: Patient,    Type of CM/SW Visit: Initial Assessment  Primary Care Provider verified and updated as needed: No   Readmission within the last 30 days: no previous admission in last 30 days   Reason for Consult: discharge planning, health care directive, transportation  Advance Care Planning: Advance Care Planning Reviewed: no concerns identified        Communication Assessment  Patient's communication style: spoken language (English or Bilingual)        Cognitive  Cognitive/Neuro/Behavioral: WDL                      Living Environment:   People in home: child(jimmy), adult     Current living Arrangements: hotel/motel ([facility] [place])      Able to return to prior arrangements: yes     Family/Social Support:  Care provided by: self, child(jimmy)  Provides care for: no one, unable/limited ability to care for self  Marital Status:   Children          Description of Support System: Supportive, Involved    Support Assessment: Adequate family and caregiver support, Adequate social supports (Pt stated having sufficient support currently.)    Current Resources:   Patient receiving home care services: No  Community Resources: County Worker  Equipment currently used at home: none  Supplies currently used at home: None    Employment/Financial:  Employment Status: retired, disabled     Financial Concerns: No concerns identified   Referral to Financial Worker: No     Does the patient's insurance plan have a 3 day qualifying hospital stay waiver?  No    Lifestyle & Psychosocial Needs:  Social Determinants of Health     Tobacco Use: High Risk (6/8/2023)    Patient History      Smoking Tobacco Use: Every Day      Smokeless Tobacco Use: Never      Passive Exposure: Not on file   Alcohol Use: Not on file   Financial Resource Strain: Not on file   Food Insecurity: Not on file   Transportation Needs: Not on file   Physical Activity: Not on  "file   Stress: Not on file   Social Connections: Not on file   Intimate Partner Violence: Not on file   Depression: Not at risk (7/30/2021)    PHQ-2      PHQ-2 Score: 0   Housing Stability: Not on file     Functional Status:  Prior to admission patient needed assistance:   Dependent ADLs:: Independent (Pt states no current use of any DME.)  Dependent IADLs:: Independent (With minor assist from son.)  Assessment of Functional Status: At functional baseline    Mental Health Status:  Mental Health Status: No Current Concerns       Chemical Dependency Status:  Chemical Dependency Status: No Current Concerns           Values/Beliefs:  Spiritual, Cultural Beliefs, Sabianism Practices, Values that affect care: no (None identified.)             Additional Information:  Writer met with pt to introduce Care Management(CM) and obtain an initial assessment. Pt resides at an extended-stay hotel in [place] with her son currently. No current PCA or other in-home services. Son assists as needed in a PCA role but, without payment through a PCA provider agency. Pt reports recently acquiring an Elderly Waiver and coordinating acceptance into an [facility]. Pt was unable to provide the facility name. No concerns expressed in regard to eventual return home.     6/8 per CHRISTIANA Vang.-\"68 year old female possible history significant for COPD, hepatitis C, bipolar disorder, anxiety depression, methamphetamine dependence on Suboxone alcohol dependence in remission, recently admitted to [facility] 4/27 - 4/29 for ESBL E. coli UTI, COPD exacerbation, discharged on nitrofurantoin, was seen in the ED on 5/29 for shortness of breath, urinary frequency, fevers her work-up for infection was unremarkable was sent out from ED, presented again today for positive blood cultures from 5/29.  She had 2 blood cultures 1 bottle out of each set grew strep mitis, Rothia mucilaginosa.  She did not check her messages until this morning.  Subsequently she presented to " "the ED.  She reports 5 days history of fevers as high as 102, chills, productive cough with yellow-colored phlegm, no other chest pain, worsening shortness of breath.  No urinary symptoms.  She has diarrhea last few days.  She recently had dental pain and thinks she might have had an abscess when she presented to the ED on 5/29.  Subsequently she lost her to the following that visit to the ED.  Has not seen a dentist since then.  She denies any other IV drug use.\"    No pending consults. Anticipate improvement and return to hotel at time of discharge. CM to follow for changes in current anticipated discharge disposition. Pt may or may not be able to receive transport through insurance provider. No family or friends currently with a vehicle.    Osmany Aguilar RN        "

## 2023-06-08 NOTE — ED NOTES
"Expected Patient Referral to ED  10:31 AM    Referring Clinic/Provider:  Self-referral    Reason for referral/Clinical facts:  She called back today after receiving a voicemail a few days ago for positive blood cultures. Blood cultures were drawn May 29, 1 growing Rothia Mucilaginosa, and the other growing Streptococcus mitis group.    Patient was seen here on 5/29.  Workup was otherwise unremarkable at that time, has continued to feel \"sick\" with intermittent chills, body aches, no fevers since then.  She is not on any antibiotics.    She was commended [facility] for reevaluation, repeat blood cultures.    Recommendations provided:  Coming in by kimberly Lantigua MD  [facility] EMERGENCY ROOM  [address] [place] [id number] [phone]       Hossein Lantigua MD  06/08/23 1033    "

## 2023-06-08 NOTE — ED TRIAGE NOTES
PT found out she had positive blood cultures today from [facility] from 5/29/23. Reporting subjective fevers and chills and general malaise. Also reports she is out of her Clonidine and Hydroxyzine      Triage Assessment     Row Name 06/08/23 1221       Triage Assessment (Adult)    Airway WDL WDL       Respiratory WDL    Respiratory WDL WDL       Skin Circulation/Temperature WDL    Skin Circulation/Temperature WDL WDL       Cardiac WDL    Cardiac WDL WDL       Peripheral/Neurovascular WDL    Peripheral Neurovascular WDL WDL       Cognitive/Neuro/Behavioral WDL    Cognitive/Neuro/Behavioral WDL WDL

## 2023-06-08 NOTE — PHARMACY-ADMISSION MEDICATION HISTORY
Pharmacist Admission Medication History    Admission medication history is complete. The information provided in this note is only as accurate as the sources available at the time of the update.    Medication reconciliation/reorder completed by provider prior to medication history? No    Information Source(s): Patient and CareEverywhere/SureScripts via in-person    Pertinent Information: patient states she has clonidine TID and PRN;  The last few Rxs were for clonidine 0.2 mg BID only.     Changes made to PTA medication list:    Added: none    Deleted: voltaren gel    Changed: magnesium dosing; duoneb to PRN    Medication Affordability:       Allergies reviewed with patient and updates made in EHR: yes    Medication History Completed By: Marcella Cisneros [facility] 6/8/2023 3:45 PM    Prior to Admission medications    Medication Sig Last Dose Taking? Auth Provider Long Term End Date   albuterol (PROAIR HFA;PROVENTIL HFA;VENTOLIN HFA) 90 mcg/actuation inhaler [ALBUTEROL (PROAIR HFA;PROVENTIL HFA;VENTOLIN HFA) 90 MCG/ACTUATION INHALER] Inhale 2 puffs every 6 (six) hours as needed for wheezing. 6/8/2023 at am.  has with Yes Leora Hogan MD Yes    amLODIPine (NORVASC) 5 MG tablet Take 5 mg by mouth daily 6/7/2023 at am Yes Unknown, Entered By History No    buprenorphine HCl-naloxone HCl (SUBOXONE) 4-1 MG per film Place 1 Film under the tongue 2 times daily 6/8/2023 Yes Unknown, Entered By History     calcium carbonate (TUMS) 500 MG chewable tablet Take 1 tablet (500 mg) by mouth 2 times daily 6/8/2023 at am Yes Ray Corona DO     cloNIDine (CATAPRES) 0.1 MG tablet Take 2 tablets (0.2 mg) by mouth 2 times daily 6/8/2023 at in ED Yes Mark Thomas MD Yes    cyclobenzaprine (FLEXERIL) 5 MG tablet Take 1 tablet (5 mg) by mouth every 8 hours as needed for muscle spasms (Use if voltaren ineffective) 6/8/2023 at am Yes Mark Thomas MD     docusate sodium (COLACE) 100 MG capsule Take 100 mg by mouth daily 6/8/2023 at  am Yes Unknown, Entered By History     FLUoxetine (PROZAC) 40 MG capsule Take 1 capsule (40 mg) by mouth daily for 30 days 6/8/2023 at am Yes Ray Corona DO Yes    fluticasone-salmeterol (ADVAIR) 250-50 MCG/ACT inhaler Inhale 1 puff into the lungs daily 6/8/2023 at am Yes Unknown, Entered By History No    furosemide (LASIX) 20 MG tablet Take 20 mg by mouth daily as needed Take if gained 5 lbs prn at prn Yes Unknown, Entered By History No    gabapentin (NEURONTIN) 300 MG capsule Take 3 capsules (900 mg) by mouth 3 times daily 6/8/2023 at x1 Yes Mark Thomas MD Yes    hydrocortisone (CORTEF) 10 MG tablet Take 20 mg (2 tablets) in the morning and then take 10 mg (1 tablet) in the afternoon.   Follow-up with Endocrinology or your Primary Care Physician for further changes. 6/8/2023 at in ED Yes Mark Thomas MD Yes    hydrOXYzine (ATARAX) 25 MG tablet Take 1-2 tablets (25-50 mg) by mouth every 6 hours as needed for other or anxiety (adjuvant pain)  Patient taking differently: Take 25 mg by mouth 4 times daily 6/8/2023 at am Yes Mark Tohmas MD     ibuprofen (ADVIL/MOTRIN) 200 MG tablet Take 600 mg by mouth every 8 hours as needed for pain prn at prn Yes Unknown, Entered By History     ipratropium - albuterol 0.5 mg/2.5 mg/3 mL (DUONEB) 0.5-2.5 (3) MG/3ML neb solution Take 1 vial by nebulization every 6 hours as needed for shortness of breath, wheezing or cough prn at prn Yes Unknown, Entered By History No    magnesium oxide (MAG-OX) 400 MG tablet Take 800 mg by mouth daily 6/8/2023 at am Yes Unknown, Entered By History     nicotine (NICODERM CQ) 14 MG/24HR 24 hr patch Place 1 patch onto the skin daily not on now Yes Laila Grayson MD     NYSTOP [id number] UNIT/GM powder Apply topically 3 times daily as needed prn at prn Yes Reported, Patient     omeprazole (PRILOSEC) 20 MG DR capsule Take 20 mg by mouth daily as needed prn at prn Yes Reported, Patient     potassium chloride ER (KLOR-CON M) 20 MEQ CR  tablet Take 20 mEq by mouth daily 6/8/2023 at am Yes Unknown, Entered By History No    QUEtiapine (SEROQUEL) 100 MG tablet Take 1 tablet (100 mg) by mouth every morning 6/7/2023 at am Yes Mark Thomas MD Yes    QUEtiapine (SEROQUEL) 300 MG tablet Take 1 tablet (300 mg) by mouth At Bedtime 6/7/2023 at pm Yes Mark Thomas MD Yes    Vitamin D3 (CHOLECALCIFEROL) 25 mcg (1000 units) tablet Take 1 tablet (25 mcg) by mouth daily 6/7/2023 at am Yes Ray Corona DO

## 2023-06-09 ENCOUNTER — APPOINTMENT (OUTPATIENT)
Dept: CARDIOLOGY | Facility: CLINIC | Age: 68
DRG: 158 | End: 2023-06-09
Payer: COMMERCIAL

## 2023-06-09 LAB — LVEF ECHO: NORMAL

## 2023-06-09 PROCEDURE — 120N000001 HC R&B MED SURG/OB

## 2023-06-09 PROCEDURE — 250N000011 HC RX IP 250 OP 636: Performed by: INTERNAL MEDICINE

## 2023-06-09 PROCEDURE — 250N000013 HC RX MED GY IP 250 OP 250 PS 637: Performed by: INTERNAL MEDICINE

## 2023-06-09 PROCEDURE — 250N000011 HC RX IP 250 OP 636: Performed by: STUDENT IN AN ORGANIZED HEALTH CARE EDUCATION/TRAINING PROGRAM

## 2023-06-09 PROCEDURE — 258N000003 HC RX IP 258 OP 636: Performed by: STUDENT IN AN ORGANIZED HEALTH CARE EDUCATION/TRAINING PROGRAM

## 2023-06-09 PROCEDURE — 99233 SBSQ HOSP IP/OBS HIGH 50: CPT | Performed by: INTERNAL MEDICINE

## 2023-06-09 PROCEDURE — 99222 1ST HOSP IP/OBS MODERATE 55: CPT | Mod: GC | Performed by: STUDENT IN AN ORGANIZED HEALTH CARE EDUCATION/TRAINING PROGRAM

## 2023-06-09 PROCEDURE — 250N000013 HC RX MED GY IP 250 OP 250 PS 637: Performed by: STUDENT IN AN ORGANIZED HEALTH CARE EDUCATION/TRAINING PROGRAM

## 2023-06-09 PROCEDURE — 93306 TTE W/DOPPLER COMPLETE: CPT

## 2023-06-09 PROCEDURE — 93306 TTE W/DOPPLER COMPLETE: CPT | Mod: 26 | Performed by: INTERNAL MEDICINE

## 2023-06-09 RX ORDER — CEFTRIAXONE 2 G/1
2 INJECTION, POWDER, FOR SOLUTION INTRAMUSCULAR; INTRAVENOUS EVERY 24 HOURS
Status: DISCONTINUED | OUTPATIENT
Start: 2023-06-09 | End: 2023-06-12

## 2023-06-09 RX ADMIN — AMLODIPINE BESYLATE 5 MG: 5 TABLET ORAL at 08:59

## 2023-06-09 RX ADMIN — PIPERACILLIN AND TAZOBACTAM 3.38 G: 3; .375 INJECTION, POWDER, LYOPHILIZED, FOR SOLUTION INTRAVENOUS at 04:30

## 2023-06-09 RX ADMIN — CLONIDINE HYDROCHLORIDE 0.2 MG: 0.1 TABLET ORAL at 08:58

## 2023-06-09 RX ADMIN — HYDROCORTISONE 10 MG: 10 TABLET ORAL at 11:38

## 2023-06-09 RX ADMIN — CEFTRIAXONE SODIUM 2 G: 2 INJECTION, POWDER, FOR SOLUTION INTRAMUSCULAR; INTRAVENOUS at 11:38

## 2023-06-09 RX ADMIN — GABAPENTIN 900 MG: 300 CAPSULE ORAL at 14:13

## 2023-06-09 RX ADMIN — QUETIAPINE FUMARATE 300 MG: 300 TABLET ORAL at 21:45

## 2023-06-09 RX ADMIN — CYCLOBENZAPRINE HYDROCHLORIDE 5 MG: 5 TABLET, FILM COATED ORAL at 21:45

## 2023-06-09 RX ADMIN — BUPRENORPHINE AND NALOXONE 2 FILM: 2; .5 FILM BUCCAL; SUBLINGUAL at 08:59

## 2023-06-09 RX ADMIN — HYDROXYZINE HYDROCHLORIDE 50 MG: 25 TABLET ORAL at 21:45

## 2023-06-09 RX ADMIN — ENOXAPARIN SODIUM 40 MG: 100 INJECTION SUBCUTANEOUS at 17:37

## 2023-06-09 RX ADMIN — HYDROCORTISONE 20 MG: 20 TABLET ORAL at 09:43

## 2023-06-09 RX ADMIN — DOCUSATE SODIUM 100 MG: 100 CAPSULE, LIQUID FILLED ORAL at 08:59

## 2023-06-09 RX ADMIN — CYCLOBENZAPRINE HYDROCHLORIDE 5 MG: 5 TABLET, FILM COATED ORAL at 09:44

## 2023-06-09 RX ADMIN — QUETIAPINE 100 MG: 100 TABLET ORAL at 08:59

## 2023-06-09 RX ADMIN — VANCOMYCIN HYDROCHLORIDE 1500 MG: 5 INJECTION, POWDER, LYOPHILIZED, FOR SOLUTION INTRAVENOUS at 18:17

## 2023-06-09 RX ADMIN — CLONIDINE HYDROCHLORIDE 0.2 MG: 0.1 TABLET ORAL at 19:48

## 2023-06-09 RX ADMIN — NICOTINE 1 PATCH: 14 PATCH, EXTENDED RELEASE TRANSDERMAL at 08:59

## 2023-06-09 RX ADMIN — FLUTICASONE FUROATE AND VILANTEROL TRIFENATATE 1 PUFF: 100; 25 POWDER RESPIRATORY (INHALATION) at 09:43

## 2023-06-09 RX ADMIN — GABAPENTIN 900 MG: 300 CAPSULE ORAL at 08:59

## 2023-06-09 RX ADMIN — GABAPENTIN 900 MG: 300 CAPSULE ORAL at 19:47

## 2023-06-09 RX ADMIN — FLUOXETINE 40 MG: 20 CAPSULE ORAL at 08:59

## 2023-06-09 RX ADMIN — HYDROXYZINE HYDROCHLORIDE 50 MG: 25 TABLET ORAL at 08:59

## 2023-06-09 RX ADMIN — BUPRENORPHINE AND NALOXONE 2 FILM: 2; .5 FILM BUCCAL; SUBLINGUAL at 19:47

## 2023-06-09 ASSESSMENT — ACTIVITIES OF DAILY LIVING (ADL)
ADLS_ACUITY_SCORE: 20

## 2023-06-09 NOTE — PROGRESS NOTES
[facility] Medicine PROGRESS NOTE      Identification/Summary:   68 year old female past history significant for COPD, hepatitis C, bipolar disorder, anxiety depression, methamphetamine dependence on Suboxone, alcohol dependence in remission, recently admitted to [facility] 4/27 - 4/29 for ESBL E. coli UTI, COPD exacerbation, discharged on nitrofurantoin, was seen in the ED on 5/29 for shortness of breath, urinary frequency, fevers, dental pain, infection work-up was unremarkable was sent out from ED, was told to come to ED for positive blood cultures from 5/29. Likely sources dental infection which was recent, currently denies any symptoms.  She also complained of fevers, productive cough 5 days prior to admission.  So far infectious work-up this admission has been negative.  Follow-up on repeat blood cultures.  ID following.     Positive blood cultures 5/29  Presumed Recent dental infection  Amphetamine use  Source likely dental origin  She had blood cultures positive 5/29 for strep mitis, Rothia mucilaginosa.    Repeat blood cultures x2 from 6/8 pending  Urine tox screen positive for amphetamines. she denies using  ID consulted  Zosyn changed to ceftriaxone, continued on vancomycin  No leukocytosis, CRP is normal  daily blood cultures  Check transthoracic echo     Fever, cough  Chest x-ray no acute changes  Could be bronchitis  Continue current antibiotics.  Urinalysis few WBCs no bacteria.     History of COPD  No sign of exacerbation  Continue home inhalers, nebulizers  She is on room air     Mild hyponatremia  She received 1 L normal saline bolus.  Gentle fluids     History of methamphetamine dependence  Urine tox screen positive for amphetamines this admission.  Continue home Suboxone  Social work to see    History of hepatitis C status posttreatment  History of adrenal insufficiency  Has not started taking hydrocortisone prescribed during her previous admission   Ordered.  She is hemodynamically  "stable.  Homelessnes  Anxiety depression  Continue home Seroquel, gabapentin  DVT prophylaxis: lovenox    Diet: Regular Diet Adult  Code Status: Full Code    Anticipated possible discharge in 1-2 days once repeat blood cultures milestones are met.    Interval History/Subjective:  Denies any fever, chills.  Her cough is improving.  No shortness of breath, chest pain, urinary or bowel complaints.    Physical Exam/Objective:  Vitals I/O   Vital signs:  Temp: 98.2  F (36.8  C) Temp src: Oral BP: 121/74 Pulse: 88   Resp: 20 SpO2: 93 % O2 Device: None (Room air)   Height: 167.6 cm (5' 6\") Weight: 62.5 kg (137 lb 12.6 oz)  Estimated body mass index is 22.24 kg/m  as calculated from the following:    Height as of this encounter: 1.676 m (5' 6\").    Weight as of this encounter: 62.5 kg (137 lb 12.6 oz). I/O last 3 completed shifts:  In: 550 [P.O.:550]  Out: -      Body mass index is 22.24 kg/m .    General Appearance:  Alert, cooperative, no distress   Head:  Normocephalic, atraumatic   Eyes:  PERRL    Throat:  mucosa; moist   Neck: No JVD, thyromegaly   Lungs:   Clear to auscultation bilaterally, respirations unlabored   Chest Wall:  No tenderness or deformity   Heart:  Regular rate and rhythm, S1, S2 normal,no murmur   Abdomen:   Soft, non tender, non distended, bowel sounds present, no guarding or rigidity   Extremities: No edema, no joint swelling   Skin: Skin color, texture, turgor normal, no rashes or lesions   Neurologic: Alert and oriented X 3, Moves all 4 extremities       Medications:   Personally Reviewed.    amLODIPine  5 mg Oral Daily     buprenorphine HCl-naloxone HCl  2 Film Sublingual BID     cefTRIAXone  2 g Intravenous Q24H     cloNIDine  0.2 mg Oral BID     docusate sodium  100 mg Oral Daily     enoxaparin ANTICOAGULANT  40 mg Subcutaneous Q24H     FLUoxetine  40 mg Oral Daily     fluticasone-vilanterol  1 puff Inhalation Daily     gabapentin  900 mg Oral TID     hydrocortisone  10 mg Oral Daily before " lunch     hydrocortisone  20 mg Oral Daily     iopamidol (ISOVUE-370)  100 mL Intravenous Once     nicotine  1 patch Transdermal Daily    And     nicotine   Transdermal Q8H BIENVENIDO     QUEtiapine  100 mg Oral QAM     QUEtiapine  300 mg Oral At Bedtime     sodium chloride (PF)  3 mL Intracatheter Q8H       Data reviewed today: I personally reviewed all new medications, labs, imaging/diagnostics reports over the past 24 hours. Pertinent findings include    Labs:  Most Recent 3 CBC's:Recent Labs   Lab Test 06/08/23 1335 05/29/23  1704 04/30/23 0623 04/28/23  0535   WBC 5.9 8.5  --  6.5   HGB 12.7 13.2  --  10.9*   MCV 88 89  --  96    199 177 203     Most Recent 3 BMP's:Recent Labs   Lab Test 06/08/23 1335 05/29/23  1704 04/30/23 0623 04/28/23  0534   * 131*  --  135*   POTASSIUM 4.3 4.3  --  4.8   CHLORIDE 96* 102  --  101   CO2 26 18*  --  26   BUN 8 11  --  11   CR 0.88 1.04 0.84 0.82   ANIONGAP 9 11  --  8   KARRIE 9.5 9.9  --  9.3   GLC 92 95  --  132*     Most Recent 2 LFT's:Recent Labs   Lab Test 06/08/23 1335 05/29/23  1704   AST 15 16   ALT <9 <9   ALKPHOS 73 84   BILITOTAL 0.8 1.4*       Imaging:   Recent Results (from the past 24 hour(s))   Chest XR,  PA & LAT    Narrative    EXAM: XR CHEST 2 VIEWS  LOCATION: [facility]  DATE/TIME: 6/8/2023 3:17 PM CDT    INDICATION: Sepsis  COMPARISON: Chest CT 05/29/2023      Impression    IMPRESSION: Cervical thoracic spinal fusion hardware. Heart size and vascularity are normal. No focal consolidation, pneumothorax nor pleural effusion.        > 50 MINUTES SPENT BY ME on the date of service doing chart review, history, exam, documentation & further activities per the note.    Peyton Campbell MD  Hospitalist  [facility]

## 2023-06-09 NOTE — PLAN OF CARE
"Goal Outcome Evaluation:      Plan of Care Reviewed With: patient    Overall Patient Progress: improvingOverall Patient Progress: improving      Patient is A&O x 4. Is pleasant and cooperative. VSS. /70 (BP Location: Right arm)   Pulse 65   Temp 98.7  F (37.1  C) (Oral)   Resp 20   Ht 1.676 m (5' 6\")   Wt 62.5 kg (137 lb 12.6 oz)   SpO2 94%   BMI 22.24 kg/m    Independent in room.     Denies pain, nausea, SHORTNESS OF BREATH or chest pain.     Adequate intake and output.     Denies pain on urination, frequency, urgency, malodorous or cloudy urine.     Will continue to monitor.     Corry Ferguson RN, BSN      "

## 2023-06-09 NOTE — PHARMACY-VANCOMYCIN DOSING SERVICE
Pharmacy Vancomycin Initial Note  Date of Service 2023  Patient's  1955  68 year old, female    Indication: Bacteremia and Endocarditis    Current estimated CrCl = Estimated Creatinine Clearance: 59.5 mL/min (based on SCr of 0.88 mg/dL).    Creatinine for last 3 days  2023:  1:35 PM Creatinine 0.88 mg/dL    Recent Vancomycin Level(s) for last 3 days  No results found for requested labs within last 3 days.      Vancomycin IV Administrations (past 72 hours)                   vancomycin (VANCOCIN) 1,250 mg in 0.9% NaCl 250 mL intermittent infusion (mg) 1,250 mg New Bag 23 1400                Nephrotoxins and other renal medications (From now, onward)    Start     Dose/Rate Route Frequency Ordered Stop    23 1800  vancomycin (VANCOCIN) 1,500 mg in 0.9% NaCl 250 mL intermittent infusion         1,500 mg  over 90 Minutes Intravenous EVERY 24 HOURS 23 1755            Contrast Orders - past 72 hours (72h ago, onward)    Start     Dose/Rate Route Frequency Stop    23 1730  iopamidol (ISOVUE-370) solution 100 mL         100 mL Intravenous ONCE            [employer] Prediction of Planned Initial Vancomycin Regimen  Loading dose: N/A  Regimen: 1500 mg IV every 24 hours.  Start time: 17:54 on 2023  Exposure target: AUC24 (range)400-600 mg/L.hr   AUC24,ss: 556 mg/L.hr  Probability of AUC24 > 400: 84 %  Ctrough,ss: 15.6 mg/L  Probability of Ctrough,ss > 20: 28 %  Probability of nephrotoxicity (Lodise EDITH ): 11 %          Plan:  1. Start vancomycin  1500 mg IV q24h.   2. Vancomycin monitoring method: AUC  3. Vancomycin therapeutic monitoring goal: 400-600 mg*h/L  4. Pharmacy will check vancomycin levels as appropriate in 1-3 Days.    5. Serum creatinine levels will be ordered daily for the first week of therapy and at least twice weekly for subsequent weeks.      Ute Bentley, PharmD

## 2023-06-09 NOTE — CONSULTS
Consultation - INFECTIOUS DISEASE CONSULTATION  Renetta Farooq,  1955, MRN [id number]      Positive blood cultures [R78.81]  Sepsis due to Streptococcus species, unspecified whether acute organ dysfunction present (H) [A40.9]    PCP: Jamila Connelly, [phone]   Code status:  Full Code           Chief Complaint: Positive blood cultures     Assessment:   Renetta Farooq is a 68 year old old female with history of COPD, hep C, homelessness, methamphetamine dependence on Suboxone, EtOH use, anxiety, depression, hypomanic episodes admitted for positive blood cultures of presumed dental source. Recent dental procedures of unclear timeline with poor dentition.     Principal Problem:    Positive blood cultures  Active Problems:    Sepsis due to Streptococcus species, unspecified whether acute organ dysfunction present (H)    Bacteremia of presumed dental source  Substance use - UDS positive for amphetamines; pt denies use  COPD   Amoxicillin allergy - diarrhea; does not appear to be true allergy. Pt tolerated zosyn without issue.   Adrenal insufficiency, suspect secondary to frequent prednisone courses for COPD exacerbations    Recommendations:   Daily blood cultures  TTE Echocardiogram  AM CBC  Continue vancomycin   Discontinue zosyn, switch to Ceftriaxone 2g every day    Thank you for letting us be part of the patient care team. We will follow along    Jakub Hsu DO, PGY3  [facility]    HPI:    Renetta Farooq is a 68 year old old female with history of COPD, hep C, homelessness, methamphetamine dependence on Suboxone, EtOH use, anxiety, depression, hypomanic episodes. History is provided by the patient and reviewing the chart.     Admitted  -  for ESBL E coli UTI and COPD exacerbation, discharged on Nitrofurantoin and hydrocortisone for suspected adrenal insufficiency. States that since that discharge, has been steadily feeling worse with fatigue, chills, and dyspnea. Has not been taking her  Hydrocortisone. Fevers developed few days before 5/29. Presented to ED 5/29 for dyspnea and fevers, initial w/u unremarkable but blood cultures drawn at that time resulted positive day after ED discharge for strep mitis in 1 bottle and rothia mucilaginosa in the other bottle.     Symptoms this presentation include fevers x5d with Tmax 102, chills, productive cough, diarrhea and worsening dyspnea.     Today, patient is feeling much better with improving appetite as well.   Today, endorses no n/v/d, fever, chills, chest pain, dyspnea.     Endorses poor dentition and has been going to dentist to get some of her teeth pulled.     Denies EtOH and illicit substance use. Endorses tobacco use of 3 cigs/day.     ===========================================    Medical History  Past Medical History:   Diagnosis Date     Acute psychosis (H) 10/14/2018     Alcohol dependence (H) 4/10/2015     Bipolar affective disorder, manic, severe, with psychotic behavior (H) 10/15/2018     Candida infection 10/19/2018     Chronic hepatitis C (H) 4/10/2015     Chronic neck pain      Closed traumatic brain injury, without loss of consciousness, sequela (H)      Dental abscess      Episodic tension-type headache, not intractable      Methamphetamine dependence (H) 10/15/2018     Pancytopenia (H) 4/10/2015     Weakness of right arm 4/10/2015        Surgical History  Past Surgical History:   Procedure Laterality Date     ARTHROSCOPY SHOULDER ROTATOR CUFF REPAIR Bilateral      CERVICAL FUSION      twice     COSMETIC SURGERY       RHINOPLASTY       TONSILLECTOMY              Social History  Reviewed, and she  reports that she has been smoking cigarettes. She started smoking about 55 years ago. She has a 54.00 pack-year smoking history. She has never used smokeless tobacco. She reports current alcohol use. She reports that she does not use drugs.        Family History  Family History   Problem Relation Age of Onset     Narcolepsy Mother      Acute  myelogenous leukemia Mother      Cancer Father       Psychosocial Needs  Social History     Social History Narrative    Lives alone in independent housing with psychiatric case assistance     Additional psychosocial needs reviewed per nursing assessment.       Allergies   Allergen Reactions     Amoxicillin Diarrhea     Droperidol Angioedema        Medications Prior to Admission   Medication Sig Dispense Refill Last Dose     albuterol (PROAIR HFA;PROVENTIL HFA;VENTOLIN HFA) 90 mcg/actuation inhaler [ALBUTEROL (PROAIR HFA;PROVENTIL HFA;VENTOLIN HFA) 90 MCG/ACTUATION INHALER] Inhale 2 puffs every 6 (six) hours as needed for wheezing. 1 each 0 6/8/2023 at am.  has with     amLODIPine (NORVASC) 5 MG tablet Take 5 mg by mouth daily   6/7/2023 at am     buprenorphine HCl-naloxone HCl (SUBOXONE) 4-1 MG per film Place 1 Film under the tongue 2 times daily   6/8/2023     calcium carbonate (TUMS) 500 MG chewable tablet Take 1 tablet (500 mg) by mouth 2 times daily 60 tablet 0 6/8/2023 at am     cloNIDine (CATAPRES) 0.1 MG tablet Take 2 tablets (0.2 mg) by mouth 2 times daily 15 tablet 0 6/8/2023 at in ED     cyclobenzaprine (FLEXERIL) 5 MG tablet Take 1 tablet (5 mg) by mouth every 8 hours as needed for muscle spasms (Use if voltaren ineffective) 30 tablet 0 6/8/2023 at am     docusate sodium (COLACE) 100 MG capsule Take 100 mg by mouth daily   6/8/2023 at am     FLUoxetine (PROZAC) 40 MG capsule Take 1 capsule (40 mg) by mouth daily for 30 days 30 capsule 0 6/8/2023 at am     fluticasone-salmeterol (ADVAIR) 250-50 MCG/ACT inhaler Inhale 1 puff into the lungs daily   6/8/2023 at am     furosemide (LASIX) 20 MG tablet Take 20 mg by mouth daily as needed Take if gained 5 lbs   prn at prn     gabapentin (NEURONTIN) 300 MG capsule Take 3 capsules (900 mg) by mouth 3 times daily 120 capsule 0 6/8/2023 at x1     hydrocortisone (CORTEF) 10 MG tablet Take 20 mg (2 tablets) in the morning and then take 10 mg (1 tablet) in the  afternoon.   Follow-up with Endocrinology or your Primary Care Physician for further changes. 90 tablet 0 6/8/2023 at in ED     hydrOXYzine (ATARAX) 25 MG tablet Take 1-2 tablets (25-50 mg) by mouth every 6 hours as needed for other or anxiety (adjuvant pain) (Patient taking differently: Take 25 mg by mouth 4 times daily) 90 tablet 0 6/8/2023 at am     ibuprofen (ADVIL/MOTRIN) 200 MG tablet Take 600 mg by mouth every 8 hours as needed for pain   prn at prn     ipratropium - albuterol 0.5 mg/2.5 mg/3 mL (DUONEB) 0.5-2.5 (3) MG/3ML neb solution Take 1 vial by nebulization every 6 hours as needed for shortness of breath, wheezing or cough   prn at prn     magnesium oxide (MAG-OX) 400 MG tablet Take 800 mg by mouth daily   6/8/2023 at am     nicotine (NICODERM CQ) 14 MG/24HR 24 hr patch Place 1 patch onto the skin daily 14 patch 1 not on now     NYSTOP 803170 UNIT/GM powder Apply topically 3 times daily as needed   prn at prn     omeprazole (PRILOSEC) 20 MG DR capsule Take 20 mg by mouth daily as needed   prn at prn     potassium chloride ER (KLOR-CON M) 20 MEQ CR tablet Take 20 mEq by mouth daily   6/8/2023 at am     QUEtiapine (SEROQUEL) 100 MG tablet Take 1 tablet (100 mg) by mouth every morning 30 tablet 0 6/7/2023 at am     QUEtiapine (SEROQUEL) 300 MG tablet Take 1 tablet (300 mg) by mouth At Bedtime 30 tablet 0 6/7/2023 at pm     Vitamin D3 (CHOLECALCIFEROL) 25 mcg (1000 units) tablet Take 1 tablet (25 mcg) by mouth daily 30 tablet 0 6/7/2023 at am        Review of Systems:  Negative except for findings in the HPI Physical Exam:  Temp:  [98.3  F (36.8  C)-98.8  F (37.1  C)] 98.7  F (37.1  C)  Pulse:  [60-98] 65  Resp:  [14-24] 20  BP: (112-141)/(9-97) 130/70  SpO2:  [93 %-95 %] 94 %      Gen: Pleasant in no acute distress.  HEENT: NCAT. EOMI. PERRL. Missing teeth, inflamed gumline.   Neck: No bruit, JVD or thyromegaly.  Lungs: Clear to ascultation bilat with no crackles or wheezes.  Card: RRR. NSR. 1/6 harsh  systolic murmur. Peripheral pulses present and symmetric. No edema.  Abd: Soft NT ND. No mass. Normal bowel sounds.  Skin: No rash.  Extr: No edema.  Neuro: Alert and oriented to place time and person. Cranial nerves II to XII intact. Motor and sensory intact. Normal gait.           ============================    Pertinent Labs  personally reviewed.   Recent Labs   Lab 06/08/23  1335   WBC 5.9   HGB 12.7   HCT 37.9          Recent Labs   Lab 06/08/23  1335   *   CO2 26   BUN 8   ALBUMIN 4.3   ALKPHOS 73   ALT <9   AST 15     MICROBIOLOGY DATA:  Personally reviewed    Pertinent Radiology  personally reviewed.

## 2023-06-10 LAB
BASOPHILS # BLD AUTO: 0 10E3/UL (ref 0–0.2)
BASOPHILS NFR BLD AUTO: 0 %
CREAT SERPL-MCNC: 0.9 MG/DL (ref 0.6–1.1)
EOSINOPHIL # BLD AUTO: 0.1 10E3/UL (ref 0–0.7)
EOSINOPHIL NFR BLD AUTO: 2 %
ERYTHROCYTE [DISTWIDTH] IN BLOOD BY AUTOMATED COUNT: 12.3 % (ref 10–15)
GFR SERPL CREATININE-BSD FRML MDRD: 69 ML/MIN/1.73M2
HCT VFR BLD AUTO: 34.6 % (ref 35–47)
HGB BLD-MCNC: 11.3 G/DL (ref 11.7–15.7)
IMM GRANULOCYTES # BLD: 0 10E3/UL
IMM GRANULOCYTES NFR BLD: 0 %
LYMPHOCYTES # BLD AUTO: 1.7 10E3/UL (ref 0.8–5.3)
LYMPHOCYTES NFR BLD AUTO: 38 %
MCH RBC QN AUTO: 29.8 PG (ref 26.5–33)
MCHC RBC AUTO-ENTMCNC: 32.7 G/DL (ref 31.5–36.5)
MCV RBC AUTO: 91 FL (ref 78–100)
MONOCYTES # BLD AUTO: 0.2 10E3/UL (ref 0–1.3)
MONOCYTES NFR BLD AUTO: 4 %
NEUTROPHILS # BLD AUTO: 2.5 10E3/UL (ref 1.6–8.3)
NEUTROPHILS NFR BLD AUTO: 56 %
NRBC # BLD AUTO: 0 10E3/UL
NRBC BLD AUTO-RTO: 0 /100
PLATELET # BLD AUTO: 128 10E3/UL (ref 150–450)
RBC # BLD AUTO: 3.79 10E6/UL (ref 3.8–5.2)
WBC # BLD AUTO: 4.5 10E3/UL (ref 4–11)

## 2023-06-10 PROCEDURE — 99232 SBSQ HOSP IP/OBS MODERATE 35: CPT | Performed by: STUDENT IN AN ORGANIZED HEALTH CARE EDUCATION/TRAINING PROGRAM

## 2023-06-10 PROCEDURE — 87040 BLOOD CULTURE FOR BACTERIA: CPT | Performed by: STUDENT IN AN ORGANIZED HEALTH CARE EDUCATION/TRAINING PROGRAM

## 2023-06-10 PROCEDURE — 250N000013 HC RX MED GY IP 250 OP 250 PS 637: Performed by: STUDENT IN AN ORGANIZED HEALTH CARE EDUCATION/TRAINING PROGRAM

## 2023-06-10 PROCEDURE — 250N000011 HC RX IP 250 OP 636: Performed by: STUDENT IN AN ORGANIZED HEALTH CARE EDUCATION/TRAINING PROGRAM

## 2023-06-10 PROCEDURE — 120N000001 HC R&B MED SURG/OB

## 2023-06-10 PROCEDURE — 250N000011 HC RX IP 250 OP 636: Performed by: INTERNAL MEDICINE

## 2023-06-10 PROCEDURE — 85025 COMPLETE CBC W/AUTO DIFF WBC: CPT | Performed by: STUDENT IN AN ORGANIZED HEALTH CARE EDUCATION/TRAINING PROGRAM

## 2023-06-10 PROCEDURE — 99232 SBSQ HOSP IP/OBS MODERATE 35: CPT | Performed by: INTERNAL MEDICINE

## 2023-06-10 PROCEDURE — 36415 COLL VENOUS BLD VENIPUNCTURE: CPT | Performed by: STUDENT IN AN ORGANIZED HEALTH CARE EDUCATION/TRAINING PROGRAM

## 2023-06-10 PROCEDURE — 36415 COLL VENOUS BLD VENIPUNCTURE: CPT | Performed by: INTERNAL MEDICINE

## 2023-06-10 PROCEDURE — 82565 ASSAY OF CREATININE: CPT | Performed by: INTERNAL MEDICINE

## 2023-06-10 PROCEDURE — 258N000003 HC RX IP 258 OP 636: Performed by: STUDENT IN AN ORGANIZED HEALTH CARE EDUCATION/TRAINING PROGRAM

## 2023-06-10 PROCEDURE — 258N000003 HC RX IP 258 OP 636: Performed by: INTERNAL MEDICINE

## 2023-06-10 PROCEDURE — 250N000013 HC RX MED GY IP 250 OP 250 PS 637: Performed by: INTERNAL MEDICINE

## 2023-06-10 RX ADMIN — CYCLOBENZAPRINE HYDROCHLORIDE 5 MG: 5 TABLET, FILM COATED ORAL at 21:37

## 2023-06-10 RX ADMIN — HYDROCORTISONE 10 MG: 10 TABLET ORAL at 11:15

## 2023-06-10 RX ADMIN — SODIUM CHLORIDE: 9 INJECTION, SOLUTION INTRAVENOUS at 00:18

## 2023-06-10 RX ADMIN — AMLODIPINE BESYLATE 5 MG: 5 TABLET ORAL at 09:23

## 2023-06-10 RX ADMIN — ENOXAPARIN SODIUM 40 MG: 100 INJECTION SUBCUTANEOUS at 17:36

## 2023-06-10 RX ADMIN — SODIUM CHLORIDE: 9 INJECTION, SOLUTION INTRAVENOUS at 23:55

## 2023-06-10 RX ADMIN — BUPRENORPHINE AND NALOXONE 2 FILM: 2; .5 FILM BUCCAL; SUBLINGUAL at 21:37

## 2023-06-10 RX ADMIN — HYDROXYZINE HYDROCHLORIDE 50 MG: 25 TABLET ORAL at 10:03

## 2023-06-10 RX ADMIN — FLUTICASONE FUROATE AND VILANTEROL TRIFENATATE 1 PUFF: 100; 25 POWDER RESPIRATORY (INHALATION) at 09:23

## 2023-06-10 RX ADMIN — CEFTRIAXONE SODIUM 2 G: 2 INJECTION, POWDER, FOR SOLUTION INTRAMUSCULAR; INTRAVENOUS at 11:15

## 2023-06-10 RX ADMIN — QUETIAPINE FUMARATE 300 MG: 300 TABLET ORAL at 21:37

## 2023-06-10 RX ADMIN — HYDROXYZINE HYDROCHLORIDE 50 MG: 25 TABLET ORAL at 21:37

## 2023-06-10 RX ADMIN — HYDROCORTISONE 20 MG: 20 TABLET ORAL at 09:23

## 2023-06-10 RX ADMIN — CLONIDINE HYDROCHLORIDE 0.2 MG: 0.1 TABLET ORAL at 09:24

## 2023-06-10 RX ADMIN — QUETIAPINE 100 MG: 100 TABLET ORAL at 09:23

## 2023-06-10 RX ADMIN — GABAPENTIN 900 MG: 300 CAPSULE ORAL at 21:37

## 2023-06-10 RX ADMIN — CLONIDINE HYDROCHLORIDE 0.2 MG: 0.1 TABLET ORAL at 21:37

## 2023-06-10 RX ADMIN — BUPRENORPHINE AND NALOXONE 2 FILM: 2; .5 FILM BUCCAL; SUBLINGUAL at 09:23

## 2023-06-10 RX ADMIN — DOCUSATE SODIUM 100 MG: 100 CAPSULE, LIQUID FILLED ORAL at 09:23

## 2023-06-10 RX ADMIN — NICOTINE 1 PATCH: 14 PATCH, EXTENDED RELEASE TRANSDERMAL at 09:23

## 2023-06-10 RX ADMIN — FLUOXETINE 40 MG: 20 CAPSULE ORAL at 09:23

## 2023-06-10 RX ADMIN — GABAPENTIN 900 MG: 300 CAPSULE ORAL at 13:45

## 2023-06-10 RX ADMIN — VANCOMYCIN HYDROCHLORIDE 1500 MG: 5 INJECTION, POWDER, LYOPHILIZED, FOR SOLUTION INTRAVENOUS at 18:05

## 2023-06-10 RX ADMIN — GABAPENTIN 900 MG: 300 CAPSULE ORAL at 09:23

## 2023-06-10 RX ADMIN — CYCLOBENZAPRINE HYDROCHLORIDE 5 MG: 5 TABLET, FILM COATED ORAL at 10:03

## 2023-06-10 ASSESSMENT — ACTIVITIES OF DAILY LIVING (ADL)
ADLS_ACUITY_SCORE: 20

## 2023-06-10 NOTE — PLAN OF CARE
Problem: Infection  Goal: Absence of Infection Signs and Symptoms  Outcome: Progressing  Intervention: Prevent or Manage Infection  Recent Flowsheet Documentation  Taken 6/10/2023 0001 by Jane Ball, RN  Isolation Precautions: contact precautions maintained   Goal Outcome Evaluation: patient A&OX4, calm and cooperative. Up independently. Very poor dentition, appears to have no full teeth left. IV antibiotics continue. Skin around IV is slightly red, looks like a tape reaction, no change overnight, brisk and large blood return when flushed. IVF infusing. MARYANNE ordered for Monday, ID following.

## 2023-06-10 NOTE — PROGRESS NOTES
[facility] Medicine PROGRESS NOTE      Identification/Summary:   68 year old female past history significant for COPD, hepatitis C, bipolar disorder, anxiety depression, methamphetamine dependence on Suboxone, alcohol dependence in remission, who was recently admitted to [facility] 4/27 - 4/29 for ESBL E. coli UTI, COPD exacerbation and discharged on nitrofurantoin, then seen in the ED 5/29 for shortness of breath, urinary frequency, fevers, dental pain, with infection work-up unremarkable and discharged from ED-- then subsequently was told to return back to ED for positive blood cultures from 5/29 (strep mitis, Rothia mucilaginosa)    Likely sources include dental infection which was recent, currently without any new symptoms.  She also complained of fevers, productive cough for ~5 days prior to admission.  So far infectious work-up this admission has been negative.  Follow-up on repeat blood cultures.  ID following.     Plan for TransEsophageal Echocardiogram on Monday.      Pt is new to writer 6/10  - - - - -    Positive blood cultures 5/29  Presumed Recent dental infection  Amphetamine use  Source likely dental origin  She had blood cultures positive 5/29 for strep mitis, Rothia mucilaginosa.    Repeat blood cultures x2 from 6/8 pending  Urine tox screen positive for amphetamines. she denies using  ID consulted, greatly appreciate  Zosyn changed to ceftriaxone, continued on vancomycin  No leukocytosis, CRP is normal  daily blood cultures   - these appear to not have been obtained; I have ordered again starting 6/10/2023   MARYANNE Monday    Normocytic anemia  Assessment-noted on 6/10, hemoglobin 11.3, with a drop with more than 1 unit, will recheck  Plan:  -Monitor clinically for any signs/symptoms concerning for bleed  Monitor hemoglobin-transfuse if less than 7     Fever, cough  Chest x-ray no acute changes  Could be bronchitis  Continue current antibiotics.  Urinalysis few WBCs no bacteria.     History of  "COPD  No sign of exacerbation  Continue home inhalers, nebulizers  She is on room air     Mild hyponatremia  She received 1 L normal saline bolus on admit  Gentle fluids     History of methamphetamine dependence  Urine tox screen positive for amphetamines this admission.  Continue home Suboxone  Social work to see    History of hepatitis C status posttreatment  History of adrenal insufficiency  Has not started taking hydrocortisone prescribed during her previous admission   Ordered.  She is hemodynamically stable.  Homelessnes  Anxiety depression  Continue home Seroquel, gabapentin  DVT prophylaxis: lovenox    Diet: Regular Diet Adult  Code Status: Full Code    Roni Thomas   Hospitalist Service  [facility]   Securely message with the Vocera Web Console (learn more here)  Text page via [email or web address] Paging/Directory       Interval History/Subjective:  - no acute events  - pt continues to deny any fever, chills.  No shortness of breath, chest pain, urinary or bowel complaints.  -She is aware of the plan for transesophageal echo and has no further questions     Physical Exam/Objective:  Vitals I/O   Vital signs:  Temp: 97.9  F (36.6  C) Temp src: Oral BP: 105/63 Pulse: 63   Resp: 16 SpO2: 91 % O2 Device: None (Room air)   Height: 167.6 cm (5' 6\") Weight: 60.2 kg (132 lb 12.8 oz)  Estimated body mass index is 21.43 kg/m  as calculated from the following:    Height as of this encounter: 1.676 m (5' 6\").    Weight as of this encounter: 60.2 kg (132 lb 12.8 oz). I/O last 3 completed shifts:  In: 2220 [P.O.:1020; I.V.:1200]  Out: -      Body mass index is 21.43 kg/m .    General Appearance:  Alert, cooperative, no distress   Head:  Normocephalic, atraumatic   Eyes:  PERRL    Throat:  mucosa; moist   Neck: No JVD, thyromegaly   Lungs:   Clear to auscultation bilaterally, respirations unlabored   Chest Wall:  No tenderness or deformity   Heart:  Regular rate and rhythm, S1, S2 normal,no murmur   Abdomen:   " Soft, non tender, non distended, bowel sounds present, no guarding or rigidity   Extremities: No edema, no joint swelling   Skin: Skin color, texture, turgor normal, no rashes or lesions   Neurologic: Alert and oriented X 3, Moves all 4 extremities       Medications:   Personally Reviewed.    amLODIPine  5 mg Oral Daily     buprenorphine HCl-naloxone HCl  2 Film Sublingual BID     cefTRIAXone  2 g Intravenous Q24H     cloNIDine  0.2 mg Oral BID     docusate sodium  100 mg Oral Daily     enoxaparin ANTICOAGULANT  40 mg Subcutaneous Q24H     FLUoxetine  40 mg Oral Daily     fluticasone-vilanterol  1 puff Inhalation Daily     gabapentin  900 mg Oral TID     hydrocortisone  10 mg Oral Daily before lunch     hydrocortisone  20 mg Oral Daily     iopamidol (ISOVUE-370)  100 mL Intravenous Once     nicotine  1 patch Transdermal Daily    And     nicotine   Transdermal Q8H BIENVENIDO     QUEtiapine  100 mg Oral QAM     QUEtiapine  300 mg Oral At Bedtime     sodium chloride (PF)  3 mL Intracatheter Q8H     vancomycin  1,500 mg Intravenous Q24H       Data reviewed today: I personally reviewed all new medications, labs, imaging/diagnostics reports over the past 24 hours. Pertinent findings include    Labs:  Most Recent 3 CBC's:  Recent Labs   Lab Test 06/08/23  1335 05/29/23  1704 04/30/23  0623 04/28/23  0535   WBC 5.9 8.5  --  6.5   HGB 12.7 13.2  --  10.9*   MCV 88 89  --  96    199 177 203     Most Recent 3 BMP's:  Recent Labs   Lab Test 06/08/23  1335 05/29/23  1704 04/30/23  0623 04/28/23  0534   * 131*  --  135*   POTASSIUM 4.3 4.3  --  4.8   CHLORIDE 96* 102  --  101   CO2 26 18*  --  26   BUN 8 11  --  11   CR 0.88 1.04 0.84 0.82   ANIONGAP 9 11  --  8   KARRIE 9.5 9.9  --  9.3   GLC 92 95  --  132*     Most Recent 2 LFT's:  Recent Labs   Lab Test 06/08/23  1335 05/29/23  1704   AST 15 16   ALT <9 <9   ALKPHOS 73 84   BILITOTAL 0.8 1.4*       Imaging:   Recent Results (from the past 24 hour(s))   Echocardiogram  Complete   Result Value    LVEF  60-65%    [facility]    [id number]  [id number]  [id number]  [id number]^JOSE R^EDUARDO     [id number] [place] [address]     Name: LEO DE LA ROSA  MRN: [id number]  : 1955  Study Date: 2023 01:27 PM  Age: 68 yrs  Gender: Female  Patient Location: [facility]  Reason For Study: Endocarditis  Ordering Physician: EDUARDO ODELL  Performed By: HAMILTON     BSA: 1.7 m2  Height: 66 in  Weight: 137 lb  HR: 63  BP: 121/74 mmHg  ______________________________________________________________________________  Procedure  Complete Portable Echo Adult. Adequate quality two-dimensional was performed  and interpreted. No hemodynamically significant valvular abnormalities on 2D  or color flow imaging. There is no comparison study available.  ______________________________________________________________________________  Interpretation Summary     Left ventricular size, wall motion and function are normal. The ejection  fraction is 60-65%.  Normal right ventricle size and systolic function.  No hemodynamically significant valvular abnormalities on 2D or color flow  imaging.  There is no comparison study available.  ______________________________________________________________________________  Left Ventricle  Left ventricular size, wall motion and function are normal. The ejection  fraction is 60-65%. There is normal left ventricular wall thickness. Left  ventricular diastolic function is normal.     Right Ventricle  Normal right ventricle size and systolic function.     Atria  The left atrium is borderline dilated. Right atrial size is normal. There is  no color Doppler evidence of an atrial shunt.     Mitral Valve  Mitral valve leaflets appear normal. There is no evidence of mitral stenosis  or clinically significant mitral regurgitation.     Tricuspid Valve  Tricuspid valve leaflets appear normal. There is no evidence of tricuspid  stenosis or clinically significant tricuspid  regurgitation. Right ventricle  systolic pressure estimate normal. The right ventricular systolic pressure is  approximated at 26.4 mmHg plus the right atrial pressure.     Aortic Valve  The aortic valve is trileaflet. Aortic valve leaflets appear normal. There is  no evidence of aortic stenosis or clinically significant aortic regurgitation.     Pulmonic Valve  The pulmonic valve is not well seen, but is grossly normal. This degree of  valvular regurgitation is within normal limits. There is trace pulmonic  valvular regurgitation.     Vessels  The aorta root is normal. Normal size ascending aorta. IVC diameter <2.1 cm  collapsing >50% with sniff suggests a normal RA pressure of 3 mmHg.     Pericardium  There is no pericardial effusion.     Rhythm  Sinus rhythm was noted.  ______________________________________________________________________________  MMode/2D Measurements & Calculations     IVSd: 0.98 cm  LVIDd: 4.4 cm  LVIDs: 2.7 cm  LVPWd: 0.84 cm  FS: 38.3 %  LV mass(C)d: 129.7 grams  LV mass(C)dI: 76.1 grams/m2  Ao root diam: 3.4 cm  LA dimension: 2.8 cm  asc Aorta Diam: 3.4 cm  LA/Ao: 0.82  LVOT diam: 2.2 cm  LVOT area: 3.8 cm2  LA Volume (BP): 76.7 ml  LA Volume Index (BP): 45.1 ml/m2     LA Volume Indexed (AL/bp): 49.3 ml/m2  RV Base: 4.5 cm  RWT: 0.38  TAPSE: 3.5 cm     Time Measurements  MM HR: 63.0 BPM     Doppler Measurements & Calculations  MV E max sekou: 101.0 cm/sec  MV A max sekou: 72.3 cm/sec  MV E/A: 1.4     MV dec slope: 541.0 cm/sec2  MV dec time: 0.19 sec  Ao V2 max: 133.0 cm/sec  Ao max P.0 mmHg  Ao V2 mean: 92.8 cm/sec  Ao mean P.0 mmHg  Ao V2 VTI: 28.8 cm  KASSIE(I,D): 3.3 cm2  KASSIE(V,D): 3.3 cm2  LV V1 max P.4 mmHg  LV V1 max: 116.0 cm/sec  LV V1 VTI: 25.0 cm  SV(LVOT): 95.0 ml  SI(LVOT): 55.8 ml/m2  PA acc time: 0.13 sec  TR max sekou: 257.0 cm/sec  TR max P.4 mmHg  AV Sekou Ratio (DI): 0.87  KASSIE Index (cm2/m2): 1.9  E/E': 7.2  E/E' av.3  Lateral E/e': 7.2  Medial E/e': 11.4  Peak  E' Sekou: 14.1 cm/sec  RV S Sekou: 15.2 cm/sec     ______________________________________________________________________________  Report approved by: Bob Bright 06/09/2023 03:36 PM              49 MINUTES SPENT BY ME on the date of service doing chart review, history, exam, documentation & further activities per the note.

## 2023-06-10 NOTE — PROGRESS NOTES
"INFECTIOUS DISEASE FOLLOW UP NOTE      ASSESSMENT:  Streptococcus mitis and Rothia Mucilaginosa (oral sunitha) in blood cultures in a patient undergoing sequential teeth extraction, 4 weeks of fever, dental abscess with tooth falling out of her mouth 4 days ago.  TTE with no evidence of endocarditis.  Suspicion for subacute bacterial endocarditis, although noted that each organism so far grew in just 1 of 4 bottles.  She will need MARYANNE.  Currently on IV ceftriaxone and ceftriaxone. R. Mucilaginosa is usually susceptible to penicillin, ampicillin, cefotaxime, and vancomycin, unable to grow for susceptibilities.  MARYANNE ordered for Monday. Would expect same bacteria in multiple cultures and elevated CRP if this were endocarditis, not the case so far here.     Review of Rothia via case report noted here.     See initial ID consult by resident Dr. Hsu on 23, cosigned by Dr. Rocha.     PLAN:  Ceftriaxone, vancomycin  MARYANNE Monday  Check sed rate    Nir Sahu MD  [facility]  [phone] clinic  [employer] paging    ______________________________________________________________________    SUBJECTIVE / INTERVAL HISTORY: temps ok. Reviewed results and course. Has noted arrhythmias past 2 months.     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     ROS: All other systems negative except as listed above.        OBJECTIVE:  /58 (BP Location: Right arm)   Pulse 78   Temp 98.2  F (36.8  C) (Oral)   Resp 17   Ht 1.676 m (5' 6\")   Wt 60.2 kg (132 lb 12.8 oz)   SpO2 95%   BMI 21.43 kg/m         Vital Signs  Temp: 98.2  F (36.8  C)  Temp src: Oral  Resp: 17  Pulse: 78  Pulse Rate Source: Monitor  BP: 121/58  BP Location: Right arm    Temp (24hrs), Av.1  F (36.7  C), Min:97.9  F (36.6  C), Max:98.3  F (36.8  C)      GEN: No acute distress.    HEENT: poor dentition.  RESPIRATORY:  Normal breathing pattern.   CARDIOVASCULAR:  Regular rate and rhythm. Normal S1 and S2. No " murmur  ABDOMEN:  Soft, normal bowel sounds, non-tender  EXTREMITIES: No edema.  SKIN/HAIR/NAILS:  No rashes  IV: peripheral        Antibiotics:  Pip/tazo -9  Ceftriaxone -  Vancomycin -    Pertinent labs:    Recent Labs   Lab 06/10/23  0945 23  1335   WBC 4.5 5.9   HGB 11.3* 12.7   HCT 34.6* 37.9   * 168        Recent Labs   Lab 23  1335   *   CO2 26   BUN 8        Lab Results   Component Value Date    CRP <0.1 2023    CRP 0.2 2023    CRP <0.1 2023         Lab Results   Component Value Date    ALT <9 2023    AST 15 2023    GGT 94 (H) 2007    ALKPHOS 73 2023         MICROBIOLOGY DATA:   blood 1 of 4 bottles strep mitis, 1 of 4 bottles rothia, unable to grow for susceptibilities   2 sets collected prior to antibiotics negative to date.    RADIOLOGY:  Echocardiogram Complete    Result Date: 2023  [id number]^JOSE R^[place] [address]  Name: LEO DE LA ROSA MRN: [id number] : 1955 Study Date: 2023 01:27 PM Age: 68 yrs Gender: Female Patient Location: [facility] Reason For Study: Endocarditis Ordering Physician: EDUARDO ODELL Performed By: HAMILTON  BSA: 1.7 m2 Height: 66 in Weight: 137 lb HR: 63 BP: 121/74 mmHg ______________________________________________________________________________ Procedure Complete Portable Echo Adult. Adequate quality two-dimensional was performed and interpreted. No hemodynamically significant valvular abnormalities on 2D or color flow imaging. There is no comparison study available. ______________________________________________________________________________ Interpretation Summary  Left ventricular size, wall motion and function are normal. The ejection fraction is 60-65%. Normal right ventricle size and systolic function. No hemodynamically significant valvular abnormalities on 2D or color flow imaging. There is no comparison study  available. ______________________________________________________________________________ Left Ventricle Left ventricular size, wall motion and function are normal. The ejection fraction is 60-65%. There is normal left ventricular wall thickness. Left ventricular diastolic function is normal.  Right Ventricle Normal right ventricle size and systolic function.  Atria The left atrium is borderline dilated. Right atrial size is normal. There is no color Doppler evidence of an atrial shunt.  Mitral Valve Mitral valve leaflets appear normal. There is no evidence of mitral stenosis or clinically significant mitral regurgitation.  Tricuspid Valve Tricuspid valve leaflets appear normal. There is no evidence of tricuspid stenosis or clinically significant tricuspid regurgitation. Right ventricle systolic pressure estimate normal. The right ventricular systolic pressure is approximated at 26.4 mmHg plus the right atrial pressure.  Aortic Valve The aortic valve is trileaflet. Aortic valve leaflets appear normal. There is no evidence of aortic stenosis or clinically significant aortic regurgitation.  Pulmonic Valve The pulmonic valve is not well seen, but is grossly normal. This degree of valvular regurgitation is within normal limits. There is trace pulmonic valvular regurgitation.  Vessels The aorta root is normal. Normal size ascending aorta. IVC diameter <2.1 cm collapsing >50% with sniff suggests a normal RA pressure of 3 mmHg.  Pericardium There is no pericardial effusion.  Rhythm Sinus rhythm was noted. ______________________________________________________________________________ MMode/2D Measurements & Calculations  IVSd: 0.98 cm LVIDd: 4.4 cm LVIDs: 2.7 cm LVPWd: 0.84 cm FS: 38.3 % LV mass(C)d: 129.7 grams LV mass(C)dI: 76.1 grams/m2 Ao root diam: 3.4 cm LA dimension: 2.8 cm asc Aorta Diam: 3.4 cm LA/Ao: 0.82 LVOT diam: 2.2 cm LVOT area: 3.8 cm2 LA Volume (BP): 76.7 ml LA Volume Index (BP): 45.1 ml/m2  LA Volume  Indexed (AL/bp): 49.3 ml/m2 RV Base: 4.5 cm RWT: 0.38 TAPSE: 3.5 cm  Time Measurements MM HR: 63.0 BPM  Doppler Measurements & Calculations MV E max sekou: 101.0 cm/sec MV A max sekou: 72.3 cm/sec MV E/A: 1.4  MV dec slope: 541.0 cm/sec2 MV dec time: 0.19 sec Ao V2 max: 133.0 cm/sec Ao max P.0 mmHg Ao V2 mean: 92.8 cm/sec Ao mean P.0 mmHg Ao V2 VTI: 28.8 cm KASSIE(I,D): 3.3 cm2 KASSIE(V,D): 3.3 cm2 LV V1 max P.4 mmHg LV V1 max: 116.0 cm/sec LV V1 VTI: 25.0 cm SV(LVOT): 95.0 ml SI(LVOT): 55.8 ml/m2 PA acc time: 0.13 sec TR max sekou: 257.0 cm/sec TR max P.4 mmHg AV Sekou Ratio (DI): 0.87 KASSIE Index (cm2/m2): 1.9 E/E': 7.2 E/E' av.3 Lateral E/e': 7.2 Medial E/e': 11.4 Peak E' Sekou: 14.1 cm/sec RV S Sekou: 15.2 cm/sec  ______________________________________________________________________________ Report approved by: Bob Bright 2023 03:36 PM       Chest XR,  PA & LAT    Result Date: 2023  EXAM: XR CHEST 2 VIEWS LOCATION: [facility] DATE/TIME: 2023 3:17 PM CDT INDICATION: Sepsis COMPARISON: Chest CT 2023     IMPRESSION: Cervical thoracic spinal fusion hardware. Heart size and vascularity are normal. No focal consolidation, pneumothorax nor pleural effusion.    CT Chest Pulmonary Embolism w Contrast    Result Date: 2023  EXAM: CT CHEST PULMONARY EMBOLISM W CONTRAST LOCATION: [facility] DATE/TIME: 2023 7:18 PM CDT INDICATION: Shortness of breath COMPARISON: CT chest 2023 TECHNIQUE: CT chest pulmonary angiogram during arterial phase injection of IV contrast. Multiplanar reformats and MIP reconstructions were performed. Dose reduction techniques were used. CONTRAST: 100 mL Isovue-370 FINDINGS: ANGIOGRAM CHEST: Limited exam due to streak artifact and respiratory motion. No central or definite peripheral pulmonary artery embolism. Normal caliber thoracic aorta. Suboptimal timing of the contrast bolus to evaluate for a thoracic  aortic dissection.  No grossly evident thoracic aortic dissection. No CT evidence of right heart strain. LUNGS AND PLEURA: Mild tracheal secretions. Mild to moderate bronchial wall thickening with multifocal endobronchial mucoid impaction. Moderate emphysema. Stable biapical scarring. Similar right lower lobe tree-in-bud opacities. Stable incidental right minor fissural intrapulmonary lymph node. No focal consolidation or pleural effusion. MEDIASTINUM/AXILLAE: Right thyroid lobe 10 mm nodule. No thoracic adenopathy. Normal heart size and no pericardial effusion. CORONARY ARTERY CALCIFICATION: None. UPPER ABDOMEN: Normal. MUSCULOSKELETAL: Normal.     IMPRESSION: 1.  Limited exam due to respiratory motion. No central or definite peripheral pulmonary artery embolism. 2.  Moderate emphysema and bronchiolitis. No pneumonic consolidation or pleural effusion. 3.  Lower right thyroid lobe 10 mm nodule. No follow-up is indicated per guidelines below. REFERENCE: Tripp GOMEZ et al. Managing Incidental Thyroid Nodules Detected on Imaging: White Paper of the ACR Incidental Thyroid Findings Committee. JACR 2015; 12:143-150. Incidental thyroid nodule detected on CT or MRI without suspicious findings. Applies to general population without limited life expectancy or significant comorbidities. Age greater than or equal to 35 years Less than 1.5 cm: No further evaluation. Greater than or equal to 1.5 cm: Evaluate with thyroid ultrasound.    CT Abdomen Pelvis w Contrast    Result Date: 5/29/2023  EXAM: CT ABDOMEN PELVIS W CONTRAST LOCATION: [facility] DATE/TIME: 5/29/2023 7:19 PM CDT INDICATION: abd pain COMPARISON: CT 03/30/2023 TECHNIQUE: CT scan of the abdomen and pelvis was performed following injection of IV contrast. Multiplanar reformats were obtained. Dose reduction techniques were used. CONTRAST: 100 mL Isovue 370 FINDINGS: LOWER CHEST: Please see concurrent chest CT report for findings above the  diaphragm. HEPATOBILIARY: Normal. PANCREAS: Atrophic but otherwise unremarkable. SPLEEN: Normal. ADRENAL GLANDS: Normal. KIDNEYS/BLADDER: No hydronephrosis. Likely bilateral renal cysts, no specific follow-up recommended. Urinary bladder is unremarkable. BOWEL: No obstruction or inflammatory change. Extensive motion artifact noted throughout the abdomen and pelvis. LYMPH NODES: No suspicious lymphadenopathy. VASCULATURE: Moderate calcified atherosclerosis. PELVIC ORGANS: Normal. MUSCULOSKELETAL: No acute bony abnormality.     IMPRESSION: 1.  No visualized explanation for patient's abdominal pain.

## 2023-06-10 NOTE — PLAN OF CARE
Problem: Plan of Care - These are the overarching goals to be used throughout the patient stay.    Goal: Optimal Comfort and Wellbeing  Outcome: Progressing  Intervention: Monitor Pain and Promote Comfort  Recent Flowsheet Documentation  Taken 6/9/2023 0905 by Brigitte Villa, RN  Pain Management Interventions: medication (see MAR)   Goal Outcome Evaluation:  Pt pleasant and cooperative throughout this shift.  She remains independent in the room, able to make needs know.  IV antibiotics continue infusing at this time.  Per pt this is likely endocarditis r/t an infection in her mouth.

## 2023-06-10 NOTE — PLAN OF CARE
Problem: Infection  Goal: Absence of Infection Signs and Symptoms  Outcome: Progressing  Intervention: Prevent or Manage Infection   Goal Outcome Evaluation:  Pt pleasant and cooperative throughout this shift.  She was placed on cardiac monitoring today due to infection, although ID informed her that it was likely not endocarditis now.  Waiting for MARYANNE on Monday 6.12.2023, continue IV antibiotics.  Pt independent in room, able to make needs known.

## 2023-06-11 LAB
CREAT SERPL-MCNC: 0.84 MG/DL (ref 0.6–1.1)
ERYTHROCYTE [SEDIMENTATION RATE] IN BLOOD BY WESTERGREN METHOD: 11 MM/HR (ref 0–30)
GFR SERPL CREATININE-BSD FRML MDRD: 75 ML/MIN/1.73M2
VANCOMYCIN SERPL-MCNC: 7.9 MG/L

## 2023-06-11 PROCEDURE — 36415 COLL VENOUS BLD VENIPUNCTURE: CPT | Performed by: STUDENT IN AN ORGANIZED HEALTH CARE EDUCATION/TRAINING PROGRAM

## 2023-06-11 PROCEDURE — 250N000013 HC RX MED GY IP 250 OP 250 PS 637: Performed by: INTERNAL MEDICINE

## 2023-06-11 PROCEDURE — 99207 PR NO CHARGE LOS: CPT | Performed by: INTERNAL MEDICINE

## 2023-06-11 PROCEDURE — 250N000011 HC RX IP 250 OP 636: Performed by: STUDENT IN AN ORGANIZED HEALTH CARE EDUCATION/TRAINING PROGRAM

## 2023-06-11 PROCEDURE — 87040 BLOOD CULTURE FOR BACTERIA: CPT | Performed by: STUDENT IN AN ORGANIZED HEALTH CARE EDUCATION/TRAINING PROGRAM

## 2023-06-11 PROCEDURE — 80202 ASSAY OF VANCOMYCIN: CPT | Performed by: STUDENT IN AN ORGANIZED HEALTH CARE EDUCATION/TRAINING PROGRAM

## 2023-06-11 PROCEDURE — 258N000003 HC RX IP 258 OP 636: Performed by: STUDENT IN AN ORGANIZED HEALTH CARE EDUCATION/TRAINING PROGRAM

## 2023-06-11 PROCEDURE — 82565 ASSAY OF CREATININE: CPT | Performed by: INTERNAL MEDICINE

## 2023-06-11 PROCEDURE — 85652 RBC SED RATE AUTOMATED: CPT | Performed by: INTERNAL MEDICINE

## 2023-06-11 PROCEDURE — 250N000011 HC RX IP 250 OP 636: Performed by: INTERNAL MEDICINE

## 2023-06-11 PROCEDURE — 258N000003 HC RX IP 258 OP 636: Performed by: INTERNAL MEDICINE

## 2023-06-11 PROCEDURE — 120N000001 HC R&B MED SURG/OB

## 2023-06-11 PROCEDURE — 99232 SBSQ HOSP IP/OBS MODERATE 35: CPT | Performed by: STUDENT IN AN ORGANIZED HEALTH CARE EDUCATION/TRAINING PROGRAM

## 2023-06-11 RX ADMIN — VANCOMYCIN HYDROCHLORIDE 1500 MG: 5 INJECTION, POWDER, LYOPHILIZED, FOR SOLUTION INTRAVENOUS at 19:58

## 2023-06-11 RX ADMIN — CLONIDINE HYDROCHLORIDE 0.2 MG: 0.1 TABLET ORAL at 19:58

## 2023-06-11 RX ADMIN — NICOTINE 1 PATCH: 14 PATCH, EXTENDED RELEASE TRANSDERMAL at 08:40

## 2023-06-11 RX ADMIN — BUPRENORPHINE AND NALOXONE 2 FILM: 2; .5 FILM BUCCAL; SUBLINGUAL at 08:40

## 2023-06-11 RX ADMIN — AMLODIPINE BESYLATE 5 MG: 5 TABLET ORAL at 08:40

## 2023-06-11 RX ADMIN — CYCLOBENZAPRINE HYDROCHLORIDE 5 MG: 5 TABLET, FILM COATED ORAL at 21:34

## 2023-06-11 RX ADMIN — BUPRENORPHINE AND NALOXONE 2 FILM: 2; .5 FILM BUCCAL; SUBLINGUAL at 19:59

## 2023-06-11 RX ADMIN — CEFTRIAXONE SODIUM 2 G: 2 INJECTION, POWDER, FOR SOLUTION INTRAMUSCULAR; INTRAVENOUS at 11:38

## 2023-06-11 RX ADMIN — CYCLOBENZAPRINE HYDROCHLORIDE 5 MG: 5 TABLET, FILM COATED ORAL at 08:40

## 2023-06-11 RX ADMIN — FLUOXETINE 40 MG: 20 CAPSULE ORAL at 08:40

## 2023-06-11 RX ADMIN — GABAPENTIN 900 MG: 300 CAPSULE ORAL at 19:58

## 2023-06-11 RX ADMIN — QUETIAPINE FUMARATE 300 MG: 300 TABLET ORAL at 21:34

## 2023-06-11 RX ADMIN — SODIUM CHLORIDE: 9 INJECTION, SOLUTION INTRAVENOUS at 21:38

## 2023-06-11 RX ADMIN — HYDROCORTISONE 20 MG: 20 TABLET ORAL at 08:42

## 2023-06-11 RX ADMIN — ENOXAPARIN SODIUM 40 MG: 100 INJECTION SUBCUTANEOUS at 16:14

## 2023-06-11 RX ADMIN — FLUTICASONE FUROATE AND VILANTEROL TRIFENATATE 1 PUFF: 100; 25 POWDER RESPIRATORY (INHALATION) at 08:40

## 2023-06-11 RX ADMIN — CLONIDINE HYDROCHLORIDE 0.2 MG: 0.1 TABLET ORAL at 08:40

## 2023-06-11 RX ADMIN — DOCUSATE SODIUM 100 MG: 100 CAPSULE, LIQUID FILLED ORAL at 08:40

## 2023-06-11 RX ADMIN — GABAPENTIN 900 MG: 300 CAPSULE ORAL at 08:40

## 2023-06-11 RX ADMIN — QUETIAPINE 100 MG: 100 TABLET ORAL at 08:40

## 2023-06-11 RX ADMIN — HYDROXYZINE HYDROCHLORIDE 50 MG: 25 TABLET ORAL at 21:34

## 2023-06-11 RX ADMIN — GABAPENTIN 900 MG: 300 CAPSULE ORAL at 14:43

## 2023-06-11 RX ADMIN — HYDROCORTISONE 10 MG: 10 TABLET ORAL at 11:38

## 2023-06-11 RX ADMIN — HYDROXYZINE HYDROCHLORIDE 50 MG: 25 TABLET ORAL at 09:48

## 2023-06-11 ASSESSMENT — ACTIVITIES OF DAILY LIVING (ADL)
ADLS_ACUITY_SCORE: 20

## 2023-06-11 NOTE — PROGRESS NOTES
[facility] Medicine PROGRESS NOTE      Identification/Summary:   68 year old female past history significant for COPD, hepatitis C, bipolar disorder, anxiety depression, methamphetamine dependence on Suboxone, alcohol dependence in remission, who was recently admitted to [facility] 4/27 - 4/29 for ESBL E. coli UTI, COPD exacerbation and discharged on nitrofurantoin, then seen in the ED 5/29 for shortness of breath, urinary frequency, fevers, dental pain, with infection work-up unremarkable and discharged from ED-- then subsequently was told to return back to ED for positive blood cultures from 5/29 (strep mitis, Rothia mucilaginosa)    Likely sources include dental infection which was recent, currently without any new symptoms.  She also complained of fevers, productive cough for ~5 days prior to admission.  So far infectious work-up this admission has been negative.  Follow-up on repeat blood cultures.  ID following. Ongoing Vanc+ceft.    Plan for TransEsophageal Echocardiogram on Monday. Npo at midnight.     - - - - -    Positive blood cultures 5/29  Presumed Recent dental infection  Amphetamine use  Source likely dental origin  She had blood cultures positive 5/29 for strep mitis, Rothia mucilaginosa.    Repeat blood cultures x2 from 6/8 pending  Urine tox screen positive for amphetamines. she denies using  ID consulted, greatly appreciate  Zosyn changed to ceftriaxone, continued on vancomycin  No leukocytosis, CRP is normal  daily blood cultures   - these appear to not have been obtained; I have ordered again starting 6/10/2023   MARYANNE Monday  -npo at midnight 6/11/2023     Normocytic anemia  Assessment-noted on 6/10, hemoglobin 11.3, with a drop with more than 1 unit, will recheck; ordered.  Plan:  -Monitor clinically for any signs/symptoms concerning for bleed  Monitor hemoglobin-transfuse if less than 7     Fever, cough  Chest x-ray no acute changes  Could be bronchitis  Continue current  "antibiotics.  Urinalysis few WBCs no bacteria.     History of COPD  No sign of exacerbation  Continue home inhalers, nebulizers  She is on room air     Mild hyponatremia  She received 1 L normal saline bolus on admit  Gentle fluids     History of methamphetamine dependence  Urine tox screen positive for amphetamines this admission.  Continue home Suboxone  Social work to see    History of hepatitis C status posttreatment  History of adrenal insufficiency  Has not started taking hydrocortisone prescribed during her previous admission   Ordered.  She is hemodynamically stable.  Homelessnes  Anxiety depression  Continue home Seroquel, gabapentin  DVT prophylaxis: lovenox    Diet: Regular Diet Adult  NPO per Anesthesia Guidelines for Procedure/Surgery Except for: Meds  Code Status: Full Code    Roni FloresHaven Behavioral Healthcareist Service  [facility]   Securely message with the Vocera Web Console (learn more here)  Text page via [employer] Paging/Directory       Interval History/Subjective:  - no acute events  - pt is enjoying her breakfast  - watching television  - no new symptoms- she continues to deny any fever, chills.  No shortness of breath, chest pain, urinary or bowel complaints.  -She is aware of the plan for transesophageal echo tomorrow has no further questions     Physical Exam/Objective:  Vitals I/O   Vital signs:  Temp: 97.6  F (36.4  C) Temp src: Oral BP: 132/65 Pulse: 67   Resp: 18 SpO2: 95 % O2 Device: None (Room air)   Height: 167.6 cm (5' 6\") Weight: 60.2 kg (132 lb 12.8 oz)  Estimated body mass index is 21.43 kg/m  as calculated from the following:    Height as of this encounter: 1.676 m (5' 6\").    Weight as of this encounter: 60.2 kg (132 lb 12.8 oz). I/O last 3 completed shifts:  In: 2115.83 [P.O.:720; I.V.:1395.83]  Out: -      Body mass index is 21.43 kg/m .    General Appearance:  Alert, cooperative, no distress   Head:  Normocephalic, atraumatic   Eyes:  PERRL    Throat:  mucosa; " moist   Neck: No JVD, thyromegaly   Lungs:   Clear to auscultation bilaterally, respirations unlabored   Chest Wall:  No tenderness or deformity   Heart:  Regular rate and rhythm, S1, S2 normal,no murmur   Abdomen:   Soft, non tender, non distended, bowel sounds present, no guarding or rigidity   Extremities: No edema, no joint swelling   Skin: Skin color, texture, turgor normal, no rashes or lesions   Neurologic: Alert and oriented X 3, Moves all 4 extremities       Medications:   Personally Reviewed.    amLODIPine  5 mg Oral Daily     buprenorphine HCl-naloxone HCl  2 Film Sublingual BID     cefTRIAXone  2 g Intravenous Q24H     cloNIDine  0.2 mg Oral BID     docusate sodium  100 mg Oral Daily     enoxaparin ANTICOAGULANT  40 mg Subcutaneous Q24H     FLUoxetine  40 mg Oral Daily     fluticasone-vilanterol  1 puff Inhalation Daily     gabapentin  900 mg Oral TID     hydrocortisone  10 mg Oral Daily before lunch     hydrocortisone  20 mg Oral Daily     iopamidol (ISOVUE-370)  100 mL Intravenous Once     nicotine  1 patch Transdermal Daily    And     nicotine   Transdermal Q8H BIENVENIDO     QUEtiapine  100 mg Oral QAM     QUEtiapine  300 mg Oral At Bedtime     sodium chloride (PF)  3 mL Intracatheter Q8H     vancomycin  1,500 mg Intravenous Q24H       Data reviewed today: I personally reviewed all new medications, labs, imaging/diagnostics reports over the past 24 hours. Pertinent findings include    Labs:  Most Recent 3 CBC's:  Recent Labs   Lab Test 06/10/23  0945 06/08/23  1335 05/29/23  1704   WBC 4.5 5.9 8.5   HGB 11.3* 12.7 13.2   MCV 91 88 89   * 168 199     Most Recent 3 BMP's:  Recent Labs   Lab Test 06/11/23  0827 06/10/23  0945 06/08/23  1335 05/29/23  1704 04/30/23  0623 04/28/23  0534   NA  --   --  131* 131*  --  135*   POTASSIUM  --   --  4.3 4.3  --  4.8   CHLORIDE  --   --  96* 102  --  101   CO2  --   --  26 18*  --  26   BUN  --   --  8 11  --  11   CR 0.84 0.90 0.88 1.04   < > 0.82   ANIONGAP  --    --  9 11  --  8   KARRIE  --   --  9.5 9.9  --  9.3   GLC  --   --  92 95  --  132*    < > = values in this interval not displayed.     Most Recent 2 LFT's:  Recent Labs   Lab Test 06/08/23  1335 05/29/23  1704   AST 15 16   ALT <9 <9   ALKPHOS 73 84   BILITOTAL 0.8 1.4*       Imaging:   No results found for this or any previous visit (from the past 24 hour(s)).     45 MINUTES SPENT BY ME on the date of service doing chart review, history, exam, documentation & further activities per the note.

## 2023-06-11 NOTE — PLAN OF CARE
Problem: Infection  Goal: Absence of Infection Signs and Symptoms  Outcome: Progressing  Intervention: Prevent or Manage Infection     Problem: Plan of Care - These are the overarching goals to be used throughout the patient stay.    Goal: Optimal Comfort and Wellbeing  Outcome: Progressing  Intervention: Monitor Pain and Promote Comfort   Goal Outcome Evaluation:  Pt pleasant and cooperative throughout this shift.  Remains afebrile.  MARYANNE tomorrow, pt is NPO at midnight, she is aware.  Remains independent in the room.  Waiting on Vanco level to start 1800 dose of Vancomycin, lab is drawing at this time.

## 2023-06-11 NOTE — PLAN OF CARE
Problem: Plan of Care - These are the overarching goals to be used throughout the patient stay.    Goal: Readiness for Transition of Care  Outcome: Progressing   Goal Outcome Evaluation:       Alert and oriented, Independent in room. Denies pain. On telemetry monitoring. Patient was bradycardic for most of the night, patient states is typical for her. HR staying around 50. IVF running.

## 2023-06-12 ENCOUNTER — APPOINTMENT (OUTPATIENT)
Dept: CARDIOLOGY | Facility: CLINIC | Age: 68
DRG: 158 | End: 2023-06-12
Attending: STUDENT IN AN ORGANIZED HEALTH CARE EDUCATION/TRAINING PROGRAM
Payer: COMMERCIAL

## 2023-06-12 LAB
ERYTHROCYTE [DISTWIDTH] IN BLOOD BY AUTOMATED COUNT: 12.6 % (ref 10–15)
HCT VFR BLD AUTO: 32.4 % (ref 35–47)
HGB BLD-MCNC: 10.6 G/DL (ref 11.7–15.7)
LVEF ECHO: NORMAL
MCH RBC QN AUTO: 30 PG (ref 26.5–33)
MCHC RBC AUTO-ENTMCNC: 32.7 G/DL (ref 31.5–36.5)
MCV RBC AUTO: 92 FL (ref 78–100)
PLATELET # BLD AUTO: 137 10E3/UL (ref 150–450)
RBC # BLD AUTO: 3.53 10E6/UL (ref 3.8–5.2)
WBC # BLD AUTO: 5.1 10E3/UL (ref 4–11)

## 2023-06-12 PROCEDURE — 250N000011 HC RX IP 250 OP 636: Performed by: STUDENT IN AN ORGANIZED HEALTH CARE EDUCATION/TRAINING PROGRAM

## 2023-06-12 PROCEDURE — 93320 DOPPLER ECHO COMPLETE: CPT | Mod: 26 | Performed by: INTERNAL MEDICINE

## 2023-06-12 PROCEDURE — 87040 BLOOD CULTURE FOR BACTERIA: CPT | Performed by: STUDENT IN AN ORGANIZED HEALTH CARE EDUCATION/TRAINING PROGRAM

## 2023-06-12 PROCEDURE — 250N000013 HC RX MED GY IP 250 OP 250 PS 637: Performed by: STUDENT IN AN ORGANIZED HEALTH CARE EDUCATION/TRAINING PROGRAM

## 2023-06-12 PROCEDURE — 99232 SBSQ HOSP IP/OBS MODERATE 35: CPT | Performed by: STUDENT IN AN ORGANIZED HEALTH CARE EDUCATION/TRAINING PROGRAM

## 2023-06-12 PROCEDURE — 258N000003 HC RX IP 258 OP 636: Performed by: INTERNAL MEDICINE

## 2023-06-12 PROCEDURE — 99152 MOD SED SAME PHYS/QHP 5/>YRS: CPT | Performed by: INTERNAL MEDICINE

## 2023-06-12 PROCEDURE — 250N000011 HC RX IP 250 OP 636: Performed by: INTERNAL MEDICINE

## 2023-06-12 PROCEDURE — 93325 DOPPLER ECHO COLOR FLOW MAPG: CPT | Mod: 26 | Performed by: INTERNAL MEDICINE

## 2023-06-12 PROCEDURE — 85027 COMPLETE CBC AUTOMATED: CPT | Performed by: STUDENT IN AN ORGANIZED HEALTH CARE EDUCATION/TRAINING PROGRAM

## 2023-06-12 PROCEDURE — 250N000013 HC RX MED GY IP 250 OP 250 PS 637: Performed by: INTERNAL MEDICINE

## 2023-06-12 PROCEDURE — 93312 ECHO TRANSESOPHAGEAL: CPT | Mod: 26 | Performed by: INTERNAL MEDICINE

## 2023-06-12 PROCEDURE — 36415 COLL VENOUS BLD VENIPUNCTURE: CPT | Performed by: STUDENT IN AN ORGANIZED HEALTH CARE EDUCATION/TRAINING PROGRAM

## 2023-06-12 PROCEDURE — 120N000001 HC R&B MED SURG/OB

## 2023-06-12 PROCEDURE — 99232 SBSQ HOSP IP/OBS MODERATE 35: CPT | Performed by: INTERNAL MEDICINE

## 2023-06-12 PROCEDURE — 250N000009 HC RX 250: Performed by: INTERNAL MEDICINE

## 2023-06-12 PROCEDURE — 93325 DOPPLER ECHO COLOR FLOW MAPG: CPT

## 2023-06-12 RX ORDER — FENTANYL CITRATE 50 UG/ML
INJECTION, SOLUTION INTRAMUSCULAR; INTRAVENOUS
Status: COMPLETED | OUTPATIENT
Start: 2023-06-12 | End: 2023-06-12

## 2023-06-12 RX ORDER — LIDOCAINE HYDROCHLORIDE 20 MG/ML
SOLUTION OROPHARYNGEAL
Status: COMPLETED | OUTPATIENT
Start: 2023-06-12 | End: 2023-06-12

## 2023-06-12 RX ORDER — SODIUM CHLORIDE 9 MG/ML
INJECTION, SOLUTION INTRAVENOUS CONTINUOUS
Status: DISCONTINUED | OUTPATIENT
Start: 2023-06-12 | End: 2023-06-13

## 2023-06-12 RX ADMIN — CYCLOBENZAPRINE HYDROCHLORIDE 5 MG: 5 TABLET, FILM COATED ORAL at 13:52

## 2023-06-12 RX ADMIN — BUPRENORPHINE AND NALOXONE 2 FILM: 2; .5 FILM BUCCAL; SUBLINGUAL at 08:59

## 2023-06-12 RX ADMIN — NICOTINE 1 PATCH: 14 PATCH, EXTENDED RELEASE TRANSDERMAL at 08:59

## 2023-06-12 RX ADMIN — CLONIDINE HYDROCHLORIDE 0.2 MG: 0.1 TABLET ORAL at 20:02

## 2023-06-12 RX ADMIN — TOPICAL ANESTHETIC 0.5 ML: 200 SPRAY DENTAL; PERIODONTAL at 12:05

## 2023-06-12 RX ADMIN — AMLODIPINE BESYLATE 5 MG: 5 TABLET ORAL at 09:00

## 2023-06-12 RX ADMIN — CYCLOBENZAPRINE HYDROCHLORIDE 5 MG: 5 TABLET, FILM COATED ORAL at 21:55

## 2023-06-12 RX ADMIN — FLUTICASONE FUROATE AND VILANTEROL TRIFENATATE 1 PUFF: 100; 25 POWDER RESPIRATORY (INHALATION) at 09:00

## 2023-06-12 RX ADMIN — FENTANYL CITRATE 25 MCG: 50 INJECTION, SOLUTION INTRAMUSCULAR; INTRAVENOUS at 12:09

## 2023-06-12 RX ADMIN — BUPRENORPHINE AND NALOXONE 2 FILM: 2; .5 FILM BUCCAL; SUBLINGUAL at 20:02

## 2023-06-12 RX ADMIN — FENTANYL CITRATE 25 MCG: 50 INJECTION, SOLUTION INTRAMUSCULAR; INTRAVENOUS at 12:07

## 2023-06-12 RX ADMIN — FLUOXETINE 40 MG: 20 CAPSULE ORAL at 09:00

## 2023-06-12 RX ADMIN — HYDROXYZINE HYDROCHLORIDE 50 MG: 25 TABLET ORAL at 09:05

## 2023-06-12 RX ADMIN — GABAPENTIN 900 MG: 300 CAPSULE ORAL at 20:02

## 2023-06-12 RX ADMIN — GABAPENTIN 900 MG: 300 CAPSULE ORAL at 13:39

## 2023-06-12 RX ADMIN — ENOXAPARIN SODIUM 40 MG: 100 INJECTION SUBCUTANEOUS at 15:51

## 2023-06-12 RX ADMIN — HYDROCORTISONE 10 MG: 10 TABLET ORAL at 13:52

## 2023-06-12 RX ADMIN — GABAPENTIN 900 MG: 300 CAPSULE ORAL at 09:00

## 2023-06-12 RX ADMIN — QUETIAPINE 100 MG: 100 TABLET ORAL at 09:00

## 2023-06-12 RX ADMIN — HYDROXYZINE HYDROCHLORIDE 50 MG: 25 TABLET ORAL at 16:02

## 2023-06-12 RX ADMIN — CLONIDINE HYDROCHLORIDE 0.2 MG: 0.1 TABLET ORAL at 09:00

## 2023-06-12 RX ADMIN — MIDAZOLAM 2 MG: 1 INJECTION INTRAMUSCULAR; INTRAVENOUS at 12:06

## 2023-06-12 RX ADMIN — QUETIAPINE FUMARATE 300 MG: 300 TABLET ORAL at 21:55

## 2023-06-12 RX ADMIN — HYDROCORTISONE 20 MG: 20 TABLET ORAL at 09:04

## 2023-06-12 RX ADMIN — SODIUM CHLORIDE: 9 INJECTION, SOLUTION INTRAVENOUS at 11:46

## 2023-06-12 RX ADMIN — CEFTRIAXONE SODIUM 2 G: 2 INJECTION, POWDER, FOR SOLUTION INTRAMUSCULAR; INTRAVENOUS at 13:36

## 2023-06-12 RX ADMIN — MIDAZOLAM 1 MG: 1 INJECTION INTRAMUSCULAR; INTRAVENOUS at 12:13

## 2023-06-12 RX ADMIN — LIDOCAINE HYDROCHLORIDE 10 ML: 20 SOLUTION ORAL; TOPICAL at 12:04

## 2023-06-12 RX ADMIN — MIDAZOLAM 1 MG: 1 INJECTION INTRAMUSCULAR; INTRAVENOUS at 12:11

## 2023-06-12 ASSESSMENT — ACTIVITIES OF DAILY LIVING (ADL)
ADLS_ACUITY_SCORE: 20

## 2023-06-12 NOTE — PHARMACY-VANCOMYCIN DOSING SERVICE
Pharmacy Vancomycin Note  Date of Service 2023  Patient's  1955   68 year old, female    Indication: Bacteremia and Endocarditis  Day of Therapy: 4   Current vancomycin regimen:  1500 mg IV q24h  Current vancomycin monitoring method: AUC  Current vancomycin therapeutic monitoring goal: 400-600 mg*h/L    Insight[employer] Prediction of Current Vancomycin Regimen  Regimen: 1500 mg IV every 24 hours.  Start time: 19:20 on 2023  Exposure target: AUC24 (range)400-600 mg/L.hr   AUC24,ss: 445 mg/L.hr  Probability of AUC24 > 400: 72 %  Ctrough,ss: 10.3 mg/L  Probability of Ctrough,ss > 20: 1 %  Probability of nephrotoxicity (Lodise EDITH ): 6 %    Current estimated CrCl = Estimated Creatinine Clearance: 60.1 mL/min (based on SCr of 0.84 mg/dL).    Creatinine for last 3 days  6/10/2023:  9:45 AM Creatinine 0.90 mg/dL  2023:  8:27 AM Creatinine 0.84 mg/dL    Recent Vancomycin Levels (past 3 days)  2023:  6:33 PM Vancomycin 7.9 mg/L    Vancomycin IV Administrations (past 72 hours)                   vancomycin (VANCOCIN) 1,500 mg in 0.9% NaCl 250 mL intermittent infusion (mg) 1,500 mg New Bag 06/10/23 1805     1,500 mg New Bag 23 1817                Nephrotoxins and other renal medications (From now, onward)    Start     Dose/Rate Route Frequency Ordered Stop    23 1800  vancomycin (VANCOCIN) 1,500 mg in 0.9% NaCl 250 mL intermittent infusion         1,500 mg  over 90 Minutes Intravenous EVERY 24 HOURS 23 1755               Contrast Orders - past 72 hours (72h ago, onward)    Start     Dose/Rate Route Frequency Stop    23 1730  iopamidol (ISOVUE-370) solution 100 mL         100 mL Intravenous ONCE            Interpretation of levels and current regimen:  Vancomycin level is reflective of -600    Has serum creatinine changed greater than 50% in last 72 hours: No    Urine output:  unable to determine    Renal Function: Stable    Plan:  1. Continue Current  Dose  2. Vancomycin monitoring method: AUC  3. Vancomycin therapeutic monitoring goal: 400-600 mg*h/L  4. Pharmacy will check vancomycin levels as appropriate in 1-3 Days.  5. Serum creatinine levels will be ordered daily for the first week of therapy and at least twice weekly for subsequent weeks.    Allison Morfin, [facility]

## 2023-06-12 NOTE — PROGRESS NOTES
[facility] Medicine PROGRESS NOTE      Identification/Summary:   68 year old female past history significant for COPD, hepatitis C, bipolar disorder, anxiety depression, methamphetamine dependence on Suboxone, alcohol dependence in remission, who was recently admitted to [facility] 4/27 - 4/29 for ESBL E. coli UTI, COPD exacerbation and discharged on nitrofurantoin, then seen in the ED 5/29 for shortness of breath, urinary frequency, fevers, dental pain, with infection work-up unremarkable and discharged from ED-- then subsequently was told to return back to ED for positive blood cultures from 5/29 (strep mitis, Rothia mucilaginosa)    Likely sources include dental infection which was recent, currently without any new symptoms.  She also complained of fevers, productive cough for ~5 days prior to admission.  So far infectious work-up this admission has been negative.  Follow-up on repeat blood cultures.  ID following. Ongoing Vanc+ceft.    Plan for TransEsophageal Echocardiogram 6/12/2023     Dispo: pending MARYANNE results and Infectious Disease clearance    - - - - -    Positive blood cultures 5/29  Presumed Recent dental infection  Amphetamine use  Source likely dental origin  She had blood cultures positive 5/29 for strep mitis, Rothia mucilaginosa.    Repeat blood cultures x2 from 6/8 pending  Urine tox screen positive for amphetamines. she denies using  ID consulted, greatly appreciate  Zosyn changed to ceftriaxone, continued on vancomycin  No leukocytosis, CRP is normal  daily blood cultures   - these appear to not have been obtained; I have ordered again starting 6/10/2023   MARYANNE 6/12/2023     Normocytic anemia  Assessment-noted on 6/10, hemoglobin 11.3, with a drop with more than 1 unit, will recheck; ordered.  Plan:  -Monitor clinically for any signs/symptoms concerning for bleed  Monitor hemoglobin-transfuse if less than 7     Fever, cough  Chest x-ray no acute changes  Could be bronchitis  Continue current  "antibiotics.  Urinalysis few WBCs no bacteria.     History of COPD  No sign of exacerbation  Continue home inhalers, nebulizers  She is on room air     Mild hyponatremia  She received 1 L normal saline bolus on admit  Gentle fluids     History of methamphetamine dependence  Urine tox screen from 5/29 was positive but not this admission  Continue home Suboxone  Social work - discussed     History of hepatitis C status posttreatment  History of adrenal insufficiency  Has not started taking hydrocortisone prescribed during her previous admission   Ordered.  She is hemodynamically stable.  Homelessnes  Anxiety depression  Continue home Seroquel, gabapentin  DVT prophylaxis: lovenox    Diet: NPO for Medical/Clinical Reasons Except for: Other; Specify: NPO for 1 hour after MARYANNE then Advance diet as tolerated to Adult Basic Diet  Code Status: Full Code    Roni ANDREWMountain View Regional Medical Centerist Service  [facility]   Securely message with the Vocera Web Console (learn more here)  Text page via [employer] Paging/Directory       Interval History/Subjective:  - no acute events  - pt is npo, disappointed about needing to wait   - watching television  - no new symptoms- she continues to deny any fever, chills.  No shortness of breath, chest pain, urinary or bowel complaints.  -She is aware of the plan for transesophageal echo midday      Physical Exam/Objective:  Vitals I/O   Vital signs:  Temp: 97.3  F (36.3  C) Temp src: Oral BP: (!) 84/54 Pulse: 55   Resp: 12 SpO2: 93 % O2 Device: None (Room air) Oxygen Delivery: 2 LPM Height: 167.6 cm (5' 6\") Weight: 60.2 kg (132 lb 12.8 oz)  Estimated body mass index is 21.43 kg/m  as calculated from the following:    Height as of this encounter: 1.676 m (5' 6\").    Weight as of this encounter: 60.2 kg (132 lb 12.8 oz). I/O last 3 completed shifts:  In: 3694 [P.O.:2380; I.V.:1314]  Out: -      Body mass index is 21.43 kg/m .    General Appearance:  Alert, cooperative, no distress "   Head:  Normocephalic, atraumatic   Eyes:  PERRL    Throat:  mucosa; moist   Neck: No JVD, thyromegaly   Lungs:   Clear to auscultation bilaterally, respirations unlabored   Chest Wall:  No tenderness or deformity   Heart:  Regular rate and rhythm, S1, S2 normal,no murmur   Abdomen:   Soft, non tender, non distended, bowel sounds present, no guarding or rigidity   Extremities: No edema, no joint swelling   Skin: Skin color, texture, turgor normal, no rashes or lesions   Neurologic: Alert and oriented X 3, Moves all 4 extremities       Medications:   Personally Reviewed.    amLODIPine  5 mg Oral Daily     buprenorphine HCl-naloxone HCl  2 Film Sublingual BID     cefTRIAXone  2 g Intravenous Q24H     cloNIDine  0.2 mg Oral BID     docusate sodium  100 mg Oral Daily     enoxaparin ANTICOAGULANT  40 mg Subcutaneous Q24H     FLUoxetine  40 mg Oral Daily     fluticasone-vilanterol  1 puff Inhalation Daily     gabapentin  900 mg Oral TID     hydrocortisone  10 mg Oral Daily before lunch     hydrocortisone  20 mg Oral Daily     iopamidol (ISOVUE-370)  100 mL Intravenous Once     nicotine  1 patch Transdermal Daily    And     nicotine   Transdermal Q8H BIENVENIDO     QUEtiapine  100 mg Oral QAM     QUEtiapine  300 mg Oral At Bedtime     sodium chloride (PF)  3 mL Intracatheter Q8H     vancomycin  1,500 mg Intravenous Q24H       Data reviewed today: I personally reviewed all new medications, labs, imaging/diagnostics reports over the past 24 hours. Pertinent findings include    Labs:  Most Recent 3 CBC's:  Recent Labs   Lab Test 06/12/23  0659 06/10/23  0945 06/08/23  1335   WBC 5.1 4.5 5.9   HGB 10.6* 11.3* 12.7   MCV 92 91 88   * 128* 168     Most Recent 3 BMP's:  Recent Labs   Lab Test 06/11/23  0827 06/10/23  0945 06/08/23  1335 05/29/23  1704 04/30/23  0623 04/28/23  0534   NA  --   --  131* 131*  --  135*   POTASSIUM  --   --  4.3 4.3  --  4.8   CHLORIDE  --   --  96* 102  --  101   CO2  --   --  26 18*  --  26   BUN   --   --  8 11  --  11   CR 0.84 0.90 0.88 1.04   < > 0.82   ANIONGAP  --   --  9 11  --  8   KARRIE  --   --  9.5 9.9  --  9.3   GLC  --   --  92 95  --  132*    < > = values in this interval not displayed.     Most Recent 2 LFT's:  Recent Labs   Lab Test 06/08/23  1335 05/29/23  1704   AST 15 16   ALT <9 <9   ALKPHOS 73 84   BILITOTAL 0.8 1.4*       Imaging:   No results found for this or any previous visit (from the past 24 hour(s)).     42 MINUTES SPENT BY ME on the date of service doing chart review, history, exam, documentation & further activities per the note.

## 2023-06-12 NOTE — PLAN OF CARE
Goal Outcome Evaluation:       MARYANNE done this afternoon around noon. Returned to [facility] around 1315. Per the Cardiology RN - I should monitor her vital signs q30min x2. I have done this. Her BP is a bit soft (90s/50s) but Renetta says this is normal for her and she is asymptomatic. She ate well once she was done with her post-procedure NPO status (completed at 1320). She is still NO hot food or liquids until 1820 tonight. IV fluids infusing at 50 ml/hr. Ceftriaxone infused once back from MARYANNE. Nursing to continue to monitor.

## 2023-06-12 NOTE — PLAN OF CARE
Problem: Plan of Care - These are the overarching goals to be used throughout the patient stay.    Goal: Optimal Comfort and Wellbeing  Outcome: Progressing   Goal Outcome Evaluation:       Pt is alert and oriented x4. Independent in room and ambulates to bathroom. Voiding well but no bowel movement since last Thurs per pt. Stomach distended and firm. Maintained on NPO since midnight. Denies pain or SOB. IVF NS 0.9% infusing at 50 ml/hr. IV Vanco administered. Denies pain on IV site. Tele reading, Sinus Dysrhythmia w/ prolonged QT/Qtc at 1200am and Sinus Bruce at 0400am. Otherwise, rest of VS were WNL.

## 2023-06-12 NOTE — PROGRESS NOTES
PT TOLERATED MARYANNE.  RECEIVED 4 MG OF VERSED AND 50 MCG OF FENTANYL.  TOLERATED PROCEDURE WELL.  REPORT TO KRISHNA HUGHES RN.  NPO TILL 1322 AND NO HOT FOOD OR LIQUIDS TILL 1822.  REPORT PENDING.    DANDRE ANDERSON RN

## 2023-06-12 NOTE — PRE-PROCEDURE
GENERAL PRE-PROCEDURE:   Date/Time:  6/12/2023 12:03 PM    Written consent obtained?: Yes    Risks and benefits: Risks, benefits and alternatives were discussed    Consent given by:  Patient  Patient states understanding of procedure being performed: Yes    Patient's understanding of procedure matches consent: Yes    Procedure consent matches procedure scheduled: Yes    Expected level of sedation:  Moderate  Appropriately NPO:  Yes  ASA Class:  3  Mallampati  :  Grade 2- soft palate, base of uvula, tonsillar pillars, and portion of posterior pharyngeal wall visible  Lungs:  Lungs clear with good breath sounds bilaterally  Heart:  Normal heart sounds and rate  History & Physical reviewed:  History and physical reviewed and no updates needed  Statement of review:  I have reviewed the lab findings, diagnostic data, medications, and the plan for sedation

## 2023-06-12 NOTE — PROGRESS NOTES
"INFECTIOUS DISEASE FOLLOW UP NOTE      ASSESSMENT:  Streptococcus mitis and Rothia Mucilaginosa (oral sunitha) in blood cultures in a patient undergoing sequential teeth extraction, 4 weeks of fever, dental abscess with tooth falling out of her mouth 4 days ago.  TTE with no evidence of endocarditis.  Suspicion for subacute bacterial endocarditis, although noted that each organism so far grew in just 1 of 4 bottles.  She will need MARYANNE.  Currently on IV ceftriaxone and ceftriaxone. R. Mucilaginosa is usually susceptible to penicillin, ampicillin, cefotaxime, and vancomycin, unable to grow for susceptibilities.  MARYANNE ordered for Monday. Would expect same bacteria in multiple cultures and elevated CRP if this were endocarditis, not the case so far here.     Review of Rothia via case report noted here.     See initial ID consult by resident Dr. Hsu on 23, cosigned by Dr. Rocha.     PLAN:  MARYANNE negative for veg  Negative BC on admission (no po abx prescribed  visit)  No fevers this admission  Thus:  discontinue antibiotics  Make appt with dentistry      Will sign off, please call with questions    Kenn Avalos M.D.    ______________________________________________________________________    SUBJECTIVE / INTERVAL HISTORY: temps ok. Reviewed results and course. Has noted arrhythmias past 2 months.     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     ROS: All other systems negative except as listed above.        OBJECTIVE:  /57 (BP Location: Right arm)   Pulse 64   Temp 98.3  F (36.8  C) (Oral)   Resp 15   Ht 1.676 m (5' 6\")   Wt 60.2 kg (132 lb 12.8 oz)   SpO2 92%   BMI 21.43 kg/m         Vital Signs  Temp: 98.2  F (36.8  C)  Temp src: Oral  Resp: 17  Pulse: 78  Pulse Rate Source: Monitor  BP: 121/58  BP Location: Right arm    Temp (24hrs), Av.1  F (36.7  C), Min:97.9  F (36.6  C), Max:98.3  F (36.8  C)      GEN: No acute distress.    HEENT: very poor dentition.no pain or " tenderness.  RESPIRATORY:  Normal breathing pattern.   CARDIOVASCULAR:  Regular rate and rhythm. Normal S1 and S2. No murmur  ABDOMEN:  Soft, normal bowel sounds, non-tender  EXTREMITIES: No edema no synovitis.  SKIN/HAIR/NAILS:  No rashes  IV: peripheral        Antibiotics:  Pip/tazo -  Ceftriaxone -  Vancomycin -    Pertinent labs:    Recent Labs   Lab 23  0659 06/10/23  0945 23  1335   WBC 5.1 4.5 5.9   HGB 10.6* 11.3* 12.7   HCT 32.4* 34.6* 37.9   * 128* 168        Recent Labs   Lab 23  1335   *   CO2 26   BUN 8        Lab Results   Component Value Date    CRP <0.1 2023    CRP 0.2 2023    CRP <0.1 2023         Lab Results   Component Value Date    ALT <9 2023    AST 15 2023    GGT 94 (H) 2007    ALKPHOS 73 2023         MICROBIOLOGY DATA:   blood 1 of 4 bottles strep mitis, 1 of 4 bottles rothia, unable to grow for susceptibilities   2 sets collected prior to antibiotics negative to date.    RADIOLOGY:  Echocardiogram MARYANNE    Result Date: 2023  [id number] [id number]^BLANCA^[place] [address]  Name: LEO DE LA ROSA MRN: [id number] : 1955 Study Date: 2023 11:55 AM Age: 68 yrs Gender: Female Patient Location: [facility] Reason For Study: Endocarditis Ordering Physician: MARCIO RIOS Performed By: CHAPIS  BSA: 1.7 m2 Height: 66 in Weight: 133 lb HR: 58 BP: 131/73 mmHg ______________________________________________________________________________ Procedure Complete MARYANNE Adult. Good quality two-dimensional was performed and interpreted. Good quality color and spectral Doppler were performed and interpreted. ______________________________________________________________________________ Interpretation Summary  Left ventricular size, wall motion and function are normal. The ejection fraction is 60-65%. Normal right ventricle size and systolic function.  No vegetations ______________________________________________________________________________ MARYANNE Consent to the procedure was obtained prior to sedation. There were no complications associated with this procedure. The procedure was performed in the Echo Lab. Informed consent for Transesophegeal echo obtained. The Transducer was inserted without difficulty . Complications None. Patient was sedated using Fentanyl 50 mcg. Patient was sedated using Versed 4 mg. The heart rate, respiratory rate, oxygen saturations, blood pressure, and response to care were monitored throughout the procedure with the assistance of the nurse. Benzocaine-1 ml viscous lidocaine-15 ml. I determined this patient to be an appropriate candidate for the planned sedation and procedure and have reassessed the patient immediately prior to sedation and procedure. Total sedation time: 15 min minutes of continuous bedside 1:1 monitoring. The patient tolerated the procedure well.  Left Ventricle Left ventricular size, wall motion and function are normal. The ejection fraction is 60-65%.  Right Ventricle Normal right ventricle size and systolic function.  Atria Normal left atrial size. Right atrial size is normal. There is no color Doppler evidence of an atrial shunt. No thrombus is detected in the left atrial appendage.  Mitral Valve Mitral valve leaflets appear normal. There is no evidence of mitral stenosis or clinically significant mitral regurgitation. There is trace to mild mitral regurgitation.  Tricuspid Valve The tricuspid valve is normal in structure and function. There is mild (1+) tricuspid regurgitation. Right ventricle systolic pressure estimate normal. There is no tricuspid stenosis.  Aortic Valve The aortic valve is trileaflet. No aortic regurgitation is present. No aortic stenosis is present.  Pulmonic Valve The pulmonic valve is not well seen, but is grossly normal. There is trace pulmonic valvular regurgitation.  Vessels The aortic  root is normal size. Normal size ascending aorta.  Pericardial/Pleural There is no pericardial effusion. ______________________________________________________________________________ Report approved by: Bob Brown 2023 12:45 PM  ______________________________________________________________________________      Echocardiogram Complete    Result Date: 2023  [id number]^JOSE R^[place] [address]  Name: LEO DE LA ROSA MRN: [id number] : 1955 Study Date: 2023 01:27 PM Age: 68 yrs Gender: Female Patient Location: [facility] Reason For Study: Endocarditis Ordering Physician: EDUARDO ODELL Performed By: HAMILTON  BSA: 1.7 m2 Height: 66 in Weight: 137 lb HR: 63 BP: 121/74 mmHg ______________________________________________________________________________ Procedure Complete Portable Echo Adult. Adequate quality two-dimensional was performed and interpreted. No hemodynamically significant valvular abnormalities on 2D or color flow imaging. There is no comparison study available. ______________________________________________________________________________ Interpretation Summary  Left ventricular size, wall motion and function are normal. The ejection fraction is 60-65%. Normal right ventricle size and systolic function. No hemodynamically significant valvular abnormalities on 2D or color flow imaging. There is no comparison study available. ______________________________________________________________________________ Left Ventricle Left ventricular size, wall motion and function are normal. The ejection fraction is 60-65%. There is normal left ventricular wall thickness. Left ventricular diastolic function is normal.  Right Ventricle Normal right ventricle size and systolic function.  Atria The left atrium is borderline dilated. Right atrial size is normal. There is no color Doppler evidence of an atrial shunt.  Mitral Valve Mitral  valve leaflets appear normal. There is no evidence of mitral stenosis or clinically significant mitral regurgitation.  Tricuspid Valve Tricuspid valve leaflets appear normal. There is no evidence of tricuspid stenosis or clinically significant tricuspid regurgitation. Right ventricle systolic pressure estimate normal. The right ventricular systolic pressure is approximated at 26.4 mmHg plus the right atrial pressure.  Aortic Valve The aortic valve is trileaflet. Aortic valve leaflets appear normal. There is no evidence of aortic stenosis or clinically significant aortic regurgitation.  Pulmonic Valve The pulmonic valve is not well seen, but is grossly normal. This degree of valvular regurgitation is within normal limits. There is trace pulmonic valvular regurgitation.  Vessels The aorta root is normal. Normal size ascending aorta. IVC diameter <2.1 cm collapsing >50% with sniff suggests a normal RA pressure of 3 mmHg.  Pericardium There is no pericardial effusion.  Rhythm Sinus rhythm was noted. ______________________________________________________________________________ MMode/2D Measurements & Calculations  IVSd: 0.98 cm LVIDd: 4.4 cm LVIDs: 2.7 cm LVPWd: 0.84 cm FS: 38.3 % LV mass(C)d: 129.7 grams LV mass(C)dI: 76.1 grams/m2 Ao root diam: 3.4 cm LA dimension: 2.8 cm asc Aorta Diam: 3.4 cm LA/Ao: 0.82 LVOT diam: 2.2 cm LVOT area: 3.8 cm2 LA Volume (BP): 76.7 ml LA Volume Index (BP): 45.1 ml/m2  LA Volume Indexed (AL/bp): 49.3 ml/m2 RV Base: 4.5 cm RWT: 0.38 TAPSE: 3.5 cm  Time Measurements MM HR: 63.0 BPM  Doppler Measurements & Calculations MV E max mando: 101.0 cm/sec MV A max mando: 72.3 cm/sec MV E/A: 1.4  MV dec slope: 541.0 cm/sec2 MV dec time: 0.19 sec Ao V2 max: 133.0 cm/sec Ao max P.0 mmHg Ao V2 mean: 92.8 cm/sec Ao mean P.0 mmHg Ao V2 VTI: 28.8 cm KASSIE(I,D): 3.3 cm2 KASSIE(V,D): 3.3 cm2 LV V1 max P.4 mmHg LV V1 max: 116.0 cm/sec LV V1 VTI: 25.0 cm SV(LVOT): 95.0 ml SI(LVOT): 55.8 ml/m2 PA acc time:  0.13 sec TR max sekou: 257.0 cm/sec TR max P.4 mmHg AV Sekou Ratio (DI): 0.87 KASSIE Index (cm2/m2): 1.9 E/E': 7.2 E/E' av.3 Lateral E/e': 7.2 Medial E/e': 11.4 Peak E' Sekou: 14.1 cm/sec RV S Sekou: 15.2 cm/sec  ______________________________________________________________________________ Report approved by: Bob Bright 2023 03:36 PM       Chest XR,  PA & LAT    Result Date: 2023  EXAM: XR CHEST 2 VIEWS LOCATION: [facility] DATE/TIME: 2023 3:17 PM CDT INDICATION: Sepsis COMPARISON: Chest CT 2023     IMPRESSION: Cervical thoracic spinal fusion hardware. Heart size and vascularity are normal. No focal consolidation, pneumothorax nor pleural effusion.    CT Chest Pulmonary Embolism w Contrast    Result Date: 2023  EXAM: CT CHEST PULMONARY EMBOLISM W CONTRAST LOCATION: [facility] DATE/TIME: 2023 7:18 PM CDT INDICATION: Shortness of breath COMPARISON: CT chest 2023 TECHNIQUE: CT chest pulmonary angiogram during arterial phase injection of IV contrast. Multiplanar reformats and MIP reconstructions were performed. Dose reduction techniques were used. CONTRAST: 100 mL Isovue-370 FINDINGS: ANGIOGRAM CHEST: Limited exam due to streak artifact and respiratory motion. No central or definite peripheral pulmonary artery embolism. Normal caliber thoracic aorta. Suboptimal timing of the contrast bolus to evaluate for a thoracic aortic dissection.  No grossly evident thoracic aortic dissection. No CT evidence of right heart strain. LUNGS AND PLEURA: Mild tracheal secretions. Mild to moderate bronchial wall thickening with multifocal endobronchial mucoid impaction. Moderate emphysema. Stable biapical scarring. Similar right lower lobe tree-in-bud opacities. Stable incidental right minor fissural intrapulmonary lymph node. No focal consolidation or pleural effusion. MEDIASTINUM/AXILLAE: Right thyroid lobe 10 mm nodule. No thoracic  adenopathy. Normal heart size and no pericardial effusion. CORONARY ARTERY CALCIFICATION: None. UPPER ABDOMEN: Normal. MUSCULOSKELETAL: Normal.     IMPRESSION: 1.  Limited exam due to respiratory motion. No central or definite peripheral pulmonary artery embolism. 2.  Moderate emphysema and bronchiolitis. No pneumonic consolidation or pleural effusion. 3.  Lower right thyroid lobe 10 mm nodule. No follow-up is indicated per guidelines below. REFERENCE: Tripp GOMEZ et al. Managing Incidental Thyroid Nodules Detected on Imaging: White Paper of the ACR Incidental Thyroid Findings Committee. JACR 2015; 12:143-150. Incidental thyroid nodule detected on CT or MRI without suspicious findings. Applies to general population without limited life expectancy or significant comorbidities. Age greater than or equal to 35 years Less than 1.5 cm: No further evaluation. Greater than or equal to 1.5 cm: Evaluate with thyroid ultrasound.    CT Abdomen Pelvis w Contrast    Result Date: 5/29/2023  EXAM: CT ABDOMEN PELVIS W CONTRAST LOCATION: [facility] DATE/TIME: 5/29/2023 7:19 PM CDT INDICATION: abd pain COMPARISON: CT 03/30/2023 TECHNIQUE: CT scan of the abdomen and pelvis was performed following injection of IV contrast. Multiplanar reformats were obtained. Dose reduction techniques were used. CONTRAST: 100 mL Isovue 370 FINDINGS: LOWER CHEST: Please see concurrent chest CT report for findings above the diaphragm. HEPATOBILIARY: Normal. PANCREAS: Atrophic but otherwise unremarkable. SPLEEN: Normal. ADRENAL GLANDS: Normal. KIDNEYS/BLADDER: No hydronephrosis. Likely bilateral renal cysts, no specific follow-up recommended. Urinary bladder is unremarkable. BOWEL: No obstruction or inflammatory change. Extensive motion artifact noted throughout the abdomen and pelvis. LYMPH NODES: No suspicious lymphadenopathy. VASCULATURE: Moderate calcified atherosclerosis. PELVIC ORGANS: Normal. MUSCULOSKELETAL: No acute bony  abnormality.     IMPRESSION: 1.  No visualized explanation for patient's abdominal pain.

## 2023-06-13 VITALS
BODY MASS INDEX: 21.34 KG/M2 | DIASTOLIC BLOOD PRESSURE: 85 MMHG | SYSTOLIC BLOOD PRESSURE: 141 MMHG | RESPIRATION RATE: 18 BRPM | TEMPERATURE: 98 F | OXYGEN SATURATION: 94 % | HEIGHT: 66 IN | HEART RATE: 69 BPM | WEIGHT: 132.8 LBS

## 2023-06-13 LAB
BACTERIA BLD CULT: NO GROWTH
BACTERIA BLD CULT: NO GROWTH

## 2023-06-13 PROCEDURE — 36415 COLL VENOUS BLD VENIPUNCTURE: CPT | Performed by: STUDENT IN AN ORGANIZED HEALTH CARE EDUCATION/TRAINING PROGRAM

## 2023-06-13 PROCEDURE — 99239 HOSP IP/OBS DSCHRG MGMT >30: CPT | Performed by: STUDENT IN AN ORGANIZED HEALTH CARE EDUCATION/TRAINING PROGRAM

## 2023-06-13 PROCEDURE — 250N000013 HC RX MED GY IP 250 OP 250 PS 637: Performed by: INTERNAL MEDICINE

## 2023-06-13 PROCEDURE — 87040 BLOOD CULTURE FOR BACTERIA: CPT | Performed by: STUDENT IN AN ORGANIZED HEALTH CARE EDUCATION/TRAINING PROGRAM

## 2023-06-13 RX ORDER — CLONIDINE HYDROCHLORIDE 0.1 MG/1
0.2 TABLET ORAL 2 TIMES DAILY
Qty: 15 TABLET | Refills: 0 | Status: SHIPPED | OUTPATIENT
Start: 2023-06-13

## 2023-06-13 RX ORDER — AMLODIPINE BESYLATE 5 MG/1
5 TABLET ORAL DAILY
Qty: 30 TABLET | Refills: 0 | Status: SHIPPED | OUTPATIENT
Start: 2023-06-13

## 2023-06-13 RX ORDER — CYCLOBENZAPRINE HCL 5 MG
5 TABLET ORAL EVERY 8 HOURS PRN
Qty: 30 TABLET | Refills: 0 | Status: SHIPPED | OUTPATIENT
Start: 2023-06-13

## 2023-06-13 RX ADMIN — AMLODIPINE BESYLATE 5 MG: 5 TABLET ORAL at 08:20

## 2023-06-13 RX ADMIN — FLUOXETINE 40 MG: 20 CAPSULE ORAL at 08:19

## 2023-06-13 RX ADMIN — CLONIDINE HYDROCHLORIDE 0.2 MG: 0.1 TABLET ORAL at 08:20

## 2023-06-13 RX ADMIN — HYDROXYZINE HYDROCHLORIDE 25 MG: 25 TABLET ORAL at 08:20

## 2023-06-13 RX ADMIN — GABAPENTIN 900 MG: 300 CAPSULE ORAL at 08:20

## 2023-06-13 RX ADMIN — BUPRENORPHINE AND NALOXONE 2 FILM: 2; .5 FILM BUCCAL; SUBLINGUAL at 09:22

## 2023-06-13 RX ADMIN — QUETIAPINE 100 MG: 100 TABLET ORAL at 08:20

## 2023-06-13 RX ADMIN — CYCLOBENZAPRINE HYDROCHLORIDE 5 MG: 5 TABLET, FILM COATED ORAL at 08:20

## 2023-06-13 RX ADMIN — NICOTINE 1 PATCH: 14 PATCH, EXTENDED RELEASE TRANSDERMAL at 08:19

## 2023-06-13 ASSESSMENT — ACTIVITIES OF DAILY LIVING (ADL)
ADLS_ACUITY_SCORE: 20

## 2023-06-13 NOTE — PLAN OF CARE
Patient discharged at 1027 via cab to home without services. PIV pulled, AVS reviewed, and belongings sent with patient.

## 2023-06-13 NOTE — DISCHARGE SUMMARY
[facility]  Hospitalist Discharge Summary      Date of Admission:  6/8/2023  Date of Discharge:  6/13/2023 10:27 AM  Discharging Provider: Roni Thomas MD  Discharge Service: Hospitalist Service    Discharge Diagnoses   -Streptococcus mitis and Rothia Mucilaginosa (oral sunitha) in blood cultures in a patient undergoing sequential teeth extraction  -History of 4 weeks of fever and dental abscess  -Plan for subacute bacterial endocarditis-status post transesophageal echocardiogram demonstrating no vegetations  -Cleared by infectious disease to discharge and follow-up with dentistry    Clinically Significant Risk Factors          Follow-ups Needed After Discharge   Follow-up Appointments     Follow-up and recommended labs and tests       Follow up with primary care provider, Jamila Connelly, within 7 days for   hospital follow- up.  Ensure dentistry follow up               Unresulted Labs Ordered in the Past 30 Days of this Admission     Date and Time Order Name Status Description    6/13/2023 12:02 AM Blood Culture Hand, Right In process     6/12/2023 12:02 AM Blood Culture Hand, Right Preliminary     6/11/2023 12:01 AM Blood Culture Arm, Right Preliminary     6/10/2023  8:28 AM Blood Culture Arm, Right Preliminary     6/8/2023 12:56 PM Blood Culture Peripheral Blood Preliminary     6/8/2023 12:56 PM Blood Culture Peripheral Blood Preliminary       These results will be followed up by s    Discharge Disposition   Discharged to home  Condition at discharge: Stable    Hospital Course      68 year old female w/ past med history significant for COPD, hepatitis C, bipolar disorder, anxiety depression, methamphetamine dependence on Suboxone, alcohol dependence in remission, who was recently admitted to [facility] 4/27 - 4/29 for ESBL E. coli UTI, COPD exacerbation and discharged on nitrofurantoin, subsequently then seen in the ED 5/29 for shortness of breath, urinary frequency, fevers, dental pain, with  infection work-up unremarkable and discharged from ED-- then subsequently was told to return back to ED for positive blood cultures from 5/29 (strep mitis, Rothia mucilaginosa)     Likely sources felt from dental infection and concern for subacute bacterial endocarditis. She also complained of fevers, productive cough for ~5 days prior to admission.  Infectious workup during stay was, and Infectious Disease guided cares. Multiple cultures obtained and pt was treated with vancomycin and ceftriaxone.     On 6/12, pt underwent TransEsophageal Echocardiogram which did not demonstrate any vegetations or signs of infection. As such, Infectious Disease discontinued her antibiotics and recommended dentistry follow-up. She remained as-pre-protocol appropriately npo immediately thereafter throughout the evening/night.     On 6/13/2023 in the morning, she remained stable and clinically at baseline. As such, she was discharged to back home. Her flexeril, amlodipine, clonidine were refilled to her preferred pharmacy as she reported running out of those medications. And she was instructed to follow up with both dentistry as planned and her PCP for posthospital follow up and when to return to urgent care if she develops progressive or new symptoms.    Consultations This Hospital Stay   PHARMACY TO DOSE VANCO  CARE MANAGEMENT / SOCIAL WORK IP CONSULT  INFECTIOUS DISEASES IP CONSULT  CARE MANAGEMENT / SOCIAL WORK IP CONSULT  PHARMACY TO DOSE VANCO    Code Status   Prior    Time Spent on this Encounter   I, Roni Thomas MD, personally saw the patient today and spent greater than 30 minutes discharging this patient.       Roni Thomas MD  [address] [address]  Phone: [phone]  Fax: [phone]  ______________________________________________________________________    Physical Exam   Vital Signs: Temp: 98  F (36.7  C) Temp src: Oral BP: (!) 141/85  Pulse: 69   Resp: 18 SpO2: 94 % O2 Device: None (Room air)    Weight: 132 lbs 12.8 oz    General: alert, oriented, and in no acute distress  Pulmonary: clear to auscultation bilaterally, normal respiratory effort, on room air, no rales or wheezes or evidence of accessory muscle use  CVS: regular rate and rhythm, no murmurs, rubs, or gallops; no blatant jugular venous distention; no extremity edema and extremities are warm to the touch  GI: soft, nontender, BS+, no rebound or guarding, no conspicuous organomegaly   Neuro: nonfocal, moves all extremities of own volition  Psych: appropriate            Primary Care Physician   aJmila Connelly    Discharge Orders      Reason for your hospital stay    Concern for bloodstream infection. The MARYANNE procedure on 6/12 showed no infection. Please follow up with dentistry as originally planned.     Follow-up and recommended labs and tests     Follow up with primary care provider, Jamila Connelly, within 7 days for hospital follow- up.  Ensure dentistry follow up     Activity    Your activity upon discharge: activity as tolerated     Diet    Follow this diet upon discharge: Orders Placed This Encounter      Advance Diet as Tolerated: Clear Liquid Diet; Regular Diet Adult      Regular Diet Adult       Significant Results and Procedures   Results for orders placed or performed during the hospital encounter of 06/08/23   Chest XR,  PA & LAT    Narrative    EXAM: XR CHEST 2 VIEWS  LOCATION: [facility]  DATE/TIME: 6/8/2023 3:17 PM CDT    INDICATION: Sepsis  COMPARISON: Chest CT 05/29/2023      Impression    IMPRESSION: Cervical thoracic spinal fusion hardware. Heart size and vascularity are normal. No focal consolidation, pneumothorax nor pleural effusion.   Echocardiogram Complete     Value    LVEF  60-65%    Narrative    [id number]  [id number]  [id number]  [id number]^JOSE R^[place] [address]     Name: LEO DE LA ROSA  T  MRN: [id number]  : 1955  Study Date: 2023 01:27 PM  Age: 68 yrs  Gender: Female  Patient Location: [facility]  Reason For Study: Endocarditis  Ordering Physician: EDUARDO ODELL  Performed By: HAMILTON     BSA: 1.7 m2  Height: 66 in  Weight: 137 lb  HR: 63  BP: 121/74 mmHg  ______________________________________________________________________________  Procedure  Complete Portable Echo Adult. Adequate quality two-dimensional was performed  and interpreted. No hemodynamically significant valvular abnormalities on 2D  or color flow imaging. There is no comparison study available.  ______________________________________________________________________________  Interpretation Summary     Left ventricular size, wall motion and function are normal. The ejection  fraction is 60-65%.  Normal right ventricle size and systolic function.  No hemodynamically significant valvular abnormalities on 2D or color flow  imaging.  There is no comparison study available.  ______________________________________________________________________________  Left Ventricle  Left ventricular size, wall motion and function are normal. The ejection  fraction is 60-65%. There is normal left ventricular wall thickness. Left  ventricular diastolic function is normal.     Right Ventricle  Normal right ventricle size and systolic function.     Atria  The left atrium is borderline dilated. Right atrial size is normal. There is  no color Doppler evidence of an atrial shunt.     Mitral Valve  Mitral valve leaflets appear normal. There is no evidence of mitral stenosis  or clinically significant mitral regurgitation.     Tricuspid Valve  Tricuspid valve leaflets appear normal. There is no evidence of tricuspid  stenosis or clinically significant tricuspid regurgitation. Right ventricle  systolic pressure estimate normal. The right ventricular systolic pressure is  approximated at 26.4 mmHg plus the right atrial pressure.     Aortic Valve  The aortic  valve is trileaflet. Aortic valve leaflets appear normal. There is  no evidence of aortic stenosis or clinically significant aortic regurgitation.     Pulmonic Valve  The pulmonic valve is not well seen, but is grossly normal. This degree of  valvular regurgitation is within normal limits. There is trace pulmonic  valvular regurgitation.     Vessels  The aorta root is normal. Normal size ascending aorta. IVC diameter <2.1 cm  collapsing >50% with sniff suggests a normal RA pressure of 3 mmHg.     Pericardium  There is no pericardial effusion.     Rhythm  Sinus rhythm was noted.  ______________________________________________________________________________  MMode/2D Measurements & Calculations     IVSd: 0.98 cm  LVIDd: 4.4 cm  LVIDs: 2.7 cm  LVPWd: 0.84 cm  FS: 38.3 %  LV mass(C)d: 129.7 grams  LV mass(C)dI: 76.1 grams/m2  Ao root diam: 3.4 cm  LA dimension: 2.8 cm  asc Aorta Diam: 3.4 cm  LA/Ao: 0.82  LVOT diam: 2.2 cm  LVOT area: 3.8 cm2  LA Volume (BP): 76.7 ml  LA Volume Index (BP): 45.1 ml/m2     LA Volume Indexed (AL/bp): 49.3 ml/m2  RV Base: 4.5 cm  RWT: 0.38  TAPSE: 3.5 cm     Time Measurements  MM HR: 63.0 BPM     Doppler Measurements & Calculations  MV E max sekou: 101.0 cm/sec  MV A max sekou: 72.3 cm/sec  MV E/A: 1.4     MV dec slope: 541.0 cm/sec2  MV dec time: 0.19 sec  Ao V2 max: 133.0 cm/sec  Ao max P.0 mmHg  Ao V2 mean: 92.8 cm/sec  Ao mean P.0 mmHg  Ao V2 VTI: 28.8 cm  KASSIE(I,D): 3.3 cm2  KASSIE(V,D): 3.3 cm2  LV V1 max P.4 mmHg  LV V1 max: 116.0 cm/sec  LV V1 VTI: 25.0 cm  SV(LVOT): 95.0 ml  SI(LVOT): 55.8 ml/m2  PA acc time: 0.13 sec  TR max sekou: 257.0 cm/sec  TR max P.4 mmHg  AV Sekou Ratio (DI): 0.87  KASSIE Index (cm2/m2): 1.9  E/E': 7.2  E/E' av.3  Lateral E/e': 7.2  Medial E/e': 11.4  Peak E' Sekou: 14.1 cm/sec  RV S Sekou: 15.2 cm/sec     ______________________________________________________________________________  Report approved by: Bob Bright 2023 03:36 PM          Echocardiogram MARYANNE     Value    LVEF  60-65%    [facility]    [id number]  [id number]  [id number]  [id number]^BLANCA^[place] [address]     Name: LEO DE LA ROSA  MRN: [id number]  : 1955  Study Date: 2023 11:55 AM  Age: 68 yrs  Gender: Female  Patient Location: [facility]  Reason For Study: Endocarditis  Ordering Physician: MARCIO RIOS  Performed By: CHAPIS     BSA: 1.7 m2  Height: 66 in  Weight: 133 lb  HR: 58  BP: 131/73 mmHg  ______________________________________________________________________________  Procedure  Complete MARYANNE Adult. Good quality two-dimensional was performed and  interpreted. Good quality color and spectral Doppler were performed and  interpreted.  ______________________________________________________________________________  Interpretation Summary     Left ventricular size, wall motion and function are normal. The ejection  fraction is 60-65%.  Normal right ventricle size and systolic function.  No vegetations  ______________________________________________________________________________  MARYANNE  Consent to the procedure was obtained prior to sedation. There were no  complications associated with this procedure. The procedure was performed in  the Echo Lab. Informed consent for Transesophegeal echo obtained. The  Transducer was inserted without difficulty . Complications None. Patient was  sedated using Fentanyl 50 mcg. Patient was sedated using Versed 4 mg. The  heart rate, respiratory rate, oxygen saturations, blood pressure, and response  to care were monitored throughout the procedure with the assistance of the  nurse. Benzocaine-1 ml  viscous lidocaine-15 ml. I determined this patient to be an appropriate  candidate for the planned sedation and procedure and have reassessed the  patient immediately prior to sedation and procedure. Total sedation time: 15  min minutes of continuous bedside 1:1 monitoring. The patient tolerated  the  procedure well.     Left Ventricle  Left ventricular size, wall motion and function are normal. The ejection  fraction is 60-65%.     Right Ventricle  Normal right ventricle size and systolic function.     Atria  Normal left atrial size. Right atrial size is normal. There is no color  Doppler evidence of an atrial shunt. No thrombus is detected in the left  atrial appendage.     Mitral Valve  Mitral valve leaflets appear normal. There is no evidence of mitral stenosis  or clinically significant mitral regurgitation. There is trace to mild mitral  regurgitation.     Tricuspid Valve  The tricuspid valve is normal in structure and function. There is mild (1+)  tricuspid regurgitation. Right ventricle systolic pressure estimate normal.  There is no tricuspid stenosis.     Aortic Valve  The aortic valve is trileaflet. No aortic regurgitation is present. No aortic  stenosis is present.     Pulmonic Valve  The pulmonic valve is not well seen, but is grossly normal. There is trace  pulmonic valvular regurgitation.     Vessels  The aortic root is normal size. Normal size ascending aorta.     Pericardial/Pleural  There is no pericardial effusion.  ______________________________________________________________________________  Report approved by: Bob Brown 06/12/2023 12:45 PM     ______________________________________________________________________________            Discharge Medications   Discharge Medication List as of 6/13/2023 10:03 AM      CONTINUE these medications which have CHANGED    Details   cloNIDine (CATAPRES) 0.1 MG tablet Take 2 tablets (0.2 mg) by mouth 2 times daily, Disp-15 tablet, R-0, E-Prescribe      cyclobenzaprine (FLEXERIL) 5 MG tablet Take 1 tablet (5 mg) by mouth every 8 hours as needed for muscle spasms (Use if voltaren ineffective), Disp-30 tablet, R-0, E-Prescribe         CONTINUE these medications which have NOT CHANGED    Details   albuterol (PROAIR HFA;PROVENTIL HFA;VENTOLIN  HFA) 90 mcg/actuation inhaler [ALBUTEROL (PROAIR HFA;PROVENTIL HFA;VENTOLIN HFA) 90 MCG/ACTUATION INHALER] Inhale 2 puffs every 6 (six) hours as needed for wheezing., Disp-1 each, R-0, Normal      buprenorphine HCl-naloxone HCl (SUBOXONE) 4-1 MG per film Place 1 Film under the tongue 2 times daily, Historical      calcium carbonate (TUMS) 500 MG chewable tablet Take 1 tablet (500 mg) by mouth 2 times daily, Disp-60 tablet, R-0, E-Prescribe      docusate sodium (COLACE) 100 MG capsule Take 100 mg by mouth daily, Historical      FLUoxetine (PROZAC) 40 MG capsule Take 1 capsule (40 mg) by mouth daily for 30 days, Disp-30 capsule, R-0, E-Prescribe      fluticasone-salmeterol (ADVAIR) 250-50 MCG/ACT inhaler Inhale 1 puff into the lungs daily, Historical      furosemide (LASIX) 20 MG tablet Take 20 mg by mouth daily as needed Take if gained 5 lbs, Historical      gabapentin (NEURONTIN) 300 MG capsule Take 3 capsules (900 mg) by mouth 3 times daily, Disp-120 capsule, R-0, E-Prescribe      hydrocortisone (CORTEF) 10 MG tablet Take 20 mg (2 tablets) in the morning and then take 10 mg (1 tablet) in the afternoon.   Follow-up with Endocrinology or your Primary Care Physician for further changes., Disp-90 tablet, R-0, Local Print      hydrOXYzine (ATARAX) 25 MG tablet Take 1-2 tablets (25-50 mg) by mouth every 6 hours as needed for other or anxiety (adjuvant pain), Disp-90 tablet, R-0, E-Prescribe      ibuprofen (ADVIL/MOTRIN) 200 MG tablet Take 600 mg by mouth every 8 hours as needed for pain, Historical      ipratropium - albuterol 0.5 mg/2.5 mg/3 mL (DUONEB) 0.5-2.5 (3) MG/3ML neb solution Take 1 vial by nebulization every 6 hours as needed for shortness of breath, wheezing or cough, Historical      magnesium oxide (MAG-OX) 400 MG tablet Take 800 mg by mouth daily, Historical      nicotine (NICODERM CQ) 14 MG/24HR 24 hr patch Place 1 patch onto the skin daily, Disp-14 patch, R-1, E-Prescribe      NYSTOP [id number] UNIT/GM powder  Apply topically 3 times daily as needed, NEVILLE, Historical      omeprazole (PRILOSEC) 20 MG DR capsule Take 20 mg by mouth daily as needed, Historical      potassium chloride ER (KLOR-CON M) 20 MEQ CR tablet Take 20 mEq by mouth daily, Historical      !! QUEtiapine (SEROQUEL) 100 MG tablet Take 1 tablet (100 mg) by mouth every morning, Disp-30 tablet, R-0, Local Print      !! QUEtiapine (SEROQUEL) 300 MG tablet Take 1 tablet (300 mg) by mouth At Bedtime, Disp-30 tablet, R-0, Local Print      Vitamin D3 (CHOLECALCIFEROL) 25 mcg (1000 units) tablet Take 1 tablet (25 mcg) by mouth daily, Disp-30 tablet, R-0, E-Prescribe      amLODIPine (NORVASC) 5 MG tablet Take 5 mg by mouth daily, Historical       !! - Potential duplicate medications found. Please discuss with provider.        Allergies   Allergies   Allergen Reactions     Amoxicillin Diarrhea     Droperidol Angioedema

## 2023-06-13 NOTE — PLAN OF CARE
Problem: Plan of Care - These are the overarching goals to be used throughout the patient stay.    Goal: Plan of Care Review  Description: The Plan of Care Review/Shift note should be completed every shift.  The Outcome Evaluation is a brief statement about your assessment that the patient is improving, declining, or no change.  This information will be displayed automatically on your shift note.  Outcome: Progressing    Patient is alert and oriented times four. She denied pain tonight. IV fluids infusing at 50ml/hr. Continues on telemetry monitoring and has been both sinus alexis and sinus alexis with prolonged QTC. Contact precautions maintained for ESBL in urine. Nicotine patch in place to left arm. Is independent in her room and calls appropriately.

## 2023-06-13 NOTE — PLAN OF CARE
Problem: Plan of Care - These are the overarching goals to be used throughout the patient stay.    Goal: Optimal Comfort and Wellbeing  Outcome: Progressing   Goal Outcome Evaluation:  Pt denies p/n/v, flexeril and atarax given for anxiety with good effect, pt ambulating to the restroom, voiding appropriately, IVF infusing, tolerating po intake, telemetry sinus alexis - NSR, IV ABX discontinued, possibly discharge tomorrow, continue to monitor.

## 2023-06-15 LAB — BACTERIA BLD CULT: NO GROWTH

## 2023-06-16 LAB — BACTERIA BLD CULT: NO GROWTH

## 2023-06-17 LAB — BACTERIA BLD CULT: NO GROWTH

## 2023-06-18 LAB — BACTERIA BLD CULT: NO GROWTH

## 2023-10-24 ENCOUNTER — HOSPITAL ENCOUNTER (EMERGENCY)
Facility: CLINIC | Age: 68
Discharge: HOME | End: 2023-10-24
Attending: EMERGENCY MEDICINE | Admitting: EMERGENCY MEDICINE
Payer: COMMERCIAL

## 2023-10-24 ENCOUNTER — HOSPITAL ENCOUNTER (EMERGENCY)
Facility: CLINIC | Age: 68
Discharge: LEFT BEFORE DISCHARGE | End: 2023-10-24
Payer: COMMERCIAL

## 2023-10-24 VITALS
DIASTOLIC BLOOD PRESSURE: 81 MMHG | HEIGHT: 66 IN | BODY MASS INDEX: 22.02 KG/M2 | HEART RATE: 100 BPM | TEMPERATURE: 97.3 F | OXYGEN SATURATION: 93 % | RESPIRATION RATE: 18 BRPM | WEIGHT: 137 LBS | SYSTOLIC BLOOD PRESSURE: 120 MMHG

## 2023-10-24 DIAGNOSIS — J12.9 VIRAL PNEUMONIA: ICD-10-CM

## 2023-10-24 DIAGNOSIS — J44.1 COPD WITH ACUTE EXACERBATION (H): ICD-10-CM

## 2023-10-24 DIAGNOSIS — J40 BRONCHITIS: ICD-10-CM

## 2023-10-24 LAB
ALBUMIN UR-MCNC: NEGATIVE MG/DL
APPEARANCE UR: CLEAR
BILIRUB UR QL STRIP: NEGATIVE
COLOR UR AUTO: COLORLESS
GLUCOSE UR STRIP-MCNC: NEGATIVE MG/DL
HGB UR QL STRIP: ABNORMAL
KETONES UR STRIP-MCNC: NEGATIVE MG/DL
LEUKOCYTE ESTERASE UR QL STRIP: NEGATIVE
MUCOUS THREADS #/AREA URNS LPF: PRESENT /LPF
NITRATE UR QL: NEGATIVE
PH UR STRIP: 5 [PH] (ref 5–7)
RBC URINE: <1 /HPF
SP GR UR STRIP: 1.01 (ref 1–1.03)
SQUAMOUS EPITHELIAL: <1 /HPF
UROBILINOGEN UR STRIP-MCNC: <2 MG/DL
WBC URINE: <1 /HPF

## 2023-10-24 PROCEDURE — 81001 URINALYSIS AUTO W/SCOPE: CPT | Performed by: EMERGENCY MEDICINE

## 2023-10-24 PROCEDURE — 99283 EMERGENCY DEPT VISIT LOW MDM: CPT

## 2023-10-24 RX ORDER — DOXYCYCLINE 100 MG/1
100 CAPSULE ORAL ONCE
Status: DISCONTINUED | OUTPATIENT
Start: 2023-10-24 | End: 2023-10-24 | Stop reason: HOSPADM

## 2023-10-24 RX ORDER — PREDNISONE 20 MG/1
40 TABLET ORAL ONCE
Status: DISCONTINUED | OUTPATIENT
Start: 2023-10-24 | End: 2023-10-24 | Stop reason: HOSPADM

## 2023-10-24 ASSESSMENT — ACTIVITIES OF DAILY LIVING (ADL)
ADLS_ACUITY_SCORE: 35
ADLS_ACUITY_SCORE: 35
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
## 2023-10-24 NOTE — ED PROVIDER NOTES
EMERGENCY DEPARTMENT ENCOUNTER      NAME: Renetta Farooq  AGE: 68 year old female  YOB: 1955  MRN: [id number]  EVALUATION DATE & TIME: 10/24/2023  2:48 PM    PCP: Jamila Connelly    ED PROVIDER: Kannan Lerner M.D.      Chief Complaint   Patient presents with    Cough       FINAL IMPRESSION:  1. COPD with acute exacerbation (H)    2. Bronchitis    3. Viral pneumonia        ED COURSE & MEDICAL DECISION MAKIN year old female presents to the Emergency Department for evaluation of cough, shortness of breath, body aches, and multiple other concerns.  Patient has a history of chronic bronchitis/COPD, homelessness, polysubstance abuse.  She had been seen at [facility] a couple of days ago and then at [facility] last night where she had a thorough work-up for her primary concerns which seem to be respiratory.  There she was diagnosed with a possible viral pneumonia and was going to be treated with doxycycline, prednisone.  On review of the chart, patient had been basically refusing to leave after her evaluation there and had made it known that she was going to be coming to [facility] today for another evaluation.  Here her vital signs are all stable.  Her lung sounds overall are clear.  Respiratory effort is normal.  She appears nontoxic and not significantly dehydrated.  I reviewed her work-up from [facility] which has included a chest x-ray, metabolic profile, cardiac biomarkers.  Everything there looked reassuring about 12 hours prior to arrival here and I do not think that based on her stable exam that repeating of any of this evaluation would be likely to be helpful.  Patient mentions multiple other concerns including reportedly history of adrenal insufficiency, seizure disorder, etc.  It sounds like she has lost contact with her primary clinic.  We discussed that I agree with the providers at [facility] that I do not think hospitalization for these issues would be warranted based on her  stable exam.     At the conclusion of the encounter I discussed the results of all of the tests and the disposition. The questions were answered. The patient or family acknowledged understanding and was agreeable with the care plan.     4:55 PM Met with and introduced myself to the patient. Discussed history and plan of care.       Medical Decision Making    History:  Supplemental history from: Family Member/Significant Other  External Record(s) reviewed: Inpatient Record: Chart review of 10/23/23 at [facility]     Work Up:  Chart documentation includes differential considered and any EKGs or imaging independently interpreted by provider, where specified.  In additional to work up documented, I considered the following work up: Documented in chart, if applicable.    External consultation:  Discussion of management with another provider: Documented in chart, if applicable    Complicating factors:  Care impacted by chronic illness: Other: Hypokalemia, hypotension, and chronic bronchitis.   Care affected by social determinants of health: N/A    Disposition considerations: Discharge. I recommended the patient continue their current prescription strength medication(s): Doxycycline, prednisone. See documentation for any additional details.            MEDICATIONS GIVEN IN THE EMERGENCY:  Medications - No data to display      NEW PRESCRIPTIONS STARTED AT [facility]'S ER VISIT  Discharge Medication List as of 10/24/2023  5:31 PM             =================================================================    HPI    Patient information was obtained from: Patient    Use of : N/A         Renetta Farooq is a 68 year old female with a pertinent history of chronic bronchitis, hypotension, and hypokalemia who presents to this ED by walk in for evaluation of a cough.     The patient reports that she went to [facility] yesterday to get checked up on and was found to have pneumonia. She was prescribed 20 mg of  prednisone, 100 mg of doxycycline, and 20 mg of furosemide. She has been coughing and having chills. She states that there is nowhere else for her to stay and is looking for a place to stay.     Per chart review patient presented to [facility] on 10/23/23 for evaluation of a fever and multiple complaints. EKG was reassuring, no arrhythmia ectopy or acute injury pattern. Denies any chest pain symptoms. Her BNP is mildly elevated at 643. Does have some swelling of the lower extremities which does appear chronic. The right lower extremity is more swollen than the left. DVT study was negative. No sign of cellulitis or infection in the lower extremities.Has mild leukocytosis of 11.3 today. Lactate was negative  Blood cultures were sent and pending  Hepatic panel is unremarkable, no sign of acute hepatic failure. Chest x-ray is showing a pattern of bilateral pneumonia, possibly viral process. COVID was negative.        REVIEW OF SYSTEMS   All systems reviewed and negative except as noted in HPI.    PAST MEDICAL HISTORY:  Past Medical History:   Diagnosis Date    Acute psychosis (H) 10/14/2018    Alcohol dependence (H) 4/10/2015    Bipolar affective disorder, manic, severe, with psychotic behavior (H) 10/15/2018    Candida infection 10/19/2018    Chronic hepatitis C (H) 4/10/2015    Chronic neck pain     Closed traumatic brain injury, without loss of consciousness, sequela (H24)     Dental abscess     Episodic tension-type headache, not intractable     Methamphetamine dependence (H) 10/15/2018    Pancytopenia (H) 4/10/2015    Weakness of right arm 4/10/2015       PAST SURGICAL HISTORY:  Past Surgical History:   Procedure Laterality Date    ARTHROSCOPY SHOULDER ROTATOR CUFF REPAIR Bilateral     CERVICAL FUSION      twice    COSMETIC SURGERY      RHINOPLASTY      TONSILLECTOMY             CURRENT MEDICATIONS:    No current facility-administered medications for this encounter.     Current Outpatient  "Medications   Medication    albuterol (PROAIR HFA;PROVENTIL HFA;VENTOLIN HFA) 90 mcg/actuation inhaler    amLODIPine (NORVASC) 5 MG tablet    buprenorphine HCl-naloxone HCl (SUBOXONE) 4-1 MG per film    calcium carbonate (TUMS) 500 MG chewable tablet    cloNIDine (CATAPRES) 0.1 MG tablet    cyclobenzaprine (FLEXERIL) 5 MG tablet    docusate sodium (COLACE) 100 MG capsule    FLUoxetine (PROZAC) 40 MG capsule    fluticasone-salmeterol (ADVAIR) 250-50 MCG/ACT inhaler    furosemide (LASIX) 20 MG tablet    gabapentin (NEURONTIN) 300 MG capsule    hydrocortisone (CORTEF) 10 MG tablet    hydrOXYzine (ATARAX) 25 MG tablet    ibuprofen (ADVIL/MOTRIN) 200 MG tablet    ipratropium - albuterol 0.5 mg/2.5 mg/3 mL (DUONEB) 0.5-2.5 (3) MG/3ML neb solution    magnesium oxide (MAG-OX) 400 MG tablet    nicotine (NICODERM CQ) 14 MG/24HR 24 hr patch    NYSTOP 137611 UNIT/GM powder    omeprazole (PRILOSEC) 20 MG DR capsule    potassium chloride ER (KLOR-CON M) 20 MEQ CR tablet    QUEtiapine (SEROQUEL) 100 MG tablet    QUEtiapine (SEROQUEL) 300 MG tablet    Vitamin D3 (CHOLECALCIFEROL) 25 mcg (1000 units) tablet         ALLERGIES:  Allergies   Allergen Reactions    Amoxicillin Diarrhea    Droperidol Angioedema       FAMILY HISTORY:  Family History   Problem Relation Age of Onset    Narcolepsy Mother     Acute myelogenous leukemia Mother     Cancer Father        SOCIAL HISTORY:   Social History     Socioeconomic History    Marital status:    Tobacco Use    Smoking status: Every Day     Packs/day: 1.00     Years: 54.00     Additional pack years: 0.00     Total pack years: 54.00     Types: Cigarettes     Start date: 1968    Smokeless tobacco: Never    Tobacco comments:     Seen IP by [facility] on 3/31/2023   Substance and Sexual Activity    Alcohol use: Yes     Comment: Alcoholic Drinks/day: \"A lot.\"  (Couldn't quantify except to repeat \"A lot\" of wine, liquor and beer)    Drug use: No   Social History Narrative    Lives alone in " "independent housing with psychiatric case assistance       VITALS:  /81   Pulse 100   Temp 97.3  F (36.3  C) (Temporal)   Resp 18   Ht 1.676 m (5' 6\")   Wt 62.1 kg (137 lb)   SpO2 93%   BMI 22.11 kg/m      PHYSICAL EXAM    Constitutional: Chronically ill-appearing elderly female patient, sitting in chair, no acute distress  HENT: Normocephalic, Atraumatic. Neck Supple.  Eyes: EOMI, Conjunctiva normal.  Respiratory: Breathing comfortably on room air. Speaks full sentences easily. Lungs clear to ascultation.  Cardiovascular: Normal heart rate, Regular rhythm. No peripheral edema.  Abdomen: Soft, nontender  Musculoskeletal: Good range of motion in all major joints. No major deformities noted.  Integument: Warm, Dry.  Neurologic: Alert & awake, Normal motor function, Normal sensory function, No focal deficits noted. Ambulatory with steady gait.  Psychiatric: Cooperative.  Tangential thought process.  No reported SI or HI.  No reported hallucinations.     LAB:  All pertinent labs reviewed and interpreted.  Labs Ordered and Resulted from Time of ED Arrival to Time of ED Departure   ROUTINE UA WITH MICROSCOPIC REFLEX TO CULTURE - Abnormal       Result Value    Color Urine Colorless      Appearance Urine Clear      Glucose Urine Negative      Bilirubin Urine Negative      Ketones Urine Negative      Specific Gravity Urine 1.008      Blood Urine 0.03 mg/dL (*)     pH Urine 5.0      Protein Albumin Urine Negative      Urobilinogen Urine <2.0      Nitrite Urine Negative      Leukocyte Esterase Urine Negative      Mucus Urine Present (*)     RBC Urine <1      WBC Urine <1      Squamous Epithelials Urine <1             I, Julianne Saba, am serving as a scribe to document services personally performed by Dr. Kannan Lerner, based on my observation and the provider's statements to me. I, Kannan Lerner MD attest that Julianne Saba is acting in a scribe capacity, has observed my performance of the services and has " documented them in accordance with my direction.    Kannan Lerner M.D.  Emergency Medicine  [facility] EMERGENCY ROOM  [address] [place] [id number] [phone]  Dept: [phone]       Kannan Lerner MD  10/24/23 2011

## 2023-10-24 NOTE — DISCHARGE INSTRUCTIONS
You were seen in the emergency department for cough, congestion, fever.  Your evaluation here looked stable including normal oxygen levels and overall generally clear sounding lungs.  We reviewed your thorough work-up from [facility] yesterday and we think this was comprehensive.  The x-ray done yesterday did suggest a possible pneumonia, potentially viral, and they have you on a effective antibiotic which we agree with.  They also are going to have you on a few days of steroids which will help with the breathing difficulty and coughing as well.  We placed an urgent primary care referral to hopefully get you connected to a clinic after this ED visit they can refill your other home medications.

## 2023-10-25 NOTE — ED NOTES
Patient was discharged and then asked for a medical ride home.  Gave me [employer] card and phone number.  Only was karen to get to getting to call after 1930, lucy DOES NOT have transportation benefits.  Was in the process of calling for med ride through [facility] when patient and her PCA asked to go outside to smoke and was wondering how long the ride was going to be?    Patient and PCA was given a list of taxi services.

## 2024-02-07 ENCOUNTER — TRANSFERRED RECORDS (OUTPATIENT)
Dept: HEALTH INFORMATION MANAGEMENT | Facility: CLINIC | Age: 69
End: 2024-02-07
Payer: COMMERCIAL

## 2024-02-07 ENCOUNTER — MEDICAL CORRESPONDENCE (OUTPATIENT)
Dept: HEALTH INFORMATION MANAGEMENT | Facility: CLINIC | Age: 69
End: 2024-02-07
Payer: COMMERCIAL

## 2024-02-07 ENCOUNTER — LAB REQUISITION (OUTPATIENT)
Dept: LAB | Facility: CLINIC | Age: 69
End: 2024-02-07

## 2024-02-07 DIAGNOSIS — E83.42 HYPOMAGNESEMIA: ICD-10-CM

## 2024-02-07 DIAGNOSIS — E27.40 UNSPECIFIED ADRENOCORTICAL INSUFFICIENCY (H): ICD-10-CM

## 2024-02-07 PROCEDURE — 80053 COMPREHEN METABOLIC PANEL: CPT | Performed by: PHYSICIAN ASSISTANT

## 2024-02-07 PROCEDURE — 83735 ASSAY OF MAGNESIUM: CPT | Performed by: PHYSICIAN ASSISTANT

## 2024-02-08 LAB
ALBUMIN SERPL BCG-MCNC: 4.5 G/DL (ref 3.5–5.2)
ALP SERPL-CCNC: 71 U/L (ref 40–150)
ALT SERPL W P-5'-P-CCNC: 16 U/L (ref 0–50)
ANION GAP SERPL CALCULATED.3IONS-SCNC: 12 MMOL/L (ref 7–15)
AST SERPL W P-5'-P-CCNC: 16 U/L (ref 0–45)
BILIRUB SERPL-MCNC: 0.5 MG/DL
BUN SERPL-MCNC: 14.4 MG/DL (ref 8–23)
CALCIUM SERPL-MCNC: 9.3 MG/DL (ref 8.8–10.2)
CHLORIDE SERPL-SCNC: 96 MMOL/L (ref 98–107)
CREAT SERPL-MCNC: 0.95 MG/DL (ref 0.51–0.95)
DEPRECATED HCO3 PLAS-SCNC: 27 MMOL/L (ref 22–29)
EGFRCR SERPLBLD CKD-EPI 2021: 65 ML/MIN/1.73M2
GLUCOSE SERPL-MCNC: 100 MG/DL (ref 70–99)
MAGNESIUM SERPL-MCNC: 1.8 MG/DL (ref 1.7–2.3)
POTASSIUM SERPL-SCNC: 3.3 MMOL/L (ref 3.4–5.3)
PROT SERPL-MCNC: 7.2 G/DL (ref 6.4–8.3)
SODIUM SERPL-SCNC: 135 MMOL/L (ref 135–145)

## 2024-02-15 ENCOUNTER — TRANSCRIBE ORDERS (OUTPATIENT)
Dept: OTHER | Age: 69
End: 2024-02-15

## 2024-02-15 DIAGNOSIS — E27.40 ADRENAL INSUFFICIENCY (H): Primary | ICD-10-CM

## 2025-03-20 ENCOUNTER — LAB REQUISITION (OUTPATIENT)
Dept: LAB | Facility: CLINIC | Age: 70
End: 2025-03-20
Payer: COMMERCIAL

## 2025-03-20 DIAGNOSIS — R60.9 EDEMA, UNSPECIFIED: ICD-10-CM

## 2025-03-24 LAB
ANION GAP SERPL CALCULATED.3IONS-SCNC: 12 MMOL/L (ref 7–15)
ANION GAP SERPL CALCULATED.3IONS-SCNC: 12 MMOL/L (ref 7–15)
BUN SERPL-MCNC: 13.1 MG/DL (ref 8–23)
BUN SERPL-MCNC: 13.1 MG/DL (ref 8–23)
CALCIUM SERPL-MCNC: 8.9 MG/DL (ref 8.8–10.4)
CALCIUM SERPL-MCNC: 8.9 MG/DL (ref 8.8–10.4)
CHLORIDE SERPL-SCNC: 92 MMOL/L (ref 98–107)
CHLORIDE SERPL-SCNC: 92 MMOL/L (ref 98–107)
CREAT SERPL-MCNC: 0.79 MG/DL (ref 0.51–0.95)
CREAT SERPL-MCNC: 0.79 MG/DL (ref 0.51–0.95)
EGFRCR SERPLBLD CKD-EPI 2021: 80 ML/MIN/1.73M2
EGFRCR SERPLBLD CKD-EPI 2021: 80 ML/MIN/1.73M2
ERYTHROCYTE [DISTWIDTH] IN BLOOD BY AUTOMATED COUNT: 14.6 % (ref 10–15)
ERYTHROCYTE [DISTWIDTH] IN BLOOD BY AUTOMATED COUNT: 14.6 % (ref 10–15)
GLUCOSE SERPL-MCNC: 143 MG/DL (ref 70–99)
GLUCOSE SERPL-MCNC: 143 MG/DL (ref 70–99)
HCO3 SERPL-SCNC: 27 MMOL/L (ref 22–29)
HCO3 SERPL-SCNC: 27 MMOL/L (ref 22–29)
HCT VFR BLD AUTO: 37.4 % (ref 35–47)
HCT VFR BLD AUTO: 37.4 % (ref 35–47)
HGB BLD-MCNC: 11.8 G/DL (ref 11.7–15.7)
HGB BLD-MCNC: 11.8 G/DL (ref 11.7–15.7)
MCH RBC QN AUTO: 29.1 PG (ref 26.5–33)
MCH RBC QN AUTO: 29.1 PG (ref 26.5–33)
MCHC RBC AUTO-ENTMCNC: 31.6 G/DL (ref 31.5–36.5)
MCHC RBC AUTO-ENTMCNC: 31.6 G/DL (ref 31.5–36.5)
MCV RBC AUTO: 92 FL (ref 78–100)
MCV RBC AUTO: 92 FL (ref 78–100)
PLATELET # BLD AUTO: 174 10E3/UL (ref 150–450)
PLATELET # BLD AUTO: 174 10E3/UL (ref 150–450)
POTASSIUM SERPL-SCNC: 3.8 MMOL/L (ref 3.4–5.3)
POTASSIUM SERPL-SCNC: 3.8 MMOL/L (ref 3.4–5.3)
RBC # BLD AUTO: 4.05 10E6/UL (ref 3.8–5.2)
RBC # BLD AUTO: 4.05 10E6/UL (ref 3.8–5.2)
SODIUM SERPL-SCNC: 131 MMOL/L (ref 135–145)
SODIUM SERPL-SCNC: 131 MMOL/L (ref 135–145)
WBC # BLD AUTO: 5.2 10E3/UL (ref 4–11)
WBC # BLD AUTO: 5.2 10E3/UL (ref 4–11)

## 2025-03-24 PROCEDURE — 85027 COMPLETE CBC AUTOMATED: CPT | Mod: ORL | Performed by: EMERGENCY MEDICINE

## 2025-03-24 PROCEDURE — 80048 BASIC METABOLIC PNL TOTAL CA: CPT | Mod: ORL | Performed by: EMERGENCY MEDICINE

## 2025-03-24 PROCEDURE — 36415 COLL VENOUS BLD VENIPUNCTURE: CPT | Mod: ORL | Performed by: EMERGENCY MEDICINE

## 2025-03-24 PROCEDURE — P9604 ONE-WAY ALLOW PRORATED TRIP: HCPCS | Mod: ORL | Performed by: EMERGENCY MEDICINE

## 2025-04-03 ENCOUNTER — LAB REQUISITION (OUTPATIENT)
Dept: LAB | Facility: CLINIC | Age: 70
End: 2025-04-03
Payer: COMMERCIAL

## 2025-04-03 DIAGNOSIS — F32.9 MAJOR DEPRESSIVE DISORDER, SINGLE EPISODE, UNSPECIFIED: ICD-10-CM

## 2025-04-08 LAB
ANION GAP SERPL CALCULATED.3IONS-SCNC: 10 MMOL/L (ref 7–15)
BUN SERPL-MCNC: 12.8 MG/DL (ref 8–23)
CALCIUM SERPL-MCNC: 8.9 MG/DL (ref 8.8–10.4)
CHLORIDE SERPL-SCNC: 91 MMOL/L (ref 98–107)
CREAT SERPL-MCNC: 0.85 MG/DL (ref 0.51–0.95)
EGFRCR SERPLBLD CKD-EPI 2021: 73 ML/MIN/1.73M2
GLUCOSE SERPL-MCNC: 93 MG/DL (ref 70–99)
HCO3 SERPL-SCNC: 29 MMOL/L (ref 22–29)
POTASSIUM SERPL-SCNC: 4 MMOL/L (ref 3.4–5.3)
SODIUM SERPL-SCNC: 130 MMOL/L (ref 135–145)

## 2025-04-08 PROCEDURE — 80048 BASIC METABOLIC PNL TOTAL CA: CPT | Mod: ORL | Performed by: EMERGENCY MEDICINE

## 2025-04-08 PROCEDURE — P9604 ONE-WAY ALLOW PRORATED TRIP: HCPCS | Mod: ORL | Performed by: EMERGENCY MEDICINE

## 2025-04-08 PROCEDURE — 36415 COLL VENOUS BLD VENIPUNCTURE: CPT | Mod: ORL | Performed by: EMERGENCY MEDICINE

## 2025-07-25 ENCOUNTER — APPOINTMENT (OUTPATIENT)
Dept: CT IMAGING | Facility: CLINIC | Age: 70
End: 2025-07-25
Payer: COMMERCIAL

## 2025-07-25 ENCOUNTER — HOSPITAL ENCOUNTER (INPATIENT)
Facility: CLINIC | Age: 70
LOS: 4 days | Discharge: NURSING, REHAB OR LONG-TERM CARE | DRG: 190 | End: 2025-07-29
Attending: EMERGENCY MEDICINE | Admitting: FAMILY MEDICINE
Payer: COMMERCIAL

## 2025-07-25 ENCOUNTER — APPOINTMENT (OUTPATIENT)
Dept: RADIOLOGY | Facility: CLINIC | Age: 70
End: 2025-07-25
Payer: COMMERCIAL

## 2025-07-25 DIAGNOSIS — Z99.81 DEPENDENCE ON SUPPLEMENTAL OXYGEN: ICD-10-CM

## 2025-07-25 DIAGNOSIS — B30.8 CHRONIC VIRAL CONJUNCTIVITIS, UNSPECIFIED LATERALITY: ICD-10-CM

## 2025-07-25 DIAGNOSIS — J44.1 CHRONIC OBSTRUCTIVE PULMONARY DISEASE WITH ACUTE EXACERBATION (H): Primary | ICD-10-CM

## 2025-07-25 LAB
ANION GAP SERPL CALCULATED.3IONS-SCNC: 13 MMOL/L (ref 7–15)
ATRIAL RATE - MUSE: 53 BPM
BASOPHILS # BLD AUTO: 0 10E3/UL (ref 0–0.2)
BASOPHILS NFR BLD AUTO: 0 %
BUN SERPL-MCNC: 12.7 MG/DL (ref 8–23)
CALCIUM SERPL-MCNC: 9.2 MG/DL (ref 8.8–10.4)
CHLORIDE SERPL-SCNC: 92 MMOL/L (ref 98–107)
CREAT SERPL-MCNC: 0.96 MG/DL (ref 0.51–0.95)
D DIMER PPP FEU-MCNC: 1.47 UG/ML FEU (ref 0–0.5)
DIASTOLIC BLOOD PRESSURE - MUSE: NORMAL MMHG
EGFRCR SERPLBLD CKD-EPI 2021: 63 ML/MIN/1.73M2
EOSINOPHIL # BLD AUTO: 0.1 10E3/UL (ref 0–0.7)
EOSINOPHIL NFR BLD AUTO: 1 %
ERYTHROCYTE [DISTWIDTH] IN BLOOD BY AUTOMATED COUNT: 13.4 % (ref 10–15)
FLUAV RNA SPEC QL NAA+PROBE: NEGATIVE
FLUBV RNA RESP QL NAA+PROBE: NEGATIVE
GLUCOSE SERPL-MCNC: 113 MG/DL (ref 70–99)
HCO3 SERPL-SCNC: 25 MMOL/L (ref 22–29)
HCT VFR BLD AUTO: 37.6 % (ref 35–47)
HGB BLD-MCNC: 12.5 G/DL (ref 11.7–15.7)
HOLD SPECIMEN: NORMAL
IMM GRANULOCYTES # BLD: 0.1 10E3/UL
IMM GRANULOCYTES NFR BLD: 1 %
INTERPRETATION ECG - MUSE: NORMAL
LYMPHOCYTES # BLD AUTO: 0.8 10E3/UL (ref 0.8–5.3)
LYMPHOCYTES NFR BLD AUTO: 13 %
MAGNESIUM SERPL-MCNC: 2 MG/DL (ref 1.7–2.3)
MCH RBC QN AUTO: 30.9 PG (ref 26.5–33)
MCHC RBC AUTO-ENTMCNC: 33.2 G/DL (ref 31.5–36.5)
MCV RBC AUTO: 93 FL (ref 78–100)
MONOCYTES # BLD AUTO: 0.3 10E3/UL (ref 0–1.3)
MONOCYTES NFR BLD AUTO: 5 %
NEUTROPHILS # BLD AUTO: 4.9 10E3/UL (ref 1.6–8.3)
NEUTROPHILS NFR BLD AUTO: 79 %
NRBC # BLD AUTO: 0 10E3/UL
NRBC BLD AUTO-RTO: 0 /100
NT-PROBNP SERPL-MCNC: 372 PG/ML (ref 0–353)
P AXIS - MUSE: 59 DEGREES
PLATELET # BLD AUTO: 163 10E3/UL (ref 150–450)
POTASSIUM SERPL-SCNC: 4.4 MMOL/L (ref 3.4–5.3)
PR INTERVAL - MUSE: 154 MS
QRS DURATION - MUSE: 86 MS
QT - MUSE: 500 MS
QTC - MUSE: 469 MS
R AXIS - MUSE: 67 DEGREES
RBC # BLD AUTO: 4.05 10E6/UL (ref 3.8–5.2)
RSV RNA SPEC NAA+PROBE: NEGATIVE
SARS-COV-2 RNA RESP QL NAA+PROBE: NEGATIVE
SODIUM SERPL-SCNC: 130 MMOL/L (ref 135–145)
SYSTOLIC BLOOD PRESSURE - MUSE: NORMAL MMHG
T AXIS - MUSE: 84 DEGREES
TROPONIN T SERPL HS-MCNC: 8 NG/L
TROPONIN T SERPL HS-MCNC: 9 NG/L
VENTRICULAR RATE- MUSE: 53 BPM
WBC # BLD AUTO: 6.3 10E3/UL (ref 4–11)

## 2025-07-25 PROCEDURE — 71275 CT ANGIOGRAPHY CHEST: CPT

## 2025-07-25 PROCEDURE — 999N000157 HC STATISTIC RCP TIME EA 10 MIN

## 2025-07-25 PROCEDURE — 250N000011 HC RX IP 250 OP 636

## 2025-07-25 PROCEDURE — 80048 BASIC METABOLIC PNL TOTAL CA: CPT

## 2025-07-25 PROCEDURE — 99285 EMERGENCY DEPT VISIT HI MDM: CPT | Mod: 25 | Performed by: EMERGENCY MEDICINE

## 2025-07-25 PROCEDURE — 83880 ASSAY OF NATRIURETIC PEPTIDE: CPT

## 2025-07-25 PROCEDURE — 120N000001 HC R&B MED SURG/OB

## 2025-07-25 PROCEDURE — 250N000013 HC RX MED GY IP 250 OP 250 PS 637

## 2025-07-25 PROCEDURE — 85025 COMPLETE CBC W/AUTO DIFF WBC: CPT

## 2025-07-25 PROCEDURE — 94640 AIRWAY INHALATION TREATMENT: CPT

## 2025-07-25 PROCEDURE — 83735 ASSAY OF MAGNESIUM: CPT

## 2025-07-25 PROCEDURE — 250N000009 HC RX 250

## 2025-07-25 PROCEDURE — 87637 SARSCOV2&INF A&B&RSV AMP PRB: CPT

## 2025-07-25 PROCEDURE — 250N000012 HC RX MED GY IP 250 OP 636 PS 637

## 2025-07-25 PROCEDURE — 99285 EMERGENCY DEPT VISIT HI MDM: CPT | Mod: 25

## 2025-07-25 PROCEDURE — 36415 COLL VENOUS BLD VENIPUNCTURE: CPT

## 2025-07-25 PROCEDURE — 84484 ASSAY OF TROPONIN QUANT: CPT

## 2025-07-25 PROCEDURE — 71046 X-RAY EXAM CHEST 2 VIEWS: CPT

## 2025-07-25 PROCEDURE — 36415 COLL VENOUS BLD VENIPUNCTURE: CPT | Performed by: EMERGENCY MEDICINE

## 2025-07-25 PROCEDURE — 85379 FIBRIN DEGRADATION QUANT: CPT

## 2025-07-25 RX ORDER — IPRATROPIUM BROMIDE AND ALBUTEROL SULFATE 2.5; .5 MG/3ML; MG/3ML
3 SOLUTION RESPIRATORY (INHALATION)
Status: DISCONTINUED | OUTPATIENT
Start: 2025-07-25 | End: 2025-07-26

## 2025-07-25 RX ORDER — DOCUSATE SODIUM 100 MG/1
100 CAPSULE, LIQUID FILLED ORAL 2 TIMES DAILY
Status: DISCONTINUED | OUTPATIENT
Start: 2025-07-25 | End: 2025-07-29 | Stop reason: HOSPADM

## 2025-07-25 RX ORDER — IOPAMIDOL 755 MG/ML
75 INJECTION, SOLUTION INTRAVASCULAR ONCE
Status: COMPLETED | OUTPATIENT
Start: 2025-07-25 | End: 2025-07-25

## 2025-07-25 RX ORDER — NALOXONE HYDROCHLORIDE 0.4 MG/ML
0.2 INJECTION, SOLUTION INTRAMUSCULAR; INTRAVENOUS; SUBCUTANEOUS
Status: DISCONTINUED | OUTPATIENT
Start: 2025-07-25 | End: 2025-07-29 | Stop reason: HOSPADM

## 2025-07-25 RX ORDER — ONDANSETRON 4 MG/1
4 TABLET, FILM COATED ORAL EVERY 8 HOURS PRN
COMMUNITY

## 2025-07-25 RX ORDER — AMOXICILLIN 250 MG
2 CAPSULE ORAL 2 TIMES DAILY PRN
Status: DISCONTINUED | OUTPATIENT
Start: 2025-07-25 | End: 2025-07-29 | Stop reason: HOSPADM

## 2025-07-25 RX ORDER — HYDROCORTISONE 10 MG/1
30 TABLET ORAL DAILY
COMMUNITY

## 2025-07-25 RX ORDER — AMLODIPINE BESYLATE 5 MG/1
5 TABLET ORAL DAILY
Status: DISCONTINUED | OUTPATIENT
Start: 2025-07-26 | End: 2025-07-29 | Stop reason: HOSPADM

## 2025-07-25 RX ORDER — METOPROLOL SUCCINATE 100 MG/1
100 TABLET, EXTENDED RELEASE ORAL DAILY
Status: DISCONTINUED | OUTPATIENT
Start: 2025-07-26 | End: 2025-07-29 | Stop reason: HOSPADM

## 2025-07-25 RX ORDER — ENOXAPARIN SODIUM 100 MG/ML
40 INJECTION SUBCUTANEOUS EVERY 24 HOURS
Status: DISCONTINUED | OUTPATIENT
Start: 2025-07-25 | End: 2025-07-29 | Stop reason: HOSPADM

## 2025-07-25 RX ORDER — QUETIAPINE FUMARATE 100 MG/1
100 TABLET, FILM COATED ORAL EVERY MORNING
Status: DISCONTINUED | OUTPATIENT
Start: 2025-07-26 | End: 2025-07-29 | Stop reason: HOSPADM

## 2025-07-25 RX ORDER — ACETAMINOPHEN 325 MG/1
650 TABLET ORAL EVERY 4 HOURS PRN
Status: DISCONTINUED | OUTPATIENT
Start: 2025-07-25 | End: 2025-07-29 | Stop reason: HOSPADM

## 2025-07-25 RX ORDER — IPRATROPIUM BROMIDE AND ALBUTEROL SULFATE 2.5; .5 MG/3ML; MG/3ML
3 SOLUTION RESPIRATORY (INHALATION) ONCE
Status: COMPLETED | OUTPATIENT
Start: 2025-07-25 | End: 2025-07-25

## 2025-07-25 RX ORDER — METHADONE HYDROCHLORIDE 10 MG/1
50 TABLET ORAL 2 TIMES DAILY
COMMUNITY

## 2025-07-25 RX ORDER — LIDOCAINE 40 MG/G
CREAM TOPICAL
Status: DISCONTINUED | OUTPATIENT
Start: 2025-07-25 | End: 2025-07-29 | Stop reason: HOSPADM

## 2025-07-25 RX ORDER — CALCIUM CARBONATE 500 MG/1
1000 TABLET, CHEWABLE ORAL 4 TIMES DAILY PRN
Status: DISCONTINUED | OUTPATIENT
Start: 2025-07-25 | End: 2025-07-29 | Stop reason: HOSPADM

## 2025-07-25 RX ORDER — CYCLOBENZAPRINE HCL 10 MG
10 TABLET ORAL EVERY 8 HOURS PRN
COMMUNITY

## 2025-07-25 RX ORDER — OXYCODONE HYDROCHLORIDE 5 MG/1
5 TABLET ORAL EVERY 4 HOURS
COMMUNITY

## 2025-07-25 RX ORDER — ACETAMINOPHEN 650 MG/1
650 SUPPOSITORY RECTAL EVERY 4 HOURS PRN
Status: DISCONTINUED | OUTPATIENT
Start: 2025-07-25 | End: 2025-07-29 | Stop reason: HOSPADM

## 2025-07-25 RX ORDER — FUROSEMIDE 20 MG/1
20 TABLET ORAL DAILY
Status: DISCONTINUED | OUTPATIENT
Start: 2025-07-26 | End: 2025-07-29 | Stop reason: HOSPADM

## 2025-07-25 RX ORDER — AMOXICILLIN 250 MG
1 CAPSULE ORAL 2 TIMES DAILY PRN
Status: DISCONTINUED | OUTPATIENT
Start: 2025-07-25 | End: 2025-07-29 | Stop reason: HOSPADM

## 2025-07-25 RX ORDER — ACETAMINOPHEN 325 MG/1
650 TABLET ORAL EVERY 6 HOURS PRN
COMMUNITY

## 2025-07-25 RX ORDER — GABAPENTIN 300 MG/1
300 CAPSULE ORAL 3 TIMES DAILY
Status: DISCONTINUED | OUTPATIENT
Start: 2025-07-25 | End: 2025-07-29 | Stop reason: HOSPADM

## 2025-07-25 RX ORDER — METHADONE HYDROCHLORIDE 10 MG/1
50 TABLET ORAL 2 TIMES DAILY
Refills: 0 | Status: DISCONTINUED | OUTPATIENT
Start: 2025-07-25 | End: 2025-07-29 | Stop reason: HOSPADM

## 2025-07-25 RX ORDER — QUETIAPINE FUMARATE 100 MG/1
300 TABLET, FILM COATED ORAL AT BEDTIME
Status: DISCONTINUED | OUTPATIENT
Start: 2025-07-25 | End: 2025-07-29 | Stop reason: HOSPADM

## 2025-07-25 RX ORDER — MAGNESIUM OXIDE 400 MG/1
400 TABLET ORAL 3 TIMES DAILY
Status: DISCONTINUED | OUTPATIENT
Start: 2025-07-25 | End: 2025-07-29 | Stop reason: HOSPADM

## 2025-07-25 RX ORDER — PREDNISONE 20 MG/1
40 TABLET ORAL DAILY
Status: DISCONTINUED | OUTPATIENT
Start: 2025-07-25 | End: 2025-07-28

## 2025-07-25 RX ORDER — NALOXONE HYDROCHLORIDE 0.4 MG/ML
0.4 INJECTION, SOLUTION INTRAMUSCULAR; INTRAVENOUS; SUBCUTANEOUS
Status: DISCONTINUED | OUTPATIENT
Start: 2025-07-25 | End: 2025-07-29 | Stop reason: HOSPADM

## 2025-07-25 RX ORDER — GABAPENTIN 300 MG/1
300 CAPSULE ORAL 3 TIMES DAILY
COMMUNITY

## 2025-07-25 RX ORDER — METOPROLOL SUCCINATE 100 MG/1
100 TABLET, EXTENDED RELEASE ORAL DAILY
COMMUNITY

## 2025-07-25 RX ORDER — METHOCARBAMOL 500 MG/1
750 TABLET, FILM COATED ORAL EVERY 6 HOURS PRN
COMMUNITY

## 2025-07-25 RX ORDER — LIDOCAINE 4 G/G
2 PATCH TOPICAL DAILY PRN
COMMUNITY

## 2025-07-25 RX ADMIN — QUETIAPINE 300 MG: 100 TABLET ORAL at 21:05

## 2025-07-25 RX ADMIN — PREDNISONE 40 MG: 20 TABLET ORAL at 21:05

## 2025-07-25 RX ADMIN — GABAPENTIN 300 MG: 300 CAPSULE ORAL at 19:26

## 2025-07-25 RX ADMIN — IPRATROPIUM BROMIDE AND ALBUTEROL SULFATE 3 ML: .5; 3 SOLUTION RESPIRATORY (INHALATION) at 15:48

## 2025-07-25 RX ADMIN — Medication 400 MG: at 19:26

## 2025-07-25 RX ADMIN — METHADONE HYDROCHLORIDE 50 MG: 10 TABLET ORAL at 19:26

## 2025-07-25 RX ADMIN — ENOXAPARIN SODIUM 40 MG: 40 INJECTION SUBCUTANEOUS at 19:27

## 2025-07-25 RX ADMIN — DOCUSATE SODIUM 100 MG: 100 CAPSULE, LIQUID FILLED ORAL at 19:26

## 2025-07-25 RX ADMIN — IOPAMIDOL 75 ML: 755 INJECTION, SOLUTION INTRAVENOUS at 14:48

## 2025-07-25 ASSESSMENT — ACTIVITIES OF DAILY LIVING (ADL)
ADLS_ACUITY_SCORE: 54
ADLS_ACUITY_SCORE: 40
ADLS_ACUITY_SCORE: 43
ADLS_ACUITY_SCORE: 54
ADLS_ACUITY_SCORE: 43
ADLS_ACUITY_SCORE: 54

## 2025-07-25 ASSESSMENT — COLUMBIA-SUICIDE SEVERITY RATING SCALE - C-SSRS
6. HAVE YOU EVER DONE ANYTHING, STARTED TO DO ANYTHING, OR PREPARED TO DO ANYTHING TO END YOUR LIFE?: NO
2. HAVE YOU ACTUALLY HAD ANY THOUGHTS OF KILLING YOURSELF IN THE PAST MONTH?: NO
1. IN THE PAST MONTH, HAVE YOU WISHED YOU WERE DEAD OR WISHED YOU COULD GO TO SLEEP AND NOT WAKE UP?: NO

## 2025-07-26 ENCOUNTER — APPOINTMENT (OUTPATIENT)
Dept: OCCUPATIONAL THERAPY | Facility: CLINIC | Age: 70
End: 2025-07-26
Payer: COMMERCIAL

## 2025-07-26 ENCOUNTER — APPOINTMENT (OUTPATIENT)
Dept: PHYSICAL THERAPY | Facility: CLINIC | Age: 70
End: 2025-07-26
Payer: COMMERCIAL

## 2025-07-26 ENCOUNTER — APPOINTMENT (OUTPATIENT)
Dept: CARDIOLOGY | Facility: CLINIC | Age: 70
End: 2025-07-26
Payer: COMMERCIAL

## 2025-07-26 LAB
ALBUMIN SERPL BCG-MCNC: 4 G/DL (ref 3.5–5.2)
ALP SERPL-CCNC: 88 U/L (ref 40–150)
ALT SERPL W P-5'-P-CCNC: 19 U/L (ref 0–50)
ANION GAP SERPL CALCULATED.3IONS-SCNC: 9 MMOL/L (ref 7–15)
AST SERPL W P-5'-P-CCNC: 26 U/L (ref 0–45)
BILIRUB SERPL-MCNC: 0.4 MG/DL
BUN SERPL-MCNC: 12.1 MG/DL (ref 8–23)
CALCIUM SERPL-MCNC: 8.8 MG/DL (ref 8.8–10.4)
CHLORIDE SERPL-SCNC: 96 MMOL/L (ref 98–107)
CREAT SERPL-MCNC: 0.86 MG/DL (ref 0.51–0.95)
EGFRCR SERPLBLD CKD-EPI 2021: 72 ML/MIN/1.73M2
ERYTHROCYTE [DISTWIDTH] IN BLOOD BY AUTOMATED COUNT: 13.2 % (ref 10–15)
GLUCOSE SERPL-MCNC: 128 MG/DL (ref 70–99)
HCO3 SERPL-SCNC: 27 MMOL/L (ref 22–29)
HCT VFR BLD AUTO: 35.6 % (ref 35–47)
HGB BLD-MCNC: 11.9 G/DL (ref 11.7–15.7)
LVEF ECHO: NORMAL
MCH RBC QN AUTO: 30.6 PG (ref 26.5–33)
MCHC RBC AUTO-ENTMCNC: 33.4 G/DL (ref 31.5–36.5)
MCV RBC AUTO: 92 FL (ref 78–100)
PLATELET # BLD AUTO: 144 10E3/UL (ref 150–450)
POTASSIUM SERPL-SCNC: 4.3 MMOL/L (ref 3.4–5.3)
PROT SERPL-MCNC: 6.3 G/DL (ref 6.4–8.3)
RBC # BLD AUTO: 3.89 10E6/UL (ref 3.8–5.2)
SODIUM SERPL-SCNC: 132 MMOL/L (ref 135–145)
WBC # BLD AUTO: 5.8 10E3/UL (ref 4–11)

## 2025-07-26 PROCEDURE — 97165 OT EVAL LOW COMPLEX 30 MIN: CPT | Mod: GO | Performed by: OCCUPATIONAL THERAPIST

## 2025-07-26 PROCEDURE — 250N000012 HC RX MED GY IP 250 OP 636 PS 637

## 2025-07-26 PROCEDURE — 93306 TTE W/DOPPLER COMPLETE: CPT

## 2025-07-26 PROCEDURE — 250N000013 HC RX MED GY IP 250 OP 250 PS 637

## 2025-07-26 PROCEDURE — 250N000009 HC RX 250

## 2025-07-26 PROCEDURE — 999N000157 HC STATISTIC RCP TIME EA 10 MIN

## 2025-07-26 PROCEDURE — 80053 COMPREHEN METABOLIC PANEL: CPT

## 2025-07-26 PROCEDURE — 94640 AIRWAY INHALATION TREATMENT: CPT | Mod: 76

## 2025-07-26 PROCEDURE — 85018 HEMOGLOBIN: CPT

## 2025-07-26 PROCEDURE — 250N000011 HC RX IP 250 OP 636

## 2025-07-26 PROCEDURE — 94640 AIRWAY INHALATION TREATMENT: CPT

## 2025-07-26 PROCEDURE — 97162 PT EVAL MOD COMPLEX 30 MIN: CPT | Mod: GP

## 2025-07-26 PROCEDURE — 258N000003 HC RX IP 258 OP 636

## 2025-07-26 PROCEDURE — 999N000156 HC STATISTIC RCP CONSULT EA 30 MIN

## 2025-07-26 PROCEDURE — 36415 COLL VENOUS BLD VENIPUNCTURE: CPT

## 2025-07-26 PROCEDURE — 93306 TTE W/DOPPLER COMPLETE: CPT | Mod: 26 | Performed by: INTERNAL MEDICINE

## 2025-07-26 PROCEDURE — 99222 1ST HOSP IP/OBS MODERATE 55: CPT | Mod: GC | Performed by: FAMILY MEDICINE

## 2025-07-26 PROCEDURE — 120N000001 HC R&B MED SURG/OB

## 2025-07-26 PROCEDURE — 97535 SELF CARE MNGMENT TRAINING: CPT | Mod: GP

## 2025-07-26 RX ORDER — IPRATROPIUM BROMIDE AND ALBUTEROL SULFATE 2.5; .5 MG/3ML; MG/3ML
3 SOLUTION RESPIRATORY (INHALATION) EVERY 6 HOURS PRN
Status: DISCONTINUED | OUTPATIENT
Start: 2025-07-26 | End: 2025-07-29 | Stop reason: HOSPADM

## 2025-07-26 RX ADMIN — GABAPENTIN 300 MG: 300 CAPSULE ORAL at 19:30

## 2025-07-26 RX ADMIN — AMLODIPINE BESYLATE 5 MG: 5 TABLET ORAL at 08:28

## 2025-07-26 RX ADMIN — QUETIAPINE 300 MG: 100 TABLET ORAL at 21:13

## 2025-07-26 RX ADMIN — ENOXAPARIN SODIUM 40 MG: 40 INJECTION SUBCUTANEOUS at 18:08

## 2025-07-26 RX ADMIN — Medication 400 MG: at 08:29

## 2025-07-26 RX ADMIN — Medication 400 MG: at 19:30

## 2025-07-26 RX ADMIN — QUETIAPINE 100 MG: 100 TABLET ORAL at 08:29

## 2025-07-26 RX ADMIN — PREDNISONE 40 MG: 20 TABLET ORAL at 08:29

## 2025-07-26 RX ADMIN — METHADONE HYDROCHLORIDE 50 MG: 10 TABLET ORAL at 19:30

## 2025-07-26 RX ADMIN — ACETAMINOPHEN 650 MG: 325 TABLET ORAL at 16:10

## 2025-07-26 RX ADMIN — IPRATROPIUM BROMIDE AND ALBUTEROL SULFATE 3 ML: .5; 3 SOLUTION RESPIRATORY (INHALATION) at 12:50

## 2025-07-26 RX ADMIN — DOCUSATE SODIUM 100 MG: 100 CAPSULE, LIQUID FILLED ORAL at 19:30

## 2025-07-26 RX ADMIN — SODIUM CHLORIDE 500 ML: 0.9 INJECTION, SOLUTION INTRAVENOUS at 18:07

## 2025-07-26 RX ADMIN — GABAPENTIN 300 MG: 300 CAPSULE ORAL at 13:48

## 2025-07-26 RX ADMIN — DOCUSATE SODIUM 100 MG: 100 CAPSULE, LIQUID FILLED ORAL at 08:30

## 2025-07-26 RX ADMIN — METHADONE HYDROCHLORIDE 50 MG: 10 TABLET ORAL at 08:29

## 2025-07-26 RX ADMIN — Medication 400 MG: at 13:48

## 2025-07-26 RX ADMIN — IPRATROPIUM BROMIDE AND ALBUTEROL SULFATE 3 ML: .5; 3 SOLUTION RESPIRATORY (INHALATION) at 09:28

## 2025-07-26 RX ADMIN — FUROSEMIDE 20 MG: 20 TABLET ORAL at 08:30

## 2025-07-26 RX ADMIN — GABAPENTIN 300 MG: 300 CAPSULE ORAL at 08:29

## 2025-07-26 RX ADMIN — FLUOXETINE HYDROCHLORIDE 40 MG: 20 CAPSULE ORAL at 08:29

## 2025-07-26 ASSESSMENT — ACTIVITIES OF DAILY LIVING (ADL)
ADLS_ACUITY_SCORE: 43
ADLS_ACUITY_SCORE: 43
ADLS_ACUITY_SCORE: 48
ADLS_ACUITY_SCORE: 43
ADLS_ACUITY_SCORE: 48
DEPENDENT_IADLS:: INDEPENDENT
ADLS_ACUITY_SCORE: 43
ADLS_ACUITY_SCORE: 44
ADLS_ACUITY_SCORE: 43
ADLS_ACUITY_SCORE: 48
ADLS_ACUITY_SCORE: 43
ADLS_ACUITY_SCORE: 48

## 2025-07-27 LAB
ANION GAP SERPL CALCULATED.3IONS-SCNC: 9 MMOL/L (ref 7–15)
BUN SERPL-MCNC: 12.9 MG/DL (ref 8–23)
CALCIUM SERPL-MCNC: 9 MG/DL (ref 8.8–10.4)
CHLORIDE SERPL-SCNC: 97 MMOL/L (ref 98–107)
CREAT SERPL-MCNC: 0.84 MG/DL (ref 0.51–0.95)
EGFRCR SERPLBLD CKD-EPI 2021: 74 ML/MIN/1.73M2
ERYTHROCYTE [DISTWIDTH] IN BLOOD BY AUTOMATED COUNT: 13.4 % (ref 10–15)
GLUCOSE SERPL-MCNC: 93 MG/DL (ref 70–99)
HCO3 SERPL-SCNC: 29 MMOL/L (ref 22–29)
HCT VFR BLD AUTO: 33.3 % (ref 35–47)
HGB BLD-MCNC: 11.2 G/DL (ref 11.7–15.7)
MCH RBC QN AUTO: 30.9 PG (ref 26.5–33)
MCHC RBC AUTO-ENTMCNC: 33.6 G/DL (ref 31.5–36.5)
MCV RBC AUTO: 92 FL (ref 78–100)
PLATELET # BLD AUTO: 135 10E3/UL (ref 150–450)
POTASSIUM SERPL-SCNC: 3.9 MMOL/L (ref 3.4–5.3)
RBC # BLD AUTO: 3.63 10E6/UL (ref 3.8–5.2)
SODIUM SERPL-SCNC: 135 MMOL/L (ref 135–145)
WBC # BLD AUTO: 5.8 10E3/UL (ref 4–11)

## 2025-07-27 PROCEDURE — 99232 SBSQ HOSP IP/OBS MODERATE 35: CPT | Mod: GC | Performed by: FAMILY MEDICINE

## 2025-07-27 PROCEDURE — 120N000001 HC R&B MED SURG/OB

## 2025-07-27 PROCEDURE — 80048 BASIC METABOLIC PNL TOTAL CA: CPT

## 2025-07-27 PROCEDURE — 250N000012 HC RX MED GY IP 250 OP 636 PS 637

## 2025-07-27 PROCEDURE — 250N000013 HC RX MED GY IP 250 OP 250 PS 637

## 2025-07-27 PROCEDURE — 36415 COLL VENOUS BLD VENIPUNCTURE: CPT

## 2025-07-27 PROCEDURE — 85014 HEMATOCRIT: CPT

## 2025-07-27 PROCEDURE — 250N000011 HC RX IP 250 OP 636

## 2025-07-27 RX ADMIN — ENOXAPARIN SODIUM 40 MG: 40 INJECTION SUBCUTANEOUS at 18:00

## 2025-07-27 RX ADMIN — GABAPENTIN 300 MG: 300 CAPSULE ORAL at 13:07

## 2025-07-27 RX ADMIN — FLUOXETINE HYDROCHLORIDE 40 MG: 20 CAPSULE ORAL at 07:44

## 2025-07-27 RX ADMIN — METHADONE HYDROCHLORIDE 50 MG: 10 TABLET ORAL at 07:44

## 2025-07-27 RX ADMIN — DOCUSATE SODIUM 100 MG: 100 CAPSULE, LIQUID FILLED ORAL at 21:02

## 2025-07-27 RX ADMIN — Medication 400 MG: at 13:07

## 2025-07-27 RX ADMIN — GABAPENTIN 300 MG: 300 CAPSULE ORAL at 21:02

## 2025-07-27 RX ADMIN — GABAPENTIN 300 MG: 300 CAPSULE ORAL at 07:44

## 2025-07-27 RX ADMIN — DOCUSATE SODIUM 100 MG: 100 CAPSULE, LIQUID FILLED ORAL at 07:44

## 2025-07-27 RX ADMIN — QUETIAPINE 100 MG: 100 TABLET ORAL at 07:44

## 2025-07-27 RX ADMIN — AMLODIPINE BESYLATE 5 MG: 5 TABLET ORAL at 07:43

## 2025-07-27 RX ADMIN — QUETIAPINE 300 MG: 100 TABLET ORAL at 21:02

## 2025-07-27 RX ADMIN — Medication 400 MG: at 21:01

## 2025-07-27 RX ADMIN — Medication 400 MG: at 07:44

## 2025-07-27 RX ADMIN — PREDNISONE 40 MG: 20 TABLET ORAL at 07:43

## 2025-07-27 RX ADMIN — METHADONE HYDROCHLORIDE 50 MG: 10 TABLET ORAL at 21:01

## 2025-07-27 ASSESSMENT — ACTIVITIES OF DAILY LIVING (ADL)
ADLS_ACUITY_SCORE: 48

## 2025-07-28 ENCOUNTER — APPOINTMENT (OUTPATIENT)
Dept: PHYSICAL THERAPY | Facility: CLINIC | Age: 70
End: 2025-07-28
Payer: COMMERCIAL

## 2025-07-28 LAB
ANION GAP SERPL CALCULATED.3IONS-SCNC: 9 MMOL/L (ref 7–15)
BUN SERPL-MCNC: 11.7 MG/DL (ref 8–23)
CALCIUM SERPL-MCNC: 9 MG/DL (ref 8.8–10.4)
CHLORIDE SERPL-SCNC: 97 MMOL/L (ref 98–107)
CREAT SERPL-MCNC: 0.91 MG/DL (ref 0.51–0.95)
EGFRCR SERPLBLD CKD-EPI 2021: 67 ML/MIN/1.73M2
ERYTHROCYTE [DISTWIDTH] IN BLOOD BY AUTOMATED COUNT: 13.4 % (ref 10–15)
GLUCOSE SERPL-MCNC: 78 MG/DL (ref 70–99)
HCO3 SERPL-SCNC: 28 MMOL/L (ref 22–29)
HCT VFR BLD AUTO: 35.2 % (ref 35–47)
HGB BLD-MCNC: 11.6 G/DL (ref 11.7–15.7)
MCH RBC QN AUTO: 30.5 PG (ref 26.5–33)
MCHC RBC AUTO-ENTMCNC: 33 G/DL (ref 31.5–36.5)
MCV RBC AUTO: 93 FL (ref 78–100)
PLATELET # BLD AUTO: 136 10E3/UL (ref 150–450)
POTASSIUM SERPL-SCNC: 3.9 MMOL/L (ref 3.4–5.3)
RBC # BLD AUTO: 3.8 10E6/UL (ref 3.8–5.2)
SODIUM SERPL-SCNC: 134 MMOL/L (ref 135–145)
WBC # BLD AUTO: 6 10E3/UL (ref 4–11)

## 2025-07-28 PROCEDURE — 250N000013 HC RX MED GY IP 250 OP 250 PS 637

## 2025-07-28 PROCEDURE — 97535 SELF CARE MNGMENT TRAINING: CPT | Mod: GP

## 2025-07-28 PROCEDURE — 120N000001 HC R&B MED SURG/OB

## 2025-07-28 PROCEDURE — 250N000012 HC RX MED GY IP 250 OP 636 PS 637

## 2025-07-28 PROCEDURE — 85014 HEMATOCRIT: CPT

## 2025-07-28 PROCEDURE — 36415 COLL VENOUS BLD VENIPUNCTURE: CPT

## 2025-07-28 PROCEDURE — 250N000011 HC RX IP 250 OP 636

## 2025-07-28 PROCEDURE — 250N000013 HC RX MED GY IP 250 OP 250 PS 637: Performed by: INTERNAL MEDICINE

## 2025-07-28 PROCEDURE — 80048 BASIC METABOLIC PNL TOTAL CA: CPT

## 2025-07-28 PROCEDURE — 99232 SBSQ HOSP IP/OBS MODERATE 35: CPT | Mod: GC | Performed by: INTERNAL MEDICINE

## 2025-07-28 PROCEDURE — 250N000011 HC RX IP 250 OP 636: Mod: JW | Performed by: INTERNAL MEDICINE

## 2025-07-28 RX ORDER — METHYLPREDNISOLONE SODIUM SUCCINATE 125 MG/2ML
60 INJECTION INTRAMUSCULAR; INTRAVENOUS EVERY 6 HOURS
Status: DISCONTINUED | OUTPATIENT
Start: 2025-07-28 | End: 2025-07-29 | Stop reason: HOSPADM

## 2025-07-28 RX ORDER — UMECLIDINIUM BROMIDE AND VILANTEROL TRIFENATATE 62.5; 25 UG/1; UG/1
1 POWDER RESPIRATORY (INHALATION) DAILY
Status: DISCONTINUED | OUTPATIENT
Start: 2025-07-28 | End: 2025-07-29 | Stop reason: HOSPADM

## 2025-07-28 RX ORDER — PREDNISONE 20 MG/1
40 TABLET ORAL DAILY
Status: CANCELLED | OUTPATIENT
Start: 2025-07-29

## 2025-07-28 RX ADMIN — METHADONE HYDROCHLORIDE 50 MG: 10 TABLET ORAL at 19:45

## 2025-07-28 RX ADMIN — QUETIAPINE 300 MG: 100 TABLET ORAL at 21:33

## 2025-07-28 RX ADMIN — GABAPENTIN 300 MG: 300 CAPSULE ORAL at 08:56

## 2025-07-28 RX ADMIN — FUROSEMIDE 20 MG: 20 TABLET ORAL at 08:57

## 2025-07-28 RX ADMIN — GABAPENTIN 300 MG: 300 CAPSULE ORAL at 19:45

## 2025-07-28 RX ADMIN — DOCUSATE SODIUM 100 MG: 100 CAPSULE, LIQUID FILLED ORAL at 19:45

## 2025-07-28 RX ADMIN — Medication 400 MG: at 13:56

## 2025-07-28 RX ADMIN — UMECLIDINIUM BROMIDE AND VILANTEROL TRIFENATATE 1 PUFF: 62.5; 25 POWDER RESPIRATORY (INHALATION) at 12:10

## 2025-07-28 RX ADMIN — ENOXAPARIN SODIUM 40 MG: 40 INJECTION SUBCUTANEOUS at 19:45

## 2025-07-28 RX ADMIN — FLUOXETINE HYDROCHLORIDE 40 MG: 20 CAPSULE ORAL at 08:56

## 2025-07-28 RX ADMIN — Medication 400 MG: at 19:45

## 2025-07-28 RX ADMIN — Medication 400 MG: at 08:57

## 2025-07-28 RX ADMIN — METHYLPREDNISOLONE SODIUM SUCCINATE 62.5 MG: 125 INJECTION, POWDER, FOR SOLUTION INTRAMUSCULAR; INTRAVENOUS at 13:56

## 2025-07-28 RX ADMIN — AMLODIPINE BESYLATE 5 MG: 5 TABLET ORAL at 08:55

## 2025-07-28 RX ADMIN — METHADONE HYDROCHLORIDE 50 MG: 10 TABLET ORAL at 08:57

## 2025-07-28 RX ADMIN — DOCUSATE SODIUM 100 MG: 100 CAPSULE, LIQUID FILLED ORAL at 08:57

## 2025-07-28 RX ADMIN — GABAPENTIN 300 MG: 300 CAPSULE ORAL at 13:56

## 2025-07-28 RX ADMIN — QUETIAPINE 100 MG: 100 TABLET ORAL at 08:56

## 2025-07-28 RX ADMIN — PREDNISONE 40 MG: 20 TABLET ORAL at 08:55

## 2025-07-28 RX ADMIN — METHYLPREDNISOLONE SODIUM SUCCINATE 62.5 MG: 125 INJECTION, POWDER, FOR SOLUTION INTRAMUSCULAR; INTRAVENOUS at 17:50

## 2025-07-28 ASSESSMENT — ACTIVITIES OF DAILY LIVING (ADL)
ADLS_ACUITY_SCORE: 48
ADLS_ACUITY_SCORE: 47
ADLS_ACUITY_SCORE: 48
ADLS_ACUITY_SCORE: 48
ADLS_ACUITY_SCORE: 47
ADLS_ACUITY_SCORE: 48
ADLS_ACUITY_SCORE: 47
ADLS_ACUITY_SCORE: 48
ADLS_ACUITY_SCORE: 47
ADLS_ACUITY_SCORE: 47
ADLS_ACUITY_SCORE: 48
ADLS_ACUITY_SCORE: 47

## 2025-07-29 VITALS
HEIGHT: 66 IN | BODY MASS INDEX: 26.5 KG/M2 | SYSTOLIC BLOOD PRESSURE: 133 MMHG | WEIGHT: 164.9 LBS | TEMPERATURE: 97.8 F | RESPIRATION RATE: 18 BRPM | DIASTOLIC BLOOD PRESSURE: 76 MMHG | OXYGEN SATURATION: 92 % | HEART RATE: 73 BPM

## 2025-07-29 LAB
ANION GAP SERPL CALCULATED.3IONS-SCNC: 11 MMOL/L (ref 7–15)
BUN SERPL-MCNC: 12.4 MG/DL (ref 8–23)
CALCIUM SERPL-MCNC: 9.4 MG/DL (ref 8.8–10.4)
CHLORIDE SERPL-SCNC: 92 MMOL/L (ref 98–107)
CREAT SERPL-MCNC: 0.77 MG/DL (ref 0.51–0.95)
EGFRCR SERPLBLD CKD-EPI 2021: 82 ML/MIN/1.73M2
ERYTHROCYTE [DISTWIDTH] IN BLOOD BY AUTOMATED COUNT: 12.5 % (ref 10–15)
GLUCOSE SERPL-MCNC: 139 MG/DL (ref 70–99)
HCO3 SERPL-SCNC: 30 MMOL/L (ref 22–29)
HCT VFR BLD AUTO: 36.9 % (ref 35–47)
HGB BLD-MCNC: 12.5 G/DL (ref 11.7–15.7)
MCH RBC QN AUTO: 30.6 PG (ref 26.5–33)
MCHC RBC AUTO-ENTMCNC: 33.9 G/DL (ref 31.5–36.5)
MCV RBC AUTO: 90 FL (ref 78–100)
OSMOLALITY SERPL: 281 MMOL/KG (ref 280–301)
OSMOLALITY UR: 294 MMOL/KG (ref 100–1200)
PLATELET # BLD AUTO: 147 10E3/UL (ref 150–450)
POTASSIUM SERPL-SCNC: 4.2 MMOL/L (ref 3.4–5.3)
RBC # BLD AUTO: 4.08 10E6/UL (ref 3.8–5.2)
SODIUM SERPL-SCNC: 133 MMOL/L (ref 135–145)
SODIUM UR-SCNC: 48 MMOL/L
WBC # BLD AUTO: 5.4 10E3/UL (ref 4–11)

## 2025-07-29 PROCEDURE — 250N000013 HC RX MED GY IP 250 OP 250 PS 637

## 2025-07-29 PROCEDURE — 250N000011 HC RX IP 250 OP 636: Performed by: INTERNAL MEDICINE

## 2025-07-29 PROCEDURE — 83930 ASSAY OF BLOOD OSMOLALITY: CPT | Performed by: INTERNAL MEDICINE

## 2025-07-29 PROCEDURE — 99238 HOSP IP/OBS DSCHRG MGMT 30/<: CPT | Mod: GC

## 2025-07-29 PROCEDURE — 84300 ASSAY OF URINE SODIUM: CPT | Performed by: INTERNAL MEDICINE

## 2025-07-29 PROCEDURE — 36415 COLL VENOUS BLD VENIPUNCTURE: CPT

## 2025-07-29 PROCEDURE — 80048 BASIC METABOLIC PNL TOTAL CA: CPT

## 2025-07-29 PROCEDURE — 85027 COMPLETE CBC AUTOMATED: CPT

## 2025-07-29 PROCEDURE — 83935 ASSAY OF URINE OSMOLALITY: CPT | Performed by: INTERNAL MEDICINE

## 2025-07-29 RX ORDER — ALBUTEROL SULFATE 90 UG/1
INHALANT RESPIRATORY (INHALATION)
Qty: 18 G | Refills: 2 | Status: CANCELLED | OUTPATIENT
Start: 2025-07-29

## 2025-07-29 RX ORDER — INHALER, ASSIST DEVICES
1 SPACER (EA) MISCELLANEOUS SEE ADMIN INSTRUCTIONS
Qty: 1 EACH | Refills: 0 | Status: CANCELLED | OUTPATIENT
Start: 2025-07-29

## 2025-07-29 RX ORDER — INHALER, ASSIST DEVICES
1 SPACER (EA) MISCELLANEOUS SEE ADMIN INSTRUCTIONS
Qty: 1 EACH | Refills: 0 | Status: SHIPPED | OUTPATIENT
Start: 2025-07-29

## 2025-07-29 RX ORDER — FLUTICASONE FUROATE, UMECLIDINIUM BROMIDE AND VILANTEROL TRIFENATATE 100; 62.5; 25 UG/1; UG/1; UG/1
1 POWDER RESPIRATORY (INHALATION) DAILY
Qty: 60 EACH | Refills: 2 | Status: CANCELLED | OUTPATIENT
Start: 2025-07-29

## 2025-07-29 RX ORDER — ALBUTEROL SULFATE 90 UG/1
INHALANT RESPIRATORY (INHALATION)
Qty: 18 G | Refills: 3 | Status: SHIPPED | OUTPATIENT
Start: 2025-07-29

## 2025-07-29 RX ORDER — PREDNISONE 20 MG/1
TABLET ORAL
Qty: 40 TABLET | Refills: 0 | Status: SHIPPED | OUTPATIENT
Start: 2025-07-29 | End: 2025-08-12

## 2025-07-29 RX ORDER — FLUTICASONE FUROATE, UMECLIDINIUM BROMIDE AND VILANTEROL TRIFENATATE 100; 62.5; 25 UG/1; UG/1; UG/1
1 POWDER RESPIRATORY (INHALATION) DAILY
Qty: 60 EACH | Refills: 2 | Status: SHIPPED | OUTPATIENT
Start: 2025-07-29

## 2025-07-29 RX ADMIN — Medication 400 MG: at 08:30

## 2025-07-29 RX ADMIN — METHYLPREDNISOLONE SODIUM SUCCINATE 62.5 MG: 125 INJECTION, POWDER, FOR SOLUTION INTRAMUSCULAR; INTRAVENOUS at 00:52

## 2025-07-29 RX ADMIN — FUROSEMIDE 20 MG: 20 TABLET ORAL at 08:30

## 2025-07-29 RX ADMIN — METHYLPREDNISOLONE SODIUM SUCCINATE 62.5 MG: 125 INJECTION, POWDER, FOR SOLUTION INTRAMUSCULAR; INTRAVENOUS at 12:28

## 2025-07-29 RX ADMIN — QUETIAPINE 100 MG: 100 TABLET ORAL at 08:30

## 2025-07-29 RX ADMIN — DOCUSATE SODIUM 100 MG: 100 CAPSULE, LIQUID FILLED ORAL at 08:30

## 2025-07-29 RX ADMIN — Medication 400 MG: at 14:30

## 2025-07-29 RX ADMIN — FLUOXETINE HYDROCHLORIDE 40 MG: 20 CAPSULE ORAL at 08:29

## 2025-07-29 RX ADMIN — METOPROLOL SUCCINATE 100 MG: 100 TABLET, EXTENDED RELEASE ORAL at 08:30

## 2025-07-29 RX ADMIN — METHADONE HYDROCHLORIDE 50 MG: 10 TABLET ORAL at 08:29

## 2025-07-29 RX ADMIN — GABAPENTIN 300 MG: 300 CAPSULE ORAL at 08:30

## 2025-07-29 RX ADMIN — AMLODIPINE BESYLATE 5 MG: 5 TABLET ORAL at 08:30

## 2025-07-29 RX ADMIN — GABAPENTIN 300 MG: 300 CAPSULE ORAL at 14:30

## 2025-07-29 RX ADMIN — METHYLPREDNISOLONE SODIUM SUCCINATE 62.5 MG: 125 INJECTION, POWDER, FOR SOLUTION INTRAMUSCULAR; INTRAVENOUS at 06:19

## 2025-07-29 ASSESSMENT — ACTIVITIES OF DAILY LIVING (ADL)
ADLS_ACUITY_SCORE: 47

## 2025-07-30 ENCOUNTER — PATIENT OUTREACH (OUTPATIENT)
Dept: CARE COORDINATION | Facility: CLINIC | Age: 70
End: 2025-07-30
Payer: COMMERCIAL

## 2025-07-30 NOTE — PROGRESS NOTES
Connected Care Resource Center: Connected [facility] [place]    Background: Transitional Care Management program identified per system criteria and reviewed by Connected Care Resource Center team for possible outreach.    Assessment: Upon chart review, CCRC Team member will not proceed with patient outreach related to this episode of Transitional Care Management program due to reason below:    Non-[facility] TCU: CCRC team member noted patient discharged to TCU/ARU/LTACH. Patient is not established with a [facility] currently supported by Primary Care-Care Coordination therefore handoff to Primary Care-Care Coordination is not appropriate at this time.    Plan: Transitional Care Management episode addressed appropriately per reason noted above.      Allison Welch MA  [facility] Care Resource [place], [facility]    *Connected Care Resource Team does NOT follow patient ongoing. Referrals are identified based on internal discharge reports and the outreach is to ensure patient has an understanding of their discharge instructions.

## 2025-08-17 ENCOUNTER — APPOINTMENT (OUTPATIENT)
Dept: CT IMAGING | Facility: CLINIC | Age: 70
End: 2025-08-17
Attending: EMERGENCY MEDICINE
Payer: COMMERCIAL

## 2025-08-17 ENCOUNTER — HOSPITAL ENCOUNTER (EMERGENCY)
Facility: CLINIC | Age: 70
Discharge: HOME | End: 2025-08-17
Attending: EMERGENCY MEDICINE
Payer: COMMERCIAL

## 2025-08-17 VITALS
DIASTOLIC BLOOD PRESSURE: 74 MMHG | RESPIRATION RATE: 16 BRPM | WEIGHT: 173.9 LBS | SYSTOLIC BLOOD PRESSURE: 141 MMHG | HEIGHT: 66 IN | OXYGEN SATURATION: 93 % | BODY MASS INDEX: 27.95 KG/M2 | HEART RATE: 58 BPM | TEMPERATURE: 98.4 F
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
## 2025-08-17 DIAGNOSIS — S01.01XA SCALP LACERATION, INITIAL ENCOUNTER: Primary | ICD-10-CM

## 2025-08-17 PROCEDURE — 72125 CT NECK SPINE W/O DYE: CPT

## 2025-08-17 PROCEDURE — 12002 RPR S/N/AX/GEN/TRNK2.6-7.5CM: CPT

## 2025-08-17 PROCEDURE — 70450 CT HEAD/BRAIN W/O DYE: CPT

## 2025-08-17 PROCEDURE — 250N000009 HC RX 250: Performed by: EMERGENCY MEDICINE

## 2025-08-17 PROCEDURE — 99284 EMERGENCY DEPT VISIT MOD MDM: CPT | Mod: 25 | Performed by: EMERGENCY MEDICINE

## 2025-08-17 RX ORDER — GINSENG 100 MG
CAPSULE ORAL ONCE
Status: COMPLETED | OUTPATIENT
Start: 2025-08-17 | End: 2025-08-17

## 2025-08-17 RX ADMIN — BACITRACIN: 500 OINTMENT TOPICAL at 18:53

## 2025-08-17 ASSESSMENT — ACTIVITIES OF DAILY LIVING (ADL)
ADLS_ACUITY_SCORE: 59